# Patient Record
Sex: FEMALE | Race: WHITE | NOT HISPANIC OR LATINO | Employment: OTHER | URBAN - METROPOLITAN AREA
[De-identification: names, ages, dates, MRNs, and addresses within clinical notes are randomized per-mention and may not be internally consistent; named-entity substitution may affect disease eponyms.]

---

## 2017-01-19 ENCOUNTER — OFFICE VISIT (OUTPATIENT)
Dept: ENDOCRINOLOGY | Age: 77
End: 2017-01-19

## 2017-01-19 VITALS
DIASTOLIC BLOOD PRESSURE: 62 MMHG | BODY MASS INDEX: 32.09 KG/M2 | HEART RATE: 74 BPM | OXYGEN SATURATION: 97 % | SYSTOLIC BLOOD PRESSURE: 124 MMHG | HEIGHT: 65 IN | WEIGHT: 192.6 LBS

## 2017-01-19 DIAGNOSIS — E78.5 HYPERLIPIDEMIA, UNSPECIFIED HYPERLIPIDEMIA TYPE: Primary | ICD-10-CM

## 2017-01-19 DIAGNOSIS — E04.2 NONTOXIC MULTINODULAR GOITER: ICD-10-CM

## 2017-01-19 DIAGNOSIS — M48.061 SPINAL STENOSIS OF LUMBAR REGION: ICD-10-CM

## 2017-01-19 DIAGNOSIS — I10 ESSENTIAL HYPERTENSION: ICD-10-CM

## 2017-01-19 DIAGNOSIS — E11.42 TYPE 2 DIABETES MELLITUS WITH PERIPHERAL NEUROPATHY (HCC): ICD-10-CM

## 2017-01-19 PROBLEM — F41.0 PANIC ATTACK: Status: ACTIVE | Noted: 2017-01-19

## 2017-01-19 PROCEDURE — 99204 OFFICE O/P NEW MOD 45 MIN: CPT | Performed by: INTERNAL MEDICINE

## 2017-01-19 RX ORDER — DILTIAZEM HYDROCHLORIDE 120 MG/1
120 TABLET, FILM COATED ORAL DAILY
COMMUNITY
End: 2017-01-19

## 2017-01-19 RX ORDER — CHOLECALCIFEROL (VITAMIN D3) 50 MCG
2000 TABLET ORAL
COMMUNITY
End: 2017-01-19

## 2017-01-19 RX ORDER — NITROFURANTOIN 25; 75 MG/1; MG/1
100 CAPSULE ORAL 2 TIMES DAILY
COMMUNITY
Start: 2017-01-18 | End: 2017-05-19

## 2017-01-19 NOTE — PROGRESS NOTES
Subjective   Monica Pagan is a 76 y.o. female.     HPI Comments: NEW PT REF BY DR JAKE POLK JR  FOR DM 2, MNG/ TESTING BS 1 X DAY / LAST DM EYE EXAM FEB 2016/ LAST DM FOOT EXAM TODAY WITH DR GONZÁLES     Diabetes   Hypoglycemia symptoms include nervousness/anxiousness and tremors. Associated symptoms include fatigue. Chest pain:  PVCS.   Goiter   Associated symptoms include abdominal pain, chills, fatigue, a fever, joint swelling and numbness (FEET AND LEGS AND BONES ). Chest pain:  PVCS.      Patient is a 76-year-old female who was referred for thyroid and diabetes consultation by Dr. Polk.  In 2015, an incidental left thyroid nodule was noted on CT of the cervical spine.  She had a thyroid ultrasound done in March 2016 which showed a multinodular goiter with the largest not Jud measuring 2.8 cm on the left.  Thyroid function tests were normal.  She denies any previous history of head/neck radiation therapy.  She denies any neck soreness.  She is not on thyroid medication.    She has known diabetes mellitus since 2008 and was started on insulin at that time.  She has been on Levemir 10 units every morning and metformin 500 mg twice a day.  Her fasting blood sugars have been higher over the past month and ranges from 170-230.  She denies any hypoglycemic episode.  Her last meal was at 7 AM.    Her last eye examination was in February 2016.  She has no history of retinopathy.  She denies blurry vision.  She complains of numbness, tingling and burning in her legs.  She was tried on Lyrica and gabapentin in the past which caused sedation.  She has never used Cymbalta.    She has cervical and lumbar degenerative disc disease and spinal stenosis.  She had a previous lumbar surgery.  She has chronic pain and has been on Norco prescribed by Dr. Schuler.  She follows with Dr. Cortes at [facility].    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 Irving.  She has not had a biopsy in the past.    She has  "hyperlipidemia and is not on medication.  She has no significant weight change in the past 6 months.  Lipid profile done in July 2016 are as follows.  Cholesterol 214.  HDL 50.  .  She has not used any cholesterol medication in the past.      She has hypertension and has been on losartan 50 mg once a day and diltiazem  mg once a day.  She denies any previous history of myocardial infarction or stroke.    She had a sleep study in the past and was advised to sleep on her side.  She occasionally agrees wakes herself up snoring.     She is under treatment for urinary tract infection with Macrobid.  The following portions of the patient's history were reviewed and updated as appropriate: allergies, current medications, past family history, past medical history, past social history, past surgical history and problem list.    Review of Systems   Constitutional: Positive for chills, fatigue and fever.   HENT: Negative.    Eyes: Negative.    Respiratory: Negative.    Cardiovascular: Chest pain:  PVCS.   Gastrointestinal: Positive for abdominal distention, abdominal pain and diarrhea. Constipation: IBS    Endocrine: Negative.    Genitourinary: Positive for difficulty urinating (HAS UTI ON ANTIBIOTICS ), frequency and urgency.   Musculoskeletal: Positive for back pain and joint swelling.   Skin: Negative.    Allergic/Immunologic: Negative.    Neurological: Positive for tremors and numbness (FEET AND LEGS AND BONES ).   Hematological: Negative.    Psychiatric/Behavioral: Positive for sleep disturbance. The patient is nervous/anxious.        Objective      Vitals:    01/19/17 1025   BP: 124/62   BP Location: Right arm   Patient Position: Sitting   Cuff Size: Large Adult   Pulse: 74   SpO2: 97%   Weight: 192 lb 9.6 oz (87.4 kg)   Height: 64.5\" (163.8 cm)     Physical Exam   Constitutional: She is oriented to person, place, and time. She appears well-developed and well-nourished. No distress.   HENT:   Head: " Normocephalic.   Nose: Nose normal.   Mouth/Throat: No oropharyngeal exudate.   Eyes: Conjunctivae and EOM are normal. Right eye exhibits no discharge. Left eye exhibits no discharge. No scleral icterus.   Neck: Neck supple. No JVD present. No tracheal deviation present. Thyromegaly present.   Thyroid barely palpable, soft, nontender   Cardiovascular: Normal rate, regular rhythm, normal heart sounds and intact distal pulses.  Exam reveals no friction rub.    No murmur heard.  Pulmonary/Chest: Effort normal and breath sounds normal. No respiratory distress. She has no wheezes. She has no rales.   Abdominal: Soft. Bowel sounds are normal. She exhibits no distension and no mass. There is no tenderness.   Musculoskeletal: Normal range of motion. She exhibits no edema, tenderness or deformity.   Lymphadenopathy:     She has no cervical adenopathy.   Neurological: She is alert and oriented to person, place, and time. She displays normal reflexes.   Intact light touch in the distal lower extremities   Skin: Skin is warm. She is not diaphoretic. No erythema. No pallor.   Calluses on the right first and fifth metatarsal area and left first metatarsal.  No plantar ulcers   Psychiatric: She has a normal mood and affect. Her behavior is normal.     Office Visit on 11/22/2016   Component Date Value Ref Range Status   • TSH 11/22/2016 0.743  0.270 - 4.200 mIU/mL Final   • Free T4 11/22/2016 1.28  0.93 - 1.70 ng/dL Final   • T3, Free 11/22/2016 2.7  2.0 - 4.4 pg/mL Final   • Sed Rate 11/22/2016 9  0 - 30 mm/hr Final   • WBC 11/22/2016 8.63  4.50 - 10.70 10*3/mm3 Final   • RBC 11/22/2016 4.46  3.90 - 5.20 10*6/mm3 Final   • Hemoglobin 11/22/2016 14.1  11.9 - 15.5 g/dL Final   • Hematocrit 11/22/2016 42.0  35.6 - 45.5 % Final   • MCV 11/22/2016 94.2  80.5 - 98.2 fL Final   • MCH 11/22/2016 31.6  26.9 - 32.0 pg Final   • MCHC 11/22/2016 33.6  32.4 - 36.3 g/dL Final   • RDW 11/22/2016 13.2* 11.7 - 13.0 % Final   • Platelets 11/22/2016  249  140 - 500 10*3/mm3 Final   • Neutrophil Rel % 11/22/2016 64.1  42.7 - 76.0 % Final   • Lymphocyte Rel % 11/22/2016 28.7  19.6 - 45.3 % Final   • Monocyte Rel % 11/22/2016 3.7* 5.0 - 12.0 % Final   • Eosinophil Rel % 11/22/2016 2.7  0.3 - 6.2 % Final   • Basophil Rel % 11/22/2016 0.5  0.0 - 1.5 % Final   • Neutrophils Absolute 11/22/2016 5.53  1.90 - 8.10 10*3/mm3 Final   • Lymphocytes Absolute 11/22/2016 2.48  0.90 - 4.80 10*3/mm3 Final   • Monocytes Absolute 11/22/2016 0.32  0.20 - 1.20 10*3/mm3 Final   • Eosinophils Absolute 11/22/2016 0.23  0.00 - 0.70 10*3/mm3 Final   • Basophils Absolute 11/22/2016 0.04  0.00 - 0.20 10*3/mm3 Final   • Immature Granulocyte Rel % 11/22/2016 0.3  0.0 - 0.5 % Final   • Immature Grans Absolute 11/22/2016 0.03  0.00 - 0.03 10*3/mm3 Final   • Glucose 11/22/2016 165* 65 - 99 mg/dL Final   • BUN 11/22/2016 18  8 - 23 mg/dL Final   • Creatinine 11/22/2016 1.14* 0.57 - 1.00 mg/dL Final   • eGFR Non African Am 11/22/2016 47* >60 mL/min/1.73 Final    Comment: The MDRD GFR formula is only valid for adults with stable  renal function between ages 18 and 70.     • eGFR  Am 11/22/2016 56* >60 mL/min/1.73 Final   • BUN/Creatinine Ratio 11/22/2016 15.8  7.0 - 25.0 Final   • Sodium 11/22/2016 143  136 - 145 mmol/L Final   • Potassium 11/22/2016 4.7  3.5 - 5.2 mmol/L Final   • Chloride 11/22/2016 101  98 - 107 mmol/L Final   • Total CO2 11/22/2016 27.6  22.0 - 29.0 mmol/L Final   • Calcium 11/22/2016 10.3  8.6 - 10.5 mg/dL Final   • Total Protein 11/22/2016 7.0  6.0 - 8.5 g/dL Final   • Albumin 11/22/2016 4.50  3.50 - 5.20 g/dL Final   • Globulin 11/22/2016 2.5  gm/dL Final   • A/G Ratio 11/22/2016 1.8  g/dL Final   • Total Bilirubin 11/22/2016 0.4  0.1 - 1.2 mg/dL Final   • Alkaline Phosphatase 11/22/2016 94  39 - 117 U/L Final   • AST (SGOT) 11/22/2016 15  1 - 32 U/L Final   • ALT (SGPT) 11/22/2016 16  1 - 33 U/L Final   • Folate, Hemolysate 11/22/2016 >620.0  Not Estab. ng/mL Final    • RBC Folate 11/22/2016 >1476  >498 ng/mL Final   • Hemoglobin A1C 11/22/2016 7.00* 4.80 - 5.60 % Final    Comment: Hemoglobin A1C Ranges:  Increased Risk for Diabetes  5.7% to 6.4%  Diabetes                     >= 6.5%  Diabetic Goal                < 7.0%       Assessment/Plan   Monica was seen today for diabetes and goiter.    Diagnoses and all orders for this visit:    Hyperlipidemia, unspecified hyperlipidemia type  -     Lipid Panel  -     T4, Free  -     TSH  -     Thyroid Peroxidase Antibody    Type 2 diabetes mellitus with peripheral neuropathy  -     Comprehensive Metabolic Panel  -     Hemoglobin A1c  -     Microalbumin / Creatinine Urine Ratio  -     T4, Free  -     TSH  -     Thyroid Peroxidase Antibody  -     C-Peptide  -     Glutamic Acid Decarboxylase  -     insulin detemir (LEVEMIR FLEXTOUCH) 100 UNIT/ML injection; Inject 14 Units under the skin Daily.    Nontoxic multinodular goiter  -     T4, Free  -     TSH  -     Thyroid Peroxidase Antibody  -     US Thyroid    Spinal stenosis of lumbar region    Essential hypertension      Patient has nontoxic multinodular goiter.  She is clinically euthyroid.  Schedule follow-up thyroid ultrasound at [facility].    She has type 2 diabetes mellitus and has peripheral neuropathy which may be due to a combination of lumbar degenerative disc disease and diabetic peripheral neuropathy.    Increase Levemir to 14 units every morning.  Continue metformin 500 mg twice a day.  Check urine microalbumin.  Consider adding Cymbalta.  Will defer pain management to Dr. Schuler.  Continue losartan and diltiazem per Dr. Gentile.  Check lipid profile.  Consider starting on Lipitor.  Patient has possible sleep apnea.  Suggests outpatient sleep evaluation.    Send copy of my notes and labs to Dr. Gentile and Dr. Schuler.    RTC 4 mos

## 2017-01-19 NOTE — LETTER
January 19, 2017     Froylan Polk Jr., MD  [address]  [address]  [facility] [address]    Patient: Monica Pagan   YOB: 1940   Date of Visit: 1/19/2017       Dear Dr. Seferino MD:    Thank you for referring Monica Pagan to me for evaluation. Below are the relevant portions of my assessment and plan of care.    If you have questions, please do not hesitate to call me. I look forward to following Monica along with you.         Sincerely,        Francesco Gonzáles MD        CC: MD Francesco Mojica MD  1/19/2017 12:16 PM  Signed  Subjective   Monica Pagan is a 76 y.o. female.     HPI Comments: NEW PT REF BY DR JAKE POLK JR  FOR DM 2, MNG/ TESTING BS 1 X DAY / LAST DM EYE EXAM FEB 2016/ LAST DM FOOT EXAM TODAY WITH DR GONZÁLES     Diabetes   Hypoglycemia symptoms include nervousness/anxiousness and tremors. Associated symptoms include fatigue. Chest pain:  PVCS.   Goiter   Associated symptoms include abdominal pain, chills, fatigue, a fever, joint swelling and numbness (FEET AND LEGS AND BONES ). Chest pain:  PVCS.      Patient is a 76-year-old female who was referred for thyroid and diabetes consultation by Dr. Polk.  In 2015, an incidental left thyroid nodule was noted on CT of the cervical spine.  She had a thyroid ultrasound done in March 2016 which showed a multinodular goiter with the largest not Jud measuring 2.8 cm on the left.  Thyroid function tests were normal.  She denies any previous history of head/neck radiation therapy.  She denies any neck soreness.  She is not on thyroid medication.    She has known diabetes mellitus since 2008 and was started on insulin at that time.  She has been on Levemir 10 units every morning and metformin 500 mg twice a day.  Her fasting blood sugars have been higher over the past month and ranges from 170-230.  She denies any hypoglycemic episode.  Her last meal was at 7 AM.    Her last eye examination was in February 2016.  She has no history of  retinopathy.  She denies blurry vision.  She complains of numbness, tingling and burning in her legs.  She was tried on Lyrica and gabapentin in the past which caused sedation.  She has never used Cymbalta.    She has cervical and lumbar degenerative disc disease and spinal stenosis.  She had a previous lumbar surgery.  She has chronic pain and has been on Norco prescribed by Dr. Schuler.  She follows with Dr. Cortes at [place].    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 Irving.  She has not had a biopsy in the past.    She has hyperlipidemia and is not on medication.  She has no significant weight change in the past 6 months.  Lipid profile done in July 2016 are as follows.  Cholesterol 214.  HDL 50.  .  She has not used any cholesterol medication in the past.      She has hypertension and has been on losartan 50 mg once a day and diltiazem  mg once a day.  She denies any previous history of myocardial infarction or stroke.    She had a sleep study in the past and was advised to sleep on her side.  She occasionally agrees wakes herself up snoring.     She is under treatment for urinary tract infection with Macrobid.  The following portions of the patient's history were reviewed and updated as appropriate: allergies, current medications, past family history, past medical history, past social history, past surgical history and problem list.    Review of Systems   Constitutional: Positive for chills, fatigue and fever.   HENT: Negative.    Eyes: Negative.    Respiratory: Negative.    Cardiovascular: Chest pain:  PVCS.   Gastrointestinal: Positive for abdominal distention, abdominal pain and diarrhea. Constipation: IBS    Endocrine: Negative.    Genitourinary: Positive for difficulty urinating (HAS UTI ON ANTIBIOTICS ), frequency and urgency.   Musculoskeletal: Positive for back pain and joint swelling.   Skin: Negative.    Allergic/Immunologic: Negative.    Neurological: Positive  "for tremors and numbness (FEET AND LEGS AND BONES ).   Hematological: Negative.    Psychiatric/Behavioral: Positive for sleep disturbance. The patient is nervous/anxious.        Objective      Vitals:    01/19/17 1025   BP: 124/62   BP Location: Right arm   Patient Position: Sitting   Cuff Size: Large Adult   Pulse: 74   SpO2: 97%   Weight: 192 lb 9.6 oz (87.4 kg)   Height: 64.5\" (163.8 cm)     Physical Exam   Constitutional: She is oriented to person, place, and time. She appears well-developed and well-nourished. No distress.   HENT:   Head: Normocephalic.   Nose: Nose normal.   Mouth/Throat: No oropharyngeal exudate.   Eyes: Conjunctivae and EOM are normal. Right eye exhibits no discharge. Left eye exhibits no discharge. No scleral icterus.   Neck: Neck supple. No JVD present. No tracheal deviation present. Thyromegaly present.   Thyroid barely palpable, soft, nontender   Cardiovascular: Normal rate, regular rhythm, normal heart sounds and intact distal pulses.  Exam reveals no friction rub.    No murmur heard.  Pulmonary/Chest: Effort normal and breath sounds normal. No respiratory distress. She has no wheezes. She has no rales.   Abdominal: Soft. Bowel sounds are normal. She exhibits no distension and no mass. There is no tenderness.   Musculoskeletal: Normal range of motion. She exhibits no edema, tenderness or deformity.   Lymphadenopathy:     She has no cervical adenopathy.   Neurological: She is alert and oriented to person, place, and time. She displays normal reflexes.   Intact light touch in the distal lower extremities   Skin: Skin is warm. She is not diaphoretic. No erythema. No pallor.   Calluses on the right first and fifth metatarsal area and left first metatarsal.  No plantar ulcers   Psychiatric: She has a normal mood and affect. Her behavior is normal.     Office Visit on 11/22/2016   Component Date Value Ref Range Status   • TSH 11/22/2016 0.743  0.270 - 4.200 mIU/mL Final   • Free T4 11/22/2016 " 1.28  0.93 - 1.70 ng/dL Final   • T3, Free 11/22/2016 2.7  2.0 - 4.4 pg/mL Final   • Sed Rate 11/22/2016 9  0 - 30 mm/hr Final   • WBC 11/22/2016 8.63  4.50 - 10.70 10*3/mm3 Final   • RBC 11/22/2016 4.46  3.90 - 5.20 10*6/mm3 Final   • Hemoglobin 11/22/2016 14.1  11.9 - 15.5 g/dL Final   • Hematocrit 11/22/2016 42.0  35.6 - 45.5 % Final   • MCV 11/22/2016 94.2  80.5 - 98.2 fL Final   • MCH 11/22/2016 31.6  26.9 - 32.0 pg Final   • MCHC 11/22/2016 33.6  32.4 - 36.3 g/dL Final   • RDW 11/22/2016 13.2* 11.7 - 13.0 % Final   • Platelets 11/22/2016 249  140 - 500 10*3/mm3 Final   • Neutrophil Rel % 11/22/2016 64.1  42.7 - 76.0 % Final   • Lymphocyte Rel % 11/22/2016 28.7  19.6 - 45.3 % Final   • Monocyte Rel % 11/22/2016 3.7* 5.0 - 12.0 % Final   • Eosinophil Rel % 11/22/2016 2.7  0.3 - 6.2 % Final   • Basophil Rel % 11/22/2016 0.5  0.0 - 1.5 % Final   • Neutrophils Absolute 11/22/2016 5.53  1.90 - 8.10 10*3/mm3 Final   • Lymphocytes Absolute 11/22/2016 2.48  0.90 - 4.80 10*3/mm3 Final   • Monocytes Absolute 11/22/2016 0.32  0.20 - 1.20 10*3/mm3 Final   • Eosinophils Absolute 11/22/2016 0.23  0.00 - 0.70 10*3/mm3 Final   • Basophils Absolute 11/22/2016 0.04  0.00 - 0.20 10*3/mm3 Final   • Immature Granulocyte Rel % 11/22/2016 0.3  0.0 - 0.5 % Final   • Immature Grans Absolute 11/22/2016 0.03  0.00 - 0.03 10*3/mm3 Final   • Glucose 11/22/2016 165* 65 - 99 mg/dL Final   • BUN 11/22/2016 18  8 - 23 mg/dL Final   • Creatinine 11/22/2016 1.14* 0.57 - 1.00 mg/dL Final   • eGFR Non African Am 11/22/2016 47* >60 mL/min/1.73 Final    Comment: The MDRD GFR formula is only valid for adults with stable  renal function between ages 18 and 70.     • eGFR  Am 11/22/2016 56* >60 mL/min/1.73 Final   • BUN/Creatinine Ratio 11/22/2016 15.8  7.0 - 25.0 Final   • Sodium 11/22/2016 143  136 - 145 mmol/L Final   • Potassium 11/22/2016 4.7  3.5 - 5.2 mmol/L Final   • Chloride 11/22/2016 101  98 - 107 mmol/L Final   • Total CO2 11/22/2016  27.6  22.0 - 29.0 mmol/L Final   • Calcium 11/22/2016 10.3  8.6 - 10.5 mg/dL Final   • Total Protein 11/22/2016 7.0  6.0 - 8.5 g/dL Final   • Albumin 11/22/2016 4.50  3.50 - 5.20 g/dL Final   • Globulin 11/22/2016 2.5  gm/dL Final   • A/G Ratio 11/22/2016 1.8  g/dL Final   • Total Bilirubin 11/22/2016 0.4  0.1 - 1.2 mg/dL Final   • Alkaline Phosphatase 11/22/2016 94  39 - 117 U/L Final   • AST (SGOT) 11/22/2016 15  1 - 32 U/L Final   • ALT (SGPT) 11/22/2016 16  1 - 33 U/L Final   • Folate, Hemolysate 11/22/2016 >620.0  Not Estab. ng/mL Final   • RBC Folate 11/22/2016 >1476  >498 ng/mL Final   • Hemoglobin A1C 11/22/2016 7.00* 4.80 - 5.60 % Final    Comment: Hemoglobin A1C Ranges:  Increased Risk for Diabetes  5.7% to 6.4%  Diabetes                     >= 6.5%  Diabetic Goal                < 7.0%       Assessment/Plan   Monica was seen today for diabetes and goiter.    Diagnoses and all orders for this visit:    Hyperlipidemia, unspecified hyperlipidemia type  -     Lipid Panel  -     T4, Free  -     TSH  -     Thyroid Peroxidase Antibody    Type 2 diabetes mellitus with peripheral neuropathy  -     Comprehensive Metabolic Panel  -     Hemoglobin A1c  -     Microalbumin / Creatinine Urine Ratio  -     T4, Free  -     TSH  -     Thyroid Peroxidase Antibody  -     C-Peptide  -     Glutamic Acid Decarboxylase  -     insulin detemir (LEVEMIR FLEXTOUCH) 100 UNIT/ML injection; Inject 14 Units under the skin Daily.    Nontoxic multinodular goiter  -     T4, Free  -     TSH  -     Thyroid Peroxidase Antibody  -     US Thyroid    Spinal stenosis of lumbar region    Essential hypertension      Patient has nontoxic multinodular goiter.  She is clinically euthyroid.  Schedule follow-up thyroid ultrasound at [facility], operated by [facility].    She has type 2 diabetes mellitus and has peripheral neuropathy which may be due to a combination of lumbar degenerative disc disease and diabetic peripheral neuropathy.    Increase Levemir to 14 units every  morning.  Continue metformin 500 mg twice a day.  Check urine microalbumin.  Consider adding Cymbalta.  Will defer pain management to Dr. Schuler.  Continue losartan and diltiazem per Dr. Gentile.  Check lipid profile.  Consider starting on Lipitor.  Patient has possible sleep apnea.  Suggests outpatient sleep evaluation.    Send copy of my notes and labs to Dr. Gentile and Dr. Schuler.    RTC 4 mos

## 2017-01-19 NOTE — MR AVS SNAPSHOT
[facility] ENDOCRINOLOGY  [phone]                    Monica Pagan   1/19/2017 10:00 AM   Office Visit    Dept Phone:  [phone]   Encounter #:  [id number]    Provider:  Francesco Perez MD   Department:  [facility] ENDOCRINOLOGY                Your Full Care Plan              Today's Medication Changes          These changes are accurate as of: 1/19/17 12:25 PM.  If you have any questions, ask your nurse or doctor.               Medication(s)that have changed:     insulin detemir 100 UNIT/ML injection   Commonly known as:  LEVEMIR FLEXTOUCH   Inject 14 Units under the skin Daily.   What changed:  See the new instructions.   Changed by:  Francesco Perez MD         Stop taking medication(s)listed here:     diltiaZEM 120 MG tablet   Commonly known as:  CARDIZEM   Stopped by:  Francesco Perez MD           hydrOXYzine 25 MG tablet   Commonly known as:  ATARAX   Stopped by:  Francesco Perez MD           metroNIDAZOLE 500 MG tablet   Commonly known as:  FLAGYL   Stopped by:  Francesco Perez MD           valACYclovir 1000 MG tablet   Commonly known as:  VALTREX   Stopped by:  Francesco Perez MD           Vitamin D 2000 UNITS tablet   Stopped by:  Francesco Perez MD                Where to Get Your Medications      Information about where to get these medications is not yet available     ! Ask your nurse or doctor about these medications     insulin detemir 100 UNIT/ML injection                  Your Updated Medication List          This list is accurate as of: 1/19/17 12:25 PM.  Always use your most recent med list.                clonazePAM 0.5 MG tablet   Commonly known as:  KlonoPIN   TAKE 1 TABLET BY MOUTH THREE TIMES DAILY AS NEEDED FOR ANXIETY       cyanocobalamin 1000 MCG/ML injection       diltiaZEM  MG 24 hr capsule   Commonly known as:  CARTIA XT   Take 1 capsule by mouth Daily.       estradiol 0.5 MG tablet   Commonly known as:  ESTRACE   TAKE 1  "TABLET EVERY DAY       eszopiclone 3 MG tablet   Commonly known as:  LUNESTA   Take 1 tablet by mouth Every Night. Take immediately before bedtime       HYDROcodone-acetaminophen 7.5-325 MG per tablet   Commonly known as:  NORCO       insulin detemir 100 UNIT/ML injection   Commonly known as:  LEVEMIR FLEXTOUCH   Inject 14 Units under the skin Daily.       Insulin Pen Needle 32G X 4 MM misc   Commonly known as:  BD PEN NEEDLE JENNIFER U/F   1 each by Other route Daily.       Insulin Syringe 29G X 1/2\" 1 ML misc       losartan 50 MG tablet   Commonly known as:  COZAAR   TAKE 1 TABLET EVERY DAY       metFORMIN 500 MG tablet   Commonly known as:  GLUCOPHAGE   Take 1 tablet by mouth 2 (Two) Times a Day With Meals.       Needle (Disp) 30G X 1/2\" misc   1 mL every 30 (thirty) days.       nitrofurantoin (macrocrystal-monohydrate) 100 MG capsule   Commonly known as:  MACROBID       vitamin D 31042 UNITS capsule capsule   Commonly known as:  ERGOCALCIFEROL   TAKE 1 CAPSULE BY MOUTH EVERY 7 DAYS               We Performed the Following     C-Peptide     Comprehensive Metabolic Panel     Glutamic Acid Decarboxylase     Hemoglobin A1c     Lipid Panel     Microalbumin / Creatinine Urine Ratio     T4, Free     Thyroid Peroxidase Antibody     TSH     US Thyroid       You Were Diagnosed With        Codes Comments    Hyperlipidemia, unspecified hyperlipidemia type    -  Primary ICD-10-CM: E78.5  ICD-9-CM: 272.4     Type 2 diabetes mellitus with peripheral neuropathy     ICD-10-CM: E11.42  ICD-9-CM: 250.60, 357.2     Nontoxic multinodular goiter     ICD-10-CM: E04.2  ICD-9-CM: 241.1     Spinal stenosis of lumbar region     ICD-10-CM: M48.06  ICD-9-CM: 724.02     Essential hypertension     ICD-10-CM: I10  ICD-9-CM: 401.9       Medications to be Given to You by a Medical Professional     Due       Frequency    1/17/2017 cyanocobalamin injection 1,000 mcg  Every 28 Days      Instructions     None    Patient Instructions History    "   Upcoming Appointments     Visit Type Date Time Department    NEW PATIENT 1/19/2017 10:00 AM [facility]    [facility] 1/19/2017 11:15 AM [facility] 4003    OFFICE VISIT 3/22/2017 10:45 AM [facility] PC [facility] 4003    OFFICE VISIT 5/19/2017 10:30 AM [facility]      MyChart Signup     Our records indicate that you have an active [facility] AVIS account.    You can view your After Visit Summary by going to [facility] and logging in with your AVIS username and password.  If you don't have a AVIS username and password but a parent or guardian has access to your record, the parent or guardian should login with their own AVIS username and password and access your record to view the After Visit Summary.    If you have questions, you can email [email or web address] or call [phone] to talk to our AVIS staff.  Remember, AVIS is NOT to be used for urgent needs.  For medical emergencies, dial 911.               Other Info from Your Visit           Your Appointments     Mar 22, 2017 10:45 AM EDT   Office Visit with Froylan Gentile Jr., MD   [facility] INTERNAL MEDICINE (--)    [address] [facility]  [facility] [address] [phone]           Arrive 15 minutes prior to appointment.            May 19, 2017 10:30 AM EDT   Office Visit with Francesco Perez MD   [facility] ENDOCRINOLOGY (--)    [address]  [facility] [address] [phone]           Arrive 15 minutes prior to appointment.              Allergies     Erythromycin  Diarrhea    Gabapentin      Levofloxacin  Diarrhea    Nsaids      Pregabalin  Other (See Comments)    fogginess    Sulfa Antibiotics      Theophylline      Compazine  [Prochlorperazine Edisylate]  Anxiety    restless    Prochlorperazine  Anxiety    restless      Reason for Visit     Diabetes     Goiter           Vital Signs     Blood Pressure Pulse Height Weight Oxygen Saturation Body  "Mass Index    124/62 (BP Location: Right arm, Patient Position: Sitting, Cuff Size: Large Adult) 74 64.5\" (163.8 cm) 192 lb 9.6 oz (87.4 kg) 97% 32.55 kg/m2    Smoking Status                   Never Smoker           Problems and Diagnoses Noted     High blood pressure    High cholesterol or triglycerides    Nontoxic multinodular goiter    Panic attack    Narrowing of spinal canal    Type 2 diabetes mellitus with peripheral neuropathy        "

## 2017-01-20 DIAGNOSIS — Z78.0 POST-MENOPAUSAL: ICD-10-CM

## 2017-01-20 RX ORDER — ESTRADIOL 0.5 MG/1
0.5 TABLET ORAL DAILY
Qty: 90 TABLET | Refills: 3 | Status: SHIPPED | OUTPATIENT
Start: 2017-01-20 | End: 2017-01-26 | Stop reason: SDUPTHER

## 2017-01-23 LAB
ALBUMIN SERPL-MCNC: 4.4 G/DL (ref 3.5–5.2)
ALBUMIN/CREAT UR: 11.3 MG/G CREAT (ref 0–30)
ALBUMIN/GLOB SERPL: 1.6 G/DL
ALP SERPL-CCNC: 101 U/L (ref 39–117)
ALT SERPL-CCNC: 20 U/L (ref 1–33)
AST SERPL-CCNC: 16 U/L (ref 1–32)
BILIRUB SERPL-MCNC: 0.3 MG/DL (ref 0.1–1.2)
BUN SERPL-MCNC: 20 MG/DL (ref 8–23)
BUN/CREAT SERPL: 22.5 (ref 7–25)
C PEPTIDE SERPL-MCNC: 6.9 NG/ML (ref 1.1–4.4)
CALCIUM SERPL-MCNC: 10.6 MG/DL (ref 8.6–10.5)
CHLORIDE SERPL-SCNC: 99 MMOL/L (ref 98–107)
CHOLEST SERPL-MCNC: 231 MG/DL (ref 0–200)
CO2 SERPL-SCNC: 24.4 MMOL/L (ref 22–29)
CREAT SERPL-MCNC: 0.89 MG/DL (ref 0.57–1)
CREAT UR-MCNC: 107.7 MG/DL
GAD65 AB SER IA-ACNC: <5 U/ML (ref 0–5)
GLOBULIN SER CALC-MCNC: 2.7 GM/DL
GLUCOSE SERPL-MCNC: 166 MG/DL (ref 65–99)
HBA1C MFR BLD: 7.1 % (ref 4.8–5.6)
HDLC SERPL-MCNC: 57 MG/DL (ref 40–60)
LDLC SERPL CALC-MCNC: 136 MG/DL (ref 0–100)
MICROALBUMIN UR-MCNC: 12.2 UG/ML
POTASSIUM SERPL-SCNC: 4.4 MMOL/L (ref 3.5–5.2)
PROT SERPL-MCNC: 7.1 G/DL (ref 6–8.5)
SODIUM SERPL-SCNC: 138 MMOL/L (ref 136–145)
T4 FREE SERPL-MCNC: 1.25 NG/DL (ref 0.93–1.7)
THYROPEROXIDASE AB SERPL-ACNC: 49 IU/ML (ref 0–34)
TRIGL SERPL-MCNC: 188 MG/DL (ref 0–150)
TSH SERPL DL<=0.005 MIU/L-ACNC: 0.76 MIU/ML (ref 0.27–4.2)
VLDLC SERPL CALC-MCNC: 37.6 MG/DL (ref 5–40)

## 2017-01-25 DIAGNOSIS — F41.9 ANXIETY: ICD-10-CM

## 2017-01-25 RX ORDER — CLONAZEPAM 0.5 MG/1
TABLET ORAL
Qty: 90 TABLET | Refills: 5 | Status: SHIPPED | OUTPATIENT
Start: 2017-01-25 | End: 2017-07-11 | Stop reason: SDUPTHER

## 2017-01-25 RX ORDER — ATORVASTATIN CALCIUM 20 MG/1
20 TABLET, FILM COATED ORAL DAILY
Qty: 30 TABLET | Refills: 1 | Status: SHIPPED | OUTPATIENT
Start: 2017-01-25 | End: 2017-02-21 | Stop reason: SINTOL

## 2017-01-26 ENCOUNTER — HOSPITAL ENCOUNTER (OUTPATIENT)
Dept: ULTRASOUND IMAGING | Facility: HOSPITAL | Age: 77
Discharge: HOME | End: 2017-01-26
Attending: INTERNAL MEDICINE | Admitting: INTERNAL MEDICINE

## 2017-01-26 DIAGNOSIS — Z78.0 POST-MENOPAUSAL: ICD-10-CM

## 2017-01-26 DIAGNOSIS — Z12.31 VISIT FOR SCREENING MAMMOGRAM: Primary | ICD-10-CM

## 2017-01-26 PROCEDURE — 76536 US EXAM OF HEAD AND NECK: CPT

## 2017-01-26 RX ORDER — ESTRADIOL 0.5 MG/1
0.5 TABLET ORAL DAILY
Qty: 90 TABLET | Refills: 3 | Status: SHIPPED | OUTPATIENT
Start: 2017-01-26 | End: 2018-01-19 | Stop reason: SDUPTHER

## 2017-01-30 ENCOUNTER — TELEPHONE (OUTPATIENT)
Dept: INTERNAL MEDICINE | Facility: CLINIC | Age: 77
End: 2017-01-30

## 2017-01-30 NOTE — TELEPHONE ENCOUNTER
The pt called last week wanting an order for a Mammo which I sent to [facility], when she called to schedule she c/o R breast pain-do you need to see her first?

## 2017-02-17 ENCOUNTER — HOSPITAL ENCOUNTER (OUTPATIENT)
Dept: MAMMOGRAPHY | Facility: HOSPITAL | Age: 77
Discharge: HOME | End: 2017-02-17
Admitting: FAMILY MEDICINE

## 2017-02-17 DIAGNOSIS — Z12.31 VISIT FOR SCREENING MAMMOGRAM: ICD-10-CM

## 2017-02-17 PROCEDURE — G0202 SCR MAMMO BI INCL CAD: HCPCS

## 2017-02-21 ENCOUNTER — TELEPHONE (OUTPATIENT)
Dept: ENDOCRINOLOGY | Age: 77
End: 2017-02-21

## 2017-02-21 NOTE — TELEPHONE ENCOUNTER
----- Message from Chana Cruz sent at 2/21/2017 11:08 AM EST -----  Contact: PATIENT  PATIENT SAID SHE TOOK  LIPITOR  APPROX. 4 WEEKS AGO AND AFTER 1 WEEK SHE WOKE UP AND EVERY JOINT ACHED AND FELT LIKE ARMS BACK AND LEGS HAD NEEDLES STICKING IN THEM. HER MUSCLES IN ARMS AND LEGS ACHED HORRIBLY.  SHE COULD HARDLY WALK.   SHE STOPPED TAKING AND IN 1 DAY SHE WAS BETTER.   SHE SAID SHE PREFERS NOT TO TAKE LIPITOR IF SHE DOES NOT HAVE TO. SHE SAID SHE IS DIETING AND WATCHING WHAT SHE EATS AND KEEPING RECORDS.   [phone]  [facility] [phone]

## 2017-02-24 ENCOUNTER — CLINICAL SUPPORT (OUTPATIENT)
Dept: INTERNAL MEDICINE | Facility: CLINIC | Age: 77
End: 2017-02-24

## 2017-02-24 DIAGNOSIS — R53.83 FATIGUE, UNSPECIFIED TYPE: Primary | ICD-10-CM

## 2017-02-24 PROCEDURE — 96372 THER/PROPH/DIAG INJ SC/IM: CPT | Performed by: FAMILY MEDICINE

## 2017-02-24 RX ADMIN — CYANOCOBALAMIN 1000 MCG: 1000 INJECTION, SOLUTION INTRAMUSCULAR; SUBCUTANEOUS at 09:29

## 2017-03-08 RX ORDER — ESZOPICLONE 3 MG/1
TABLET, FILM COATED ORAL
Qty: 30 TABLET | Refills: 5 | Status: SHIPPED | OUTPATIENT
Start: 2017-03-08 | End: 2017-09-05 | Stop reason: SDUPTHER

## 2017-03-28 ENCOUNTER — OFFICE VISIT (OUTPATIENT)
Dept: INTERNAL MEDICINE | Facility: CLINIC | Age: 77
End: 2017-03-28

## 2017-03-28 VITALS
BODY MASS INDEX: 31.43 KG/M2 | SYSTOLIC BLOOD PRESSURE: 134 MMHG | RESPIRATION RATE: 16 BRPM | TEMPERATURE: 96.3 F | WEIGHT: 186 LBS | DIASTOLIC BLOOD PRESSURE: 60 MMHG | HEART RATE: 72 BPM

## 2017-03-28 DIAGNOSIS — I10 ESSENTIAL HYPERTENSION: ICD-10-CM

## 2017-03-28 DIAGNOSIS — Z12.11 SCREEN FOR COLON CANCER: ICD-10-CM

## 2017-03-28 DIAGNOSIS — IMO0001 UNCONTROLLED TYPE 2 DIABETES MELLITUS WITHOUT COMPLICATION, WITHOUT LONG-TERM CURRENT USE OF INSULIN: Primary | ICD-10-CM

## 2017-03-28 DIAGNOSIS — E11.42 TYPE 2 DIABETES MELLITUS WITH PERIPHERAL NEUROPATHY (HCC): ICD-10-CM

## 2017-03-28 DIAGNOSIS — E78.2 MIXED HYPERLIPIDEMIA: ICD-10-CM

## 2017-03-28 PROCEDURE — 99214 OFFICE O/P EST MOD 30 MIN: CPT | Performed by: FAMILY MEDICINE

## 2017-03-28 NOTE — PROGRESS NOTES
Subjective   Monica Pagan is a 76 y.o. female.     Chief Complaint   Patient presents with   • Fatigue   • Hypertension         History of Present Illness   Patient returns for routine recheck.  She's recently gotten over a gastrointestinal virus.  She has dysfunction of the right elbow minimally to straighten fully the right shoulder.  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  We discussed her history of back surgery and redo back surgery based on infection a few years ago and she's never quite recovered energy wise from that.  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    Otherwise she follows up with endocrinology regarding diabetes with neuropathy.    Treatment of hypertension is also reviewed.  She has had bad results and reactions to statins his first treatment of hyperlipidemia.    We discussed colon cancer screening and she'll make an appointment to follow-up with Dr. Urrutia on for colon cancer screening.      The following portions of the patient's history were reviewed and updated as appropriate: allergies, current medications, past social history and problem list.    Review of Systems   Constitutional: Positive for fatigue.   HENT: Negative.    Eyes: Negative.    Respiratory: Negative.    Cardiovascular: Negative.    Gastrointestinal: Negative.    Endocrine: Negative.    Genitourinary: Negative.    Musculoskeletal: Positive for back pain.   Skin: Negative.    Allergic/Immunologic: Negative.    Neurological: Negative.    Hematological: Negative.    Psychiatric/Behavioral: Positive for dysphoric mood.       Objective   Vitals:    03/28/17 1349   BP: 134/60   Pulse: 72   Resp: 16   Temp: 96.3 °F (35.7 °C)     Physical Exam   Constitutional: She is oriented to person, place, and time. She appears well-developed and well-nourished.   HENT:   Head: Normocephalic and atraumatic.   Right Ear: Tympanic  membrane and external ear normal.   Left Ear: Tympanic membrane and external ear normal.   Nose: Nose normal.   Mouth/Throat: Oropharynx is clear and moist.   Eyes: Conjunctivae and EOM are normal. Pupils are equal, round, and reactive to light.   Neck: Normal range of motion. Neck supple. No JVD present. No thyromegaly present.   Cardiovascular: Normal rate, regular rhythm, normal heart sounds and intact distal pulses.    Pulmonary/Chest: Effort normal and breath sounds normal.   Abdominal: Soft. Bowel sounds are normal.   Musculoskeletal: Normal range of motion.   Lymphadenopathy:     She has no cervical adenopathy.   Neurological: She is alert and oriented to person, place, and time. No cranial nerve deficit. Coordination normal.   Skin: Skin is warm and dry. No rash noted.   Psychiatric: She has a normal mood and affect. Her behavior is normal. Judgment and thought content normal.   Vitals reviewed.      Assessment/Plan   Problem List Items Addressed This Visit        Cardiovascular and Mediastinum    Essential hypertension    Hyperlipidemia       Endocrine    Type 2 diabetes mellitus with peripheral neuropathy      Other Visit Diagnoses     Uncontrolled type 2 diabetes mellitus without complication, without long-term current use of insulin    -  Primary    Relevant Orders    Ambulatory referral to Ophthalmology    Screen for colon cancer           plan: No changes in meds right now we'll see her back in 6 months sooner if needed.  Continued follow-up and surveillance for diabetes type 2 insulin requiring.  Also follow-up with chronic pain management Dr. Linton.

## 2017-04-17 DIAGNOSIS — E11.9 CONTROLLED TYPE 2 DIABETES MELLITUS WITHOUT COMPLICATION, UNSPECIFIED LONG TERM INSULIN USE STATUS: ICD-10-CM

## 2017-04-27 ENCOUNTER — CLINICAL SUPPORT (OUTPATIENT)
Dept: INTERNAL MEDICINE | Facility: CLINIC | Age: 77
End: 2017-04-27

## 2017-04-27 DIAGNOSIS — M79.7 FIBROMYALGIA: Primary | ICD-10-CM

## 2017-04-27 DIAGNOSIS — R53.83 FATIGUE, UNSPECIFIED TYPE: ICD-10-CM

## 2017-04-27 PROCEDURE — 96372 THER/PROPH/DIAG INJ SC/IM: CPT | Performed by: FAMILY MEDICINE

## 2017-04-27 RX ADMIN — CYANOCOBALAMIN 1000 MCG: 1000 INJECTION, SOLUTION INTRAMUSCULAR; SUBCUTANEOUS at 10:23

## 2017-05-02 ENCOUNTER — PATIENT MESSAGE (OUTPATIENT)
Dept: ENDOCRINOLOGY | Age: 77
End: 2017-05-02

## 2017-05-02 DIAGNOSIS — E11.42 TYPE 2 DIABETES MELLITUS WITH PERIPHERAL NEUROPATHY (HCC): ICD-10-CM

## 2017-05-15 ENCOUNTER — TELEPHONE (OUTPATIENT)
Dept: INTERNAL MEDICINE | Facility: CLINIC | Age: 77
End: 2017-05-15

## 2017-05-19 ENCOUNTER — OFFICE VISIT (OUTPATIENT)
Dept: ENDOCRINOLOGY | Age: 77
End: 2017-05-19

## 2017-05-19 VITALS
WEIGHT: 185.6 LBS | HEIGHT: 65 IN | DIASTOLIC BLOOD PRESSURE: 70 MMHG | OXYGEN SATURATION: 98 % | SYSTOLIC BLOOD PRESSURE: 128 MMHG | HEART RATE: 77 BPM | BODY MASS INDEX: 30.92 KG/M2

## 2017-05-19 DIAGNOSIS — E78.5 HYPERLIPIDEMIA, UNSPECIFIED HYPERLIPIDEMIA TYPE: ICD-10-CM

## 2017-05-19 DIAGNOSIS — M53.9 MULTILEVEL DEGENERATIVE DISC DISEASE: ICD-10-CM

## 2017-05-19 DIAGNOSIS — E04.2 NONTOXIC MULTINODULAR GOITER: ICD-10-CM

## 2017-05-19 DIAGNOSIS — E11.42 TYPE 2 DIABETES MELLITUS WITH PERIPHERAL NEUROPATHY (HCC): Primary | ICD-10-CM

## 2017-05-19 DIAGNOSIS — I10 ESSENTIAL HYPERTENSION: ICD-10-CM

## 2017-05-19 PROCEDURE — 99214 OFFICE O/P EST MOD 30 MIN: CPT | Performed by: INTERNAL MEDICINE

## 2017-05-19 RX ORDER — MULTIPLE VITAMINS W/ MINERALS TAB 9MG-400MCG
1 TAB ORAL DAILY
COMMUNITY
End: 2017-07-11 | Stop reason: ALTCHOICE

## 2017-05-20 LAB
ALBUMIN SERPL-MCNC: 4.4 G/DL (ref 3.5–5.2)
ALBUMIN/GLOB SERPL: 1.4 G/DL
ALP SERPL-CCNC: 82 U/L (ref 39–117)
ALT SERPL-CCNC: 21 U/L (ref 1–33)
AST SERPL-CCNC: 22 U/L (ref 1–32)
BILIRUB SERPL-MCNC: 0.5 MG/DL (ref 0.1–1.2)
BUN SERPL-MCNC: 13 MG/DL (ref 8–23)
BUN/CREAT SERPL: 13.5 (ref 7–25)
CALCIUM SERPL-MCNC: 10.8 MG/DL (ref 8.6–10.5)
CHLORIDE SERPL-SCNC: 101 MMOL/L (ref 98–107)
CHOLEST SERPL-MCNC: 213 MG/DL (ref 0–200)
CO2 SERPL-SCNC: 26.4 MMOL/L (ref 22–29)
CREAT SERPL-MCNC: 0.96 MG/DL (ref 0.57–1)
GLOBULIN SER CALC-MCNC: 3.1 GM/DL
GLUCOSE SERPL-MCNC: 123 MG/DL (ref 65–99)
HBA1C MFR BLD: 6.4 % (ref 4.8–5.6)
HDLC SERPL-MCNC: 56 MG/DL (ref 40–60)
LDLC SERPL CALC-MCNC: 123 MG/DL (ref 0–100)
POTASSIUM SERPL-SCNC: 4.8 MMOL/L (ref 3.5–5.2)
PROT SERPL-MCNC: 7.5 G/DL (ref 6–8.5)
SODIUM SERPL-SCNC: 141 MMOL/L (ref 136–145)
T4 FREE SERPL-MCNC: 1.28 NG/DL (ref 0.93–1.7)
TRIGL SERPL-MCNC: 170 MG/DL (ref 0–150)
TSH SERPL DL<=0.005 MIU/L-ACNC: 1.02 MIU/ML (ref 0.27–4.2)
VLDLC SERPL CALC-MCNC: 34 MG/DL (ref 5–40)

## 2017-05-25 ENCOUNTER — TELEPHONE (OUTPATIENT)
Dept: INTERNAL MEDICINE | Facility: CLINIC | Age: 77
End: 2017-05-25

## 2017-06-28 ENCOUNTER — CLINICAL SUPPORT (OUTPATIENT)
Dept: INTERNAL MEDICINE | Facility: CLINIC | Age: 77
End: 2017-06-28

## 2017-06-28 DIAGNOSIS — D51.9 ANEMIA DUE TO VITAMIN B12 DEFICIENCY, UNSPECIFIED B12 DEFICIENCY TYPE: Primary | ICD-10-CM

## 2017-06-28 PROCEDURE — 96372 THER/PROPH/DIAG INJ SC/IM: CPT | Performed by: FAMILY MEDICINE

## 2017-06-28 RX ORDER — CYANOCOBALAMIN 1000 UG/ML
1000 INJECTION, SOLUTION INTRAMUSCULAR; SUBCUTANEOUS
Status: DISCONTINUED | OUTPATIENT
Start: 2017-06-28 | End: 2018-03-29

## 2017-06-28 RX ADMIN — CYANOCOBALAMIN 1000 MCG: 1000 INJECTION, SOLUTION INTRAMUSCULAR; SUBCUTANEOUS at 11:43

## 2017-06-30 DIAGNOSIS — I10 ESSENTIAL HYPERTENSION: ICD-10-CM

## 2017-07-03 DIAGNOSIS — E55.9 VITAMIN D DEFICIENCY: ICD-10-CM

## 2017-07-03 RX ORDER — ERGOCALCIFEROL 1.25 MG/1
CAPSULE ORAL
Qty: 12 CAPSULE | Refills: 0 | Status: SHIPPED | OUTPATIENT
Start: 2017-07-03 | End: 2017-09-25 | Stop reason: SDUPTHER

## 2017-07-03 RX ORDER — LOSARTAN POTASSIUM 50 MG/1
TABLET ORAL
Qty: 90 TABLET | Refills: 0 | Status: SHIPPED | OUTPATIENT
Start: 2017-07-03 | End: 2017-10-01 | Stop reason: SDUPTHER

## 2017-07-06 ENCOUNTER — TELEPHONE (OUTPATIENT)
Dept: ENDOCRINOLOGY | Age: 77
End: 2017-07-06

## 2017-07-06 RX ORDER — GLUCOSAMINE HCL/CHONDROITIN SU 500-400 MG
CAPSULE ORAL
Qty: 100 EACH | Refills: 1 | Status: SHIPPED | OUTPATIENT
Start: 2017-07-06 | End: 2018-01-09 | Stop reason: SDUPTHER

## 2017-07-06 NOTE — TELEPHONE ENCOUNTER
----- Message from Sarah Chow sent at 7/6/2017 11:25 AM EDT -----  Contact: patient  [facility] [address] [place] [address] [address] AT [facility] [address] [address](Albino Oswald) [facility] [address] - [phone] PH - [phone] (Phone)  [phone] (Fax)        Has an enhance talking machine  Needs test strips and lancets  Test 1 x daily    If cant get the strips and lancets for the enhance she will need to meter      Pt wants yson to know that she stop taking metformin

## 2017-07-11 ENCOUNTER — OFFICE VISIT (OUTPATIENT)
Dept: CARDIOLOGY | Facility: CLINIC | Age: 77
End: 2017-07-11

## 2017-07-11 VITALS
BODY MASS INDEX: 31.49 KG/M2 | WEIGHT: 189 LBS | HEART RATE: 68 BPM | HEIGHT: 65 IN | SYSTOLIC BLOOD PRESSURE: 128 MMHG | DIASTOLIC BLOOD PRESSURE: 74 MMHG

## 2017-07-11 DIAGNOSIS — F41.9 ANXIETY: ICD-10-CM

## 2017-07-11 DIAGNOSIS — I49.3 PVC (PREMATURE VENTRICULAR CONTRACTION): Primary | ICD-10-CM

## 2017-07-11 DIAGNOSIS — I10 ESSENTIAL HYPERTENSION: ICD-10-CM

## 2017-07-11 PROCEDURE — 99213 OFFICE O/P EST LOW 20 MIN: CPT | Performed by: INTERNAL MEDICINE

## 2017-07-11 PROCEDURE — 93000 ELECTROCARDIOGRAM COMPLETE: CPT | Performed by: INTERNAL MEDICINE

## 2017-07-11 NOTE — PROGRESS NOTES
Subjective:     Encounter Date:07/11/2017      Patient ID: Monica Pagan is a 76 y.o. female.    Chief Complaint:  Hypertension   This is a chronic problem. The current episode started more than 1 year ago. Associated symptoms include palpitations. Pertinent negatives include no anxiety, chest pain or orthopnea.   Palpitations    This is a chronic problem. The current episode started more than 1 year ago. The problem has been waxing and waning. Associated symptoms include anxiety. Pertinent negatives include no chest pain.       Patient resents today for reevaluation.  From a vascular standpoint she's been doing good occasional lightheadedness occasional fatigue.  Palpitations of the most part resolved/are unchanged.    Review of Systems   HENT: Positive for hearing loss.    Cardiovascular: Positive for palpitations. Negative for chest pain and orthopnea.   Psychiatric/Behavioral: The patient is nervous/anxious.    All other systems reviewed and are negative.        ECG 12 Lead  Date/Time: 7/11/2017 4:10 PM  Performed by: JODY PICKETT  Authorized by: JODY PICKETT   Comparison: compared with previous ECG from 6/29/2016  Similar to previous ECG  Rhythm: sinus rhythm  Clinical impression: normal ECG               Objective:     Physical Exam   Constitutional: She is oriented to person, place, and time. She appears well-developed.   HENT:   Head: Normocephalic.   Eyes: Conjunctivae are normal.   Neck: Normal range of motion.   Cardiovascular: Normal rate, regular rhythm and normal heart sounds.    Pulmonary/Chest: Breath sounds normal.   Abdominal: Soft. Bowel sounds are normal.   Musculoskeletal: Normal range of motion. She exhibits no edema.   Neurological: She is alert and oriented to person, place, and time.   Skin: Skin is warm and dry.   Psychiatric: She has a normal mood and affect. Her behavior is normal.   Vitals reviewed.      Lab Review:       Assessment:         No diagnosis found.        Plan:       1. History of premature ventricular contractions clinically stable.   2. Hypertension. Her blood pressure is good.   3. Patient appears to be relatively stable continue the same follow-up in one to 2 years.

## 2017-07-12 RX ORDER — CLONAZEPAM 0.5 MG/1
TABLET ORAL
Qty: 90 TABLET | Refills: 3 | OUTPATIENT
Start: 2017-07-12 | End: 2017-11-16 | Stop reason: SDUPTHER

## 2017-07-19 DIAGNOSIS — E11.42 TYPE 2 DIABETES MELLITUS WITH PERIPHERAL NEUROPATHY (HCC): ICD-10-CM

## 2017-07-28 ENCOUNTER — CLINICAL SUPPORT (OUTPATIENT)
Dept: INTERNAL MEDICINE | Facility: CLINIC | Age: 77
End: 2017-07-28

## 2017-07-28 PROCEDURE — 96372 THER/PROPH/DIAG INJ SC/IM: CPT | Performed by: FAMILY MEDICINE

## 2017-07-28 RX ADMIN — CYANOCOBALAMIN 1000 MCG: 1000 INJECTION, SOLUTION INTRAMUSCULAR; SUBCUTANEOUS at 10:42

## 2017-07-31 ENCOUNTER — OFFICE VISIT (OUTPATIENT)
Dept: INTERNAL MEDICINE | Facility: CLINIC | Age: 77
End: 2017-07-31

## 2017-07-31 VITALS
DIASTOLIC BLOOD PRESSURE: 70 MMHG | SYSTOLIC BLOOD PRESSURE: 140 MMHG | BODY MASS INDEX: 31.65 KG/M2 | RESPIRATION RATE: 16 BRPM | TEMPERATURE: 96.6 F | HEIGHT: 65 IN | HEART RATE: 74 BPM | OXYGEN SATURATION: 97 % | WEIGHT: 190 LBS

## 2017-07-31 DIAGNOSIS — R30.0 DYSURIA: ICD-10-CM

## 2017-07-31 DIAGNOSIS — R39.9 UTI SYMPTOMS: Primary | ICD-10-CM

## 2017-07-31 DIAGNOSIS — R82.90 ABNORMAL URINE ODOR: ICD-10-CM

## 2017-07-31 DIAGNOSIS — R35.0 URINARY FREQUENCY: ICD-10-CM

## 2017-07-31 PROCEDURE — 99213 OFFICE O/P EST LOW 20 MIN: CPT | Performed by: NURSE PRACTITIONER

## 2017-07-31 NOTE — PROGRESS NOTES
Vitals:    07/31/17 1001   BP: 140/70   Pulse: 74   Resp: 16   Temp: 96.6 °F (35.9 °C)   SpO2: 97%     Last 2 weights    07/31/17  1001   Weight: 190 lb (86.2 kg)     Social History   Substance Use Topics   • Smoking status: Never Smoker   • Smokeless tobacco: Not on file      Comment: daily caffeine use   • Alcohol use 0.6 oz/week     1 Glasses of wine per week      Comment: 1 time year        Subjective     HPI  Pt presents to office today with 2-3 weeks of urinary pain, urgency and frequency, strong urine odor, and flank pain. She states she has been going to the bathroom 3 time at night which is causing a lot of pain issues/anxiety getting in and out of bed. She went to an urgent care and they gave her ditropan but she was unable to take it due to making her drowsy. She has not taken anything else. Pt denies fever, dizziness, or blood in urine.      The following portions of the patient's history were reviewed and updated as appropriate: allergies, current medications, past medical history, past social history and problem list.    Review of Systems   Constitutional: Negative.    Respiratory: Negative.    Cardiovascular: Negative.    Genitourinary: Positive for dysuria, flank pain, frequency and urgency.       Objective     Physical Exam   Constitutional: She is oriented to person, place, and time. Vital signs are normal. She appears well-developed and well-nourished.   HENT:   Head: Normocephalic and atraumatic.   Neck: Normal range of motion.   Cardiovascular: Normal rate, regular rhythm and normal heart sounds.    Pulmonary/Chest: Effort normal and breath sounds normal.   Musculoskeletal: Normal range of motion.   Neurological: She is oriented to person, place, and time.   Nursing note and vitals reviewed.      Assessment/Plan   Monica was seen today for urinary tract infection.    Diagnoses and all orders for this visit:    UTI symptoms  -     Urinalysis With / Culture If Indicated    Dysuria  -      Urinalysis With / Culture If Indicated    Urinary frequency  -     Urinalysis With / Culture If Indicated    Abnormal urine odor  -     Urinalysis With / Culture If Indicated         -start with UA with culture  -pt does see urology and reviewed recent visit with them  -Pt advise to try AZO OTC for dyuria at this time  -cont home meds  -FU prn or if symptoms persist/worsen

## 2017-08-01 LAB
APPEARANCE UR: CLEAR
BACTERIA #/AREA URNS HPF: NORMAL /HPF
BILIRUB UR QL STRIP: NEGATIVE
COLOR UR: YELLOW
EPI CELLS #/AREA URNS HPF: NORMAL /HPF
GLUCOSE UR QL: NEGATIVE
HGB UR QL STRIP: NEGATIVE
KETONES UR QL STRIP: NEGATIVE
LEUKOCYTE ESTERASE UR QL STRIP: NEGATIVE
MICRO URNS: NORMAL
MICRO URNS: NORMAL
NITRITE UR QL STRIP: NEGATIVE
PH UR STRIP: 6 [PH] (ref 5–7.5)
PROT UR QL STRIP: NEGATIVE
RBC #/AREA URNS HPF: NORMAL /HPF
SP GR UR: 1.01 (ref 1–1.03)
URINALYSIS REFLEX: NORMAL
UROBILINOGEN UR STRIP-MCNC: 0.2 MG/DL (ref 0.2–1)
WBC #/AREA URNS HPF: NORMAL /HPF

## 2017-08-09 DIAGNOSIS — E11.42 TYPE 2 DIABETES MELLITUS WITH PERIPHERAL NEUROPATHY (HCC): ICD-10-CM

## 2017-08-25 ENCOUNTER — CLINICAL SUPPORT (OUTPATIENT)
Dept: INTERNAL MEDICINE | Facility: CLINIC | Age: 77
End: 2017-08-25

## 2017-08-25 DIAGNOSIS — E53.8 VITAMIN B12 DEFICIENCY: Primary | ICD-10-CM

## 2017-08-25 PROCEDURE — 96372 THER/PROPH/DIAG INJ SC/IM: CPT | Performed by: FAMILY MEDICINE

## 2017-08-25 RX ADMIN — CYANOCOBALAMIN 1000 MCG: 1000 INJECTION, SOLUTION INTRAMUSCULAR; SUBCUTANEOUS at 10:31

## 2017-09-05 RX ORDER — ESZOPICLONE 3 MG/1
TABLET, FILM COATED ORAL
Qty: 30 TABLET | Refills: 2 | OUTPATIENT
Start: 2017-09-05 | End: 2017-11-21 | Stop reason: ALTCHOICE

## 2017-09-25 DIAGNOSIS — E55.9 VITAMIN D DEFICIENCY: ICD-10-CM

## 2017-09-25 RX ORDER — ERGOCALCIFEROL 1.25 MG/1
CAPSULE ORAL
Qty: 12 CAPSULE | Refills: 0 | Status: SHIPPED | OUTPATIENT
Start: 2017-09-25 | End: 2017-12-17 | Stop reason: SDUPTHER

## 2017-09-26 ENCOUNTER — CLINICAL SUPPORT (OUTPATIENT)
Dept: INTERNAL MEDICINE | Facility: CLINIC | Age: 77
End: 2017-09-26

## 2017-09-26 DIAGNOSIS — E53.8 VITAMIN B12 DEFICIENCY: Primary | ICD-10-CM

## 2017-09-26 PROCEDURE — 96372 THER/PROPH/DIAG INJ SC/IM: CPT | Performed by: FAMILY MEDICINE

## 2017-09-26 RX ADMIN — CYANOCOBALAMIN 1000 MCG: 1000 INJECTION, SOLUTION INTRAMUSCULAR; SUBCUTANEOUS at 10:05

## 2017-10-01 DIAGNOSIS — I10 ESSENTIAL HYPERTENSION: ICD-10-CM

## 2017-10-02 ENCOUNTER — OFFICE VISIT (OUTPATIENT)
Dept: ENDOCRINOLOGY | Age: 77
End: 2017-10-02

## 2017-10-02 VITALS
HEART RATE: 76 BPM | BODY MASS INDEX: 31.59 KG/M2 | WEIGHT: 189.6 LBS | SYSTOLIC BLOOD PRESSURE: 148 MMHG | HEIGHT: 65 IN | DIASTOLIC BLOOD PRESSURE: 66 MMHG | OXYGEN SATURATION: 98 %

## 2017-10-02 DIAGNOSIS — I10 ESSENTIAL HYPERTENSION: ICD-10-CM

## 2017-10-02 DIAGNOSIS — E11.42 TYPE 2 DIABETES MELLITUS WITH PERIPHERAL NEUROPATHY (HCC): Primary | ICD-10-CM

## 2017-10-02 DIAGNOSIS — M53.9 MULTILEVEL DEGENERATIVE DISC DISEASE: ICD-10-CM

## 2017-10-02 DIAGNOSIS — E78.5 HYPERLIPIDEMIA, UNSPECIFIED HYPERLIPIDEMIA TYPE: ICD-10-CM

## 2017-10-02 DIAGNOSIS — E04.2 NONTOXIC MULTINODULAR GOITER: ICD-10-CM

## 2017-10-02 DIAGNOSIS — E83.52 HYPERCALCEMIA: ICD-10-CM

## 2017-10-02 PROCEDURE — 99214 OFFICE O/P EST MOD 30 MIN: CPT | Performed by: INTERNAL MEDICINE

## 2017-10-02 RX ORDER — GLIMEPIRIDE 1 MG/1
1 TABLET ORAL EVERY MORNING
Qty: 30 TABLET | Refills: 11 | Status: SHIPPED | OUTPATIENT
Start: 2017-10-02 | End: 2017-10-09 | Stop reason: SINTOL

## 2017-10-02 RX ORDER — LOSARTAN POTASSIUM 50 MG/1
TABLET ORAL
Qty: 90 TABLET | Refills: 1 | Status: SHIPPED | OUTPATIENT
Start: 2017-10-02 | End: 2017-11-17 | Stop reason: SDUPTHER

## 2017-10-02 NOTE — PROGRESS NOTES
Subjective   Monica Pagan is a 77 y.o. female.     HPI Comments: F/u for dm ,hyperlipidemia, cervical and lumbar DDD/ testing bs 1 x day / last dm eye exam 7/25/17 with dr Wolf/ last dm foot exam 1/19/17 with dr choi     Diabetes   Hypoglycemia symptoms include nervousness/anxiousness.   Hyperlipidemia        Patient is a 76-year-old female who was referred for thyroid and diabetes consultation by Dr. Gentile. In 2015, an incidental left thyroid nodule was noted on CT of the cervical spine. She had a thyroid ultrasound done in March 2016 which showed a multinodular goiter with the largest nodule measuring 2.8 cm on the left. Thyroid function tests were normal.  She had a follow-up thyroid ultrasound done in January 2017 which showed a stable multinodular goiter.  She denies any previous history of head/neck radiation therapy. She denies any neck soreness. She is not on thyroid medication.      She has known diabetes mellitus since 2008 and was started on insulin at that time. She has been on Levemir 30 units every morning.  She feels shaky inside after she takes metformin not related to hypoglycemia.  She stopped metformin in and shakiness resolved.  She restarted metformin and symptoms recurred.  She has been off metformin since August 2017.. Her fasting blood sugars ranges from 152-228. She denies any hypoglycemic episode.  She has gained 4 pounds since May 2017..  Her last meal was at 8 AM.      Her last eye examination was in 8/17. She has no retinopathy. She denies blurry vision. She complains of numbness, tingling and burning in her legs.  She has a left foot drop.  She was tried on Lyrica and gabapentin in the past which caused sedation. She has never used Cymbalta.  She has no microalbuminuria and urine sample taken in January 2017.      She has cervical and lumbar degenerative disc disease and spinal stenosis. She had a previous lumbar surgery.  She has chronic pain and has been on Norco prescribed by  Dr. Schuler. She follows with Dr. Cortes at [facility].  She is sleeping poorly due to pain which wakes her up from sleep.  She had a sleep study in the past and was told to have borderline sleep apnea.  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 Irving. She has not had a biopsy in the past.      She has hyperlipidemia.  She tried Lipitor 20 mg for 2 weeks and developed myalgia and arthralgia.  The pain resolved 1 week after she stopped Lipitor.  She has tried pravastatin which she discontinued after 2 weeks because of joint pain.  She has not used Livalo, Zetia or PCS K9 inhibitors in the past      She has hypertension and has been on losartan 50 mg once a day and diltiazem  mg once a day. She denies any previous history of myocardial infarction or stroke.  She denies chest pain or SOB    She has history of irritable bowel syndrome and was workup by Dr. Russell in the past.  She has intermittent constipation and is on stool softeners and MiraLAX.  She had a normal colonoscopy in 2012 done by Dr. Russell    She has vitamin D deficiency and takes vitamin D 50,000 units once a month.  She has vitamin B12 deficiency and is receiving intramuscular vitamin B12 through Dr. Gentile.    She is taking care of her daughter who has MS and bipolar disorder.    The following portions of the patient's history were reviewed and updated as appropriate: allergies, current medications, past family history, past medical history, past social history, past surgical history and problem list.    Review of Systems   Constitutional: Negative.    HENT: Negative.    Eyes: Negative.    Respiratory: Negative.    Cardiovascular: Negative.    Gastrointestinal: Positive for abdominal distention, abdominal pain, constipation ( IBS ) and diarrhea.   Endocrine: Positive for cold intolerance.   Genitourinary: Positive for urgency.   Musculoskeletal: Positive for back pain, joint swelling (  "osteoarthritis ) and neck pain.   Allergic/Immunologic: Negative.    Neurological: Positive for numbness (legs ).   Hematological: Negative.    Psychiatric/Behavioral: Positive for sleep disturbance. The patient is nervous/anxious.        Objective      Vitals:    10/02/17 1136   BP: 148/66   BP Location: Right arm   Patient Position: Sitting   Cuff Size: Large Adult   Pulse: 76   SpO2: 98%   Weight: 189 lb 9.6 oz (86 kg)   Height: 64.5\" (163.8 cm)     Physical Exam   Constitutional: She is oriented to person, place, and time. She appears well-developed and well-nourished. No distress.   HENT:   Head: Normocephalic.   Nose: Nose normal.   Mouth/Throat: No oropharyngeal exudate.   Eyes: Conjunctivae and EOM are normal. Right eye exhibits no discharge. Left eye exhibits no discharge. No scleral icterus.   Neck: Neck supple. No JVD present. No tracheal deviation present. No thyromegaly present.   Cardiovascular: Normal rate, regular rhythm, normal heart sounds and intact distal pulses.  Exam reveals no gallop and no friction rub.    No murmur heard.  Pulmonary/Chest: Effort normal and breath sounds normal. No respiratory distress. She has no wheezes. She has no rales. She exhibits no tenderness.   Abdominal: Soft. Bowel sounds are normal. She exhibits no distension and no mass. There is no tenderness.   Musculoskeletal: Normal range of motion. She exhibits no edema, tenderness or deformity.   Lymphadenopathy:     She has no cervical adenopathy.   Neurological: She is alert and oriented to person, place, and time. She has normal reflexes.   Decreased light touch in distal lower ext   Skin: Skin is warm and dry.   Psychiatric: She has a normal mood and affect. Her behavior is normal.     Office Visit on 07/31/2017   Component Date Value Ref Range Status   • Specific Gravity,  07/31/2017 1.014  1.005 - 1.030 Final   • pH,  07/31/2017 6.0  5.0 - 7.5 Final   • Color,  07/31/2017 Yellow  Yellow Final   • Appearance,  " 07/31/2017 Clear  Clear Final   • Leukocytes, UA 07/31/2017 Negative  Negative Final   • Protein 07/31/2017 Negative  Negative/Trace Final   • Glucose, UA 07/31/2017 Negative  Negative Final   • Ketones 07/31/2017 Negative  Negative Final   • Blood, UA 07/31/2017 Negative  Negative Final   • Bilirubin, UA 07/31/2017 Negative  Negative Final   • Urobilinogen, UA 07/31/2017 0.2  0.2 - 1.0 mg/dL Final   • Nitrite, UA 07/31/2017 Negative  Negative Final   • Microscopic Examination 07/31/2017 Comment   Final    Microscopic follows if indicated.   • MICROSCOPIC EXAMINATION 07/31/2017 See below:   Final    Microscopic was indicated and was performed.   • Urinalysis Reflex 07/31/2017 Comment   Final    This specimen will not reflex to a Urine Culture.   • WBC, UA 07/31/2017 0-5  0 - 5 /hpf Final   • RBC, UA 07/31/2017 0-2  0 - 2 /hpf Final   • Epithelial Cells (non renal) 07/31/2017 0-10  0 - 10 /hpf Final   • Bacteria, UA 07/31/2017 Few  None seen/Few /hpf Final     Assessment/Plan   Monica was seen today for diabetes and hyperlipidemia.    Diagnoses and all orders for this visit:    Type 2 diabetes mellitus with peripheral neuropathy  -     Comprehensive Metabolic Panel  -     Lipid Panel  -     Hemoglobin A1c  -     glimepiride (AMARYL) 1 MG tablet; Take 1 tablet by mouth Every Morning.    Nontoxic multinodular goiter  -     TSH    Hyperlipidemia, unspecified hyperlipidemia type  -     Lipid Panel    Essential hypertension    Multilevel degenerative disc disease    Hypercalcemia  -     Vitamin D 25 Hydroxy  -     PTH, Intact      Continue Levemir.  Start glimepiride 1 mg every morning.  Check hemoglobin A1c and thyroid function tests.  Check lipid profile.  Consider Livalo.    Continue losartan and diltiazem XR per Dr. Gentile.  Advised patient to discuss with Dr. Gentile or Dr. Schuler about using Cymbalta for chronic pain and neuropathy.  Suggests tetanus pertussis vaccine    Send copy of my notes and labs to Dr. Gentile and   Reasor.    RTC 4 mos

## 2017-10-03 LAB
25(OH)D3+25(OH)D2 SERPL-MCNC: 38.4 NG/ML (ref 30–100)
ALBUMIN SERPL-MCNC: 4.5 G/DL (ref 3.5–5.2)
ALBUMIN/GLOB SERPL: 1.6 G/DL
ALP SERPL-CCNC: 100 U/L (ref 39–117)
ALT SERPL-CCNC: 22 U/L (ref 1–33)
AST SERPL-CCNC: 15 U/L (ref 1–32)
BILIRUB SERPL-MCNC: 0.3 MG/DL (ref 0.1–1.2)
BUN SERPL-MCNC: 14 MG/DL (ref 8–23)
BUN/CREAT SERPL: 15.4 (ref 7–25)
CALCIUM SERPL-MCNC: 11 MG/DL (ref 8.6–10.5)
CHLORIDE SERPL-SCNC: 99 MMOL/L (ref 98–107)
CHOLEST SERPL-MCNC: 236 MG/DL (ref 0–200)
CO2 SERPL-SCNC: 27.4 MMOL/L (ref 22–29)
CREAT SERPL-MCNC: 0.91 MG/DL (ref 0.57–1)
GLOBULIN SER CALC-MCNC: 2.9 GM/DL
GLUCOSE SERPL-MCNC: 169 MG/DL (ref 65–99)
HBA1C MFR BLD: 8.1 % (ref 4.8–5.6)
HDLC SERPL-MCNC: 54 MG/DL (ref 40–60)
LDLC SERPL CALC-MCNC: 136 MG/DL (ref 0–100)
POTASSIUM SERPL-SCNC: 4.7 MMOL/L (ref 3.5–5.2)
PROT SERPL-MCNC: 7.4 G/DL (ref 6–8.5)
PTH-INTACT SERPL-MCNC: 50 PG/ML (ref 15–65)
SODIUM SERPL-SCNC: 140 MMOL/L (ref 136–145)
TRIGL SERPL-MCNC: 229 MG/DL (ref 0–150)
TSH SERPL DL<=0.005 MIU/L-ACNC: 1.37 MIU/ML (ref 0.27–4.2)
VLDLC SERPL CALC-MCNC: 45.8 MG/DL (ref 5–40)

## 2017-10-04 DIAGNOSIS — E83.52 SERUM CALCIUM ELEVATED: ICD-10-CM

## 2017-10-04 DIAGNOSIS — E78.49 OTHER HYPERLIPIDEMIA: Primary | ICD-10-CM

## 2017-10-04 DIAGNOSIS — E34.9 ELEVATED PARATHYROID HORMONE: ICD-10-CM

## 2017-10-04 DIAGNOSIS — I10 ESSENTIAL HYPERTENSION: ICD-10-CM

## 2017-10-05 ENCOUNTER — RESULTS ENCOUNTER (OUTPATIENT)
Dept: ENDOCRINOLOGY | Age: 77
End: 2017-10-05

## 2017-10-05 DIAGNOSIS — E83.52 SERUM CALCIUM ELEVATED: ICD-10-CM

## 2017-10-05 DIAGNOSIS — E34.9 ELEVATED PARATHYROID HORMONE: ICD-10-CM

## 2017-10-09 ENCOUNTER — OFFICE VISIT (OUTPATIENT)
Dept: INTERNAL MEDICINE | Facility: CLINIC | Age: 77
End: 2017-10-09

## 2017-10-09 VITALS
DIASTOLIC BLOOD PRESSURE: 68 MMHG | SYSTOLIC BLOOD PRESSURE: 134 MMHG | OXYGEN SATURATION: 98 % | HEART RATE: 94 BPM | BODY MASS INDEX: 32.45 KG/M2 | TEMPERATURE: 96.8 F | WEIGHT: 192 LBS

## 2017-10-09 DIAGNOSIS — F32.9 REACTIVE DEPRESSION: ICD-10-CM

## 2017-10-09 DIAGNOSIS — I10 ESSENTIAL HYPERTENSION: ICD-10-CM

## 2017-10-09 DIAGNOSIS — Z00.00 MEDICARE ANNUAL WELLNESS VISIT, SUBSEQUENT: ICD-10-CM

## 2017-10-09 DIAGNOSIS — E83.52 SERUM CALCIUM ELEVATED: ICD-10-CM

## 2017-10-09 DIAGNOSIS — F41.9 ANXIETY: ICD-10-CM

## 2017-10-09 DIAGNOSIS — E04.2 NONTOXIC MULTINODULAR GOITER: ICD-10-CM

## 2017-10-09 DIAGNOSIS — E34.9 ELEVATED PARATHYROID HORMONE: ICD-10-CM

## 2017-10-09 DIAGNOSIS — E11.42 TYPE 2 DIABETES MELLITUS WITH PERIPHERAL NEUROPATHY (HCC): ICD-10-CM

## 2017-10-09 DIAGNOSIS — E78.49 OTHER HYPERLIPIDEMIA: Primary | ICD-10-CM

## 2017-10-09 DIAGNOSIS — R25.1 TREMULOUSNESS: ICD-10-CM

## 2017-10-09 PROCEDURE — 99214 OFFICE O/P EST MOD 30 MIN: CPT | Performed by: FAMILY MEDICINE

## 2017-10-09 PROCEDURE — G0439 PPPS, SUBSEQ VISIT: HCPCS | Performed by: FAMILY MEDICINE

## 2017-10-09 RX ORDER — DULOXETIN HYDROCHLORIDE 20 MG/1
CAPSULE, DELAYED RELEASE ORAL
Qty: 90 CAPSULE | Refills: 1 | Status: SHIPPED | OUTPATIENT
Start: 2017-10-09 | End: 2018-04-08 | Stop reason: SDUPTHER

## 2017-10-09 RX ORDER — DULOXETIN HYDROCHLORIDE 20 MG/1
20 CAPSULE, DELAYED RELEASE ORAL DAILY
Qty: 30 CAPSULE | Refills: 1 | Status: SHIPPED | OUTPATIENT
Start: 2017-10-09 | End: 2017-10-09 | Stop reason: SDUPTHER

## 2017-10-09 NOTE — PATIENT INSTRUCTIONS
Medicare Wellness  Personal Prevention Plan of Service     Date of Office Visit:  10/09/2017  Encounter Provider:  Froylan Gentile Jr., MD  Place of Service:  [facility]  Patient Name: Monica Pagan  :  1940    As part of the Medicare Wellness portion of your visit today, we are providing you with this personalized preventive plan of services (PPPS). This plan is based upon recommendations of the [place] Preventive Services Task Force (USPSTF) and the Advisory Committee on Immunization Practices (ACIP).    This lists the preventive care services that should be considered, and provides dates of when you are due. Items listed as completed are up-to-date and do not require any further intervention.    Health Maintenance   Topic Date Due   • MEDICARE ANNUAL WELLNESS  2016   • COLONOSCOPY  2016   • URINE MICROALBUMIN  2018   • PNEUMOCOCCAL VACCINES (65+ LOW/MEDIUM RISK) (2 of 2 - PPSV23) 2018   • HEMOGLOBIN A1C  2018   • DIABETIC FOOT EXAM  2018   • DIABETIC EYE EXAM  2018   • LIPID PANEL  10/02/2018   • MAMMOGRAM  2019   • TDAP/TD VACCINES (2 - Td) 2027   • INFLUENZA VACCINE  Completed   • ZOSTER VACCINE  Completed       No orders of the defined types were placed in this encounter.      Return in about 3 months (around 2018) for Recheck.

## 2017-10-09 NOTE — PROGRESS NOTES
QUICK REFERENCE INFORMATION:  The ABCs of the Annual Wellness Visit    Subsequent Medicare Wellness Visit    HEALTH RISK ASSESSMENT    1940    Recent Hospitalizations:  No hospitalization(s) within the last year..        Current Medical Providers:  Patient Care Team:  Froylan Gentile Jr., MD as PCP - General (Family Medicine)  Froylan Gentile Jr., MD as PCP - Claims Attributed  Agustin Schuler MD as Consulting Physician (Pain Medicine)  Humza Jean Baptiste MD as Consulting Physician (Dermatology)  Ana Maria Wolf MD as Consulting Physician (Ophthalmology)  Candido Randhawa MD as Consulting Physician (Cardiology)  Kassandra Purvis DPM (Podiatry)        Smoking Status:  History   Smoking Status   • Never Smoker   Smokeless Tobacco   • Not on file     Comment: daily caffeine use       Alcohol Consumption:  History   Alcohol Use   • 0.6 oz/week   • 1 Glasses of wine per week     Comment: 1 time year        Depression Screen:   PHQ-2/PHQ-9 Depression Screening 10/9/2017   Little interest or pleasure in doing things 1   Feeling down, depressed, or hopeless 1   Trouble falling or staying asleep, or sleeping too much 3   Feeling tired or having little energy 2   Poor appetite or overeating 0   Feeling bad about yourself - or that you are a failure or have let yourself or your family down 1   Trouble concentrating on things, such as reading the newspaper or watching television 1   Moving or speaking so slowly that other people could have noticed. Or the opposite - being so fidgety or restless that you have been moving around a lot more than usual 0   Thoughts that you would be better off dead, or of hurting yourself in some way 0   Total Score 9   If you checked off any problems, how difficult have these problems made it for you to do your work, take care of things at home, or get along with other people? Somewhat difficult       Health Habits and Functional and Cognitive Screening:  Functional & Cognitive Status  10/9/2017   Do you have difficulty preparing food and eating? No   Do you have difficulty bathing yourself? No   Do you have difficulty getting dressed? No   Do you have difficulty using the toilet? No   Do you have difficulty moving around from place to place? No   In the past year have you fallen or experienced a near fall? Yes   Do you need help using the phone?  No   Are you deaf or do you have serious difficulty hearing?  No   Do you need help with transportation? No   Do you need help shopping? No   Do you need help preparing meals?  No   Do you need help with housework?  No   Do you need help with laundry? No   Do you need help taking your medications? No   Do you need help managing money? No   Do you have difficulty concentrating, remembering or making decisions? No       Health Habits  Current Diet: Diabetic Diet  Dental Exam: Up to date  Eye Exam: Up to date  Exercise (times per week): 7 times per week  Current Exercise Activities Include: Housecleaning      Does the patient have evidence of cognitive impairment? No    Aspirin use counseling: Does not need ASA (and currently is not on it)      Recent Lab Results:  CMP:  Lab Results   Component Value Date     (H) 10/02/2017    BUN 14 10/02/2017    CREATININE 0.91 10/02/2017    EGFRIFNONA 60 (L) 10/02/2017    EGFRIFAFRI 73 10/02/2017    BCR 15.4 10/02/2017     10/02/2017    K 4.7 10/02/2017    CO2 27.4 10/02/2017    CALCIUM 11.0 (H) 10/02/2017    PROTENTOTREF 7.4 10/02/2017    ALBUMIN 4.50 10/02/2017    LABGLOBREF 2.9 10/02/2017    LABIL2 1.6 10/02/2017    BILITOT 0.3 10/02/2017    ALKPHOS 100 10/02/2017    AST 15 10/02/2017    ALT 22 10/02/2017     Lipid Panel:  Lab Results   Component Value Date    TRIG 229 (H) 10/02/2017    HDL 54 10/02/2017    VLDL 45.8 (H) 10/02/2017     HbA1c:  Lab Results   Component Value Date    HGBA1C 8.10 (H) 10/02/2017       Visual Acuity:  No exam data present    Age-appropriate Screening Schedule:  Refer to the  list below for future screening recommendations based on patient's age, sex and/or medical conditions. Orders for these recommended tests are listed in the plan section. The patient has been provided with a written plan.    Health Maintenance   Topic Date Due   • COLONOSCOPY  01/26/2016   • URINE MICROALBUMIN  01/19/2018   • PNEUMOCOCCAL VACCINES (65+ LOW/MEDIUM RISK) (2 of 2 - PPSV23) 03/28/2018   • HEMOGLOBIN A1C  04/02/2018   • DIABETIC FOOT EXAM  05/19/2018   • DIABETIC EYE EXAM  07/25/2018   • LIPID PANEL  10/02/2018   • MAMMOGRAM  02/17/2019   • TDAP/TD VACCINES (2 - Td) 08/13/2027   • INFLUENZA VACCINE  Completed   • ZOSTER VACCINE  Completed        Subjective   History of Present Illness    Monica Pagan is a 77 y.o. female who presents for an Subsequent Wellness Visit.    The following portions of the patient's history were reviewed and updated as appropriate: allergies, current medications, past family history, past medical history, past social history, past surgical history and problem list.    Outpatient Medications Prior to Visit   Medication Sig Dispense Refill   • clonazePAM (KlonoPIN) 0.5 MG tablet TAKE 1 TABLET BY MOUTH THREE TIMES DAILY AS NEEDED FOR ANXIETY 90 tablet 3   • diltiazem CD (CARTIA XT) 120 MG 24 hr capsule Take 1 capsule by mouth Daily. 90 capsule 3   • estradiol (ESTRACE) 0.5 MG tablet Take 1 tablet by mouth Daily. 90 tablet 3   • eszopiclone (LUNESTA) 3 MG tablet TAKE 1 TABLET BY MOUTH EVERY NIGHT AT BEDTIME (Patient taking differently: taking 5 mg   TAKE 1 TABLET BY MOUTH EVERY NIGHT AT BEDTIME) 30 tablet 2   • Glucose Blood (BLOOD GLUCOSE TEST) strip Enhance meter testing bs 1 x day dx code E11.42 100 each 1   • HYDROcodone-acetaminophen (NORCO) 7.5-325 MG per tablet 1 tablet 2 (Two) Times a Day.  0   • insulin detemir (LEVEMIR FLEXTOUCH) 100 UNIT/ML injection Inject 30 Units under the skin Daily. (Patient taking differently: Inject 30 Units under the skin Daily. Morning) 10 pen  2   • Insulin Pen Needle (BD PEN NEEDLE JENNIFER U/F) 32G X 4 MM misc 1 each by Other route Daily. 100 each 3   • Lancets 30G misc Enhance talking meter /testing bs 1 x day  Dx code  E11.42 100 each 1   • losartan (COZAAR) 50 MG tablet TAKE 1 TABLET BY MOUTH EVERY DAY 90 tablet 1   • pitavastatin calcium (LIVALO) 1 MG tablet tablet Take 1 tablet by mouth Every Night. 30 tablet 5   • vitamin D (ERGOCALCIFEROL) 64059 units capsule capsule TAKE 1 CAPSULE BY MOUTH EVERY 7 DAYS 12 capsule 0   • glimepiride (AMARYL) 1 MG tablet Take 1 tablet by mouth Every Morning. 30 tablet 11   • Cyanocobalamin 1000 MCG/ML kit Inject 1,000 mcg as directed Every 30 (Thirty) Days. 1 kit 5     Facility-Administered Medications Prior to Visit   Medication Dose Route Frequency Provider Last Rate Last Dose   • cyanocobalamin injection 1,000 mcg  1,000 mcg Intramuscular Q28 Days Froylan Gentile Jr., MD   1,000 mcg at 09/26/17 1005       Patient Active Problem List   Diagnosis   • Anemia   • Anxiety   • Pain in the coccyx   • Degeneration of intervertebral disc of lumbar region   • Type 2 diabetes mellitus with peripheral neuropathy   • Fibromyalgia   • Hyperlipidemia   • Essential hypertension   • Insomnia   • Spinal stenosis of lumbar region   • Tremor   • Vitamin D deficiency   • Nontoxic multinodular goiter   • Panic attack   • Multilevel degenerative disc disease   • Vitamin B12 deficiency       Advance Care Planning:  has an advance directive - a copy has been provided and is in file    Identification of Risk Factors:  Risk factors include: cardiovascular risk, inadequate social support, caretaker stress and depression.    Review of Systems    Compared to one year ago, the patient feels her physical health is worse.  Compared to one year ago, the patient feels her mental health is worse.    Objective     Physical Exam    Vitals:    10/09/17 1628   BP: 134/68   BP Location: Left arm   Patient Position: Sitting   Cuff Size: Large Adult   Pulse: 94    Temp: 96.8 °F (36 °C)   TempSrc: Tympanic   SpO2: 98%   Weight: 192 lb (87.1 kg)   PainSc:   7       Body mass index is 32.45 kg/(m^2).  Discussed the patient's BMI with her. The BMI is above average; BMI management plan is completed.    Assessment/Plan   Patient Self-Management and Personalized Health Advice  The patient has been provided with information about: prevention of cardiac or vascular disease and mental health concerns and preventive services including:   · Counseling for cardiovascular disease risk reduction.    Visit Diagnoses:    ICD-10-CM ICD-9-CM   1. Other hyperlipidemia E78.4 272.4   2. Serum calcium elevated E83.52 275.42   3. Elevated parathyroid hormone E34.9 259.9   4. Essential hypertension I10 401.9   5. Type 2 diabetes mellitus with peripheral neuropathy E11.42 250.60     357.2   6. Nontoxic multinodular goiter E04.2 241.1   7. Anxiety F41.9 300.00   8. Tremulousness R25.1 781.0       Orders Placed This Encounter   Procedures   • Pneumococcal Conjugate Vaccine 13-Valent All       Outpatient Encounter Prescriptions as of 10/9/2017   Medication Sig Dispense Refill   • clonazePAM (KlonoPIN) 0.5 MG tablet TAKE 1 TABLET BY MOUTH THREE TIMES DAILY AS NEEDED FOR ANXIETY 90 tablet 3   • diltiazem CD (CARTIA XT) 120 MG 24 hr capsule Take 1 capsule by mouth Daily. 90 capsule 3   • estradiol (ESTRACE) 0.5 MG tablet Take 1 tablet by mouth Daily. 90 tablet 3   • eszopiclone (LUNESTA) 3 MG tablet TAKE 1 TABLET BY MOUTH EVERY NIGHT AT BEDTIME (Patient taking differently: taking 5 mg   TAKE 1 TABLET BY MOUTH EVERY NIGHT AT BEDTIME) 30 tablet 2   • Glucose Blood (BLOOD GLUCOSE TEST) strip Enhance meter testing bs 1 x day dx code E11.42 100 each 1   • HYDROcodone-acetaminophen (NORCO) 7.5-325 MG per tablet 1 tablet 2 (Two) Times a Day.  0   • insulin detemir (LEVEMIR FLEXTOUCH) 100 UNIT/ML injection Inject 30 Units under the skin Daily. (Patient taking differently: Inject 30 Units under the skin Daily.  Morning) 10 pen 2   • Insulin Pen Needle (BD PEN NEEDLE JENNIFER U/F) 32G X 4 MM misc 1 each by Other route Daily. 100 each 3   • Lancets 30G misc Enhance talking meter /testing bs 1 x day  Dx code  E11.42 100 each 1   • losartan (COZAAR) 50 MG tablet TAKE 1 TABLET BY MOUTH EVERY DAY 90 tablet 1   • pitavastatin calcium (LIVALO) 1 MG tablet tablet Take 1 tablet by mouth Every Night. 30 tablet 5   • vitamin D (ERGOCALCIFEROL) 57159 units capsule capsule TAKE 1 CAPSULE BY MOUTH EVERY 7 DAYS 12 capsule 0   • DULoxetine (CYMBALTA) 20 MG capsule Take 1 capsule by mouth Daily. 30 capsule 1   • glimepiride (AMARYL) 1 MG tablet Take 1 tablet by mouth Every Morning. 30 tablet 11   • [DISCONTINUED] Cyanocobalamin 1000 MCG/ML kit Inject 1,000 mcg as directed Every 30 (Thirty) Days. 1 kit 5     Facility-Administered Encounter Medications as of 10/9/2017   Medication Dose Route Frequency Provider Last Rate Last Dose   • cyanocobalamin injection 1,000 mcg  1,000 mcg Intramuscular Q28 Days Froylan Gentile Jr., MD   1,000 mcg at 09/26/17 1005       Reviewed use of high risk medication in the elderly: yes  Reviewed for potential of harmful drug interactions in the elderly: yes    Follow Up:  No Follow-up on file.     An After Visit Summary and PPPS with all of these plans were given to the patient.

## 2017-10-09 NOTE — PROGRESS NOTES
Subjective   Monica Pagan is a 77 y.o. female.     Chief Complaint   Patient presents with   • Anxiety   • Depression   • Hypertension   • Hyperlipidemia   • Diabetes         History of Present Illness   Patient comes in with numerous issues.  She has caregiver stress over care of her daughter who is in decline from multiple sclerosis.  Otherwise she has a lot of back trouble and history of treatment of sepsis with vancomycin.    She is seeing Dr. Perez endocrinology but could not tolerate metformin or glimepiride because of internal tremulousness.  She seems very stressed out as well.  She is experiencing severe caregiver stress.    Medicare wellness visit is performed this visit.  We discussed starting low-dose Cymbalta.  She is to return for Prevnar injection as we were out of vaccine.      The following portions of the patient's history were reviewed and updated as appropriate: allergies, current medications, past social history and problem list.    Review of Systems   Constitutional: Negative.    HENT: Negative.    Eyes: Negative.    Respiratory: Negative.    Cardiovascular: Negative.    Gastrointestinal: Negative.    Endocrine: Negative.    Genitourinary: Negative.    Musculoskeletal: Negative.    Skin: Negative.    Allergic/Immunologic: Negative.    Neurological: Negative.    Hematological: Negative.    Psychiatric/Behavioral: Negative.        Objective   Vitals:    10/09/17 1628   BP: 134/68   Pulse: 94   Temp: 96.8 °F (36 °C)   SpO2: 98%     Physical Exam   Constitutional: She is oriented to person, place, and time. She appears well-developed and well-nourished.   HENT:   Head: Normocephalic and atraumatic.   Right Ear: Tympanic membrane and external ear normal.   Left Ear: Tympanic membrane and external ear normal.   Nose: Nose normal.   Mouth/Throat: Oropharynx is clear and moist.   Eyes: Conjunctivae and EOM are normal. Pupils are equal, round, and reactive to light.   Neck: Normal range of motion.  Neck supple. No JVD present. No thyromegaly present.   Cardiovascular: Normal rate, regular rhythm, normal heart sounds and intact distal pulses.    Pulmonary/Chest: Effort normal and breath sounds normal.   Abdominal: Soft. Bowel sounds are normal.   Musculoskeletal:        Lumbar back: She exhibits decreased range of motion.        Lymphadenopathy:     She has no cervical adenopathy.   Neurological: She is alert and oriented to person, place, and time. No cranial nerve deficit. Gait abnormal. Coordination normal.   Skin: Skin is warm and dry. No rash noted.   Psychiatric: She has a normal mood and affect. Her behavior is normal. Judgment and thought content normal.   Vitals reviewed.      Assessment/Plan   Problem List Items Addressed This Visit        Cardiovascular and Mediastinum    Essential hypertension    Hyperlipidemia - Primary       Endocrine    Type 2 diabetes mellitus with peripheral neuropathy    Nontoxic multinodular goiter       Other    Anxiety      Other Visit Diagnoses     Serum calcium elevated        Elevated parathyroid hormone        Tremulousness        Reactive depression        Relevant Medications    DULoxetine (CYMBALTA) 20 MG capsule    Medicare annual wellness visit, subsequent          Plan: Medicare wellness visit performed start Cymbalta 20 mg daily.  Recheck in about 3 months.  Follow-up with endocrinology.  Follow-up with Dr. Linton chronic pain management.

## 2017-10-10 LAB
CALCIUM 24H UR-MCNC: 21.6 MG/DL
CALCIUM 24H UR-MRATE: 270 MG/24 HR (ref 100–300)
CREAT 24H UR-MRATE: 0.8 G/24 HR (ref 0.7–1.6)
CREAT UR-MCNC: 64 MG/DL
PHOSPHATE 24H UR-MRATE: 647.5 MG/24 HR (ref 400–1300)
PHOSPHATE UR-MCNC: 51.8 MG/DL
PROT 24H UR-MRATE: 75 MG/24HOURS (ref 0–150)
PROT UR-MCNC: 6 MG/DL

## 2017-10-11 RX ORDER — DILTIAZEM HYDROCHLORIDE 120 MG/1
CAPSULE, EXTENDED RELEASE ORAL
Qty: 90 CAPSULE | Refills: 1 | Status: SHIPPED | OUTPATIENT
Start: 2017-10-11 | End: 2018-04-19 | Stop reason: SDUPTHER

## 2017-10-16 DIAGNOSIS — E11.42 TYPE 2 DIABETES MELLITUS WITH PERIPHERAL NEUROPATHY (HCC): ICD-10-CM

## 2017-10-16 RX ORDER — GLIMEPIRIDE 2 MG/1
2 TABLET ORAL
Qty: 30 TABLET | Refills: 11
Start: 2017-10-16 | End: 2017-11-21 | Stop reason: SINTOL

## 2017-10-31 ENCOUNTER — CLINICAL SUPPORT (OUTPATIENT)
Dept: INTERNAL MEDICINE | Facility: CLINIC | Age: 77
End: 2017-10-31

## 2017-10-31 DIAGNOSIS — Z23 NEED FOR PNEUMOCOCCAL VACCINE: Primary | ICD-10-CM

## 2017-10-31 PROCEDURE — 96372 THER/PROPH/DIAG INJ SC/IM: CPT | Performed by: FAMILY MEDICINE

## 2017-10-31 PROCEDURE — 90670 PCV13 VACCINE IM: CPT | Performed by: FAMILY MEDICINE

## 2017-10-31 PROCEDURE — 90471 IMMUNIZATION ADMIN: CPT | Performed by: FAMILY MEDICINE

## 2017-10-31 RX ADMIN — CYANOCOBALAMIN 1000 MCG: 1000 INJECTION, SOLUTION INTRAMUSCULAR; SUBCUTANEOUS at 09:16

## 2017-11-16 DIAGNOSIS — F41.9 ANXIETY: ICD-10-CM

## 2017-11-17 DIAGNOSIS — I10 ESSENTIAL HYPERTENSION: ICD-10-CM

## 2017-11-17 RX ORDER — CLONAZEPAM 0.5 MG/1
TABLET ORAL
Qty: 90 TABLET | Refills: 5 | Status: SHIPPED | OUTPATIENT
Start: 2017-11-17 | End: 2017-12-17 | Stop reason: SDUPTHER

## 2017-11-17 RX ORDER — LOSARTAN POTASSIUM 50 MG/1
50 TABLET ORAL DAILY
Qty: 90 TABLET | Refills: 1 | Status: SHIPPED | OUTPATIENT
Start: 2017-11-17 | End: 2017-11-22 | Stop reason: SDUPTHER

## 2017-11-21 ENCOUNTER — RESULTS ENCOUNTER (OUTPATIENT)
Dept: ENDOCRINOLOGY | Age: 77
End: 2017-11-21

## 2017-11-21 DIAGNOSIS — I10 ESSENTIAL HYPERTENSION: ICD-10-CM

## 2017-11-21 DIAGNOSIS — E78.49 OTHER HYPERLIPIDEMIA: ICD-10-CM

## 2017-11-21 LAB
ALBUMIN SERPL-MCNC: 4.3 G/DL (ref 3.5–5.2)
ALBUMIN/GLOB SERPL: 1.5 G/DL
ALP SERPL-CCNC: 92 U/L (ref 39–117)
ALT SERPL-CCNC: 21 U/L (ref 1–33)
AST SERPL-CCNC: 20 U/L (ref 1–32)
BILIRUB SERPL-MCNC: 0.4 MG/DL (ref 0.1–1.2)
BUN SERPL-MCNC: 15 MG/DL (ref 8–23)
BUN/CREAT SERPL: 15.6 (ref 7–25)
CALCIUM SERPL-MCNC: 10.1 MG/DL (ref 8.6–10.5)
CHLORIDE SERPL-SCNC: 104 MMOL/L (ref 98–107)
CHOLEST SERPL-MCNC: 182 MG/DL (ref 0–200)
CO2 SERPL-SCNC: 27.6 MMOL/L (ref 22–29)
CREAT SERPL-MCNC: 0.96 MG/DL (ref 0.57–1)
GFR SERPLBLD CREATININE-BSD FMLA CKD-EPI: 57 ML/MIN/1.73
GFR SERPLBLD CREATININE-BSD FMLA CKD-EPI: 69 ML/MIN/1.73
GLOBULIN SER CALC-MCNC: 2.8 GM/DL
GLUCOSE SERPL-MCNC: 164 MG/DL (ref 65–99)
HDLC SERPL-MCNC: 57 MG/DL (ref 40–60)
LDLC SERPL CALC-MCNC: 103 MG/DL (ref 0–100)
POTASSIUM SERPL-SCNC: 4.7 MMOL/L (ref 3.5–5.2)
PROT SERPL-MCNC: 7.1 G/DL (ref 6–8.5)
SODIUM SERPL-SCNC: 140 MMOL/L (ref 136–145)
TRIGL SERPL-MCNC: 108 MG/DL (ref 0–150)
VLDLC SERPL CALC-MCNC: 21.6 MG/DL (ref 5–40)

## 2017-11-21 RX ORDER — ESZOPICLONE 3 MG/1
3 TABLET, FILM COATED ORAL NIGHTLY
Qty: 30 TABLET | Refills: 2
Start: 2017-11-21 | End: 2017-12-01 | Stop reason: SDUPTHER

## 2017-11-22 DIAGNOSIS — I10 ESSENTIAL HYPERTENSION: ICD-10-CM

## 2017-11-22 RX ORDER — LOSARTAN POTASSIUM 50 MG/1
50 TABLET ORAL DAILY
Qty: 90 TABLET | Refills: 1 | Status: SHIPPED | OUTPATIENT
Start: 2017-11-22 | End: 2018-07-13 | Stop reason: SDUPTHER

## 2017-12-01 ENCOUNTER — CLINICAL SUPPORT (OUTPATIENT)
Dept: INTERNAL MEDICINE | Facility: CLINIC | Age: 77
End: 2017-12-01

## 2017-12-01 DIAGNOSIS — E53.8 VITAMIN B12 DEFICIENCY: ICD-10-CM

## 2017-12-01 PROCEDURE — 96372 THER/PROPH/DIAG INJ SC/IM: CPT | Performed by: FAMILY MEDICINE

## 2017-12-01 RX ADMIN — CYANOCOBALAMIN 1000 MCG: 1000 INJECTION, SOLUTION INTRAMUSCULAR; SUBCUTANEOUS at 10:34

## 2017-12-04 RX ORDER — ESZOPICLONE 3 MG/1
TABLET, FILM COATED ORAL
Qty: 30 TABLET | Refills: 5 | Status: SHIPPED | OUTPATIENT
Start: 2017-12-04 | End: 2018-06-05 | Stop reason: SDUPTHER

## 2017-12-17 DIAGNOSIS — E55.9 VITAMIN D DEFICIENCY: ICD-10-CM

## 2017-12-17 DIAGNOSIS — F41.9 ANXIETY: ICD-10-CM

## 2017-12-18 RX ORDER — ERGOCALCIFEROL 1.25 MG/1
CAPSULE ORAL
Qty: 12 CAPSULE | Refills: 0 | Status: SHIPPED | OUTPATIENT
Start: 2017-12-18 | End: 2018-03-10 | Stop reason: SDUPTHER

## 2017-12-18 RX ORDER — CLONAZEPAM 0.5 MG/1
TABLET ORAL
Qty: 90 TABLET | Refills: 0 | Status: SHIPPED | OUTPATIENT
Start: 2017-12-18 | End: 2018-06-20 | Stop reason: SDUPTHER

## 2018-01-06 DIAGNOSIS — E11.9 CONTROLLED TYPE 2 DIABETES MELLITUS WITHOUT COMPLICATION, UNSPECIFIED LONG TERM INSULIN USE STATUS: ICD-10-CM

## 2018-01-08 RX ORDER — PEN NEEDLE, DIABETIC 32GX 5/32"
NEEDLE, DISPOSABLE MISCELLANEOUS
Qty: 200 EACH | Refills: 12 | Status: SHIPPED | OUTPATIENT
Start: 2018-01-08 | End: 2019-01-31 | Stop reason: SDUPTHER

## 2018-01-09 ENCOUNTER — OFFICE VISIT (OUTPATIENT)
Dept: INTERNAL MEDICINE | Facility: CLINIC | Age: 78
End: 2018-01-09

## 2018-01-09 VITALS
WEIGHT: 191 LBS | TEMPERATURE: 97.6 F | OXYGEN SATURATION: 98 % | BODY MASS INDEX: 32.28 KG/M2 | SYSTOLIC BLOOD PRESSURE: 138 MMHG | DIASTOLIC BLOOD PRESSURE: 74 MMHG | HEART RATE: 88 BPM

## 2018-01-09 DIAGNOSIS — E53.8 VITAMIN B12 DEFICIENCY: ICD-10-CM

## 2018-01-09 DIAGNOSIS — N39.0 URINARY TRACT INFECTION WITHOUT HEMATURIA, SITE UNSPECIFIED: ICD-10-CM

## 2018-01-09 DIAGNOSIS — E78.2 MIXED HYPERLIPIDEMIA: Primary | ICD-10-CM

## 2018-01-09 DIAGNOSIS — H81.03 MENIERE DISEASE, BILATERAL: ICD-10-CM

## 2018-01-09 DIAGNOSIS — H90.3 SENSORINEURAL HEARING LOSS (SNHL) OF BOTH EARS: ICD-10-CM

## 2018-01-09 DIAGNOSIS — E04.2 NONTOXIC MULTINODULAR GOITER: ICD-10-CM

## 2018-01-09 DIAGNOSIS — I10 ESSENTIAL HYPERTENSION: ICD-10-CM

## 2018-01-09 PROCEDURE — 96372 THER/PROPH/DIAG INJ SC/IM: CPT | Performed by: FAMILY MEDICINE

## 2018-01-09 PROCEDURE — 99214 OFFICE O/P EST MOD 30 MIN: CPT | Performed by: FAMILY MEDICINE

## 2018-01-09 RX ORDER — ROSUVASTATIN CALCIUM 5 MG/1
5 TABLET, COATED ORAL DAILY
Qty: 30 TABLET | Refills: 2 | Status: SHIPPED | OUTPATIENT
Start: 2018-01-09 | End: 2018-04-06 | Stop reason: SDUPTHER

## 2018-01-09 RX ORDER — CEFDINIR 300 MG/1
300 CAPSULE ORAL 2 TIMES DAILY
Qty: 20 CAPSULE | Refills: 0 | Status: SHIPPED | OUTPATIENT
Start: 2018-01-09 | End: 2018-02-13

## 2018-01-09 RX ADMIN — CYANOCOBALAMIN 1000 MCG: 1000 INJECTION, SOLUTION INTRAMUSCULAR; SUBCUTANEOUS at 11:20

## 2018-01-09 NOTE — PROGRESS NOTES
Subjective   Monica Pagan is a 77 y.o. female.     Chief Complaint   Patient presents with   • Urinary Tract Infection   • Hearing Loss   • Hyperlipidemia   • Diabetes   • Back Pain         History of Present Illness   Patient has many issues.  She is exhausted from taking care of her daughter has progressive MS.  She feels like she has UTI with extreme fatigue and polyuria.  Treatment of her diabetes is reviewed from Dr. CHARLOTTE raymond.    She's had a history of Ménière's disease as well as hearing loss and tinnitus which seems worse.    She is off her cholesterol medicines because she can't afford it medicine discussed with her about trying generic Crestor.    She will resume metformin.    Otherwise she sees Dr. Martino who notes that she has 2 screws broken in the surgical repair of her lumbar spine.  She has to decide on surgery.      The following portions of the patient's history were reviewed and updated as appropriate: allergies, current medications, past social history and problem list.    Review of Systems   Constitutional: Positive for fatigue.   HENT: Positive for hearing loss and tinnitus.    Eyes: Negative.    Respiratory: Negative.    Cardiovascular: Negative.    Gastrointestinal: Negative.    Endocrine: Negative.    Genitourinary: Negative.    Musculoskeletal: Negative.    Skin: Negative.    Allergic/Immunologic: Negative.    Neurological: Positive for dizziness.   Hematological: Negative.    Psychiatric/Behavioral: Negative.        Objective   Vitals:    01/09/18 1114   BP: 138/74   Pulse: 88   Temp: 97.6 °F (36.4 °C)   SpO2: 98%     Physical Exam   Constitutional: She is oriented to person, place, and time. She appears well-developed and well-nourished.   HENT:   Head: Normocephalic and atraumatic.   Right Ear: Tympanic membrane and external ear normal.   Left Ear: Tympanic membrane and external ear normal.   Nose: Nose normal.   Mouth/Throat: Oropharynx is clear and moist.   Eyes: Conjunctivae and EOM  are normal. Pupils are equal, round, and reactive to light.   Neck: Normal range of motion. Neck supple. No JVD present. Thyromegaly present.   Cardiovascular: Normal rate, regular rhythm and intact distal pulses.    Murmur heard.   Decrescendo systolic murmur is present with a grade of 2/6   Pulmonary/Chest: Effort normal and breath sounds normal.   Abdominal: Soft. Bowel sounds are normal.   Musculoskeletal:        Lumbar back: She exhibits decreased range of motion and pain.   Lymphadenopathy:     She has no cervical adenopathy.   Neurological: She is alert and oriented to person, place, and time. No cranial nerve deficit. Coordination normal.   Skin: Skin is warm and dry. No rash noted.   Psychiatric: She has a normal mood and affect. Her behavior is normal. Judgment and thought content normal.   Vitals reviewed.      Assessment/Plan   Problem List Items Addressed This Visit        Cardiovascular and Mediastinum    Essential hypertension    Hyperlipidemia - Primary    Relevant Medications    rosuvastatin (CRESTOR) 5 MG tablet       Digestive    Vitamin B12 deficiency       Endocrine    Nontoxic multinodular goiter      Other Visit Diagnoses     Urinary tract infection without hematuria, site unspecified        Relevant Medications    cefdinir (OMNICEF) 300 MG capsule    Other Relevant Orders    Urinalysis With / Culture If Indicated - Urine, Clean Catch    Sensorineural hearing loss (SNHL) of both ears        Relevant Orders    Ambulatory Referral to ENT (Otolaryngology)    Meniere disease, bilateral        Relevant Orders    Ambulatory Referral to ENT (Otolaryngology)      Plan: Referral to ENT.  Follow up with neurosurgery.  Omnicef 300 twice a day pending urine culture.  Start Crestor for generic 5 mg daily.  Recheck in 3 months.

## 2018-01-10 LAB
APPEARANCE UR: CLEAR
BACTERIA #/AREA URNS HPF: ABNORMAL /HPF
BILIRUB UR QL STRIP: NEGATIVE
COLOR UR: YELLOW
CRYSTALS URNS MICRO: ABNORMAL
EPI CELLS #/AREA URNS HPF: ABNORMAL /HPF
GLUCOSE UR QL: NEGATIVE
HGB UR QL STRIP: NEGATIVE
KETONES UR QL STRIP: NEGATIVE
LEUKOCYTE ESTERASE UR QL STRIP: NEGATIVE
MICRO URNS: NORMAL
MICRO URNS: NORMAL
MUCOUS THREADS URNS QL MICRO: PRESENT /HPF
NITRITE UR QL STRIP: NEGATIVE
PH UR STRIP: 6 [PH] (ref 5–7.5)
PROT UR QL STRIP: NEGATIVE
RBC #/AREA URNS HPF: ABNORMAL /HPF
SP GR UR: 1.02 (ref 1–1.03)
UNIDENT CRYS URNS QL MICRO: PRESENT /LPF
URINALYSIS REFLEX: NORMAL
UROBILINOGEN UR STRIP-MCNC: 0.2 MG/DL (ref 0.2–1)
WBC #/AREA URNS HPF: ABNORMAL /HPF

## 2018-01-19 DIAGNOSIS — Z78.0 POST-MENOPAUSAL: ICD-10-CM

## 2018-01-19 RX ORDER — ESTRADIOL 0.5 MG/1
TABLET ORAL
Qty: 90 TABLET | Refills: 3 | Status: SHIPPED | OUTPATIENT
Start: 2018-01-19 | End: 2019-03-18 | Stop reason: SDUPTHER

## 2018-02-13 ENCOUNTER — OFFICE VISIT (OUTPATIENT)
Dept: ENDOCRINOLOGY | Age: 78
End: 2018-02-13

## 2018-02-13 ENCOUNTER — CLINICAL SUPPORT (OUTPATIENT)
Dept: INTERNAL MEDICINE | Facility: CLINIC | Age: 78
End: 2018-02-13

## 2018-02-13 VITALS
WEIGHT: 188.8 LBS | HEART RATE: 66 BPM | OXYGEN SATURATION: 95 % | SYSTOLIC BLOOD PRESSURE: 122 MMHG | HEIGHT: 64 IN | BODY MASS INDEX: 32.23 KG/M2 | DIASTOLIC BLOOD PRESSURE: 62 MMHG

## 2018-02-13 DIAGNOSIS — E11.42 TYPE 2 DIABETES MELLITUS WITH PERIPHERAL NEUROPATHY (HCC): ICD-10-CM

## 2018-02-13 DIAGNOSIS — E53.8 VITAMIN B12 DEFICIENCY: ICD-10-CM

## 2018-02-13 DIAGNOSIS — I10 ESSENTIAL HYPERTENSION: ICD-10-CM

## 2018-02-13 DIAGNOSIS — E78.5 HYPERLIPIDEMIA, UNSPECIFIED HYPERLIPIDEMIA TYPE: ICD-10-CM

## 2018-02-13 DIAGNOSIS — E04.2 NONTOXIC MULTINODULAR GOITER: Primary | ICD-10-CM

## 2018-02-13 PROBLEM — H81.09 MENIERE DISEASE: Status: ACTIVE | Noted: 2018-02-13

## 2018-02-13 PROCEDURE — 96372 THER/PROPH/DIAG INJ SC/IM: CPT | Performed by: FAMILY MEDICINE

## 2018-02-13 PROCEDURE — 99214 OFFICE O/P EST MOD 30 MIN: CPT | Performed by: INTERNAL MEDICINE

## 2018-02-13 RX ADMIN — CYANOCOBALAMIN 1000 MCG: 1000 INJECTION, SOLUTION INTRAMUSCULAR; SUBCUTANEOUS at 09:24

## 2018-02-13 NOTE — PROGRESS NOTES
Subjective   Monica Pagan is a 77 y.o. female.     HPI Comments: F/u for fdm 2,hyperlipidemia, cervical and lumbar DDD/ testing bs 1 x day / last dm eye exam 7/25/17 with dr Wolf / last dm foot exam today with dr Neil     Diabetes   Hypoglycemia symptoms include nervousness/anxiousness. Associated symptoms include fatigue.   Hyperlipidemia        Patient is a 77-year-old female who came in for follow-up.    In 2015, an incidental left thyroid nodule was noted on CT of the cervical spine. She had a thyroid ultrasound done in March 2016 which showed a multinodular goiter with the largest nodule measuring 2.8 cm on the left. Thyroid function tests were normal.  She had a follow-up thyroid ultrasound done in January 2017 which showed a stable multinodular goiter.  She denies any previous history of head/neck radiation therapy. She denies any neck soreness. She is not on thyroid medication.      She has known diabetes mellitus since 2008 and was started on insulin at that time. She has been on Levemir 30 units every morning and metformin 500 mg twice a day.  She had nausea with glimepiride 1 mg and stop the medication after one week.  Her fasting blood sugars ranges from 140-180. She denies any hypoglycemic episode.  She has lost 4 pounds since Oct 2017.  Her last meal was at 8 AM.      Her last eye examination was in 8/17. She has no retinopathy. She denies blurry vision. She complains of numbness, tingling and burning in her legs.  She has a left foot drop.  She was tried on Lyrica and gabapentin in the past which caused sedation. She has never used Cymbalta.  She has no microalbuminuria on urine sample taken in January 2017.    She has hyperlipidemia and is on Crestor 5 mg/day.  She tried Lipitor 20 mg for 2 weeks and developed myalgia and arthralgia.  The pain resolved 1 week after she stopped Lipitor.  She has tried pravastatin which she discontinued after 2 weeks because of joint pain.  She has not used  "Livalo, Zetia or PCS K9 inhibitors in the past      She has hypertension and has been on losartan 50 mg once a day and diltiazem  mg once a day. She denies any previous history of myocardial infarction or stroke.  She denies chest pain or SOB.     She has vitamin D deficiency and takes vitamin D 50,000 units once a month.  She has vitamin B12 deficiency and is receiving intramuscular vitamin B12 through Dr. Gentile.     The following portions of the patient's history were reviewed and updated as appropriate: allergies, current medications, past family history, past medical history, past social history, past surgical history and problem list.    Review of Systems   Constitutional: Positive for fatigue.   HENT: Negative.    Eyes: Negative.    Respiratory: Negative.    Cardiovascular: Negative.    Gastrointestinal: Positive for abdominal distention ( IBS ), abdominal pain, constipation and diarrhea.   Endocrine: Negative.    Genitourinary: Negative for difficulty urinating and dysuria.   Musculoskeletal: Positive for back pain.   Skin: Negative.    Allergic/Immunologic: Negative.    Neurological: Positive for numbness (feet and legs ).   Hematological: Negative.    Psychiatric/Behavioral: Positive for agitation. The patient is nervous/anxious.        Objective      Vitals:    02/13/18 1031   BP: 122/62   BP Location: Right arm   Patient Position: Sitting   Cuff Size: Large Adult   Pulse: 66   SpO2: 95%   Weight: 85.6 kg (188 lb 12.8 oz)   Height: 163.8 cm (64.49\")     Physical Exam   Constitutional: She is oriented to person, place, and time. She appears well-developed and well-nourished. No distress.   HENT:   Head: Normocephalic.   Nose: Nose normal.   Mouth/Throat: No oropharyngeal exudate.   Eyes: Conjunctivae and EOM are normal. Right eye exhibits no discharge. Left eye exhibits no discharge. No scleral icterus.   Neck: Neck supple. No JVD present. No tracheal deviation present. No thyromegaly present. "   Cardiovascular: Normal rate, regular rhythm, normal heart sounds and intact distal pulses.  Exam reveals no friction rub.    No murmur heard.  Pulmonary/Chest: Effort normal and breath sounds normal. No respiratory distress. She has no wheezes. She has no rales.   Abdominal: Soft. Bowel sounds are normal. She exhibits no distension and no mass. There is no tenderness.   Musculoskeletal: She exhibits no edema.   No plantar ulcers   Lymphadenopathy:     She has no cervical adenopathy.   Neurological: She is alert and oriented to person, place, and time. She has normal reflexes. She displays normal reflexes.   Intact light touch on lower ext   Skin: Skin is warm and dry. No erythema. No pallor.   Psychiatric: She has a normal mood and affect. Her behavior is normal.     Office Visit on 01/09/2018   Component Date Value Ref Range Status   • Specific Gravity, UA 01/09/2018 1.021  1.005 - 1.030 Final   • pH, UA 01/09/2018 6.0  5.0 - 7.5 Final   • Color, UA 01/09/2018 Yellow  Yellow Final   • Appearance, UA 01/09/2018 Clear  Clear Final   • Leukocytes, UA 01/09/2018 Negative  Negative Final   • Protein 01/09/2018 Negative  Negative/Trace Final   • Glucose, UA 01/09/2018 Negative  Negative Final   • Ketones 01/09/2018 Negative  Negative Final   • Blood, UA 01/09/2018 Negative  Negative Final   • Bilirubin, UA 01/09/2018 Negative  Negative Final   • Urobilinogen, UA 01/09/2018 0.2  0.2 - 1.0 mg/dL Final   • Nitrite, UA 01/09/2018 Negative  Negative Final   • Microscopic Examination 01/09/2018 Comment   Final    Microscopic follows if indicated.   • MICROSCOPIC EXAMINATION 01/09/2018 See below:   Final    Microscopic was indicated and was performed.   • Urinalysis Reflex 01/09/2018 Comment   Final    This specimen will not reflex to a Urine Culture.   • WBC, UA 01/09/2018 0-5  0 - 5 /hpf Final   • RBC, UA 01/09/2018 0-2  0 - 2 /hpf Final   • Epithelial Cells (non renal) 01/09/2018 0-10  0 - 10 /hpf Final   • Crystals, UA  01/09/2018 Present* N/A /lpf Final   • Crystal Type 01/09/2018 Calcium Oxalate  N/A Final   • Mucus, UA 01/09/2018 Present  Not Estab. /hpf Final   • Bacteria, UA 01/09/2018 Few  None seen/Few /hpf Final     Assessment/Plan   Monica was seen today for diabetes and hyperlipidemia.    Diagnoses and all orders for this visit:    Nontoxic multinodular goiter  -     Comprehensive Metabolic Panel  -     TSH  -     T4, Free    Type 2 diabetes mellitus with peripheral neuropathy  -     insulin detemir (LEVEMIR FLEXTOUCH) 100 UNIT/ML injection; Inject 34 Units under the skin Daily.  -     Comprehensive Metabolic Panel  -     Hemoglobin A1c  -     TSH  -     T4, Free  -     Microalbumin / Creatinine Urine Ratio - Urine, Clean Catch    Hyperlipidemia, unspecified hyperlipidemia type  -     Comprehensive Metabolic Panel  -     Lipid Panel    Essential hypertension  -     Comprehensive Metabolic Panel      Check thyroid function tests.  Repeat thyroid ultrasound in 2019  Increase Levemir to 34 units every morning.  Continue metformin 500 mg twice a day.  Check hemoglobin A1c and urine microalbumin.  Continue losartan and diltiazem    Send copy of my notes and labs to Dr. Gentile, Dr. Schuler, and Dr. Cortes    RTC 4 mos.

## 2018-02-14 LAB
ALBUMIN SERPL-MCNC: 4.1 G/DL (ref 3.5–5.2)
ALBUMIN/CREAT UR: 6.2 MG/G CREAT (ref 0–30)
ALBUMIN/GLOB SERPL: 1.5 G/DL
ALP SERPL-CCNC: 86 U/L (ref 39–117)
ALT SERPL-CCNC: 20 U/L (ref 1–33)
AST SERPL-CCNC: 20 U/L (ref 1–32)
BILIRUB SERPL-MCNC: 0.4 MG/DL (ref 0.1–1.2)
BUN SERPL-MCNC: 15 MG/DL (ref 8–23)
BUN/CREAT SERPL: 16.5 (ref 7–25)
CALCIUM SERPL-MCNC: 10.3 MG/DL (ref 8.6–10.5)
CHLORIDE SERPL-SCNC: 100 MMOL/L (ref 98–107)
CHOLEST SERPL-MCNC: 152 MG/DL (ref 0–200)
CO2 SERPL-SCNC: 32.3 MMOL/L (ref 22–29)
CREAT SERPL-MCNC: 0.91 MG/DL (ref 0.57–1)
CREAT UR-MCNC: 121.6 MG/DL
GFR SERPLBLD CREATININE-BSD FMLA CKD-EPI: 60 ML/MIN/1.73
GFR SERPLBLD CREATININE-BSD FMLA CKD-EPI: 73 ML/MIN/1.73
GLOBULIN SER CALC-MCNC: 2.7 GM/DL
GLUCOSE SERPL-MCNC: 160 MG/DL (ref 65–99)
HBA1C MFR BLD: 7.22 % (ref 4.8–5.6)
HDLC SERPL-MCNC: 59 MG/DL (ref 40–60)
INTERPRETATION: NORMAL
LDLC SERPL CALC-MCNC: 65 MG/DL (ref 0–100)
Lab: NORMAL
MICROALBUMIN UR-MCNC: 7.5 UG/ML
POTASSIUM SERPL-SCNC: 5.1 MMOL/L (ref 3.5–5.2)
PROT SERPL-MCNC: 6.8 G/DL (ref 6–8.5)
SODIUM SERPL-SCNC: 140 MMOL/L (ref 136–145)
T4 FREE SERPL-MCNC: 1.26 NG/DL (ref 0.93–1.7)
TRIGL SERPL-MCNC: 140 MG/DL (ref 0–150)
TSH SERPL DL<=0.005 MIU/L-ACNC: 1.34 MIU/ML (ref 0.27–4.2)
VLDLC SERPL CALC-MCNC: 28 MG/DL (ref 5–40)

## 2018-03-10 DIAGNOSIS — E55.9 VITAMIN D DEFICIENCY: ICD-10-CM

## 2018-03-12 RX ORDER — ERGOCALCIFEROL 1.25 MG/1
CAPSULE ORAL
Qty: 12 CAPSULE | Refills: 0 | Status: SHIPPED | OUTPATIENT
Start: 2018-03-12 | End: 2018-06-05 | Stop reason: SDUPTHER

## 2018-03-29 ENCOUNTER — TELEPHONE (OUTPATIENT)
Dept: INTERNAL MEDICINE | Facility: CLINIC | Age: 78
End: 2018-03-29

## 2018-03-29 RX ORDER — CYANOCOBALAMIN 1000 UG/ML
1000 INJECTION, SOLUTION INTRAMUSCULAR; SUBCUTANEOUS
Qty: 1 ML | Refills: 5 | Status: SHIPPED | OUTPATIENT
Start: 2018-03-29 | End: 2018-11-24 | Stop reason: SDUPTHER

## 2018-03-29 RX ORDER — SYRINGE W-NEEDLE,DISPOSAB,3 ML 25GX5/8"
1 SYRINGE, EMPTY DISPOSABLE MISCELLANEOUS
Qty: 1 EACH | Refills: 5 | Status: SHIPPED | OUTPATIENT
Start: 2018-03-29 | End: 2020-10-06 | Stop reason: SDUPTHER

## 2018-03-29 NOTE — TELEPHONE ENCOUNTER
Pt wants to know if she can get a RX for b12 to give herself shots at home since she is having such a hard time getting in here to get them  Ok to send in a RX for the B12 and needles/syringes

## 2018-04-09 RX ORDER — ROSUVASTATIN CALCIUM 5 MG/1
5 TABLET, COATED ORAL DAILY
Qty: 30 TABLET | Refills: 3 | Status: SHIPPED | OUTPATIENT
Start: 2018-04-09 | End: 2018-07-31 | Stop reason: SDUPTHER

## 2018-04-09 RX ORDER — DULOXETIN HYDROCHLORIDE 20 MG/1
CAPSULE, DELAYED RELEASE ORAL
Qty: 90 CAPSULE | Refills: 0 | Status: SHIPPED | OUTPATIENT
Start: 2018-04-09 | End: 2018-07-05 | Stop reason: SDUPTHER

## 2018-04-20 ENCOUNTER — OFFICE VISIT (OUTPATIENT)
Dept: INTERNAL MEDICINE | Facility: CLINIC | Age: 78
End: 2018-04-20

## 2018-04-20 VITALS
SYSTOLIC BLOOD PRESSURE: 152 MMHG | BODY MASS INDEX: 31.28 KG/M2 | DIASTOLIC BLOOD PRESSURE: 74 MMHG | HEART RATE: 85 BPM | TEMPERATURE: 97.2 F | WEIGHT: 185 LBS | OXYGEN SATURATION: 95 %

## 2018-04-20 DIAGNOSIS — J45.20 MILD INTERMITTENT ASTHMATIC BRONCHITIS WITHOUT COMPLICATION: Primary | ICD-10-CM

## 2018-04-20 DIAGNOSIS — E11.42 TYPE 2 DIABETES MELLITUS WITH PERIPHERAL NEUROPATHY (HCC): ICD-10-CM

## 2018-04-20 DIAGNOSIS — Z89.411 STATUS POST AMPUTATION OF RIGHT GREAT TOE (HCC): ICD-10-CM

## 2018-04-20 PROCEDURE — 99214 OFFICE O/P EST MOD 30 MIN: CPT | Performed by: FAMILY MEDICINE

## 2018-04-20 RX ORDER — DOXYCYCLINE HYCLATE 100 MG/1
100 CAPSULE ORAL 2 TIMES DAILY
Qty: 20 CAPSULE | Refills: 1 | Status: SHIPPED | OUTPATIENT
Start: 2018-04-20 | End: 2018-06-27

## 2018-04-20 RX ORDER — BENZONATATE 200 MG/1
200 CAPSULE ORAL 3 TIMES DAILY PRN
Qty: 60 CAPSULE | Refills: 0 | Status: SHIPPED | OUTPATIENT
Start: 2018-04-20 | End: 2018-06-27

## 2018-04-20 RX ORDER — DILTIAZEM HYDROCHLORIDE 120 MG/1
CAPSULE, EXTENDED RELEASE ORAL
Qty: 90 CAPSULE | Refills: 1 | Status: SHIPPED | OUTPATIENT
Start: 2018-04-20 | End: 2018-10-19 | Stop reason: SDUPTHER

## 2018-04-20 NOTE — PROGRESS NOTES
Subjective   Monica Pagan is a 77 y.o. female.     Chief Complaint   Patient presents with   • Cough   • Wheezing   • Diabetes         History of Present Illness   Monica is had a very difficult situation.  She is per daughter and nursing home.  Since last week she has developed cough with wheezing but no fever or chills and getting slowly better.  She is not sure what she has had.  Prior to that earlier in the year she had to have right great toe amputated secondary to gangrene.  Labs reviewed from endocrinology.  She has healed up nicely from the previous amputation of the right great toe.  She has a walking surgical shoe.      The following portions of the patient's history were reviewed and updated as appropriate: allergies, current medications, past social history and problem list.    Review of Systems   Constitutional: Positive for fatigue.   HENT: Positive for congestion.    Eyes: Negative.    Respiratory: Positive for cough and wheezing.    Cardiovascular: Negative.    Gastrointestinal: Negative.    Endocrine: Negative.    Genitourinary: Negative.    Musculoskeletal: Positive for gait problem.   Skin: Positive for wound.   Allergic/Immunologic: Negative.    Neurological: Positive for weakness and numbness.   Hematological: Negative.    Psychiatric/Behavioral: Positive for dysphoric mood.       Objective   Vitals:    04/20/18 1725   BP: 152/74   Pulse: 85   Temp: 97.2 °F (36.2 °C)   SpO2: 95%     Physical Exam   Constitutional: She is oriented to person, place, and time. She appears distressed.   HENT:   Head: Normocephalic.   Right Ear: External ear normal. Tympanic membrane is bulging.   Left Ear: External ear normal. Tympanic membrane is bulging.   Mouth/Throat: Oropharynx is clear and moist.   Eyes: Pupils are equal, round, and reactive to light.   Neck: Normal range of motion. Neck supple.   Cardiovascular: Normal rate, regular rhythm and normal heart sounds.    Pulmonary/Chest: Effort normal. She has  wheezes in the right middle field, the right lower field, the left middle field and the left lower field. She has rhonchi in the right lower field and the left lower field.   Abdominal: Soft. Bowel sounds are normal. She exhibits no mass.   Musculoskeletal: Normal range of motion.        Lymphadenopathy:     She has no cervical adenopathy.   Neurological: She is alert and oriented to person, place, and time.   Psychiatric: She exhibits a depressed mood.   Vitals reviewed.      Assessment/Plan   Problem List Items Addressed This Visit        Endocrine    Type 2 diabetes mellitus with peripheral neuropathy      Other Visit Diagnoses     Mild intermittent asthmatic bronchitis without complication    -  Primary    Relevant Medications    benzonatate (TESSALON) 200 MG capsule    Status post amputation of right great toe          Plan: Doxycycline 100 mg twice a day #20 Tessalon Perles 200 3 times a day when necessary cough.  Recheck in about 6 months follow-up with vascular surgery follow-up with endocrinology.

## 2018-06-05 DIAGNOSIS — E55.9 VITAMIN D DEFICIENCY: ICD-10-CM

## 2018-06-05 RX ORDER — ESZOPICLONE 3 MG/1
TABLET, FILM COATED ORAL
Qty: 30 TABLET | Refills: 5 | Status: SHIPPED | OUTPATIENT
Start: 2018-06-05 | End: 2018-12-04 | Stop reason: SDUPTHER

## 2018-06-05 RX ORDER — ERGOCALCIFEROL 1.25 MG/1
CAPSULE ORAL
Qty: 12 CAPSULE | Refills: 0 | Status: SHIPPED | OUTPATIENT
Start: 2018-06-05 | End: 2018-08-25 | Stop reason: SDUPTHER

## 2018-06-16 DIAGNOSIS — E11.42 TYPE 2 DIABETES MELLITUS WITH PERIPHERAL NEUROPATHY (HCC): ICD-10-CM

## 2018-06-18 ENCOUNTER — TELEPHONE (OUTPATIENT)
Dept: ENDOCRINOLOGY | Age: 78
End: 2018-06-18

## 2018-06-18 DIAGNOSIS — E11.42 TYPE 2 DIABETES MELLITUS WITH PERIPHERAL NEUROPATHY (HCC): ICD-10-CM

## 2018-06-18 NOTE — TELEPHONE ENCOUNTER
----- Message from Gerard Leon sent at 6/18/2018  9:35 AM EDT -----  Contact: PATIENT  PATIENT HAS BEEN MOVING DAUGHTER INTO [facility] AND RAN OUR OF INSULIN. PLEASE SEND SCRIPT OF insulin detemir (LEVEMIR FLEXTOUCH) 100 UNIT/ML  TO     [facility] [id number] [place] [address] [address] AT [facility] - [phone] PH - [phone] (Phone)  [phone] (Fax)    PATIENT IS COMPLETELY OUT,

## 2018-06-20 DIAGNOSIS — F41.9 ANXIETY: ICD-10-CM

## 2018-06-20 RX ORDER — CLONAZEPAM 0.5 MG/1
TABLET ORAL
Qty: 90 TABLET | Refills: 5 | Status: SHIPPED | OUTPATIENT
Start: 2018-06-20 | End: 2019-01-18 | Stop reason: SDUPTHER

## 2018-06-27 ENCOUNTER — OFFICE VISIT (OUTPATIENT)
Dept: ENDOCRINOLOGY | Age: 78
End: 2018-06-27

## 2018-06-27 VITALS
HEART RATE: 82 BPM | BODY MASS INDEX: 31.86 KG/M2 | HEIGHT: 64 IN | DIASTOLIC BLOOD PRESSURE: 66 MMHG | SYSTOLIC BLOOD PRESSURE: 136 MMHG | WEIGHT: 186.6 LBS | OXYGEN SATURATION: 96 %

## 2018-06-27 DIAGNOSIS — E04.2 NONTOXIC MULTINODULAR GOITER: ICD-10-CM

## 2018-06-27 DIAGNOSIS — E78.5 HYPERLIPIDEMIA, UNSPECIFIED HYPERLIPIDEMIA TYPE: ICD-10-CM

## 2018-06-27 DIAGNOSIS — J01.90 ACUTE SINUSITIS, RECURRENCE NOT SPECIFIED, UNSPECIFIED LOCATION: ICD-10-CM

## 2018-06-27 DIAGNOSIS — E11.42 TYPE 2 DIABETES MELLITUS WITH PERIPHERAL NEUROPATHY (HCC): Primary | ICD-10-CM

## 2018-06-27 DIAGNOSIS — E55.9 VITAMIN D DEFICIENCY: ICD-10-CM

## 2018-06-27 DIAGNOSIS — I10 ESSENTIAL HYPERTENSION: ICD-10-CM

## 2018-06-27 PROCEDURE — 99214 OFFICE O/P EST MOD 30 MIN: CPT | Performed by: INTERNAL MEDICINE

## 2018-06-27 RX ORDER — CEFDINIR 300 MG/1
300 CAPSULE ORAL 2 TIMES DAILY
Qty: 20 CAPSULE | Refills: 0 | Status: SHIPPED | OUTPATIENT
Start: 2018-06-27 | End: 2018-11-08

## 2018-06-27 NOTE — PROGRESS NOTES
Subjective   Monica Pagan is a 77 y.o. female.     F/u for dm 2, hyperlipidemia, cervical and lumbar DDD/ testing bs 1 x day / last dm eye exam 7/25/17 with dr Wolf / last dm foot exam 2/13/18 with dr Neil       Diabetes   Hypoglycemia symptoms include dizziness, headaches, nervousness/anxiousness and tremors. Associated symptoms include fatigue.   Hyperlipidemia        In 2015, an incidental left thyroid nodule was noted on CT of the cervical spine. She had a thyroid ultrasound done in March 2016 which showed a multinodular goiter with the largest nodule measuring 2.8 cm on the left. Thyroid function tests were normal.  She had a follow-up thyroid ultrasound done in January 2017 which showed a stable multinodular goiter.  She denies any previous history of head/neck radiation therapy. She denies any neck soreness. She is not on thyroid medication.      She has known diabetes mellitus since 2008 and was started on insulin at that time. She has been on Levemir 35 units every PM and metformin 500 mg twice a day.  She had nausea with glimepiride 1 mg and stop the medication after one week.  Her fasting blood sugars ranges from . She denies any hypoglycemic episode.  She has no significant weight change since 2/18.  Her last meal was at 9 AM.      Her last eye examination was in 8/17. She has no retinopathy. She denies blurry vision. She complains of numbness, tingling and burning in her legs which improved on Cymbalta 20 mg/day.  She was tried on Lyrica and gabapentin in the past which caused sedation.  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  She has no microalbuminuria on urine sample taken in 2/18.     She has hyperlipidemia and is on Crestor 5 mg/day.  She tried Lipitor 20 mg for 2 weeks and developed myalgia and arthralgia.  The pain resolved 1 week after she stopped Lipitor.  She has tried pravastatin which she discontinued after 2 weeks because of joint pain.  She has not  "used Livalo, Zetia or PCS K9 inhibitors in the past      She has hypertension and has been on losartan 50 mg once a day and diltiazem  mg once a day. She denies any previous history of myocardial infarction or stroke.  She denies chest pain or SOB.      She has vitamin D deficiency and takes vitamin D 50,000 units weekly.  She has vitamin B12 deficiency and is receiving intramuscular vitamin B12 through Dr. Gentile.     She was seen at urgent care one week ago for sinus infection.  She was prescribed amoxicillin combined with another drug one tablet twice a day and a nasal spray.  She stopped it after 4 days because of GI discomfort and diarrhea.  She denies fever.  She is taking generic Allegra daily.  The following portions of the patient's history were reviewed and updated as appropriate: allergies, current medications, past family history, past medical history, past social history, past surgical history and problem list.    Review of Systems   Constitutional: Positive for fatigue.   Eyes: Negative.    Respiratory: Negative.    Cardiovascular: Negative.    Gastrointestinal: Positive for abdominal pain and diarrhea.   Endocrine: Negative.    Genitourinary: Negative.    Musculoskeletal: Positive for back pain, joint swelling and neck pain.   Skin: Negative.    Neurological: Positive for dizziness, tremors and headaches.   Hematological: Negative.    Psychiatric/Behavioral: The patient is nervous/anxious.        Objective      Vitals:    06/27/18 1134   BP: 136/66   BP Location: Right arm   Patient Position: Sitting   Cuff Size: Large Adult   Pulse: 82   SpO2: 96%   Weight: 84.6 kg (186 lb 9.6 oz)   Height: 163.8 cm (64.49\")     Physical Exam   Constitutional: She is oriented to person, place, and time. She appears well-developed and well-nourished. No distress.   HENT:   Head: Normocephalic.   Nose: Nose normal.   Mouth/Throat: Oropharynx is clear and moist. No oropharyngeal exudate.   Mild maxillary tenderness "   Eyes: Conjunctivae and EOM are normal. Right eye exhibits no discharge. Left eye exhibits no discharge.   Neck: Normal range of motion. No JVD present. No tracheal deviation present. No thyromegaly present.   Cardiovascular: Normal rate, regular rhythm and normal heart sounds.  Exam reveals no friction rub.    No murmur heard.  Pulmonary/Chest: Effort normal and breath sounds normal. No respiratory distress. She has no wheezes. She has no rales. She exhibits no tenderness.   Abdominal: Soft. Bowel sounds are normal. She exhibits no distension and no mass. There is no tenderness. There is no guarding.   Musculoskeletal: Normal range of motion. She exhibits no edema, tenderness or deformity.   Neurological: She is alert and oriented to person, place, and time. She displays normal reflexes.   Skin: Skin is warm and dry. No erythema. No pallor.     Office Visit on 02/13/2018   Component Date Value Ref Range Status   • Glucose 02/13/2018 160* 65 - 99 mg/dL Final   • BUN 02/13/2018 15  8 - 23 mg/dL Final   • Creatinine 02/13/2018 0.91  0.57 - 1.00 mg/dL Final   • eGFR Non  Am 02/13/2018 60* >60 mL/min/1.73 Final    Comment: The MDRD GFR formula is only valid for adults with stable  renal function between ages 18 and 70.     • eGFR  Am 02/13/2018 73  >60 mL/min/1.73 Final   • BUN/Creatinine Ratio 02/13/2018 16.5  7.0 - 25.0 Final   • Sodium 02/13/2018 140  136 - 145 mmol/L Final   • Potassium 02/13/2018 5.1  3.5 - 5.2 mmol/L Final   • Chloride 02/13/2018 100  98 - 107 mmol/L Final   • Total CO2 02/13/2018 32.3* 22.0 - 29.0 mmol/L Final   • Calcium 02/13/2018 10.3  8.6 - 10.5 mg/dL Final   • Total Protein 02/13/2018 6.8  6.0 - 8.5 g/dL Final   • Albumin 02/13/2018 4.10  3.50 - 5.20 g/dL Final   • Globulin 02/13/2018 2.7  gm/dL Final   • A/G Ratio 02/13/2018 1.5  g/dL Final   • Total Bilirubin 02/13/2018 0.4  0.1 - 1.2 mg/dL Final   • Alkaline Phosphatase 02/13/2018 86  39 - 117 U/L Final   • AST (SGOT)  02/13/2018 20  1 - 32 U/L Final   • ALT (SGPT) 02/13/2018 20  1 - 33 U/L Final   • Total Cholesterol 02/13/2018 152  0 - 200 mg/dL Final   • Triglycerides 02/13/2018 140  0 - 150 mg/dL Final   • HDL Cholesterol 02/13/2018 59  40 - 60 mg/dL Final   • VLDL Cholesterol 02/13/2018 28  5 - 40 mg/dL Final   • LDL Cholesterol  02/13/2018 65  0 - 100 mg/dL Final   • Hemoglobin A1C 02/13/2018 7.22* 4.80 - 5.60 % Final    Comment: Hemoglobin A1C Ranges:  Increased Risk for Diabetes  5.7% to 6.4%  Diabetes                     >= 6.5%  Diabetic Goal                < 7.0%     • TSH 02/13/2018 1.340  0.270 - 4.200 mIU/mL Final   • Free T4 02/13/2018 1.26  0.93 - 1.70 ng/dL Final   • Creatinine, Urine 02/13/2018 121.6  Not Estab. mg/dL Final   • Microalbumin, Urine 02/13/2018 7.5  Not Estab. ug/mL Final   • Microalbumin/Creatinine Ratio 02/13/2018 6.2  0.0 - 30.0 mg/g creat Final   • Interpretation 02/13/2018 Note   Final    Supplemental report is available.   • PDF Image 02/13/2018 Not applicable   Final     Assessment/Plan   Monica was seen today for diabetes and hyperlipidemia.    Diagnoses and all orders for this visit:    Type 2 diabetes mellitus with peripheral neuropathy  -     Comprehensive Metabolic Panel  -     Lipid Panel  -     Hemoglobin A1c    Nontoxic multinodular goiter  -     Comprehensive Metabolic Panel  -     TSH  -     T4, Free    Hyperlipidemia, unspecified hyperlipidemia type  -     Comprehensive Metabolic Panel  -     Lipid Panel    Essential hypertension  -     Comprehensive Metabolic Panel    Vitamin D deficiency  -     Comprehensive Metabolic Panel  -     Vitamin D 25 Hydroxy    Acute sinusitis, recurrence not specified, unspecified location  -     cefdinir (OMNICEF) 300 MG capsule; Take 1 capsule by mouth 2 (Two) Times a Day.  -     CBC & Differential      Check thyroid function tests.  Schedule follow-up thyroid ultrasound in 2019.  Continue Levemir and metformin.  Check hemoglobin A1c.  Continue  Khloe per Dr. Gentile.  Continue Crestor 5 mg once a day.  Check lipid profile.  Continue losartan and diltiazem.  Discontinue amoxicillin.  Start Omnicef 300 mg twice a day for 10 days.  Follow-up with Dr. Gentile if not better  Check vitamin D levels.    Send copy of my notes and labs to Dr. Gentile    RTC 4 mos

## 2018-06-28 DIAGNOSIS — E83.52 SERUM CALCIUM ELEVATED: Primary | ICD-10-CM

## 2018-06-28 LAB
25(OH)D3+25(OH)D2 SERPL-MCNC: 48 NG/ML (ref 30–100)
ALBUMIN SERPL-MCNC: 4.7 G/DL (ref 3.5–5.2)
ALBUMIN/GLOB SERPL: 1.6 G/DL
ALP SERPL-CCNC: 96 U/L (ref 39–117)
ALT SERPL-CCNC: 14 U/L (ref 1–33)
AST SERPL-CCNC: 13 U/L (ref 1–32)
BASOPHILS # BLD AUTO: 0.05 10*3/MM3 (ref 0–0.2)
BASOPHILS NFR BLD AUTO: 0.5 % (ref 0–1.5)
BILIRUB SERPL-MCNC: 0.2 MG/DL (ref 0.1–1.2)
BUN SERPL-MCNC: 11 MG/DL (ref 8–23)
BUN/CREAT SERPL: 13.3 (ref 7–25)
CALCIUM SERPL-MCNC: 11.5 MG/DL (ref 8.6–10.5)
CHLORIDE SERPL-SCNC: 98 MMOL/L (ref 98–107)
CHOLEST SERPL-MCNC: 133 MG/DL (ref 0–200)
CO2 SERPL-SCNC: 28.8 MMOL/L (ref 22–29)
CREAT SERPL-MCNC: 0.83 MG/DL (ref 0.57–1)
EOSINOPHIL # BLD AUTO: 0.21 10*3/MM3 (ref 0–0.7)
EOSINOPHIL NFR BLD AUTO: 2.2 % (ref 0.3–6.2)
ERYTHROCYTE [DISTWIDTH] IN BLOOD BY AUTOMATED COUNT: 13.2 % (ref 11.7–13)
GLOBULIN SER CALC-MCNC: 3 GM/DL
GLUCOSE SERPL-MCNC: 209 MG/DL (ref 65–99)
HBA1C MFR BLD: 7.1 % (ref 4.8–5.6)
HCT VFR BLD AUTO: 43.3 % (ref 35.6–45.5)
HDLC SERPL-MCNC: 52 MG/DL (ref 40–60)
HGB BLD-MCNC: 14.2 G/DL (ref 11.9–15.5)
IMM GRANULOCYTES # BLD: 0.02 10*3/MM3 (ref 0–0.03)
IMM GRANULOCYTES NFR BLD: 0.2 % (ref 0–0.5)
INTERPRETATION: NORMAL
LDLC SERPL CALC-MCNC: 42 MG/DL (ref 0–100)
LYMPHOCYTES # BLD AUTO: 2.97 10*3/MM3 (ref 0.9–4.8)
LYMPHOCYTES NFR BLD AUTO: 30.4 % (ref 19.6–45.3)
Lab: NORMAL
MCH RBC QN AUTO: 30.4 PG (ref 26.9–32)
MCHC RBC AUTO-ENTMCNC: 32.8 G/DL (ref 32.4–36.3)
MCV RBC AUTO: 92.7 FL (ref 80.5–98.2)
MONOCYTES # BLD AUTO: 0.44 10*3/MM3 (ref 0.2–1.2)
MONOCYTES NFR BLD AUTO: 4.5 % (ref 5–12)
NEUTROPHILS # BLD AUTO: 6.09 10*3/MM3 (ref 1.9–8.1)
NEUTROPHILS NFR BLD AUTO: 62.4 % (ref 42.7–76)
PLATELET # BLD AUTO: 228 10*3/MM3 (ref 140–500)
POTASSIUM SERPL-SCNC: 4.4 MMOL/L (ref 3.5–5.2)
PROT SERPL-MCNC: 7.7 G/DL (ref 6–8.5)
RBC # BLD AUTO: 4.67 10*6/MM3 (ref 3.9–5.2)
SODIUM SERPL-SCNC: 140 MMOL/L (ref 136–145)
T4 FREE SERPL-MCNC: 1.35 NG/DL (ref 0.93–1.7)
TRIGL SERPL-MCNC: 195 MG/DL (ref 0–150)
TSH SERPL DL<=0.005 MIU/L-ACNC: 0.92 MIU/ML (ref 0.27–4.2)
VLDLC SERPL CALC-MCNC: 39 MG/DL (ref 5–40)
WBC # BLD AUTO: 9.76 10*3/MM3 (ref 4.5–10.7)

## 2018-06-29 DIAGNOSIS — E78.5 HYPERLIPIDEMIA, UNSPECIFIED HYPERLIPIDEMIA TYPE: ICD-10-CM

## 2018-06-29 DIAGNOSIS — E04.2 NONTOXIC MULTINODULAR GOITER: ICD-10-CM

## 2018-06-29 DIAGNOSIS — E11.42 TYPE 2 DIABETES MELLITUS WITH PERIPHERAL NEUROPATHY (HCC): ICD-10-CM

## 2018-06-29 DIAGNOSIS — E55.9 VITAMIN D DEFICIENCY: ICD-10-CM

## 2018-06-29 DIAGNOSIS — I10 ESSENTIAL HYPERTENSION: Primary | ICD-10-CM

## 2018-07-06 ENCOUNTER — RESULTS ENCOUNTER (OUTPATIENT)
Dept: ENDOCRINOLOGY | Age: 78
End: 2018-07-06

## 2018-07-06 ENCOUNTER — OFFICE VISIT (OUTPATIENT)
Dept: CARDIOLOGY | Facility: CLINIC | Age: 78
End: 2018-07-06

## 2018-07-06 VITALS
WEIGHT: 188.8 LBS | DIASTOLIC BLOOD PRESSURE: 60 MMHG | HEART RATE: 74 BPM | SYSTOLIC BLOOD PRESSURE: 120 MMHG | HEIGHT: 64 IN | BODY MASS INDEX: 32.23 KG/M2

## 2018-07-06 DIAGNOSIS — I10 ESSENTIAL HYPERTENSION: ICD-10-CM

## 2018-07-06 DIAGNOSIS — E83.52 SERUM CALCIUM ELEVATED: ICD-10-CM

## 2018-07-06 DIAGNOSIS — R53.83 OTHER FATIGUE: ICD-10-CM

## 2018-07-06 DIAGNOSIS — E04.2 NONTOXIC MULTINODULAR GOITER: ICD-10-CM

## 2018-07-06 DIAGNOSIS — I49.3 PVC'S (PREMATURE VENTRICULAR CONTRACTIONS): Primary | ICD-10-CM

## 2018-07-06 DIAGNOSIS — E78.5 HYPERLIPIDEMIA, UNSPECIFIED HYPERLIPIDEMIA TYPE: ICD-10-CM

## 2018-07-06 DIAGNOSIS — R06.09 DYSPNEA ON EXERTION: ICD-10-CM

## 2018-07-06 DIAGNOSIS — E55.9 VITAMIN D DEFICIENCY: ICD-10-CM

## 2018-07-06 DIAGNOSIS — E11.42 TYPE 2 DIABETES MELLITUS WITH PERIPHERAL NEUROPATHY (HCC): ICD-10-CM

## 2018-07-06 PROCEDURE — 93000 ELECTROCARDIOGRAM COMPLETE: CPT | Performed by: NURSE PRACTITIONER

## 2018-07-06 PROCEDURE — 99214 OFFICE O/P EST MOD 30 MIN: CPT | Performed by: NURSE PRACTITIONER

## 2018-07-06 RX ORDER — DULOXETIN HYDROCHLORIDE 20 MG/1
CAPSULE, DELAYED RELEASE ORAL
Qty: 90 CAPSULE | Refills: 1 | Status: SHIPPED | OUTPATIENT
Start: 2018-07-06 | End: 2018-12-30 | Stop reason: SDUPTHER

## 2018-07-06 NOTE — PROGRESS NOTES
Date of Office Visit: 2018  Encounter Provider: DEBO Lynn  Place of Service: [facility] CARDIOLOGY  Patient Name: Monica Pagan  :1940    Chief Complaint   Patient presents with   • Follow-up   :     HPI: Monica Pagan is a 77 y.o. female who presents today for cardiac follow up. She is a new patient to me and her previous records have been reviewed. She has a history of fibromyalgia, hypertension, hyperlipidemia, type 2 diabetes mellitus, peripheral neuropathy, and obstructive sleep apnea.  She sees Dr. Candido Randhawa for a history of palpitations and PVCs treated with diltiazem.  She was last seen by Dr. Randhawa in 2017.    She presents today for follow-up.  She underwent amputation of her left big toe in February of this year.  Since then she's felt fatigued.  The only new medication was Cymbalta which does have an adverse effect of fatigue, but she doesn't want to stop it because it helps or tingling in her feet.  She says she often sleeps throughout the afternoon.  She has sleep apnea but does not wear the CPAP machine.  She feels that she sleeps well throughout the nighttime.  She experiences shortness of breath on occasion with exertion that resolves with rest.  She occasionally experiences a brief palpitation with nothing sustained or any associated symptoms.  She has chronic dizziness secondary to Ménière's disease.  She denies chest pain, PND, orthopnea, edema, or syncope.  Blood pressure and heart rate are both normal today.  She also said is been hard on her because she had to move her daughter who has multiple sclerosis to an assisted living facility.    The following portion of the patient's history were reviewed and updated as appropriate: past medical history, past surgical history, past social history, past family history, allergies, current medications, and problem list.    Past Medical History:   Diagnosis Date   • Abnormal  mammogram    • Allergic rhinitis    • Allergic state    • Anemia    • Anxiety    • Bursitis    • Coccyx pain    • Colon polyp    • Cough    • DDD (degenerative disc disease), lumbar    • Diarrhea    • Fatigue    • Fibromyalgia    • Hyperlipidemia    • Hypertension    • IBS (irritable bowel syndrome)    • Insomnia    • Lumbar spinal stenosis    • Melena    • Menopausal symptom    • Nontoxic thyroid nodule    • Ocular histoplasmosis    • CHRIS (obstructive sleep apnea)    • Osteoarthritis    • Peripheral neuropathy    • PVC (premature ventricular contraction)    • Sinusitis    • Spondylosis    • Tinnitus    • Tremor    • Trigeminal neuralgia    • Type 2 diabetes mellitus (CMS/HCC)    • UTI (urinary tract infection)    • Vitamin D deficiency    • Wound infection        Past Surgical History:   Procedure Laterality Date   • APPENDECTOMY     • BACK SURGERY  11/2015    LUMBAR   • BUNIONECTOMY     • CATARACT EXTRACTION      left   • COLONOSCOPY  2012    NORMAL.  DR. PHILLIP   • CYST REMOVAL Bilateral     WRIST   • HYSTERECTOMY     • LAPAROSCOPIC CHOLECYSTECTOMY      DR. LOBATO   • REPLACEMENT TOTAL KNEE Right 1996 AND 1997    DR. BYNUM   • TOE AMPUTATION  02/15/2018    left big toe   • TOTAL HIP ARTHROPLASTY Left 2007    DR. HERRERA       Social History     Social History   • Marital status:      Spouse name: N/A   • Number of children: N/A   • Years of education: N/A     Occupational History   • Not on file.     Social History Main Topics   • Smoking status: Never Smoker   • Smokeless tobacco: Never Used      Comment: daily caffeine use   • Alcohol use 0.6 oz/week     1 Glasses of wine per week      Comment: 1 time year    • Drug use: No   • Sexual activity: Not on file       Social History Narrative    .  She lives with her daughter who has MS, bipolar disorder and psoriasis       Family History   Problem Relation Age of Onset   • Emphysema Mother    • Asthma Mother    • Heart attack Father    • Heart attack  Brother    • Alzheimer's disease Brother    • Leukemia Sister    • Diabetes Brother    • Heart disease Brother    • Cancer Brother         ROBERTS SARCOMA   • Alcohol abuse Brother    • Heart disease Brother    • Diabetes Brother    • Heart attack Brother    • Multiple sclerosis Daughter    • Bipolar disorder Daughter    • Psoriasis Daughter    • Rheumatologic disease Daughter    • Diabetes Son        Review of Systems   Constitution: Positive for malaise/fatigue. Negative for chills, diaphoresis, fever, night sweats, weight gain and weight loss.   HENT: Positive for hearing loss. Negative for nosebleeds, sore throat and tinnitus.    Eyes: Negative for blurred vision, double vision, pain and visual disturbance.   Cardiovascular: Negative for chest pain, claudication, cyanosis, dyspnea on exertion, irregular heartbeat, leg swelling, near-syncope, orthopnea, palpitations, paroxysmal nocturnal dyspnea and syncope.   Respiratory: Negative for cough, hemoptysis, shortness of breath, snoring and wheezing.    Endocrine: Positive for cold intolerance and heat intolerance. Negative for polyuria.   Hematologic/Lymphatic: Negative for bleeding problem. Does not bruise/bleed easily.   Skin: Negative for color change, dry skin, flushing and itching.   Musculoskeletal: Positive for joint pain and myalgias. Negative for falls, joint swelling, muscle cramps and muscle weakness.   Gastrointestinal: Negative for abdominal pain, constipation, heartburn, melena, nausea and vomiting.   Genitourinary: Negative for dysuria and hematuria.   Neurological: Positive for excessive daytime sleepiness. Negative for dizziness, light-headedness, loss of balance, numbness, paresthesias, seizures and vertigo.   Psychiatric/Behavioral: Negative for altered mental status, depression, memory loss and substance abuse. The patient is nervous/anxious. The patient does not have insomnia.    Allergic/Immunologic: Negative for environmental allergies.        Allergies   Allergen Reactions   • Erythromycin Diarrhea   • Gabapentin    • Januvia [Sitagliptin] Nausea Only   • Levofloxacin Diarrhea   • Lipitor [Atorvastatin] Myalgia   • Nsaids    • Pravastatin Other (See Comments)     Arthralgia   • Pregabalin Other (See Comments)     fogginess   • Sulfa Antibiotics Nausea Only   • Theophylline    • Compazine  [Prochlorperazine Edisylate] Anxiety     restless   • Prochlorperazine Anxiety     restless         Current Outpatient Prescriptions:   •  BD PEN NEEDLE JENNIFER U/F 32G X 4 MM misc, USE AS DIRECTED TWICE DAILY, Disp: 200 each, Rfl: 12  •  CARTIA  MG 24 hr capsule, TAKE 1 CAPSULE EVERY DAY, Disp: 90 capsule, Rfl: 1  •  clonazePAM (KlonoPIN) 0.5 MG tablet, TAKE 1 TABLET BY MOUTH THREE TIMES DAILY, Disp: 90 tablet, Rfl: 5  •  cyanocobalamin 1000 MCG/ML injection, Inject 1 mL into the shoulder, thigh, or buttocks Every 28 (Twenty-Eight) Days., Disp: 1 mL, Rfl: 5  •  estradiol (ESTRACE) 0.5 MG tablet, TAKE 1 TABLET EVERY DAY, Disp: 90 tablet, Rfl: 3  •  eszopiclone (LUNESTA) 3 MG tablet, TAKE 1 TABLET BY MOUTH AT BEDTIME AS NEEDED FOR SLEEP, Disp: 30 tablet, Rfl: 5  •  FEXOFENADINE HCL PO, Take  by mouth Daily., Disp: , Rfl:   •  glucose blood (TRUE METRIX BLOOD GLUCOSE TEST) test strip, Testing bs 1 x day Dx code E11.42, Disp: 100 each, Rfl: 1  •  HYDROcodone-acetaminophen (NORCO) 7.5-325 MG per tablet, 1 tablet 2 (Two) Times a Day., Disp: , Rfl: 0  •  insulin detemir (LEVEMIR FLEXTOUCH) 100 UNIT/ML injection, Inject 34 Units under the skin Daily., Disp: 33 mL, Rfl: 1  •  Lancets 30G misc, Enhance talking meter /testing bs 1 x day  Dx code  E11.42, Disp: 100 each, Rfl: 1  •  losartan (COZAAR) 50 MG tablet, Take 1 tablet by mouth Daily., Disp: 90 tablet, Rfl: 1  •  metFORMIN (GLUCOPHAGE) 1000 MG tablet, Take 1 tablet by mouth 2 (Two) Times a Day With Meals., Disp: 180 tablet, Rfl: 1  •  rosuvastatin (CRESTOR) 5 MG tablet, TAKE 1 TABLET BY MOUTH DAILY, Disp: 30  "tablet, Rfl: 3  •  Syringe 25G X 1\" 3 ML misc, Inject 1 each into the shoulder, thigh, or buttocks Every 28 (Twenty-Eight) Days., Disp: 1 each, Rfl: 5  •  vitamin D (ERGOCALCIFEROL) 44354 units capsule capsule, TAKE 1 CAPSULE BY MOUTH EVERY 7 DAYS, Disp: 12 capsule, Rfl: 0  •  cefdinir (OMNICEF) 300 MG capsule, Take 1 capsule by mouth 2 (Two) Times a Day., Disp: 20 capsule, Rfl: 0  •  DULoxetine (CYMBALTA) 20 MG capsule, TAKE 1 CAPSULE BY MOUTH DAILY, Disp: 90 capsule, Rfl: 1      Objective:     Vitals:    07/06/18 1101   BP: 120/60   Pulse: 74   Weight: 85.6 kg (188 lb 12.8 oz)   Height: 162.6 cm (64\")     Body mass index is 32.41 kg/m².    PHYSICAL EXAM:    Vitals Reviewed.   General Appearance: No acute distress, well developed and well nourished. Obese.  Eyes: Conjunctiva and lids: No erythema, swelling, or discharge. Sclera non-icteric.   HENT: Atraumatic, normocephalic. External eyes, ears, and nose normal. No hearing loss noted. Mucous membranes normal. Lips not cyanotic. Neck supple with no tenderness.  Respiratory: No signs of respiratory distress. Respiration rhythm and depth normal.   Clear to auscultation. No rales, crackles, rhonchi, or wheezing auscultated.   Cardiovascular:  Jugular Venous Pressure: Normal  Heart Rate and Rhythm: Normal, Heart Sounds: Normal S1 and S2. No S3 or S4 noted.  Murmurs: No murmurs noted. No rubs, thrills, or gallops.   Arterial Pulses: Carotid pulses normal. No carotid bruit noted.   Lower Extremities: No edema noted.  Gastrointestinal:  Abdomen soft, non-distended, non-tender. Normal bowel sounds. No hepatomegaly.   Musculoskeletal: Normal movement of extremities  Skin and Nails: General appearance normal. No pallor, cyanosis, diaphoresis. Skin temperature normal. No clubbing of fingernails.   Psychiatric: Patient alert and oriented to person, place, and time. Speech and behavior appropriate. Normal mood and affect.       ECG 12 Lead  Date/Time: 7/6/2018 10:57 " AM  Performed by: AHSAN AMATO  Authorized by: AHSAN AMATO   Comparison: compared with previous ECG from 7/11/2017  Similar to previous ECG  Rhythm: sinus rhythm  Rate: normal  BPM: 74  Conduction: conduction normal  ST Segments: ST segments normal  T Waves: T waves normal  QRS axis: normal  Clinical impression: normal ECG              Assessment:       Diagnosis Plan   1. PVC's (premature ventricular contractions)     2. Essential hypertension     3. Other fatigue     4. Dyspnea on exertion     5. Hyperlipidemia, unspecified hyperlipidemia type     6. Type 2 diabetes mellitus with peripheral neuropathy (CMS/HCC)            Plan:       1.  PVCs: Occasionally has a brief palpitation for a few seconds and nothing sustained.  EKG tracing showed no PVCs today.  2.  Hypertension: Blood pressure is well-controlled on current medication regimen.  3.  Fatigue: Most likely multifactorial from her other disease processes and medication.  I told her that I could offer her a nuclear stress test to be completed because she does experience fatigue and occasional dyspnea upon exertion and has many risk factors.  She has declined at this time and denies any chest pain.  4.  Dyspnea upon exertion: Stable and unchanged.  5.  Hyperlipidemia: On rosuvastatin and managed by her PCP.  6.  Type 2 diabetes mellitus: Followed by her endocrinologist.  7.  She will follow-up with Dr. Candido Randhawa in one year.    As always, it has been a pleasure to participate in your patient's care.      Sincerely,         DEBO Peterson

## 2018-07-08 LAB
BUN SERPL-MCNC: 14 MG/DL (ref 8–23)
BUN/CREAT SERPL: 13.7 (ref 7–25)
CALCIUM SERPL-MCNC: 9.4 MG/DL (ref 8.6–10.5)
CHLORIDE SERPL-SCNC: 101 MMOL/L (ref 98–107)
CO2 SERPL-SCNC: 22.3 MMOL/L (ref 22–29)
CREAT SERPL-MCNC: 1.02 MG/DL (ref 0.57–1)
GLUCOSE SERPL-MCNC: 192 MG/DL (ref 65–99)
PHOSPHATE SERPL-MCNC: 3.2 MG/DL (ref 2.5–4.5)
POTASSIUM SERPL-SCNC: 4.5 MMOL/L (ref 3.5–5.2)
PTH-INTACT SERPL-MCNC: 63 PG/ML (ref 15–65)
SODIUM SERPL-SCNC: 137 MMOL/L (ref 136–145)

## 2018-07-13 DIAGNOSIS — I10 ESSENTIAL HYPERTENSION: ICD-10-CM

## 2018-07-13 RX ORDER — LOSARTAN POTASSIUM 50 MG/1
TABLET ORAL
Qty: 90 TABLET | Refills: 1 | Status: SHIPPED | OUTPATIENT
Start: 2018-07-13 | End: 2019-01-11 | Stop reason: SDUPTHER

## 2018-07-31 RX ORDER — ROSUVASTATIN CALCIUM 5 MG/1
5 TABLET, COATED ORAL DAILY
Qty: 30 TABLET | Refills: 0 | Status: SHIPPED | OUTPATIENT
Start: 2018-07-31 | End: 2018-08-28 | Stop reason: SDUPTHER

## 2018-08-25 DIAGNOSIS — E55.9 VITAMIN D DEFICIENCY: ICD-10-CM

## 2018-08-27 RX ORDER — ERGOCALCIFEROL 1.25 MG/1
CAPSULE ORAL
Qty: 12 CAPSULE | Refills: 0 | Status: SHIPPED | OUTPATIENT
Start: 2018-08-27 | End: 2018-11-12 | Stop reason: SDUPTHER

## 2018-08-28 RX ORDER — ROSUVASTATIN CALCIUM 5 MG/1
5 TABLET, COATED ORAL DAILY
Qty: 30 TABLET | Refills: 2 | Status: SHIPPED | OUTPATIENT
Start: 2018-08-28 | End: 2018-11-23 | Stop reason: SDUPTHER

## 2018-10-17 DIAGNOSIS — E78.49 OTHER HYPERLIPIDEMIA: ICD-10-CM

## 2018-10-17 DIAGNOSIS — D50.8 OTHER IRON DEFICIENCY ANEMIA: ICD-10-CM

## 2018-10-17 DIAGNOSIS — I10 ESSENTIAL HYPERTENSION: Primary | ICD-10-CM

## 2018-10-17 DIAGNOSIS — E11.42 TYPE 2 DIABETES MELLITUS WITH PERIPHERAL NEUROPATHY (HCC): ICD-10-CM

## 2018-10-17 DIAGNOSIS — E04.2 NONTOXIC MULTINODULAR GOITER: ICD-10-CM

## 2018-10-19 RX ORDER — DILTIAZEM HYDROCHLORIDE 120 MG/1
CAPSULE, EXTENDED RELEASE ORAL
Qty: 90 CAPSULE | Refills: 1 | Status: SHIPPED | OUTPATIENT
Start: 2018-10-19 | End: 2019-03-18 | Stop reason: SDUPTHER

## 2018-11-01 ENCOUNTER — RESULTS ENCOUNTER (OUTPATIENT)
Dept: ENDOCRINOLOGY | Age: 78
End: 2018-11-01

## 2018-11-01 DIAGNOSIS — I10 ESSENTIAL HYPERTENSION: ICD-10-CM

## 2018-11-01 DIAGNOSIS — E11.42 TYPE 2 DIABETES MELLITUS WITH PERIPHERAL NEUROPATHY (HCC): ICD-10-CM

## 2018-11-01 DIAGNOSIS — D50.8 OTHER IRON DEFICIENCY ANEMIA: ICD-10-CM

## 2018-11-01 DIAGNOSIS — E78.49 OTHER HYPERLIPIDEMIA: ICD-10-CM

## 2018-11-01 DIAGNOSIS — E04.2 NONTOXIC MULTINODULAR GOITER: ICD-10-CM

## 2018-11-01 LAB
ALBUMIN SERPL-MCNC: 4.3 G/DL (ref 3.5–5.2)
ALBUMIN/GLOB SERPL: 1.6 G/DL
ALP SERPL-CCNC: 71 U/L (ref 39–117)
ALT SERPL-CCNC: 19 U/L (ref 1–33)
AST SERPL-CCNC: 18 U/L (ref 1–32)
BASOPHILS # BLD AUTO: 0.04 10*3/MM3 (ref 0–0.2)
BASOPHILS NFR BLD AUTO: 0.4 % (ref 0–1.5)
BILIRUB SERPL-MCNC: 0.5 MG/DL (ref 0.1–1.2)
BUN SERPL-MCNC: 18 MG/DL (ref 8–23)
BUN/CREAT SERPL: 18 (ref 7–25)
CALCIUM SERPL-MCNC: 10.7 MG/DL (ref 8.6–10.5)
CHLORIDE SERPL-SCNC: 101 MMOL/L (ref 98–107)
CHOLEST SERPL-MCNC: 140 MG/DL (ref 0–200)
CO2 SERPL-SCNC: 28 MMOL/L (ref 22–29)
CREAT SERPL-MCNC: 1 MG/DL (ref 0.57–1)
EOSINOPHIL # BLD AUTO: 0.14 10*3/MM3 (ref 0–0.7)
EOSINOPHIL NFR BLD AUTO: 1.4 % (ref 0.3–6.2)
ERYTHROCYTE [DISTWIDTH] IN BLOOD BY AUTOMATED COUNT: 13.6 % (ref 11.7–13)
GLOBULIN SER CALC-MCNC: 2.7 GM/DL
GLUCOSE SERPL-MCNC: 156 MG/DL (ref 65–99)
HBA1C MFR BLD: 6.6 % (ref 4.8–5.6)
HCT VFR BLD AUTO: 44.6 % (ref 35.6–45.5)
HDLC SERPL-MCNC: 62 MG/DL (ref 40–60)
HGB BLD-MCNC: 13.7 G/DL (ref 11.9–15.5)
IMM GRANULOCYTES # BLD: 0.04 10*3/MM3 (ref 0–0.03)
IMM GRANULOCYTES NFR BLD: 0.4 % (ref 0–0.5)
INTERPRETATION: NORMAL
LDLC SERPL CALC-MCNC: 49 MG/DL (ref 0–100)
LYMPHOCYTES # BLD AUTO: 3.04 10*3/MM3 (ref 0.9–4.8)
LYMPHOCYTES NFR BLD AUTO: 29.5 % (ref 19.6–45.3)
Lab: NORMAL
MCH RBC QN AUTO: 29.8 PG (ref 26.9–32)
MCHC RBC AUTO-ENTMCNC: 30.7 G/DL (ref 32.4–36.3)
MCV RBC AUTO: 97.2 FL (ref 80.5–98.2)
MONOCYTES # BLD AUTO: 0.54 10*3/MM3 (ref 0.2–1.2)
MONOCYTES NFR BLD AUTO: 5.2 % (ref 5–12)
NEUTROPHILS # BLD AUTO: 6.52 10*3/MM3 (ref 1.9–8.1)
NEUTROPHILS NFR BLD AUTO: 63.1 % (ref 42.7–76)
PLATELET # BLD AUTO: 230 10*3/MM3 (ref 140–500)
POTASSIUM SERPL-SCNC: 4.8 MMOL/L (ref 3.5–5.2)
PROT SERPL-MCNC: 7 G/DL (ref 6–8.5)
RBC # BLD AUTO: 4.59 10*6/MM3 (ref 3.9–5.2)
SODIUM SERPL-SCNC: 141 MMOL/L (ref 136–145)
T4 FREE SERPL-MCNC: 1.33 NG/DL (ref 0.93–1.7)
TRIGL SERPL-MCNC: 145 MG/DL (ref 0–150)
TSH SERPL DL<=0.005 MIU/L-ACNC: 1.42 MIU/ML (ref 0.27–4.2)
VLDLC SERPL CALC-MCNC: 29 MG/DL (ref 5–40)
WBC # BLD AUTO: 10.32 10*3/MM3 (ref 4.5–10.7)

## 2018-11-08 ENCOUNTER — OFFICE VISIT (OUTPATIENT)
Dept: ENDOCRINOLOGY | Age: 78
End: 2018-11-08

## 2018-11-08 VITALS
SYSTOLIC BLOOD PRESSURE: 112 MMHG | WEIGHT: 196.6 LBS | OXYGEN SATURATION: 97 % | BODY MASS INDEX: 33.57 KG/M2 | HEIGHT: 64 IN | DIASTOLIC BLOOD PRESSURE: 62 MMHG | HEART RATE: 76 BPM

## 2018-11-08 DIAGNOSIS — E78.5 HYPERLIPIDEMIA, UNSPECIFIED HYPERLIPIDEMIA TYPE: ICD-10-CM

## 2018-11-08 DIAGNOSIS — I10 ESSENTIAL HYPERTENSION: ICD-10-CM

## 2018-11-08 DIAGNOSIS — E11.42 TYPE 2 DIABETES MELLITUS WITH PERIPHERAL NEUROPATHY (HCC): Primary | ICD-10-CM

## 2018-11-08 DIAGNOSIS — E53.8 VITAMIN B12 DEFICIENCY: ICD-10-CM

## 2018-11-08 DIAGNOSIS — E55.9 VITAMIN D DEFICIENCY: ICD-10-CM

## 2018-11-08 PROCEDURE — 99214 OFFICE O/P EST MOD 30 MIN: CPT | Performed by: INTERNAL MEDICINE

## 2018-11-08 NOTE — PROGRESS NOTES
Subjective   Monica Pagan is a 78 y.o. female.     F/u for dm 2, hyperlipidemia, cervical and lumbar DDD/ testing bs 1 x day / last dm eye exam 7/2018 with dr Wolf / last dm foot exam 2/13/18 with dr Neil / flu  Vaccine @ [facility]       Diabetes   She presents for her follow-up diabetic visit. She has type 2 diabetes mellitus. Hypoglycemia symptoms include dizziness and nervousness/anxiousness. Associated symptoms include fatigue.   Hyperlipidemia        In 2015, an incidental left thyroid nodule was noted on CT of the cervical spine. She had a thyroid ultrasound done in March 2016 which showed a multinodular goiter with the largest nodule measuring 2.8 cm on the left. Thyroid function tests were normal.  She had a follow-up thyroid ultrasound done in January 2017 which showed a stable multinodular goiter.  She denies any previous history of head/neck radiation therapy. She denies any neck soreness.  She is not on thyroid medication.  Thyroid function test done in November 2018 showed a normal TSH of 1.42 and normal free T4 at 1.33 ng per DL      She has known diabetes mellitus since 2008 and was started on insulin at that time. She has been on Levemir 35 units every PM and metformin 500 mg twice a day.  She had nausea with glimepiride 1 mg and stop the medication after one week.  Her fasting blood sugars ranges from . She denies any hypoglycemic episode.  She has gained 10 lbs since 6/18.        Her last eye examination was in 8/17. She has no retinopathy. She denies blurry vision. She complains of numbness, tingling and burning in her legs which improved on Cymbalta 20 mg/day.  She was tried on Lyrica and gabapentin in the past which caused sedation.  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  She has no microalbuminuria on urine sample taken in 2/18.     She has hyperlipidemia and is on Crestor 5 mg/day.  She tried Lipitor 20 mg for 2 weeks and developed myalgia and  "arthralgia.  The pain resolved 1 week after she stopped Lipitor.  She has tried pravastatin which she discontinued after 2 weeks because of joint pain.  She has not used Livalo, Zetia or PCS K9 inhibitors in the past      She has hypertension and has been on losartan 50 mg once a day and diltiazem  mg once a day. She denies any previous history of myocardial infarction or stroke.  She denies chest pain or SOB.      She has vitamin D deficiency and takes vitamin D 50,000 units weekly.  She has vitamin B12 deficiency and is receiving intramuscular vitamin B12 through Dr. Gentile.     She has irritable bowel syndrome and follows with Dr. Russell.  She had sleep study 5 years ago and was told to have borderline sleep apnea.    Her daughter is disabled to multiple sclerosis.    The following portions of the patient's history were reviewed and updated as appropriate: allergies, current medications, past family history, past medical history, past social history, past surgical history and problem list.    Review of Systems   Constitutional: Positive for fatigue.   HENT: Negative.    Eyes: Negative.    Respiratory: Negative.    Cardiovascular: Negative.    Gastrointestinal: Positive for abdominal pain, constipation and diarrhea.   Endocrine: Negative.    Genitourinary: Positive for frequency and urgency.   Musculoskeletal: Negative.    Skin: Negative.    Allergic/Immunologic: Negative.    Neurological: Positive for dizziness and numbness (legs and feet ).   Hematological: Bruises/bleeds easily.   Psychiatric/Behavioral: The patient is nervous/anxious.        Objective      Vitals:    11/08/18 1409   BP: 112/62   BP Location: Right arm   Patient Position: Sitting   Cuff Size: Large Adult   Pulse: 76   SpO2: 97%   Weight: 89.2 kg (196 lb 9.6 oz)   Height: 162.6 cm (64.02\")     Physical Exam   Constitutional: She is oriented to person, place, and time. She appears well-developed and well-nourished. No distress.   HENT: "   Head: Normocephalic.   Right Ear: External ear normal.   Left Ear: External ear normal.   Nose: Nose normal.   Mouth/Throat: Oropharynx is clear and moist.   Eyes: EOM are normal. Right eye exhibits no discharge. Left eye exhibits no discharge. No scleral icterus.   Neck: Normal range of motion. No JVD present. No tracheal deviation present. No thyromegaly present.   Cardiovascular: Normal rate, regular rhythm, normal heart sounds and intact distal pulses.  Exam reveals no gallop and no friction rub.    No murmur heard.  Pulmonary/Chest: Effort normal and breath sounds normal. No respiratory distress. She has no wheezes. She has no rales. She exhibits no tenderness.   Abdominal: Soft. Bowel sounds are normal. She exhibits no distension and no mass. There is no tenderness. There is no guarding.   Musculoskeletal: Normal range of motion. She exhibits no edema, tenderness or deformity.   Lymphadenopathy:     She has no cervical adenopathy.   Neurological: She is alert and oriented to person, place, and time. She displays normal reflexes.   Skin: Skin is warm and dry. No erythema. No pallor.   Psychiatric: She has a normal mood and affect. Her behavior is normal.     Results Encounter on 11/01/2018   Component Date Value Ref Range Status   • WBC 11/01/2018 10.32  4.50 - 10.70 10*3/mm3 Final   • RBC 11/01/2018 4.59  3.90 - 5.20 10*6/mm3 Final   • Hemoglobin 11/01/2018 13.7  11.9 - 15.5 g/dL Final   • Hematocrit 11/01/2018 44.6  35.6 - 45.5 % Final   • MCV 11/01/2018 97.2  80.5 - 98.2 fL Final   • MCH 11/01/2018 29.8  26.9 - 32.0 pg Final   • MCHC 11/01/2018 30.7* 32.4 - 36.3 g/dL Final   • RDW 11/01/2018 13.6* 11.7 - 13.0 % Final   • Platelets 11/01/2018 230  140 - 500 10*3/mm3 Final   • Neutrophil Rel % 11/01/2018 63.1  42.7 - 76.0 % Final   • Lymphocyte Rel % 11/01/2018 29.5  19.6 - 45.3 % Final   • Monocyte Rel % 11/01/2018 5.2  5.0 - 12.0 % Final   • Eosinophil Rel % 11/01/2018 1.4  0.3 - 6.2 % Final   • Basophil  Rel % 11/01/2018 0.4  0.0 - 1.5 % Final   • Neutrophils Absolute 11/01/2018 6.52  1.90 - 8.10 10*3/mm3 Final   • Lymphocytes Absolute 11/01/2018 3.04  0.90 - 4.80 10*3/mm3 Final   • Monocytes Absolute 11/01/2018 0.54  0.20 - 1.20 10*3/mm3 Final   • Eosinophils Absolute 11/01/2018 0.14  0.00 - 0.70 10*3/mm3 Final   • Basophils Absolute 11/01/2018 0.04  0.00 - 0.20 10*3/mm3 Final   • Immature Granulocyte Rel % 11/01/2018 0.4  0.0 - 0.5 % Final   • Immature Grans Absolute 11/01/2018 0.04* 0.00 - 0.03 10*3/mm3 Final   • Glucose 11/01/2018 156* 65 - 99 mg/dL Final   • BUN 11/01/2018 18  8 - 23 mg/dL Final   • Creatinine 11/01/2018 1.00  0.57 - 1.00 mg/dL Final   • eGFR Non African Am 11/01/2018 54* >60 mL/min/1.73 Final    Comment: The MDRD GFR formula is only valid for adults with stable  renal function between ages 18 and 70.     • eGFR  Am 11/01/2018 65  >60 mL/min/1.73 Final   • BUN/Creatinine Ratio 11/01/2018 18.0  7.0 - 25.0 Final   • Sodium 11/01/2018 141  136 - 145 mmol/L Final   • Potassium 11/01/2018 4.8  3.5 - 5.2 mmol/L Final   • Chloride 11/01/2018 101  98 - 107 mmol/L Final   • Total CO2 11/01/2018 28.0  22.0 - 29.0 mmol/L Final   • Calcium 11/01/2018 10.7* 8.6 - 10.5 mg/dL Final   • Total Protein 11/01/2018 7.0  6.0 - 8.5 g/dL Final   • Albumin 11/01/2018 4.30  3.50 - 5.20 g/dL Final   • Globulin 11/01/2018 2.7  gm/dL Final   • A/G Ratio 11/01/2018 1.6  g/dL Final   • Total Bilirubin 11/01/2018 0.5  0.1 - 1.2 mg/dL Final   • Alkaline Phosphatase 11/01/2018 71  39 - 117 U/L Final   • AST (SGOT) 11/01/2018 18  1 - 32 U/L Final   • ALT (SGPT) 11/01/2018 19  1 - 33 U/L Final   • Total Cholesterol 11/01/2018 140  0 - 200 mg/dL Final   • Triglycerides 11/01/2018 145  0 - 150 mg/dL Final   • HDL Cholesterol 11/01/2018 62* 40 - 60 mg/dL Final   • VLDL Cholesterol 11/01/2018 29  5 - 40 mg/dL Final   • LDL Cholesterol  11/01/2018 49  0 - 100 mg/dL Final   • Hemoglobin A1C 11/01/2018 6.60* 4.80 - 5.60 % Final     Comment: Hemoglobin A1C Ranges:  Increased Risk for Diabetes  5.7% to 6.4%  Diabetes                     >= 6.5%  Diabetic Goal                < 7.0%     • Free T4 11/01/2018 1.33  0.93 - 1.70 ng/dL Final   • TSH 11/01/2018 1.420  0.270 - 4.200 mIU/mL Final   • Interpretation 11/01/2018 Note   Final    Comment: Medical Director's Note: Patient First Name has been  corrected on 11/1/2018, was MONICA SANDHU and now is MONICA.  Please review this report in its entirety, since changes to  patient demographics may affect result interpretation(s)  and/or treatment/follow-up suggestions.  Supplemental report is available.     • PDF Image 11/01/2018 Not applicable   Final     Assessment/Plan   Monica was seen today for diabetes and hyperlipidemia.    Diagnoses and all orders for this visit:    Type 2 diabetes mellitus with peripheral neuropathy (CMS/HCC)    Vitamin D deficiency    Vitamin B12 deficiency    Hyperlipidemia, unspecified hyperlipidemia type    Essential hypertension      Continue Levemir and metformin  Continue Cymbalta.  Continue Crestor 5 mg per day.  Continue losartan and diltiazem XR  Continue Vit D and vit B12    Send copy of my note to Dr. Gentile    RTC 4 mos.

## 2018-11-12 DIAGNOSIS — E55.9 VITAMIN D DEFICIENCY: ICD-10-CM

## 2018-11-12 RX ORDER — ERGOCALCIFEROL 1.25 MG/1
CAPSULE ORAL
Qty: 12 CAPSULE | Refills: 0 | Status: SHIPPED | OUTPATIENT
Start: 2018-11-12 | End: 2019-02-08 | Stop reason: SDUPTHER

## 2018-11-26 DIAGNOSIS — E83.52 SERUM CALCIUM ELEVATED: Primary | ICD-10-CM

## 2018-11-26 RX ORDER — CYANOCOBALAMIN 1000 UG/ML
INJECTION, SOLUTION INTRAMUSCULAR; SUBCUTANEOUS
Qty: 1 ML | Refills: 2 | Status: SHIPPED | OUTPATIENT
Start: 2018-11-26 | End: 2019-01-19 | Stop reason: SDUPTHER

## 2018-11-26 RX ORDER — ROSUVASTATIN CALCIUM 5 MG/1
5 TABLET, COATED ORAL DAILY
Qty: 30 TABLET | Refills: 2 | Status: SHIPPED | OUTPATIENT
Start: 2018-11-26 | End: 2018-11-26 | Stop reason: SDUPTHER

## 2018-11-26 RX ORDER — ROSUVASTATIN CALCIUM 5 MG/1
5 TABLET, COATED ORAL DAILY
Qty: 30 TABLET | Refills: 2 | Status: SHIPPED | OUTPATIENT
Start: 2018-11-26 | End: 2019-01-31 | Stop reason: SDUPTHER

## 2018-12-03 ENCOUNTER — RESULTS ENCOUNTER (OUTPATIENT)
Dept: ENDOCRINOLOGY | Age: 78
End: 2018-12-03

## 2018-12-03 DIAGNOSIS — E83.52 SERUM CALCIUM ELEVATED: ICD-10-CM

## 2018-12-04 LAB
25(OH)D3+25(OH)D2 SERPL-MCNC: 37.8 NG/ML (ref 30–100)
BUN SERPL-MCNC: 14 MG/DL (ref 8–23)
BUN/CREAT SERPL: 16.5 (ref 7–25)
CALCIUM SERPL-MCNC: 10.7 MG/DL (ref 8.6–10.5)
CHLORIDE SERPL-SCNC: 100 MMOL/L (ref 98–107)
CO2 SERPL-SCNC: 26.8 MMOL/L (ref 22–29)
CREAT SERPL-MCNC: 0.85 MG/DL (ref 0.57–1)
GLUCOSE SERPL-MCNC: 142 MG/DL (ref 65–99)
POTASSIUM SERPL-SCNC: 4.6 MMOL/L (ref 3.5–5.2)
PTH-INTACT SERPL-MCNC: 50 PG/ML (ref 15–65)
SODIUM SERPL-SCNC: 138 MMOL/L (ref 136–145)

## 2018-12-05 RX ORDER — ESZOPICLONE 3 MG/1
TABLET, FILM COATED ORAL
Qty: 30 TABLET | Refills: 5 | Status: SHIPPED | OUTPATIENT
Start: 2018-12-05 | End: 2019-06-03 | Stop reason: SDUPTHER

## 2018-12-31 RX ORDER — DULOXETIN HYDROCHLORIDE 20 MG/1
CAPSULE, DELAYED RELEASE ORAL
Qty: 90 CAPSULE | Refills: 0 | Status: SHIPPED | OUTPATIENT
Start: 2018-12-31 | End: 2019-04-04 | Stop reason: SDUPTHER

## 2019-01-08 ENCOUNTER — OFFICE VISIT (OUTPATIENT)
Dept: INTERNAL MEDICINE | Facility: CLINIC | Age: 79
End: 2019-01-08

## 2019-01-08 VITALS
RESPIRATION RATE: 16 BRPM | OXYGEN SATURATION: 96 % | HEIGHT: 64 IN | SYSTOLIC BLOOD PRESSURE: 140 MMHG | BODY MASS INDEX: 33.53 KG/M2 | DIASTOLIC BLOOD PRESSURE: 79 MMHG | HEART RATE: 75 BPM | TEMPERATURE: 97.1 F | WEIGHT: 196.4 LBS

## 2019-01-08 DIAGNOSIS — R53.83 FATIGUE, UNSPECIFIED TYPE: Primary | ICD-10-CM

## 2019-01-08 DIAGNOSIS — G47.30 SLEEP APNEA, UNSPECIFIED TYPE: ICD-10-CM

## 2019-01-08 PROCEDURE — 99213 OFFICE O/P EST LOW 20 MIN: CPT | Performed by: NURSE PRACTITIONER

## 2019-01-08 NOTE — PROGRESS NOTES
"Subjective   Monica Pagan is a 78 y.o. female.   CC: Fatigue, persistent    Patient presents for evaluation of fatigue.  This is a 78 year old female patient of Dr. mcduffie with a history of hypertension, hyperlipidemia, vitamin D deficiency, lumbar stenosis for which she had back surgery about a year ago, fibromyalgia, and Ménière's disease, anemia.  She has had extensive laboratory workup recently for the issue of fatigue.  She states that she feels like she has very low energy despite how much sleep she gets.  She has much stress in her life, with a very ill daughter.  She states that \"no one can come to a conclusion as to what is causing much fatigue.\"  She denies shortness of breath, chest pain, fever, chills.  She does deal with chronic back pain, which she states causes depression at times.  She does state that she has been told in the past that she should be evaluated for sleep apnea, but has never gotten this evaluation.  She states that she often wakes up in the middle of the night with a dry mouth, feeling as if she has stopped breathing periodically.  She states that the worst point and her fatigue is in the morning when she wakes up.  She denies development of any other new issues today.         The following portions of the patient's history were reviewed and updated as appropriate: allergies, current medications, past family history, past medical history, past social history, past surgical history and problem list.    Review of Systems   Constitutional: Positive for fatigue. Negative for activity change, chills, fever, unexpected weight gain and unexpected weight loss.   HENT: Negative for congestion, hearing loss, postnasal drip, sinus pressure, sneezing, sore throat and tinnitus.    Eyes: Negative for photophobia, pain and visual disturbance.   Respiratory: Negative for cough, chest tightness, shortness of breath and wheezing.    Cardiovascular: Negative for chest pain, palpitations and leg swelling. " "  Gastrointestinal: Negative for abdominal distention, abdominal pain, constipation, diarrhea, nausea and vomiting.   Endocrine: Negative for polydipsia, polyphagia and polyuria.   Genitourinary: Negative for dysuria, frequency, hematuria and urgency.   Musculoskeletal: Positive for back pain (  Chronic).   Neurological: Negative for dizziness, weakness, numbness and headache.   Psychiatric/Behavioral: Positive for depressed mood and stress. Negative for suicidal ideas. The patient is nervous/anxious.        Objective    /79 (BP Location: Left arm, Patient Position: Sitting, Cuff Size: Small Adult)   Pulse 75   Temp 97.1 °F (36.2 °C) (Oral)   Resp 16   Ht 162.6 cm (64\")   Wt 89.1 kg (196 lb 6.4 oz)   SpO2 96%   BMI 33.71 kg/m²     Physical Exam   Constitutional: She is oriented to person, place, and time. She appears well-developed and well-nourished. No distress.   HENT:   Head: Normocephalic and atraumatic.   Right Ear: External ear normal.   Left Ear: External ear normal.   Nose: Nose normal.   Mouth/Throat: Oropharynx is clear and moist. No oropharyngeal exudate.   Eyes: Conjunctivae and EOM are normal. Pupils are equal, round, and reactive to light. Right eye exhibits no discharge. Left eye exhibits no discharge.   Neck: Normal range of motion. Neck supple. No JVD present. No tracheal deviation present. No thyromegaly present.   Cardiovascular: Normal rate, regular rhythm, normal heart sounds and intact distal pulses. Exam reveals no gallop and no friction rub.   No murmur heard.  Pulmonary/Chest: Effort normal and breath sounds normal. No stridor. No respiratory distress. She has no wheezes. She has no rales. She exhibits no tenderness.   Lungs are CTA bilaterally   Abdominal: Soft. Bowel sounds are normal. She exhibits no distension. There is no tenderness.   Musculoskeletal: Normal range of motion.   Lymphadenopathy:     She has no cervical adenopathy.   Neurological: She is alert and oriented " to person, place, and time.   Skin: Skin is warm and dry. Capillary refill takes less than 2 seconds. She is not diaphoretic.   Psychiatric: She has a normal mood and affect. Her behavior is normal. Judgment and thought content normal.   Nursing note and vitals reviewed.        Assessment/Plan   Monica was seen today for fatigue.    Diagnoses and all orders for this visit:    Fatigue, unspecified type  -     Ambulatory Referral to Sleep Medicine    Sleep apnea, unspecified type  -     Ambulatory Referral to Sleep Medicine      - Fatigue: She has had extensive lab workup.  I believe if she may lie in underlying sleep apnea.  We will refer to sleep medicine for evaluation of possible sleep apnea.    - Follow-up if symptoms persist or worsen.  Follow up routinely with Dr. mcduffie, PCP.

## 2019-01-11 DIAGNOSIS — I10 ESSENTIAL HYPERTENSION: ICD-10-CM

## 2019-01-11 RX ORDER — LOSARTAN POTASSIUM 50 MG/1
TABLET ORAL
Qty: 90 TABLET | Refills: 1 | Status: SHIPPED | OUTPATIENT
Start: 2019-01-11 | End: 2019-05-21 | Stop reason: SDUPTHER

## 2019-01-18 DIAGNOSIS — F41.9 ANXIETY: ICD-10-CM

## 2019-01-21 ENCOUNTER — TELEPHONE (OUTPATIENT)
Dept: INTERNAL MEDICINE | Facility: CLINIC | Age: 79
End: 2019-01-21

## 2019-01-21 RX ORDER — CLONAZEPAM 0.5 MG/1
TABLET ORAL
Qty: 90 TABLET | Refills: 3 | Status: SHIPPED | OUTPATIENT
Start: 2019-01-21 | End: 2019-07-01 | Stop reason: SDUPTHER

## 2019-01-21 RX ORDER — CYANOCOBALAMIN 1000 UG/ML
INJECTION, SOLUTION INTRAMUSCULAR; SUBCUTANEOUS
Qty: 1 ML | Refills: 5 | Status: SHIPPED | OUTPATIENT
Start: 2019-01-21 | End: 2019-03-26 | Stop reason: SDUPTHER

## 2019-01-31 DIAGNOSIS — E11.9 CONTROLLED TYPE 2 DIABETES MELLITUS WITHOUT COMPLICATION (HCC): ICD-10-CM

## 2019-02-01 RX ORDER — PEN NEEDLE, DIABETIC 32GX 5/32"
NEEDLE, DISPOSABLE MISCELLANEOUS
Qty: 200 EACH | Refills: 0 | Status: SHIPPED | OUTPATIENT
Start: 2019-02-01 | End: 2019-08-07 | Stop reason: SDUPTHER

## 2019-02-01 RX ORDER — ROSUVASTATIN CALCIUM 5 MG/1
5 TABLET, COATED ORAL DAILY
Qty: 90 TABLET | Refills: 0 | Status: SHIPPED | OUTPATIENT
Start: 2019-02-01 | End: 2019-05-24 | Stop reason: SDUPTHER

## 2019-02-08 DIAGNOSIS — E55.9 VITAMIN D DEFICIENCY: ICD-10-CM

## 2019-02-08 RX ORDER — ERGOCALCIFEROL 1.25 MG/1
CAPSULE ORAL
Qty: 12 CAPSULE | Refills: 2 | Status: SHIPPED | OUTPATIENT
Start: 2019-02-08 | End: 2019-08-30 | Stop reason: SDUPTHER

## 2019-02-17 DIAGNOSIS — E11.42 TYPE 2 DIABETES MELLITUS WITH PERIPHERAL NEUROPATHY (HCC): ICD-10-CM

## 2019-02-20 ENCOUNTER — TELEPHONE (OUTPATIENT)
Dept: INTERNAL MEDICINE | Facility: CLINIC | Age: 79
End: 2019-02-20

## 2019-02-20 NOTE — TELEPHONE ENCOUNTER
The pt has to cancel her sleep study when her Daughter was in the hospital and needs to r/s please call her ( I couldn't find info in referral-sorry)

## 2019-02-21 ENCOUNTER — APPOINTMENT (OUTPATIENT)
Dept: SLEEP MEDICINE | Facility: HOSPITAL | Age: 79
End: 2019-02-21
Attending: INTERNAL MEDICINE

## 2019-03-08 DIAGNOSIS — I10 ESSENTIAL HYPERTENSION: Primary | ICD-10-CM

## 2019-03-08 DIAGNOSIS — E04.2 NONTOXIC MULTINODULAR GOITER: ICD-10-CM

## 2019-03-08 DIAGNOSIS — R68.89 ABNORMAL ENDOCRINE LABORATORY TEST FINDING: ICD-10-CM

## 2019-03-08 DIAGNOSIS — E11.42 TYPE 2 DIABETES MELLITUS WITH PERIPHERAL NEUROPATHY (HCC): ICD-10-CM

## 2019-03-08 DIAGNOSIS — E78.49 OTHER HYPERLIPIDEMIA: ICD-10-CM

## 2019-03-11 ENCOUNTER — RESULTS ENCOUNTER (OUTPATIENT)
Dept: ENDOCRINOLOGY | Age: 79
End: 2019-03-11

## 2019-03-11 DIAGNOSIS — I10 ESSENTIAL HYPERTENSION: ICD-10-CM

## 2019-03-11 DIAGNOSIS — R68.89 ABNORMAL ENDOCRINE LABORATORY TEST FINDING: ICD-10-CM

## 2019-03-11 DIAGNOSIS — E78.49 OTHER HYPERLIPIDEMIA: ICD-10-CM

## 2019-03-11 DIAGNOSIS — E11.42 TYPE 2 DIABETES MELLITUS WITH PERIPHERAL NEUROPATHY (HCC): ICD-10-CM

## 2019-03-11 DIAGNOSIS — E04.2 NONTOXIC MULTINODULAR GOITER: ICD-10-CM

## 2019-03-12 LAB
ALBUMIN SERPL-MCNC: 4.1 G/DL (ref 3.5–5.2)
ALBUMIN/CREAT UR: 9.3 MG/G CREAT (ref 0–30)
ALBUMIN/GLOB SERPL: 1.7 G/DL
ALP SERPL-CCNC: 65 U/L (ref 39–117)
ALT SERPL-CCNC: 19 U/L (ref 1–33)
AST SERPL-CCNC: 20 U/L (ref 1–32)
BILIRUB SERPL-MCNC: 0.3 MG/DL (ref 0.1–1.2)
BUN SERPL-MCNC: 10 MG/DL (ref 8–23)
BUN/CREAT SERPL: 11.5 (ref 7–25)
CALCIUM SERPL-MCNC: 10.7 MG/DL (ref 8.6–10.5)
CHLORIDE SERPL-SCNC: 100 MMOL/L (ref 98–107)
CHOLEST SERPL-MCNC: 114 MG/DL (ref 0–200)
CO2 SERPL-SCNC: 25 MMOL/L (ref 22–29)
CREAT SERPL-MCNC: 0.87 MG/DL (ref 0.57–1)
CREAT UR-MCNC: 92.4 MG/DL
GLOBULIN SER CALC-MCNC: 2.4 GM/DL
GLUCOSE SERPL-MCNC: 146 MG/DL (ref 65–99)
HBA1C MFR BLD: 6.9 % (ref 4.8–5.6)
HDLC SERPL-MCNC: 49 MG/DL (ref 40–60)
INTERPRETATION: NORMAL
LDLC SERPL CALC-MCNC: 35 MG/DL (ref 0–100)
Lab: NORMAL
MICROALBUMIN UR-MCNC: 8.6 UG/ML
POTASSIUM SERPL-SCNC: 5.1 MMOL/L (ref 3.5–5.2)
PROT SERPL-MCNC: 6.5 G/DL (ref 6–8.5)
PTH-INTACT SERPL-MCNC: 36 PG/ML (ref 15–65)
SODIUM SERPL-SCNC: 140 MMOL/L (ref 136–145)
T4 FREE SERPL-MCNC: 1.23 NG/DL (ref 0.93–1.7)
TRIGL SERPL-MCNC: 152 MG/DL (ref 0–150)
VLDLC SERPL CALC-MCNC: 30.4 MG/DL (ref 5–40)

## 2019-03-15 ENCOUNTER — OFFICE VISIT (OUTPATIENT)
Dept: SLEEP MEDICINE | Facility: HOSPITAL | Age: 79
End: 2019-03-15

## 2019-03-15 VITALS
SYSTOLIC BLOOD PRESSURE: 155 MMHG | BODY MASS INDEX: 31.82 KG/M2 | HEIGHT: 65 IN | OXYGEN SATURATION: 95 % | HEART RATE: 85 BPM | DIASTOLIC BLOOD PRESSURE: 83 MMHG | WEIGHT: 191 LBS

## 2019-03-15 DIAGNOSIS — F51.04 PSYCHOPHYSIOLOGICAL INSOMNIA: ICD-10-CM

## 2019-03-15 DIAGNOSIS — F11.90 CHRONIC, CONTINUOUS USE OF OPIOIDS: ICD-10-CM

## 2019-03-15 DIAGNOSIS — R06.83 SNORING: ICD-10-CM

## 2019-03-15 DIAGNOSIS — R06.89 GASPING FOR BREATH: ICD-10-CM

## 2019-03-15 DIAGNOSIS — E66.9 OBESITY (BMI 30-39.9): ICD-10-CM

## 2019-03-15 DIAGNOSIS — G47.10 HYPERSOMNIA: Primary | ICD-10-CM

## 2019-03-15 PROCEDURE — G0463 HOSPITAL OUTPT CLINIC VISIT: HCPCS

## 2019-03-15 NOTE — PROGRESS NOTES
[facility]  Telephone: [phone] / Fax: [phone] [place]  Telephone: [phone] / Fax: [phone] Lakia Unger    Referring Physician: Froylan Gentile Jr., MD  PCP: Froylan Gentlie Jr., MD    Reason for consult:  sleep apnea    Monica Pagan is a 78 y.o.female  was seen in the [facility] today for evaluation of sleep apnea.  She reports excessive fatigue. She snores loudly at night and is gasping for breath during deep sleep. She wakes up jerking from sleep feels that she startles.  She takes a nap during the day to feel more refreshed. She has chronic pain and is on Norco and is taking Clonazepam for anxiety in the morning and afternoon.  She reports recurrent arousals from sleep. She takes Lunesta for insomnia.  Her bedtime schedule is 10:30 PM-7:30 AM.  She falls asleep within 1-2 hours and usually wakes up in the morning feeling tired.    Social history, drinks 2-3 caffeinated beverages a day, no alcohol, no tobacco.    Review of systems, positive for hematuria, myalgias, diarrhea, excessive thirst, cold intolerance.  The rest of the review of systems is negative.    Monica Pagan  has a past medical history of Abnormal mammogram, Allergic rhinitis, Allergic state, Anemia, Anxiety, Bursitis, Coccyx pain, Colon polyp, Cough, DDD (degenerative disc disease), lumbar, Diarrhea, Fatigue, Fibromyalgia, Hyperlipidemia, Hypertension, IBS (irritable bowel syndrome), Insomnia, Lumbar spinal stenosis, Melena, Menopausal symptom, Nontoxic thyroid nodule, Ocular histoplasmosis, CHRIS (obstructive sleep apnea), Osteoarthritis, Peripheral neuropathy, PVC (premature ventricular contraction), Sinusitis, Spondylosis, Tinnitus, Tremor, Trigeminal neuralgia, Type 2 diabetes mellitus (CMS/HCC), UTI (urinary tract infection), Vitamin D deficiency, and Wound infection.    Current Medications:    Current Outpatient Medications:   •  BD PEN NEEDLE JENNIFER U/F 32G X 4 MM misc, USE AS DIRECTED  "TWICE DAILY, Disp: 200 each, Rfl: 0  •  CARTIA  MG 24 hr capsule, TAKE 1 CAPSULE EVERY DAY, Disp: 90 capsule, Rfl: 1  •  clonazePAM (KlonoPIN) 0.5 MG tablet, TAKE 1 TABLET BY MOUTH THREE TIMES DAILY, Disp: 90 tablet, Rfl: 3  •  cyanocobalamin 1000 MCG/ML injection, INJECT 1 ML INTO THE SHOULDER, THIGH, OR BUTTOCKS EVERY 28 DAYS, Disp: 1 mL, Rfl: 5  •  DULoxetine (CYMBALTA) 20 MG capsule, TAKE 1 CAPSULE BY MOUTH DAILY, Disp: 90 capsule, Rfl: 0  •  estradiol (ESTRACE) 0.5 MG tablet, TAKE 1 TABLET EVERY DAY, Disp: 90 tablet, Rfl: 3  •  eszopiclone (LUNESTA) 3 MG tablet, TAKE 1 TABLET BY MOUTH AT BEDTIME AS NEEDED FOR SLEEP, Disp: 30 tablet, Rfl: 5  •  FEXOFENADINE HCL PO, Take  by mouth Daily., Disp: , Rfl:   •  glucose blood (TRUE METRIX BLOOD GLUCOSE TEST) test strip, Dx code E11.42 testing bs 1 x day, Disp: 100 each, Rfl: 1  •  HYDROcodone-acetaminophen (NORCO) 7.5-325 MG per tablet, 1 tablet 2 (Two) Times a Day., Disp: , Rfl: 0  •  insulin detemir (LEVEMIR FLEXTOUCH) 100 UNIT/ML injection, Inject 35 Units under the skin into the appropriate area as directed Every Night., Disp: 34 mL, Rfl: 1  •  Lancets 30G misc, Enhance talking meter /testing bs 1 x day  Dx code  E11.42, Disp: 100 each, Rfl: 1  •  losartan (COZAAR) 50 MG tablet, TAKE 1 TABLET EVERY DAY, Disp: 90 tablet, Rfl: 1  •  metFORMIN (GLUCOPHAGE) 1000 MG tablet, TAKE 1 TABLET TWO TIMES A DAY WITH MEALS., Disp: 180 tablet, Rfl: 1  •  rosuvastatin (CRESTOR) 5 MG tablet, TAKE 1 TABLET BY MOUTH DAILY, Disp: 90 tablet, Rfl: 0  •  Syringe 25G X 1\" 3 ML misc, Inject 1 each into the shoulder, thigh, or buttocks Every 28 (Twenty-Eight) Days., Disp: 1 each, Rfl: 5  •  vitamin D (ERGOCALCIFEROL) 20681 units capsule capsule, TAKE 1 CAPSULE BY MOUTH EVERY 7 DAYS, Disp: 12 capsule, Rfl: 2    I have reviewed Past Medical History, Past Surgical History, Medication List, Social History and Family History as entered in Sleep Questionnaire and EPIC.    ESS  4   Vital " "Signs /83   Pulse 85   Ht 163.8 cm (64.5\")   Wt 86.6 kg (191 lb)   SpO2 95%   BMI 32.28 kg/m²  Body mass index is 32.28 kg/m².    General Alert and oriented. No acute distress noted   Pharynx/Throat Class IV Mallampati airway, large tongue, no evidence of redundant lateral pharyngeal tissue. No oral lesions. No thrush. Moist mucous membranes.   Head Normocephalic. Symmetrical. Atraumatic.    Nose No septal deviation. No drainage   Chest Wall Normal shape. Symmetric expansion with respiration. No tenderness.   Neck Trachea midline, no thyromegaly or adenopathy    Lungs Clear to auscultation bilaterally. No wheezes. No rhonchi. No rales. Respirations regular, even and unlabored.   Heart Regular rhythm and normal rate. Normal S1 and S2. No murmur   Abdomen Soft, non-tender and non-distended. Normal bowel sounds. No masses.   Extremities Moves all extremities well. No edema   Psychiatric Normal mood and affect.      .     Impression:  1. Hypersomnia    2. Gasping for breath    3. Snoring    4. Psychophysiological insomnia    5. Chronic, continuous use of opioids    6. Obesity (BMI 30-39.9)          Plan:  I discussed the pathophysiology of obstructive sleep apnea with the patient.  We discussed the adverse outcomes associated with untreated sleep-disordered breathing.  I scheduled in lab study as she is on chronic treatment with narcotics for pain.  In lab study will help evaluate other sleep disorders such as central sleep apnea due to drug.  We discussed treatment modalities of obstructive sleep apnea including CPAP device as well as oral mandibular advancement device. Sleep study will be scheduled to establish definitive diagnosis of sleep disorder breathing.  Weight loss will be strongly beneficial in order to reduce the severity of sleep-disordered breathing.  Patient has narrow oropharyngeal structure.  Caution during activities that require prolonged concentration is strongly advised.  Patient will be " notified of sleep study results after sleep study is completed.  If sleep apnea is only mild,  oral mandibular advancement device may be one of the treatment options.  However if sleep apnea is moderately severe, CPAP treatment will be strongly encouraged.  The patient is not opposed to treatment with CPAP device if we confirm significant obstructive sleep apnea on polysomnography.  I asked to bring Lunesta for the sleep study.    Thank you for allowing me to participate in your patient's care.    The patient will follow up with Dr. Varela after completion of polysomnography.    DEBO Corcoran  [facility]  Phone: [phone]      Part of this note may be an electronic transcription/translation of spoken language to printed text using the Dragon Dictation System. Some errors may exist even though the document was edited.

## 2019-03-18 DIAGNOSIS — Z78.0 POST-MENOPAUSAL: ICD-10-CM

## 2019-03-18 RX ORDER — ESTRADIOL 0.5 MG/1
TABLET ORAL
Qty: 90 TABLET | Refills: 1 | Status: SHIPPED | OUTPATIENT
Start: 2019-03-18 | End: 2019-08-07 | Stop reason: SDUPTHER

## 2019-03-19 RX ORDER — DILTIAZEM HYDROCHLORIDE 120 MG/1
CAPSULE, EXTENDED RELEASE ORAL
Qty: 90 CAPSULE | Refills: 1 | Status: SHIPPED | OUTPATIENT
Start: 2019-03-19 | End: 2019-08-07 | Stop reason: SDUPTHER

## 2019-03-21 ENCOUNTER — APPOINTMENT (OUTPATIENT)
Dept: SLEEP MEDICINE | Facility: HOSPITAL | Age: 79
End: 2019-03-21

## 2019-03-26 RX ORDER — CYANOCOBALAMIN 1000 UG/ML
INJECTION, SOLUTION INTRAMUSCULAR; SUBCUTANEOUS
Qty: 1 ML | Refills: 2 | Status: SHIPPED | OUTPATIENT
Start: 2019-03-26 | End: 2019-06-16 | Stop reason: SDUPTHER

## 2019-04-04 RX ORDER — DULOXETIN HYDROCHLORIDE 20 MG/1
CAPSULE, DELAYED RELEASE ORAL
Qty: 90 CAPSULE | Refills: 1 | Status: SHIPPED | OUTPATIENT
Start: 2019-04-04 | End: 2019-07-02 | Stop reason: DRUGHIGH

## 2019-04-08 ENCOUNTER — TELEPHONE (OUTPATIENT)
Dept: INTERNAL MEDICINE | Facility: CLINIC | Age: 79
End: 2019-04-08

## 2019-04-08 NOTE — TELEPHONE ENCOUNTER
Pt was unable to set up her sleep study, she's got a lot going on with her Daughter.  She will call us back when she's able to reschedule.

## 2019-05-21 DIAGNOSIS — I10 ESSENTIAL HYPERTENSION: ICD-10-CM

## 2019-05-21 RX ORDER — LOSARTAN POTASSIUM 50 MG/1
TABLET ORAL
Qty: 90 TABLET | Refills: 0 | Status: SHIPPED | OUTPATIENT
Start: 2019-05-21 | End: 2019-08-07 | Stop reason: SDUPTHER

## 2019-05-24 ENCOUNTER — OFFICE VISIT (OUTPATIENT)
Dept: INTERNAL MEDICINE | Facility: CLINIC | Age: 79
End: 2019-05-24

## 2019-05-24 VITALS
RESPIRATION RATE: 16 BRPM | TEMPERATURE: 97.8 F | BODY MASS INDEX: 31.65 KG/M2 | OXYGEN SATURATION: 97 % | HEART RATE: 79 BPM | SYSTOLIC BLOOD PRESSURE: 127 MMHG | DIASTOLIC BLOOD PRESSURE: 81 MMHG | WEIGHT: 190 LBS | HEIGHT: 65 IN

## 2019-05-24 DIAGNOSIS — E55.9 VITAMIN D DEFICIENCY: ICD-10-CM

## 2019-05-24 DIAGNOSIS — E53.8 VITAMIN B12 DEFICIENCY: Primary | ICD-10-CM

## 2019-05-24 DIAGNOSIS — R53.83 FATIGUE, UNSPECIFIED TYPE: ICD-10-CM

## 2019-05-24 PROCEDURE — 99213 OFFICE O/P EST LOW 20 MIN: CPT | Performed by: NURSE PRACTITIONER

## 2019-05-24 PROCEDURE — 96372 THER/PROPH/DIAG INJ SC/IM: CPT | Performed by: NURSE PRACTITIONER

## 2019-05-24 RX ORDER — ROSUVASTATIN CALCIUM 5 MG/1
5 TABLET, COATED ORAL DAILY
Qty: 30 TABLET | Refills: 0 | Status: SHIPPED | OUTPATIENT
Start: 2019-05-24 | End: 2019-06-27 | Stop reason: SDUPTHER

## 2019-05-24 RX ORDER — CYANOCOBALAMIN 1000 UG/ML
1000 INJECTION, SOLUTION INTRAMUSCULAR; SUBCUTANEOUS ONCE
Status: COMPLETED | OUTPATIENT
Start: 2019-05-24 | End: 2019-05-24

## 2019-05-24 RX ADMIN — CYANOCOBALAMIN 1000 MCG: 1000 INJECTION, SOLUTION INTRAMUSCULAR; SUBCUTANEOUS at 15:41

## 2019-05-24 NOTE — PROGRESS NOTES
Subjective   Monica Pagan is a 78 y.o. female.   Chief Complaint   Patient presents with   • Needs B12 inj   • Fatigue     Generalized   • Stress   • Goiter     [id number]       Patient presents for evaluation of fatigue and muscle aches.  This is 72-year-old female patient of Dr. mcduffie.  She has a history of fibromyalgia, lumbar degenerative disc disease, B12 deficiency, vitamin D deficiency, goiter, hypertension, hyperlipidemia.  She follows with Dr. Perez, endocrinology and will see him on 6/13/2019.  She endorses several weeks of body aches throughout and fatigue.  She identifies no medication changes that may have caused this.  She does state that she has been under a lot of stress lately.  She does have some urinary frequency but has a history of overactive bladder and takes Myrbetriq.  She denies having any blood in the urine or cloudy urine and denies any painful urination.  She denies fever, chills, shortness of breath, chest discomfort.  She is having widespread muscle aches.  She denies development of any other new issues today.         The following portions of the patient's history were reviewed and updated as appropriate: allergies, current medications, past family history, past medical history, past social history, past surgical history and problem list.    Review of Systems   Constitutional: Positive for fatigue. Negative for activity change, chills, fever, unexpected weight gain and unexpected weight loss.   HENT: Negative for congestion, hearing loss, postnasal drip, sinus pressure, sneezing, sore throat and tinnitus.    Eyes: Negative for photophobia, pain and visual disturbance.   Respiratory: Negative for cough, chest tightness, shortness of breath and wheezing.    Cardiovascular: Negative for chest pain, palpitations and leg swelling.   Gastrointestinal: Negative for abdominal distention, abdominal pain, constipation, diarrhea, nausea and vomiting.   Endocrine: Negative for polydipsia,  "polyphagia and polyuria.   Genitourinary: Negative for dysuria, frequency, hematuria and urgency.   Musculoskeletal: Positive for arthralgias and back pain.   Neurological: Negative for dizziness, weakness, numbness and headache.   Psychiatric/Behavioral: Positive for stress. Negative for suicidal ideas.   All other systems reviewed and are negative.      Objective    /81 (BP Location: Left arm, Patient Position: Sitting, Cuff Size: Adult)   Pulse 79   Temp 97.8 °F (36.6 °C) (Oral)   Resp 16   Ht 163.8 cm (64.5\")   Wt 86.2 kg (190 lb)   SpO2 97%   BMI 32.11 kg/m²     Physical Exam   Constitutional: She is oriented to person, place, and time. She appears well-developed and well-nourished. No distress.   HENT:   Head: Normocephalic and atraumatic.   Eyes: EOM are normal. Pupils are equal, round, and reactive to light.   Neck: Normal range of motion. Neck supple. No JVD present. No tracheal deviation present. No thyromegaly present.   Cardiovascular: Normal rate, regular rhythm, normal heart sounds and intact distal pulses. Exam reveals no gallop and no friction rub.   No murmur heard.  Pulmonary/Chest: Effort normal and breath sounds normal. No stridor. No respiratory distress. She has no wheezes. She has no rales. She exhibits no tenderness.   Lungs are CTA bilaterally   Abdominal: Soft. Bowel sounds are normal. She exhibits no distension. There is no tenderness.   Musculoskeletal: Normal range of motion.   Lymphadenopathy:     She has no cervical adenopathy.   Neurological: She is alert and oriented to person, place, and time.   Skin: Skin is warm and dry. Capillary refill takes less than 2 seconds. She is not diaphoretic.   Psychiatric: She has a normal mood and affect. Her behavior is normal. Judgment and thought content normal.   Nursing note and vitals reviewed.    Current outpatient and discharge medications have been reconciled for the patient.  Reviewed by: DEBO Gallagher      Assessment/Plan "   Monica was seen today for needs b12 inj, fatigue, stress and goiter.    Diagnoses and all orders for this visit:    Vitamin B12 deficiency  -     cyanocobalamin injection 1,000 mcg  -     Vitamin B12    Vitamin D deficiency  -     Vitamin D 25 Hydroxy    Fatigue, unspecified type  -     Vitamin B12  -     Vitamin D 25 Hydroxy  -     CBC & Differential  -     TSH  -     T4, Free  -     Folate  -     Urinalysis With Culture If Indicated - Urine, Clean Catch  -     Comprehensive metabolic panel      -Fatigue, muscle aches: We will check labs including CBC, CMP, B12, vitamin D, TSH, free T4, folate.  We will check the urine as well for urinalysis with culture if indicated.  She is having urinary frequency but not really any above baseline.  We will rule out UTI as a contributor.    -She takes monthly B12 injections at home per Dr. Lopez prescription but accidentally wasted her injection today.  We will give her one in the office.    -We will contact patient with the results of her labs and any further recommendations.  Follow-up PRN and routinely with PCP, Dr. mcduffie.

## 2019-05-26 LAB
25(OH)D3+25(OH)D2 SERPL-MCNC: 43.1 NG/ML (ref 30–100)
ALBUMIN SERPL-MCNC: 4.6 G/DL (ref 3.5–5.2)
ALBUMIN/GLOB SERPL: 1.7 G/DL
ALP SERPL-CCNC: 81 U/L (ref 39–117)
ALT SERPL-CCNC: 26 U/L (ref 1–33)
APPEARANCE UR: CLEAR
AST SERPL-CCNC: 27 U/L (ref 1–32)
BACTERIA #/AREA URNS HPF: ABNORMAL /HPF
BACTERIA UR CULT: NORMAL
BACTERIA UR CULT: NORMAL
BASOPHILS # BLD AUTO: 0.08 10*3/MM3 (ref 0–0.2)
BASOPHILS NFR BLD AUTO: 0.9 % (ref 0–1.5)
BILIRUB SERPL-MCNC: 0.3 MG/DL (ref 0.2–1.2)
BILIRUB UR QL STRIP: NEGATIVE
BUN SERPL-MCNC: 13 MG/DL (ref 8–23)
BUN/CREAT SERPL: 15.7 (ref 7–25)
CALCIUM SERPL-MCNC: 11.5 MG/DL (ref 8.6–10.5)
CHLORIDE SERPL-SCNC: 99 MMOL/L (ref 98–107)
CO2 SERPL-SCNC: 28.2 MMOL/L (ref 22–29)
COLOR UR: YELLOW
CREAT SERPL-MCNC: 0.83 MG/DL (ref 0.57–1)
EOSINOPHIL # BLD AUTO: 0.26 10*3/MM3 (ref 0–0.4)
EOSINOPHIL NFR BLD AUTO: 2.8 % (ref 0.3–6.2)
EPI CELLS #/AREA URNS HPF: ABNORMAL /HPF
ERYTHROCYTE [DISTWIDTH] IN BLOOD BY AUTOMATED COUNT: 12.6 % (ref 12.3–15.4)
FOLATE SERPL-MCNC: 19.1 NG/ML (ref 4.78–24.2)
GLOBULIN SER CALC-MCNC: 2.7 GM/DL
GLUCOSE SERPL-MCNC: 171 MG/DL (ref 65–99)
GLUCOSE UR QL: NEGATIVE
HCT VFR BLD AUTO: 44.2 % (ref 34–46.6)
HGB BLD-MCNC: 14 G/DL (ref 12–15.9)
HGB UR QL STRIP: NEGATIVE
IMM GRANULOCYTES # BLD AUTO: 0.03 10*3/MM3 (ref 0–0.05)
IMM GRANULOCYTES NFR BLD AUTO: 0.3 % (ref 0–0.5)
KETONES UR QL STRIP: NEGATIVE
LEUKOCYTE ESTERASE UR QL STRIP: ABNORMAL
LYMPHOCYTES # BLD AUTO: 3.28 10*3/MM3 (ref 0.7–3.1)
LYMPHOCYTES NFR BLD AUTO: 35.1 % (ref 19.6–45.3)
MCH RBC QN AUTO: 29.1 PG (ref 26.6–33)
MCHC RBC AUTO-ENTMCNC: 31.7 G/DL (ref 31.5–35.7)
MCV RBC AUTO: 91.9 FL (ref 79–97)
MICRO URNS: ABNORMAL
MONOCYTES # BLD AUTO: 0.48 10*3/MM3 (ref 0.1–0.9)
MONOCYTES NFR BLD AUTO: 5.1 % (ref 5–12)
MUCOUS THREADS URNS QL MICRO: PRESENT /HPF
NEUTROPHILS # BLD AUTO: 5.21 10*3/MM3 (ref 1.7–7)
NEUTROPHILS NFR BLD AUTO: 55.8 % (ref 42.7–76)
NITRITE UR QL STRIP: NEGATIVE
NRBC BLD AUTO-RTO: 0 /100 WBC (ref 0–0.2)
PH UR STRIP: 6.5 [PH] (ref 5–7.5)
PLATELET # BLD AUTO: 196 10*3/MM3 (ref 140–450)
POTASSIUM SERPL-SCNC: 4.3 MMOL/L (ref 3.5–5.2)
PROT SERPL-MCNC: 7.3 G/DL (ref 6–8.5)
PROT UR QL STRIP: NEGATIVE
RBC # BLD AUTO: 4.81 10*6/MM3 (ref 3.77–5.28)
RBC #/AREA URNS HPF: ABNORMAL /HPF
SODIUM SERPL-SCNC: 140 MMOL/L (ref 136–145)
SP GR UR: 1.02 (ref 1–1.03)
T4 FREE SERPL-MCNC: 1.33 NG/DL (ref 0.93–1.7)
TSH SERPL DL<=0.005 MIU/L-ACNC: 1.5 MIU/ML (ref 0.27–4.2)
URINALYSIS REFLEX: ABNORMAL
UROBILINOGEN UR STRIP-MCNC: 0.2 MG/DL (ref 0.2–1)
VIT B12 SERPL-MCNC: >2000 PG/ML (ref 211–946)
WBC # BLD AUTO: 9.34 10*3/MM3 (ref 3.4–10.8)
WBC #/AREA URNS HPF: >30 /HPF

## 2019-05-28 NOTE — PROGRESS NOTES
Please notify patient that her labs look stable. No abnormalities in Vitamin D, CBC, urine, thyroid or folate. Please let me know if symptoms persist or worsen. She also needs a follow-up scheduled with Dr. Gentile for health maintenance, it doesn't look like she has one right now.

## 2019-06-03 RX ORDER — ESZOPICLONE 3 MG/1
TABLET, FILM COATED ORAL
Qty: 30 TABLET | Refills: 2 | Status: SHIPPED | OUTPATIENT
Start: 2019-06-03 | End: 2019-07-01 | Stop reason: SDUPTHER

## 2019-06-07 DIAGNOSIS — R68.89 ABNORMAL ENDOCRINE LABORATORY TEST FINDING: ICD-10-CM

## 2019-06-07 DIAGNOSIS — I10 ESSENTIAL HYPERTENSION: Primary | ICD-10-CM

## 2019-06-07 DIAGNOSIS — E11.42 TYPE 2 DIABETES MELLITUS WITH PERIPHERAL NEUROPATHY (HCC): ICD-10-CM

## 2019-06-07 DIAGNOSIS — E78.49 OTHER HYPERLIPIDEMIA: ICD-10-CM

## 2019-06-10 ENCOUNTER — RESULTS ENCOUNTER (OUTPATIENT)
Dept: ENDOCRINOLOGY | Age: 79
End: 2019-06-10

## 2019-06-10 DIAGNOSIS — I10 ESSENTIAL HYPERTENSION: ICD-10-CM

## 2019-06-10 DIAGNOSIS — E11.42 TYPE 2 DIABETES MELLITUS WITH PERIPHERAL NEUROPATHY (HCC): ICD-10-CM

## 2019-06-10 DIAGNOSIS — R68.89 ABNORMAL ENDOCRINE LABORATORY TEST FINDING: ICD-10-CM

## 2019-06-10 DIAGNOSIS — E78.49 OTHER HYPERLIPIDEMIA: ICD-10-CM

## 2019-06-11 LAB
ALBUMIN SERPL-MCNC: 4.5 G/DL (ref 3.5–5.2)
ALBUMIN/GLOB SERPL: 2 G/DL
ALP SERPL-CCNC: 75 U/L (ref 39–117)
ALT SERPL-CCNC: 20 U/L (ref 1–33)
AST SERPL-CCNC: 22 U/L (ref 1–32)
BILIRUB SERPL-MCNC: 0.5 MG/DL (ref 0.2–1.2)
BUN SERPL-MCNC: 14 MG/DL (ref 8–23)
BUN/CREAT SERPL: 15.9 (ref 7–25)
CALCIUM SERPL-MCNC: 10.8 MG/DL (ref 8.6–10.5)
CHLORIDE SERPL-SCNC: 103 MMOL/L (ref 98–107)
CHOLEST SERPL-MCNC: 129 MG/DL (ref 0–200)
CO2 SERPL-SCNC: 25.3 MMOL/L (ref 22–29)
CREAT SERPL-MCNC: 0.88 MG/DL (ref 0.57–1)
GLOBULIN SER CALC-MCNC: 2.3 GM/DL
GLUCOSE SERPL-MCNC: 168 MG/DL (ref 65–99)
HBA1C MFR BLD: 7 % (ref 4.8–5.6)
HDLC SERPL-MCNC: 51 MG/DL (ref 40–60)
INTERPRETATION: NORMAL
LDLC SERPL CALC-MCNC: 51 MG/DL (ref 0–100)
Lab: NORMAL
POTASSIUM SERPL-SCNC: 5.1 MMOL/L (ref 3.5–5.2)
PROT SERPL-MCNC: 6.8 G/DL (ref 6–8.5)
PTH-INTACT SERPL-MCNC: 58 PG/ML (ref 15–65)
SODIUM SERPL-SCNC: 140 MMOL/L (ref 136–145)
TRIGL SERPL-MCNC: 136 MG/DL (ref 0–150)
VLDLC SERPL CALC-MCNC: 27.2 MG/DL

## 2019-06-12 NOTE — PROGRESS NOTES
Subjective   Monica Pagan is a 78 y.o. female.     F/u for dm 2, hyperlipidemia, cervical and lumbar DDD/ testing bs 1 x day/ last dm eye exam 7/20/18 with dr Wolf / last dm foot exam today with dr Perez        In 2015, an incidental left thyroid nodule was noted on CT of the cervical spine. She had a thyroid ultrasound done in March 2016 which showed a multinodular goiter with the largest nodule measuring 2.8 cm on the left. Thyroid function tests were normal.  She had a follow-up thyroid ultrasound done in January 2017 which showed a stable multinodular goiter.  She denies any previous history of head/neck radiation therapy. She denies any neck soreness.  She is not on thyroid medication.    Thyroid function test done in May 2019 showed a normal TSH at 1.5 and normal free T4 at 1.33 ng per DL.      She has known diabetes mellitus since 2008 and was started on insulin at that time. She has been on Levemir 35 units every PM and metformin 1000 mg twice a day.  She had nausea with glimepiride 1 mg and stopped the medication after one week.  Her fasting blood sugars ranges from 111-193. She denies any hypoglycemic episode.  She has lost 6 pounds since November 2018.  Hemoglobin A1c done in June 2019 was 7.0%.      Her last eye examination was in 8/17 and she has appointment on July 27, 2019.. She has no retinopathy. She has occasional blurry vision. She complains of numbness, tingling and burning in her legs which improved on Cymbalta 20 mg/day.  She was tried on Lyrica and gabapentin in the past which caused sedation.  She is on CBD cream prescribed by Dr. Lira.  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  She has no microalbuminuria on urine sample taken in 3/19.     She has hyperlipidemia and is on Crestor 5 mg/day.  She tried Lipitor 20 mg for 2 weeks and developed myalgia and arthralgia.  The pain resolved 1 week after she stopped Lipitor.  She has tried pravastatin which she  "discontinued after 2 weeks because of joint pain.  She has not used Livalo, Zetia or PCS K9 inhibitors in the past.        She has hypertension and has been on losartan 50 mg once a day and diltiazem  mg once a day. She denies any previous history of myocardial infarction or stroke.  She denies chest pain or SOB.      She has vitamin D deficiency and takes vitamin D 50,000 units weekly.  She has vitamin B12 deficiency and is receiving intramuscular vitamin B12 through Dr. Gentile.      She has irritable bowel syndrome and follows with Dr. Russell.  She had sleep study 5 years ago and was told to have borderline sleep apnea.     Her daughter is disabled to multiple sclerosis.     The following portions of the patient's history were reviewed and updated as appropriate: allergies, current medications, past family history, past medical history, past social history, past surgical history and problem list.    Review of Systems   Constitutional: Positive for chills and fatigue.   HENT: Negative.    Eyes: Negative.    Respiratory: Negative.    Cardiovascular: Positive for leg swelling (achy ).   Gastrointestinal: Negative.    Endocrine: Negative.    Genitourinary: Positive for frequency and urgency.   Musculoskeletal: Negative.    Skin: Negative.    Allergic/Immunologic: Negative.    Neurological: Positive for numbness (  hand and feet ).   Hematological: Bruises/bleeds easily.   Psychiatric/Behavioral: Negative.  Negative for sleep disturbance.       Objective      Vitals:    06/13/19 1205   BP: 132/66   BP Location: Right arm   Patient Position: Sitting   Cuff Size: Large Adult   Pulse: 74   SpO2: 97%   Weight: 86.5 kg (190 lb 9.6 oz)   Height: 163.8 cm (64.49\")     Physical Exam   Constitutional: She is oriented to person, place, and time. She appears well-developed and well-nourished. No distress.   HENT:   Head: Normocephalic.   Right Ear: External ear normal.   Left Ear: External ear normal.   Nose: Nose normal. "   Eyes: Conjunctivae and EOM are normal. Right eye exhibits no discharge. Left eye exhibits no discharge. No scleral icterus.   Neck: Neck supple. No JVD present. No tracheal deviation present. No thyromegaly present.   Cardiovascular: Normal rate, regular rhythm, normal heart sounds and intact distal pulses. Exam reveals no gallop and no friction rub.   No murmur heard.  Pulmonary/Chest: Effort normal and breath sounds normal. No stridor. No respiratory distress. She has no wheezes. She has no rales.   Abdominal: Soft. Bowel sounds are normal. She exhibits no distension and no mass. There is no tenderness. There is no guarding.   Musculoskeletal: Normal range of motion. She exhibits no edema, tenderness or deformity.   Absent left big toe   Lymphadenopathy:     She has no cervical adenopathy.   Neurological: She is alert and oriented to person, place, and time. She displays normal reflexes.   Intact light touch on both lower extremities   Skin: Skin is warm and dry. No erythema. No pallor.   Psychiatric: She has a normal mood and affect. Her behavior is normal.     Results Encounter on 06/10/2019   Component Date Value Ref Range Status   • Glucose 06/10/2019 168* 65 - 99 mg/dL Final   • BUN 06/10/2019 14  8 - 23 mg/dL Final   • Creatinine 06/10/2019 0.88  0.57 - 1.00 mg/dL Final   • eGFR Non African Am 06/10/2019 62  >60 mL/min/1.73 Final    Comment: The MDRD GFR formula is only valid for adults with stable  renal function between ages 18 and 70.     • eGFR  Am 06/10/2019 76  >60 mL/min/1.73 Final   • BUN/Creatinine Ratio 06/10/2019 15.9  7.0 - 25.0 Final   • Sodium 06/10/2019 140  136 - 145 mmol/L Final   • Potassium 06/10/2019 5.1  3.5 - 5.2 mmol/L Final   • Chloride 06/10/2019 103  98 - 107 mmol/L Final   • Total CO2 06/10/2019 25.3  22.0 - 29.0 mmol/L Final   • Calcium 06/10/2019 10.8* 8.6 - 10.5 mg/dL Final   • Total Protein 06/10/2019 6.8  6.0 - 8.5 g/dL Final   • Albumin 06/10/2019 4.50  3.50 - 5.20  g/dL Final   • Globulin 06/10/2019 2.3  gm/dL Final   • A/G Ratio 06/10/2019 2.0  g/dL Final   • Total Bilirubin 06/10/2019 0.5  0.2 - 1.2 mg/dL Final   • Alkaline Phosphatase 06/10/2019 75  39 - 117 U/L Final   • AST (SGOT) 06/10/2019 22  1 - 32 U/L Final   • ALT (SGPT) 06/10/2019 20  1 - 33 U/L Final   • Total Cholesterol 06/10/2019 129  0 - 200 mg/dL Final   • Triglycerides 06/10/2019 136  0 - 150 mg/dL Final   • HDL Cholesterol 06/10/2019 51  40 - 60 mg/dL Final   • VLDL Cholesterol 06/10/2019 27.2  mg/dL Final   • LDL Cholesterol  06/10/2019 51  0 - 100 mg/dL Final   • PTH, Intact 06/10/2019 58  15 - 65 pg/mL Final   • Hemoglobin A1C 06/10/2019 7.00* 4.80 - 5.60 % Final    Comment: Hemoglobin A1C Ranges:  Increased Risk for Diabetes  5.7% to 6.4%  Diabetes                     >= 6.5%  Diabetic Goal                < 7.0%     • Interpretation 06/10/2019 Note   Final    Supplemental report is available.   • PDF Image 06/10/2019 Not applicable   Final     Assessment/Plan   Monica was seen today for diabetes and hyperlipidemia.    Diagnoses and all orders for this visit:    Type 2 diabetes mellitus with peripheral neuropathy (CMS/HCC)    Vitamin D deficiency    Vitamin B12 deficiency    Hyperlipidemia, unspecified hyperlipidemia type    Essential hypertension    Nontoxic multinodular goiter      Continue Levemir and metformin  Continue Cymbalta per podiatrist.  Continue Crestor 5 mg/day.  Continue losartan and diltiazem.  Continue vitamin B12 and vitamin D supplements per Dr. Gentile    Copy of my note sent to Dr. Gentile, Dr. Schuler, Dr. Wolf, Dr. Randhawa, and Dr. Lira    RTC 4 mos.

## 2019-06-13 ENCOUNTER — OFFICE VISIT (OUTPATIENT)
Dept: ENDOCRINOLOGY | Age: 79
End: 2019-06-13

## 2019-06-13 VITALS
WEIGHT: 190.6 LBS | DIASTOLIC BLOOD PRESSURE: 66 MMHG | SYSTOLIC BLOOD PRESSURE: 132 MMHG | OXYGEN SATURATION: 97 % | HEIGHT: 64 IN | BODY MASS INDEX: 32.54 KG/M2 | HEART RATE: 74 BPM

## 2019-06-13 DIAGNOSIS — E78.5 HYPERLIPIDEMIA, UNSPECIFIED HYPERLIPIDEMIA TYPE: ICD-10-CM

## 2019-06-13 DIAGNOSIS — E04.2 NONTOXIC MULTINODULAR GOITER: ICD-10-CM

## 2019-06-13 DIAGNOSIS — E55.9 VITAMIN D DEFICIENCY: ICD-10-CM

## 2019-06-13 DIAGNOSIS — I10 ESSENTIAL HYPERTENSION: ICD-10-CM

## 2019-06-13 DIAGNOSIS — E53.8 VITAMIN B12 DEFICIENCY: ICD-10-CM

## 2019-06-13 DIAGNOSIS — E11.42 TYPE 2 DIABETES MELLITUS WITH PERIPHERAL NEUROPATHY (HCC): Primary | ICD-10-CM

## 2019-06-13 PROCEDURE — 99214 OFFICE O/P EST MOD 30 MIN: CPT | Performed by: INTERNAL MEDICINE

## 2019-06-17 RX ORDER — CYANOCOBALAMIN 1000 UG/ML
INJECTION, SOLUTION INTRAMUSCULAR; SUBCUTANEOUS
Qty: 1 ML | Refills: 2 | Status: SHIPPED | OUTPATIENT
Start: 2019-06-17 | End: 2019-09-23 | Stop reason: SDUPTHER

## 2019-06-28 RX ORDER — ROSUVASTATIN CALCIUM 5 MG/1
TABLET, COATED ORAL
Qty: 30 TABLET | Refills: 0 | Status: SHIPPED | OUTPATIENT
Start: 2019-06-28 | End: 2019-07-26 | Stop reason: SDUPTHER

## 2019-07-01 DIAGNOSIS — F41.9 ANXIETY: ICD-10-CM

## 2019-07-01 RX ORDER — CLONAZEPAM 0.5 MG/1
TABLET ORAL
Qty: 90 TABLET | Refills: 0 | Status: SHIPPED | OUTPATIENT
Start: 2019-07-01 | End: 2019-07-28 | Stop reason: SDUPTHER

## 2019-07-01 RX ORDER — ESZOPICLONE 3 MG/1
TABLET, FILM COATED ORAL
Qty: 90 TABLET | Refills: 2 | Status: SHIPPED | OUTPATIENT
Start: 2019-07-01 | End: 2019-12-27 | Stop reason: SDUPTHER

## 2019-07-02 ENCOUNTER — OFFICE VISIT (OUTPATIENT)
Dept: INTERNAL MEDICINE | Facility: CLINIC | Age: 79
End: 2019-07-02

## 2019-07-02 VITALS
BODY MASS INDEX: 32.1 KG/M2 | TEMPERATURE: 97.5 F | DIASTOLIC BLOOD PRESSURE: 86 MMHG | OXYGEN SATURATION: 97 % | HEART RATE: 72 BPM | HEIGHT: 64 IN | SYSTOLIC BLOOD PRESSURE: 147 MMHG | WEIGHT: 188 LBS

## 2019-07-02 DIAGNOSIS — E11.42 TYPE 2 DIABETES MELLITUS WITH PERIPHERAL NEUROPATHY (HCC): ICD-10-CM

## 2019-07-02 DIAGNOSIS — Z91.81 AT MODERATE RISK FOR FALL: ICD-10-CM

## 2019-07-02 DIAGNOSIS — M51.36 DEGENERATION OF INTERVERTEBRAL DISC OF LUMBAR REGION: ICD-10-CM

## 2019-07-02 DIAGNOSIS — E04.2 NONTOXIC MULTINODULAR GOITER: ICD-10-CM

## 2019-07-02 DIAGNOSIS — E78.2 MIXED HYPERLIPIDEMIA: Primary | ICD-10-CM

## 2019-07-02 DIAGNOSIS — I10 ESSENTIAL HYPERTENSION: ICD-10-CM

## 2019-07-02 DIAGNOSIS — E53.8 VITAMIN B12 DEFICIENCY: ICD-10-CM

## 2019-07-02 DIAGNOSIS — Z00.00 MEDICARE ANNUAL WELLNESS VISIT, SUBSEQUENT: ICD-10-CM

## 2019-07-02 PROCEDURE — 99214 OFFICE O/P EST MOD 30 MIN: CPT | Performed by: FAMILY MEDICINE

## 2019-07-02 PROCEDURE — G0439 PPPS, SUBSEQ VISIT: HCPCS | Performed by: FAMILY MEDICINE

## 2019-07-02 RX ORDER — DULOXETIN HYDROCHLORIDE 30 MG/1
30 CAPSULE, DELAYED RELEASE ORAL DAILY
Qty: 90 CAPSULE | Refills: 1 | Status: SHIPPED | OUTPATIENT
Start: 2019-07-02 | End: 2019-10-04 | Stop reason: SDUPTHER

## 2019-07-02 NOTE — PROGRESS NOTES
QUICK REFERENCE INFORMATION:  The ABCs of the Annual Wellness Visit    Subsequent Medicare Wellness Visit     HEALTH RISK ASSESSMENT    : 1940    Recent Hospitalizations:  No hospitalization(s) within the last year..  ccc      Current Medical Providers:  Patient Care Team:  Froylan Gentile Jr., MD as PCP - General (Family Medicine)  Agustin Schuler MD as PCP - Claims Attributed  Agustin Schuler MD as Consulting Physician (Pain Medicine)  Humza Jean Baptiste MD as Consulting Physician (Dermatology)  Ana Maria Wolf MD as Consulting Physician (Ophthalmology)  Candido Randhawa MD as Consulting Physician (Cardiology)  Francesco Perez MD as Consulting Physician (Endocrinology)  Americo Lira DPM (Podiatry)        Smoking Status:  Social History     Tobacco Use   Smoking Status Never Smoker   Smokeless Tobacco Never Used   Tobacco Comment    daily caffeine use       Alcohol Consumption:  Social History     Substance and Sexual Activity   Alcohol Use Yes   • Alcohol/week: 0.6 oz   • Types: 1 Glasses of wine per week    Comment: 1 time year        Depression Screen:   PHQ-2/PHQ-9 Depression Screening 2019   Little interest or pleasure in doing things 1   Feeling down, depressed, or hopeless 1   Trouble falling or staying asleep, or sleeping too much 0   Feeling tired or having little energy 0   Poor appetite or overeating 0   Feeling bad about yourself - or that you are a failure or have let yourself or your family down 0   Trouble concentrating on things, such as reading the newspaper or watching television 0   Moving or speaking so slowly that other people could have noticed. Or the opposite - being so fidgety or restless that you have been moving around a lot more than usual 0   Thoughts that you would be better off dead, or of hurting yourself in some way 0   Total Score 2   If you checked off any problems, how difficult have these problems made it for you to do your work, take care of  things at home, or get along with other people? Not difficult at all       Health Habits and Functional and Cognitive Screening:  Functional & Cognitive Status 7/2/2019   Do you have difficulty preparing food and eating? Yes   Do you have difficulty bathing yourself, getting dressed or grooming yourself? No   Do you have difficulty using the toilet? No   Do you have difficulty moving around from place to place? No   Do you have trouble with steps or getting out of a bed or a chair? No   In the past year have you fallen or experienced a near fall? Yes   Current Diet Unhealthy Diet   Dental Exam Not up to date   Eye Exam Not up to date   Exercise (times per week) 0 times per week   Current Exercise Activities Include None   Do you need help using the phone?  No   Are you deaf or do you have serious difficulty hearing?  Yes   Do you need help with transportation? No   Do you need help shopping? No   Do you need help preparing meals?  No   Do you need help with housework?  Yes   Do you need help with laundry? No   Do you need help taking your medications? No   Do you need help managing money? No   Do you ever drive or ride in a car without wearing a seat belt? No   Have you felt unusual stress, anger or loneliness in the last month? Yes   Who do you live with? Alone   If you need help, do you have trouble finding someone available to you? No   Have you been bothered in the last four weeks by sexual problems? No   Do you have difficulty concentrating, remembering or making decisions? Yes           Does the patient have evidence of cognitive impairment? No    Asiprin use counseling: Does not need ASA (and currently is not on it)      Recent Lab Results:    Lab Results   Component Value Date     (H) 06/10/2019     Lab Results   Component Value Date    HGBA1C 7.00 (H) 06/10/2019     Lab Results   Component Value Date    TRIG 136 06/10/2019    HDL 51 06/10/2019    VLDL 27.2 06/10/2019           Age-appropriate  Screening Schedule:  Refer to the list below for future screening recommendations based on patient's age, sex and/or medical conditions. Orders for these recommended tests are listed in the plan section. The patient has been provided with a written plan.    Health Maintenance   Topic Date Due   • ZOSTER VACCINE (2 of 3) 12/18/2016   • MAMMOGRAM  02/17/2019   • INFLUENZA VACCINE  08/01/2019   • HEMOGLOBIN A1C  12/10/2019   • URINE MICROALBUMIN  03/11/2020   • LIPID PANEL  06/10/2020   • COLONOSCOPY  04/10/2024   • TDAP/TD VACCINES (2 - Td) 08/13/2027   • PNEUMOCOCCAL VACCINES (65+ LOW/MEDIUM RISK)  Addressed        Subjective   History of Present Illness    Monica Pagan is a 78 y.o. female who presents for an Annual Wellness Visit.    The following portions of the patient's history were reviewed and updated as appropriate: allergies, current medications, past family history, past medical history, past social history, past surgical history and problem list.    Outpatient Medications Prior to Visit   Medication Sig Dispense Refill   • albuterol sulfate  (90 Base) MCG/ACT inhaler Inhale 2 puffs.     • BD PEN NEEDLE JENNIFER U/F 32G X 4 MM misc USE AS DIRECTED TWICE DAILY 200 each 0   • benzonatate (TESSALON PERLES) 100 MG capsule Take 100 mg by mouth.     • Blood Glucose Monitoring Suppl (TRUE METRIX METER) w/Device kit USE UTD  0   • CARTIA  MG 24 hr capsule TAKE 1 CAPSULE EVERY DAY 90 capsule 1   • clonazePAM (KlonoPIN) 0.5 MG tablet TAKE 1 TABLET BY MOUTH THREE TIMES DAILY 90 tablet 0   • cyanocobalamin 1000 MCG/ML injection INJECT 1,000 MCG AS DIRECTED EVERY 30 DAYS 1 mL 2   • DULoxetine (CYMBALTA) 20 MG capsule TAKE 1 CAPSULE BY MOUTH DAILY 90 capsule 1   • estradiol (ESTRACE) 0.5 MG tablet TAKE 1 TABLET EVERY DAY 90 tablet 1   • eszopiclone (LUNESTA) 3 MG tablet TAKE 1 TABLET BY MOUTH EVERY DAY AT BEDTIME AS NEEDED FOR SLEEP 90 tablet 2   • FEXOFENADINE HCL PO Take  by mouth Daily.     • glucose blood  "(TRUE METRIX BLOOD GLUCOSE TEST) test strip Dx code E11.42 testing bs 1 x day 100 each 1   • HYDROcodone-acetaminophen (NORCO) 7.5-325 MG per tablet 1 tablet 2 (Two) Times a Day.  0   • insulin detemir (LEVEMIR FLEXTOUCH) 100 UNIT/ML injection Inject 35 Units under the skin into the appropriate area as directed Every Night. 34 mL 1   • Lancets 30G misc Enhance talking meter /testing bs 1 x day  Dx code  E11.42 100 each 1   • losartan (COZAAR) 50 MG tablet TAKE 1 TABLET EVERY DAY 90 tablet 0   • metFORMIN (GLUCOPHAGE) 1000 MG tablet TAKE 1 TABLET TWO TIMES A DAY WITH MEALS. 180 tablet 1   • MYRBETRIQ 25 MG tablet sustained-release 24 hour 24 hr tablet Take  by mouth Daily.  1   • rosuvastatin (CRESTOR) 5 MG tablet TAKE 1 TABLET BY MOUTH DAILY 30 tablet 0   • Syringe 25G X 1\" 3 ML misc Inject 1 each into the shoulder, thigh, or buttocks Every 28 (Twenty-Eight) Days. 1 each 5   • vitamin D (ERGOCALCIFEROL) 42855 units capsule capsule TAKE 1 CAPSULE BY MOUTH EVERY 7 DAYS 12 capsule 2     No facility-administered medications prior to visit.        Patient Active Problem List   Diagnosis   • Anemia   • Anxiety   • Pain in the coccyx   • Degeneration of intervertebral disc of lumbar region   • Type 2 diabetes mellitus with peripheral neuropathy (CMS/HCC)   • Fibromyalgia   • Hyperlipidemia   • Essential hypertension   • Insomnia   • Spinal stenosis of lumbar region   • Tremor   • Vitamin D deficiency   • Nontoxic multinodular goiter   • Panic attack   • Multilevel degenerative disc disease   • Vitamin B12 deficiency   • Meniere disease   • PVC's (premature ventricular contractions)       Advance Care Planning:  Patient has an advance directive - a copy has been provided and is visible in patient header    Identification of Risk Factors:  Risk factors include: cardiovascular risk and increased fall risk.    Review of Systems    Compared to one year ago, the patient feels her physical health is worse.  Compared to one year " "ago, the patient feels her mental health is the same.    Objective     Physical Exam    Vitals:    07/02/19 1346   BP: 147/86   BP Location: Left arm   Patient Position: Sitting   Cuff Size: Small Adult   Pulse: 72   Temp: 97.5 °F (36.4 °C)   TempSrc: Oral   SpO2: 97%   Weight: 85.3 kg (188 lb)   Height: 163.8 cm (64.49\")   PainSc:   4   PainLoc: Back       Patient's Body mass index is 31.78 kg/m². BMI is above normal parameters. Recommendations include: nutrition counseling.      Assessment/Plan   Patient Self-Management and Personalized Health Advice  The patient has been provided with information about: prevention of cardiac or vascular disease and fall prevention and preventive services including:   · Counseling for cardiovascular disease risk reduction.    Visit Diagnoses:    ICD-10-CM ICD-9-CM   1. Mixed hyperlipidemia E78.2 272.2   2. Essential hypertension I10 401.9   3. Vitamin B12 deficiency E53.8 266.2   4. Type 2 diabetes mellitus with peripheral neuropathy (CMS/[facility]) E11.42 250.60     357.2   5. Nontoxic multinodular goiter E04.2 241.1   6. Degeneration of intervertebral disc of lumbar region M51.36 722.52   7. Medicare annual wellness visit, subsequent Z00.00 V70.0       No orders of the defined types were placed in this encounter.      Outpatient Encounter Medications as of 7/2/2019   Medication Sig Dispense Refill   • albuterol sulfate  (90 Base) MCG/ACT inhaler Inhale 2 puffs.     • BD PEN NEEDLE JENNIFER U/F 32G X 4 MM misc USE AS DIRECTED TWICE DAILY 200 each 0   • benzonatate (TESSALON PERLES) 100 MG capsule Take 100 mg by mouth.     • Blood Glucose Monitoring Suppl (TRUE METRIX METER) w/Device kit USE UTD  0   • CARTIA  MG 24 hr capsule TAKE 1 CAPSULE EVERY DAY 90 capsule 1   • clonazePAM (KlonoPIN) 0.5 MG tablet TAKE 1 TABLET BY MOUTH THREE TIMES DAILY 90 tablet 0   • cyanocobalamin 1000 MCG/ML injection INJECT 1,000 MCG AS DIRECTED EVERY 30 DAYS 1 mL 2   • DULoxetine (CYMBALTA) 20 MG " "capsule TAKE 1 CAPSULE BY MOUTH DAILY 90 capsule 1   • estradiol (ESTRACE) 0.5 MG tablet TAKE 1 TABLET EVERY DAY 90 tablet 1   • eszopiclone (LUNESTA) 3 MG tablet TAKE 1 TABLET BY MOUTH EVERY DAY AT BEDTIME AS NEEDED FOR SLEEP 90 tablet 2   • FEXOFENADINE HCL PO Take  by mouth Daily.     • glucose blood (TRUE METRIX BLOOD GLUCOSE TEST) test strip Dx code E11.42 testing bs 1 x day 100 each 1   • HYDROcodone-acetaminophen (NORCO) 7.5-325 MG per tablet 1 tablet 2 (Two) Times a Day.  0   • insulin detemir (LEVEMIR FLEXTOUCH) 100 UNIT/ML injection Inject 35 Units under the skin into the appropriate area as directed Every Night. 34 mL 1   • Lancets 30G misc Enhance talking meter /testing bs 1 x day  Dx code  E11.42 100 each 1   • losartan (COZAAR) 50 MG tablet TAKE 1 TABLET EVERY DAY 90 tablet 0   • metFORMIN (GLUCOPHAGE) 1000 MG tablet TAKE 1 TABLET TWO TIMES A DAY WITH MEALS. 180 tablet 1   • MYRBETRIQ 25 MG tablet sustained-release 24 hour 24 hr tablet Take  by mouth Daily.  1   • rosuvastatin (CRESTOR) 5 MG tablet TAKE 1 TABLET BY MOUTH DAILY 30 tablet 0   • Syringe 25G X 1\" 3 ML misc Inject 1 each into the shoulder, thigh, or buttocks Every 28 (Twenty-Eight) Days. 1 each 5   • vitamin D (ERGOCALCIFEROL) 32846 units capsule capsule TAKE 1 CAPSULE BY MOUTH EVERY 7 DAYS 12 capsule 2     No facility-administered encounter medications on file as of 7/2/2019.        Reviewed use of high risk medication in the elderly: yes  Reviewed for potential of harmful drug interactions in the elderly: yes    Follow Up:  No Follow-up on file.     An After Visit Summary and PPPS with all of these plans were given to the patient.               "

## 2019-07-02 NOTE — PATIENT INSTRUCTIONS
Medicare Wellness  Personal Prevention Plan of Service     Date of Office Visit:  2019  Encounter Provider:  Froylan Gentile Jr., MD  Place of Service:  [facility] INTERNAL MEDICINE  Patient Name: Monica Pagan  :  1940    As part of the Medicare Wellness portion of your visit today, we are providing you with this personalized preventive plan of services (PPPS). This plan is based upon recommendations of the [place] Preventive Services Task Force (USPSTF) and the Advisory Committee on Immunization Practices (ACIP).    This lists the preventive care services that should be considered, and provides dates of when you are due. Items listed as completed are up-to-date and do not require any further intervention.    Health Maintenance   Topic Date Due   • ANNUAL GYN EXAM  1940   • ZOSTER VACCINE (2 of 3) 2016   • MEDICARE ANNUAL WELLNESS  10/09/2018   • MAMMOGRAM  2019   • INFLUENZA VACCINE  2019   • HEMOGLOBIN A1C  12/10/2019   • URINE MICROALBUMIN  2020   • LIPID PANEL  06/10/2020   • COLONOSCOPY  04/10/2024   • TDAP/TD VACCINES (2 - Td) 2027   • PNEUMOCOCCAL VACCINES (65+ LOW/MEDIUM RISK)  Addressed       No orders of the defined types were placed in this encounter.      Return if symptoms worsen or fail to improve, for Recheck.

## 2019-07-02 NOTE — PROGRESS NOTES
Subjective   Monica Pagan is a 78 y.o. female.     Chief Complaint   Patient presents with   • Annual Exam   • Hypertension   • Hyperlipidemia         History of Present Illness   Patient is a delightful lady who is moving to [place] to take care of her daughter who has multiple problems.  Treatment of diabetes hypertension hyperlipidemia reviewed.  She has had moderate increase in fall risk we discussed fall prevention.  She has chronic back pain as well.      The following portions of the patient's history were reviewed and updated as appropriate: allergies, current medications, past social history and problem list.    Review of Systems   Constitutional: Positive for fatigue.   HENT: Negative.    Eyes: Negative.    Respiratory: Negative.    Cardiovascular: Negative.    Gastrointestinal: Negative.    Endocrine: Negative.    Genitourinary: Negative.    Musculoskeletal: Positive for arthralgias, back pain, gait problem and myalgias.   Skin: Negative.    Allergic/Immunologic: Negative.    Hematological: Negative.    Psychiatric/Behavioral: Positive for dysphoric mood.       Objective   Vitals:    07/02/19 1346   BP: 147/86   Pulse: 72   Temp: 97.5 °F (36.4 °C)   SpO2: 97%     Physical Exam   Constitutional: She is oriented to person, place, and time. She appears well-developed and well-nourished.   HENT:   Head: Normocephalic and atraumatic.   Right Ear: Tympanic membrane and external ear normal.   Left Ear: Tympanic membrane and external ear normal.   Nose: Nose normal.   Mouth/Throat: Oropharynx is clear and moist.   Eyes: Conjunctivae and EOM are normal. Pupils are equal, round, and reactive to light.   Neck: Normal range of motion. Neck supple. No JVD present. No thyromegaly present.   Cardiovascular: Normal rate, regular rhythm, normal heart sounds and intact distal pulses.   Pulmonary/Chest: Effort normal and breath sounds normal.   Abdominal: Soft. Bowel sounds are normal.   Musculoskeletal:        Right  knee: She exhibits decreased range of motion.        Left knee: She exhibits decreased range of motion.        Lumbar back: She exhibits decreased range of motion, tenderness and bony tenderness.   Lymphadenopathy:     She has no cervical adenopathy.   Neurological: She is alert and oriented to person, place, and time. No cranial nerve deficit. Coordination normal.   Skin: Skin is warm and dry. No rash noted.   Psychiatric: She has a normal mood and affect. Her behavior is normal. Judgment and thought content normal.   Vitals reviewed.      Assessment/Plan   Problem List Items Addressed This Visit        Cardiovascular and Mediastinum    Essential hypertension    Hyperlipidemia - Primary       Digestive    Vitamin B12 deficiency       Endocrine    Type 2 diabetes mellitus with peripheral neuropathy (CMS/HCC)    Nontoxic multinodular goiter       Musculoskeletal and Integument    Degeneration of intervertebral disc of lumbar region      Other Visit Diagnoses     Medicare annual wellness visit, subsequent          Increase Cymbalta from 20 to 30 mg daily for enhancement of treatment of back pain as well as neuropathy.  She will follow-up with physician in [facility].

## 2019-07-22 ENCOUNTER — TELEPHONE (OUTPATIENT)
Dept: CARDIOLOGY | Facility: CLINIC | Age: 79
End: 2019-07-22

## 2019-07-22 NOTE — TELEPHONE ENCOUNTER
Pt is moving to [place], [place] and asked if you could refer/suggest a Cardiologist there?    Thanks,  Soheila

## 2019-07-22 NOTE — TELEPHONE ENCOUNTER
I spoke with the pt.  If she can't find someone then she will just come bk here for appts.  She doesn't want to stop seeing you  but her daughter has MS and that is why she is moving to [place]-so she can be with her.

## 2019-07-26 RX ORDER — ROSUVASTATIN CALCIUM 5 MG/1
TABLET, COATED ORAL
Qty: 30 TABLET | Refills: 0 | Status: SHIPPED | OUTPATIENT
Start: 2019-07-26 | End: 2019-08-30 | Stop reason: SDUPTHER

## 2019-07-28 DIAGNOSIS — F41.9 ANXIETY: ICD-10-CM

## 2019-08-01 RX ORDER — CLONAZEPAM 0.5 MG/1
TABLET ORAL
Qty: 90 TABLET | Refills: 2 | Status: SHIPPED | OUTPATIENT
Start: 2019-08-01 | End: 2019-10-17 | Stop reason: SDUPTHER

## 2019-08-07 DIAGNOSIS — E11.9 CONTROLLED TYPE 2 DIABETES MELLITUS WITHOUT COMPLICATION (HCC): ICD-10-CM

## 2019-08-07 DIAGNOSIS — Z78.0 POST-MENOPAUSAL: ICD-10-CM

## 2019-08-07 DIAGNOSIS — E11.42 TYPE 2 DIABETES MELLITUS WITH PERIPHERAL NEUROPATHY (HCC): ICD-10-CM

## 2019-08-07 DIAGNOSIS — I10 ESSENTIAL HYPERTENSION: ICD-10-CM

## 2019-08-07 RX ORDER — ESTRADIOL 0.5 MG/1
TABLET ORAL
Qty: 90 TABLET | Refills: 0 | Status: SHIPPED | OUTPATIENT
Start: 2019-08-07 | End: 2019-10-31 | Stop reason: SDUPTHER

## 2019-08-07 RX ORDER — DILTIAZEM HYDROCHLORIDE 120 MG/1
CAPSULE, COATED, EXTENDED RELEASE ORAL
Qty: 90 CAPSULE | Refills: 1 | Status: SHIPPED | OUTPATIENT
Start: 2019-08-07 | End: 2020-01-20 | Stop reason: SDUPTHER

## 2019-08-07 RX ORDER — LOSARTAN POTASSIUM 50 MG/1
TABLET ORAL
Qty: 90 TABLET | Refills: 0 | Status: SHIPPED | OUTPATIENT
Start: 2019-08-07 | End: 2019-09-06 | Stop reason: SDUPTHER

## 2019-08-07 RX ORDER — PEN NEEDLE, DIABETIC 32GX 5/32"
NEEDLE, DISPOSABLE MISCELLANEOUS
Qty: 100 EACH | Refills: 0 | Status: SHIPPED | OUTPATIENT
Start: 2019-08-07 | End: 2022-02-23

## 2019-08-07 RX ORDER — INSULIN DETEMIR 100 [IU]/ML
INJECTION, SOLUTION SUBCUTANEOUS
Qty: 34 ML | Refills: 1 | Status: SHIPPED | OUTPATIENT
Start: 2019-08-07 | End: 2019-09-06 | Stop reason: SDUPTHER

## 2019-08-30 DIAGNOSIS — E55.9 VITAMIN D DEFICIENCY: ICD-10-CM

## 2019-08-30 RX ORDER — ERGOCALCIFEROL 1.25 MG/1
50000 CAPSULE ORAL
Qty: 12 CAPSULE | Refills: 1 | Status: SHIPPED | OUTPATIENT
Start: 2019-08-30 | End: 2020-04-27

## 2019-08-30 RX ORDER — ROSUVASTATIN CALCIUM 5 MG/1
5 TABLET, COATED ORAL DAILY
Qty: 90 TABLET | Refills: 1 | Status: SHIPPED | OUTPATIENT
Start: 2019-08-30 | End: 2019-09-06 | Stop reason: SDUPTHER

## 2019-09-06 ENCOUNTER — OFFICE VISIT (OUTPATIENT)
Dept: FAMILY MEDICINE CLINIC | Facility: CLINIC | Age: 79
End: 2019-09-06

## 2019-09-06 VITALS
TEMPERATURE: 97.9 F | DIASTOLIC BLOOD PRESSURE: 79 MMHG | WEIGHT: 187 LBS | OXYGEN SATURATION: 94 % | RESPIRATION RATE: 16 BRPM | HEART RATE: 73 BPM | BODY MASS INDEX: 31.92 KG/M2 | HEIGHT: 64 IN | SYSTOLIC BLOOD PRESSURE: 135 MMHG

## 2019-09-06 DIAGNOSIS — G62.9 NEUROPATHY: ICD-10-CM

## 2019-09-06 DIAGNOSIS — C44.310 BASAL CELL CARCINOMA (BCC) OF FACE: ICD-10-CM

## 2019-09-06 DIAGNOSIS — E78.2 MIXED HYPERLIPIDEMIA: ICD-10-CM

## 2019-09-06 DIAGNOSIS — D64.9 ANEMIA, UNSPECIFIED TYPE: ICD-10-CM

## 2019-09-06 DIAGNOSIS — E11.65 TYPE 2 DIABETES MELLITUS WITH HYPERGLYCEMIA, WITH LONG-TERM CURRENT USE OF INSULIN (HCC): Primary | ICD-10-CM

## 2019-09-06 DIAGNOSIS — L98.9 SCALP LESION: ICD-10-CM

## 2019-09-06 DIAGNOSIS — Z79.4 TYPE 2 DIABETES MELLITUS WITH HYPERGLYCEMIA, WITH LONG-TERM CURRENT USE OF INSULIN (HCC): Primary | ICD-10-CM

## 2019-09-06 DIAGNOSIS — E53.8 VITAMIN B12 DEFICIENCY: ICD-10-CM

## 2019-09-06 DIAGNOSIS — I10 ESSENTIAL HYPERTENSION: ICD-10-CM

## 2019-09-06 DIAGNOSIS — E11.42 TYPE 2 DIABETES MELLITUS WITH PERIPHERAL NEUROPATHY (HCC): ICD-10-CM

## 2019-09-06 DIAGNOSIS — E55.9 VITAMIN D DEFICIENCY: ICD-10-CM

## 2019-09-06 PROBLEM — M48.02 CERVICAL SPINAL STENOSIS: Status: ACTIVE | Noted: 2018-11-30

## 2019-09-06 PROBLEM — Z86.0100 HISTORY OF COLON POLYPS: Status: ACTIVE | Noted: 2019-04-17

## 2019-09-06 PROBLEM — Z86.010 HISTORY OF COLON POLYPS: Status: ACTIVE | Noted: 2019-04-17

## 2019-09-06 PROBLEM — E11.40 DIABETIC NEUROPATHY: Status: ACTIVE | Noted: 2018-02-15

## 2019-09-06 PROCEDURE — 99203 OFFICE O/P NEW LOW 30 MIN: CPT | Performed by: FAMILY MEDICINE

## 2019-09-06 RX ORDER — ROSUVASTATIN CALCIUM 5 MG/1
5 TABLET, COATED ORAL DAILY
Qty: 90 TABLET | Refills: 0 | Status: SHIPPED | OUTPATIENT
Start: 2019-09-06 | End: 2020-06-11 | Stop reason: SDUPTHER

## 2019-09-06 RX ORDER — CETIRIZINE HYDROCHLORIDE 10 MG/1
10 TABLET ORAL DAILY
COMMUNITY
End: 2020-08-12

## 2019-09-06 RX ORDER — LOSARTAN POTASSIUM 50 MG/1
50 TABLET ORAL DAILY
Qty: 90 TABLET | Refills: 0
Start: 2019-09-06 | End: 2019-10-04 | Stop reason: SDUPTHER

## 2019-09-06 RX ORDER — ROSUVASTATIN CALCIUM 5 MG/1
5 TABLET, COATED ORAL DAILY
Qty: 30 TABLET | Refills: 0 | Status: SHIPPED | OUTPATIENT
Start: 2019-09-06 | End: 2019-09-06 | Stop reason: SDUPTHER

## 2019-09-23 RX ORDER — CYANOCOBALAMIN 1000 UG/ML
INJECTION, SOLUTION INTRAMUSCULAR; SUBCUTANEOUS
Qty: 1 ML | Refills: 2 | OUTPATIENT
Start: 2019-09-23

## 2019-09-23 RX ORDER — CYANOCOBALAMIN 1000 UG/ML
INJECTION, SOLUTION INTRAMUSCULAR; SUBCUTANEOUS
Refills: 0 | OUTPATIENT
Start: 2019-09-23

## 2019-09-23 RX ORDER — MIRABEGRON 25 MG/1
25 TABLET, FILM COATED, EXTENDED RELEASE ORAL DAILY
Qty: 90 TABLET | Refills: 0 | Status: SHIPPED | OUTPATIENT
Start: 2019-09-23 | End: 2019-10-16

## 2019-09-23 RX ORDER — CYANOCOBALAMIN 1000 UG/ML
1000 INJECTION, SOLUTION INTRAMUSCULAR; SUBCUTANEOUS
Qty: 3 ML | Refills: 0 | Status: SHIPPED | OUTPATIENT
Start: 2019-09-23 | End: 2019-11-26 | Stop reason: SDUPTHER

## 2019-10-04 DIAGNOSIS — I10 ESSENTIAL HYPERTENSION: ICD-10-CM

## 2019-10-04 RX ORDER — LOSARTAN POTASSIUM 50 MG/1
50 TABLET ORAL DAILY
Qty: 90 TABLET | Refills: 0
Start: 2019-10-04 | End: 2019-10-16 | Stop reason: SDUPTHER

## 2019-10-04 RX ORDER — DULOXETIN HYDROCHLORIDE 30 MG/1
30 CAPSULE, DELAYED RELEASE ORAL DAILY
Qty: 90 CAPSULE | Refills: 1 | Status: SHIPPED | OUTPATIENT
Start: 2019-10-04 | End: 2020-03-09 | Stop reason: SDUPTHER

## 2019-10-14 ENCOUNTER — TELEPHONE (OUTPATIENT)
Dept: FAMILY MEDICINE CLINIC | Facility: CLINIC | Age: 79
End: 2019-10-14

## 2019-10-14 NOTE — TELEPHONE ENCOUNTER
Last visit:  9/6/19  Next visit: 12/13/19  Last labs: 9/6/19    Rx requested: Duloxetine   Pharmacy: [facility]

## 2019-10-16 ENCOUNTER — OFFICE VISIT (OUTPATIENT)
Dept: PODIATRY | Facility: CLINIC | Age: 79
End: 2019-10-16

## 2019-10-16 VITALS
SYSTOLIC BLOOD PRESSURE: 127 MMHG | BODY MASS INDEX: 32.95 KG/M2 | HEART RATE: 70 BPM | HEIGHT: 64 IN | WEIGHT: 193 LBS | DIASTOLIC BLOOD PRESSURE: 80 MMHG

## 2019-10-16 DIAGNOSIS — M19.072 ARTHRITIS OF LEFT FOOT: ICD-10-CM

## 2019-10-16 DIAGNOSIS — F41.9 ANXIETY: ICD-10-CM

## 2019-10-16 DIAGNOSIS — B35.1 ONYCHOMYCOSIS: ICD-10-CM

## 2019-10-16 DIAGNOSIS — I10 ESSENTIAL HYPERTENSION: ICD-10-CM

## 2019-10-16 DIAGNOSIS — E11.42 DM TYPE 2 WITH DIABETIC PERIPHERAL NEUROPATHY (HCC): Primary | ICD-10-CM

## 2019-10-16 PROCEDURE — 11721 DEBRIDE NAIL 6 OR MORE: CPT | Performed by: PODIATRIST

## 2019-10-16 PROCEDURE — 99203 OFFICE O/P NEW LOW 30 MIN: CPT | Performed by: PODIATRIST

## 2019-10-16 NOTE — PROGRESS NOTES
10/16/2019  Foot and Ankle Surgery - New Patient   Provider: Dr. Sean Ring DPM  Location: [facility]    Subjective:  Monica Pagan is a 79 y.o. female.     Chief Complaint   Patient presents with   • Annual Exam     Dm check       HPI: Patient is a 79-year-old diabetic female that presents for routine diabetic foot check and discomfort involving her left foot.  Patient has seen an outside podiatrist in the past and has required left great toe amputation.  Surgery was performed approximately 2 years ago.  She feels that her blood sugars are well controlled at this time.  She has not had any recent open wounds or infections to her feet.  She does complain of discomfort with weightbearing activities, particularly without shoes to the left foot.  She also has instability due to the great toe amputation which requires her to ambulate with walker assistance.  She has had previous falls in the past.  She also states that she has an AFO which she intermittently wears.  No other issues today    Allergies   Allergen Reactions   • Cortisone Other (See Comments)   • Erythromycin Diarrhea   • Gabapentin    • Januvia [Sitagliptin] Nausea Only   • Levofloxacin Diarrhea   • Lipitor [Atorvastatin] Myalgia   • Nsaids GI Intolerance   • Pravastatin Other (See Comments)     Arthralgia   • Pregabalin Other (See Comments)     fogginess   • Sulfa Antibiotics Nausea Only   • Theophylline    • Compazine  [Prochlorperazine Edisylate] Anxiety     restless   • Prochlorperazine Anxiety     restless       Past Medical History:   Diagnosis Date   • Anemia    • Anxiety    • Coccyx pain    • Colon polyp     TA   • DDD (degenerative disc disease), lumbar    • Fibromyalgia    • Hyperlipidemia    • Hypertension    • IBS (irritable bowel syndrome)    • Insomnia    • Lumbar spinal stenosis    • MAMMO    • Meniere's disease    • Nontoxic thyroid nodule    • Ocular histoplasmosis    • Osteoarthritis    • Peripheral neuropathy    • PVC  (premature ventricular contraction)    • Spondylosis    • Tinnitus    • Tremor    • Trigeminal neuralgia    • Type 2 diabetes mellitus (CMS/HCC)    • Vitamin D deficiency        Past Surgical History:   Procedure Laterality Date   • AMPUTATION DIGIT Left     Digit #1   • APPENDECTOMY  1962   • BACK SURGERY  11/2015    LUMBAR Laminectomy/ Fusion   • BUNIONECTOMY Bilateral    • CATARACT EXTRACTION      left   • CATARACT EXTRACTION      x2   • COLONOSCOPY  2012    NEG = 2012, rech 2017     • CYST REMOVAL Bilateral     WRIST   • HYSTERECTOMY  1980   • LAMINECTOMY     • LAPAROSCOPIC CHOLECYSTECTOMY      DR. LOBATO   • PARTIAL HYSTERECTOMY     • REPLACEMENT TOTAL KNEE Right 1996 AND 1997    DR. BYNUM   • SPINE SURGERY     • TOTAL HIP ARTHROPLASTY Left 2007    DR. HERRERA       Family History   Problem Relation Age of Onset   • Asthma Mother    • COPD Mother    • Heart disease Father    • Alcohol abuse Father    • Other Father         WWII Gangrene/ Malaria   • Arthritis Sister    • Heart attack Brother    • Alzheimer's disease Brother    • Heart disease Brother    • Diabetes Brother    • Hyperlipidemia Brother    • Leukemia Sister    • Heart disease Brother    • Diabetes Brother    • Alcohol abuse Brother    • Heart attack Brother 42   • Cancer Brother         Ewings Sarcoma       Social History     Socioeconomic History   • Marital status:      Spouse name: Not on file   • Number of children: Not on file   • Years of education: Not on file   • Highest education level: Not on file   Tobacco Use   • Smoking status: Never Smoker   • Smokeless tobacco: Never Used   • Tobacco comment: daily caffeine use   Substance and Sexual Activity   • Alcohol use: Yes     Alcohol/week: 0.6 oz     Types: 1 Glasses of wine per week     Comment: Rare   • Drug use: No   • Sexual activity: Defer   Social History Narrative    .  She lives with her daughter who has MS, bipolar disorder and psoriasis        Current Outpatient  "Medications on File Prior to Visit   Medication Sig Dispense Refill   • albuterol sulfate  (90 Base) MCG/ACT inhaler Inhale 2 puffs.     • BD PEN NEEDLE JENNIFER U/F 32G X 4 MM misc USE AS DIRECTED TWICE DAILY 100 each 0   • benzonatate (TESSALON PERLES) 100 MG capsule Take 100 mg by mouth.     • Blood Glucose Monitoring Suppl (TRUE METRIX METER) w/Device kit USE UTD  0   • cetirizine (zyrTEC) 10 MG tablet Take 10 mg by mouth Daily.     • clonazePAM (KlonoPIN) 0.5 MG tablet TAKE 1 TABLET BY MOUTH THREE TIMES DAILY (Patient taking differently: TAKE 1 TABLET BY MOUTH TWO TIMES DAILY) 90 tablet 2   • cyanocobalamin 1000 MCG/ML injection Inject 1 mL under the skin into the appropriate area as directed Every 30 (Thirty) Days. 3 mL 0   • diltiaZEM CD (CARDIZEM CD) 120 MG 24 hr capsule TAKE 1 CAPSULE EVERY DAY 90 capsule 1   • DULoxetine (CYMBALTA) 30 MG capsule Take 1 capsule by mouth Daily. 90 capsule 1   • estradiol (ESTRACE) 0.5 MG tablet TAKE 1 TABLET EVERY DAY 90 tablet 0   • eszopiclone (LUNESTA) 3 MG tablet TAKE 1 TABLET BY MOUTH EVERY DAY AT BEDTIME AS NEEDED FOR SLEEP 90 tablet 2   • glucose blood (TRUE METRIX BLOOD GLUCOSE TEST) test strip Dx code E11.42 testing bs 1 x day 100 each 0   • HYDROcodone-acetaminophen (NORCO) 7.5-325 MG per tablet 1 tablet 2 (Two) Times a Day.  0   • insulin detemir (LEVEMIR FLEXTOUCH) 100 UNIT/ML injection Inject 35 Units under the skin into the appropriate area as directed Every Night. 34 mL 1   • Lancets 30G misc Enhance talking meter /testing bs 1 x day  Dx code  E11.42 100 each 1   • losartan (COZAAR) 50 MG tablet Take 1 tablet by mouth Daily. 90 tablet 0   • metFORMIN (GLUCOPHAGE) 1000 MG tablet Take 1 tablet by mouth 2 (Two) Times a Day With Meals. 180 tablet 1   • rosuvastatin (CRESTOR) 5 MG tablet Take 1 tablet by mouth Daily. 90 tablet 0   • Syringe 25G X 1\" 3 ML misc Inject 1 each into the shoulder, thigh, or buttocks Every 28 (Twenty-Eight) Days. 1 each 5   • vitamin D " "(ERGOCALCIFEROL) 46911 units capsule capsule Take 1 capsule by mouth Every 7 (Seven) Days. 12 capsule 1   • [DISCONTINUED] MYRBETRIQ 25 MG tablet sustained-release 24 hour 24 hr tablet Take 1 tablet by mouth Daily. 90 tablet 0     No current facility-administered medications on file prior to visit.        Review of Systems:  General: Denies fever, chills, fatigue, and weakness.  Eyes: Denies vision loss, blurry vision, and excessive redness.  ENT: Denies hearing issues and difficulty swallowing.  Cardiovascular: Denies palpitations, chest pain, or syncopal episodes.  Respiratory: Denies shortness of breath, wheezing, and coughing.  GI: Denies abdominal pain, nausea, and vomiting.   : Denies frequency, hematuria, and urgency.  Musculoskeletal: + Left foot pain  Derm: Denies rash, open wounds, or suspicious lesions.  Neuro: Denies headaches, numbness, loss of coordination, and tremors.  Psych: Denies anxiety and depression.  Endocrine: Denies temperature intolerance and changes in appetite.  Heme: Denies bleeding disorders or abnormal bruising.     Objective   /80   Pulse 70   Ht 162.6 cm (64\")   Wt 87.5 kg (193 lb)   BMI 33.13 kg/m²     Foot/Ankle Exam:       General:   Diabetic Foot Exam Performed    Appearance: appears stated age and healthy    Orientation: AAOx3    Affect: appropriate       Right Foot/Ankle:      Vascular   Normal vascular exam    Dorsalis pedis:  2+  Posterior tibial:  2+  Skin Temperature: warm    Edema Grading:  None  CFT:  < 3 seconds  Pedal Hair Growth:  Present  Varicosities: mild varicosities       Neurologic   Light touch sensation:  Normal  Hot/Cold sensation: normal    Protective Sensation using [place]-Caterina Monofilament:  10  Achilles reflex:  2+     Musculoskeletal   Ecchymosis:  None  Tenderness: none    Arch:  Normal  Hammertoe:  Second toe, third toe, fourth toe and fifth toe (Reducible)     Muscle Strength   Normal strength    Foot dorsiflexion:  5  Foot plantar " flexion:  5  Foot inversion:  5  Foot eversion:  5     Range of Motion   Foot and ankle ROM within normal limits       Dermatologic   Skin: skin intact    Nails: onychomycosis, abnormally thick and dystrophic nails        Additional Comments Moderate soft tissue rigidity.  No pain with palpation.     Left Foot/Ankle:      Vascular   Normal vascular exam    Dorsalis pedis:  2+  Posterior tibial:  2+  Skin Temperature: warm    Edema Grading:  None  CFT:  < 3 seconds  Pedal Hair Growth:  Present  Varicosities: mild varicosities       Neurologic   Light touch sensation:  Normal  Hot/cold sensation: normal    Protective Sensation using [place]-Caterina Monofilament:  10  Achilles reflex:  2+     Musculoskeletal    Amputation   Toes amputated: first toe  Ecchymosis:  None  Tenderness: arch and dorsal foot    Arch:  Normal  Hammertoe:  Second toe, third toe, fourth toe and fifth toe (Reducible)     Muscle Strength   Normal strength    Foot dorsiflexion:  5  Foot plantar flexion:  5  Foot inversion:  5  Foot eversion:  5     Dermatologic   Skin: skin intact    Nails: onychomycosis, abnormally thick and dystrophic nails        Additional Comments: Bony prominence noted to the dorsal midfoot.  Discomfort and limitation with range of motion present.  Moderate soft tissue rigidity      Assessment/Plan   Monica was seen today for annual exam.    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  On exam, she does have bony prominence and joint limitation noted to the left midfoot.  She has been told that she has fairly significant arthritis which is apparent on exam.  Her symptoms are mostly noted when she is not wearing shoes.  I have asked that she avoid barefoot or unsupported weightbearing. I have referred her to [facility] for diabetic shoes  and inserts.  We did discuss long term treatment options for arthritis if symptoms progress including steroid injections and surgery.  She states that she understands.  She also complains of previous falls and instability which likely stem from her left hallux amputation.  She does have an AFO.  I have asked that she wear the AFO and ambulate with walker assistance to prevent further falls.  We did discuss formal physical therapy which she does not want to consider.  We did review proper stretching and strengthening exercises.  I do feel that she is at mild to moderate risk for pedal complications.Explained importance of diabetic foot care, daily foot checks, and glycemic control. Patient should check both feet on a daily basis, monitor and control blood sugars, make sure that both feet and in between toes are towel dried after baths or showers. Avoid barefoot walking at all times. Check shoes before putting them on.   Patient was given information on proper foot care. Call the office at the first signs of a wound or with signs of infection.    Nail debridement: Both feet x9    Nails were debrided with a nail nipper without complication.  No anesthesia was required.  Indications for procedure were thickened, dystrophic nails which are difficult to trim.  Proper self-care and technique was discussed with patient.  Patient was stable after procedure.    No orders of the defined types were placed in this encounter.       Note is dictated utilizing voice recognition software. Unfortunately this leads to occasional typographical errors. I apologize in advance if the situation occurs. If questions occur please do not hesitate to call our office.

## 2019-10-16 NOTE — PATIENT INSTRUCTIONS
Diabetes Mellitus and Foot Care  Foot care is an important part of your health, especially when you have diabetes. Diabetes may cause you to have problems because of poor blood flow (circulation) to your feet and legs, which can cause your skin to:  · Become thinner and drier.  · Break more easily.  · Heal more slowly.  · Peel and crack.  You may also have nerve damage (neuropathy) in your legs and feet, causing decreased feeling in them. This means that you may not notice minor injuries to your feet that could lead to more serious problems. Noticing and addressing any potential problems early is the best way to prevent future foot problems.  How to care for your feet  Foot hygiene  · Wash your feet daily with warm water and mild soap. Do not use hot water. Then, pat your feet and the areas between your toes until they are completely dry. Do not soak your feet as this can dry your skin.  · Trim your toenails straight across. Do not dig under them or around the cuticle. File the edges of your nails with an emery board or nail file.  · Apply a moisturizing lotion or petroleum jelly to the skin on your feet and to dry, brittle toenails. Use lotion that does not contain alcohol and is unscented. Do not apply lotion between your toes.  Shoes and socks  · Wear clean socks or stockings every day. Make sure they are not too tight. Do not wear knee-high stockings since they may decrease blood flow to your legs.  · Wear shoes that fit properly and have enough cushioning. Always look in your shoes before you put them on to be sure there are no objects inside.  · To break in new shoes, wear them for just a few hours a day. This prevents injuries on your feet.  Wounds, scrapes, corns, and calluses  · Check your feet daily for blisters, cuts, bruises, sores, and redness. If you cannot see the bottom of your feet, use a mirror or ask someone for help.  · Do not cut corns or calluses or try to remove them with medicine.  · If you  find a minor scrape, cut, or break in the skin on your feet, keep it and the skin around it clean and dry. You may clean these areas with mild soap and water. Do not clean the area with peroxide, alcohol, or iodine.  · If you have a wound, scrape, corn, or callus on your foot, look at it several times a day to make sure it is healing and not infected. Check for:  ? Redness, swelling, or pain.  ? Fluid or blood.  ? Warmth.  ? Pus or a bad smell.  General instructions  · Do not cross your legs. This may decrease blood flow to your feet.  · Do not use heating pads or hot water bottles on your feet. They may burn your skin. If you have lost feeling in your feet or legs, you may not know this is happening until it is too late.  · Protect your feet from hot and cold by wearing shoes, such as at the beach or on hot pavement.  · Schedule a complete foot exam at least once a year (annually) or more often if you have foot problems. If you have foot problems, report any cuts, sores, or bruises to your health care provider immediately.  Contact a health care provider if:  · You have a medical condition that increases your risk of infection and you have any cuts, sores, or bruises on your feet.  · You have an injury that is not healing.  · You have redness on your legs or feet.  · You feel burning or tingling in your legs or feet.  · You have pain or cramps in your legs and feet.  · Your legs or feet are numb.  · Your feet always feel cold.  · You have pain around a toenail.  Get help right away if:  · You have a wound, scrape, corn, or callus on your foot and:  ? You have pain, swelling, or redness that gets worse.  ? You have fluid or blood coming from the wound, scrape, corn, or callus.  ? Your wound, scrape, corn, or callus feels warm to the touch.  ? You have pus or a bad smell coming from the wound, scrape, corn, or callus.  ? You have a fever.  ? You have a red line going up your leg.  Summary  · Check your feet every day  for cuts, sores, red spots, swelling, and blisters.  · Moisturize feet and legs daily.  · Wear shoes that fit properly and have enough cushioning.  · If you have foot problems, report any cuts, sores, or bruises to your health care provider immediately.  · Schedule a complete foot exam at least once a year (annually) or more often if you have foot problems.  This information is not intended to replace advice given to you by your health care provider. Make sure you discuss any questions you have with your health care provider.  Document Released: 12/15/2001 Document Revised: 01/30/2019 Document Reviewed: 01/19/2018  [employer] Patient Education © 2019 [employer]

## 2019-10-17 DIAGNOSIS — F41.9 ANXIETY: ICD-10-CM

## 2019-10-17 RX ORDER — LOSARTAN POTASSIUM 50 MG/1
50 TABLET ORAL DAILY
Qty: 90 TABLET | Refills: 0
Start: 2019-10-17 | End: 2019-10-31 | Stop reason: SDUPTHER

## 2019-10-17 RX ORDER — CLONAZEPAM 0.5 MG/1
0.5 TABLET ORAL 3 TIMES DAILY
Qty: 90 TABLET | Refills: 2 | Status: CANCELLED | OUTPATIENT
Start: 2019-10-17

## 2019-10-17 RX ORDER — CLONAZEPAM 0.5 MG/1
0.5 TABLET ORAL 3 TIMES DAILY
Qty: 90 TABLET | Refills: 0 | Status: SHIPPED | OUTPATIENT
Start: 2019-10-17 | End: 2019-11-26 | Stop reason: SDUPTHER

## 2019-10-22 DIAGNOSIS — E11.42 TYPE 2 DIABETES MELLITUS WITH PERIPHERAL NEUROPATHY (HCC): ICD-10-CM

## 2019-10-22 NOTE — TELEPHONE ENCOUNTER
Metformin 1000  levemir    [facility]    Seen 9/6/19  sched 12/2019  Labs were ordered 9/2019 - not done yet  Labs 6/2019

## 2019-10-25 ENCOUNTER — TELEPHONE (OUTPATIENT)
Dept: FAMILY MEDICINE CLINIC | Facility: CLINIC | Age: 79
End: 2019-10-25

## 2019-10-25 DIAGNOSIS — E11.42 TYPE 2 DIABETES MELLITUS WITH PERIPHERAL NEUROPATHY (HCC): ICD-10-CM

## 2019-10-25 NOTE — TELEPHONE ENCOUNTER
Last visit:  9/6/19  Next visit:12/13/19  Last labs:6/10/19    Rx requested:   Metformin 1,000  Levemir Flextouch    Pharmacy: [facility]     (This came over as a paper request from [facility] pharmacy)

## 2019-10-29 ENCOUNTER — TELEPHONE (OUTPATIENT)
Dept: FAMILY MEDICINE CLINIC | Facility: CLINIC | Age: 79
End: 2019-10-29

## 2019-10-29 NOTE — TELEPHONE ENCOUNTER
Patient called and left a VM stating that she won't be able to get her metformin in time and is wanting to know if you can send in 2 weeks worth to a local pharmacy--[facility] in [place].

## 2019-10-31 DIAGNOSIS — Z78.0 POST-MENOPAUSAL: ICD-10-CM

## 2019-10-31 DIAGNOSIS — I10 ESSENTIAL HYPERTENSION: ICD-10-CM

## 2019-10-31 RX ORDER — ESTRADIOL 0.5 MG/1
0.5 TABLET ORAL DAILY
Qty: 90 TABLET | Refills: 0 | Status: SHIPPED | OUTPATIENT
Start: 2019-10-31 | End: 2020-01-20 | Stop reason: SDUPTHER

## 2019-10-31 NOTE — TELEPHONE ENCOUNTER
Estradiol 0.5    [facility]    Seen 9/6/19  sched 12/13  Labs6/2019 - some ordered on 9/6/19, not done yet.

## 2019-11-01 RX ORDER — LOSARTAN POTASSIUM 50 MG/1
TABLET ORAL
Qty: 90 TABLET | Refills: 0 | Status: SHIPPED | OUTPATIENT
Start: 2019-11-01 | End: 2020-01-20 | Stop reason: SDUPTHER

## 2019-11-26 DIAGNOSIS — F41.9 ANXIETY: ICD-10-CM

## 2019-11-26 RX ORDER — CYANOCOBALAMIN 1000 UG/ML
INJECTION, SOLUTION INTRAMUSCULAR; SUBCUTANEOUS
Qty: 3 ML | Refills: 0 | Status: SHIPPED | OUTPATIENT
Start: 2019-11-26 | End: 2020-02-25

## 2019-11-26 RX ORDER — CLONAZEPAM 0.5 MG/1
TABLET ORAL
Qty: 90 TABLET | Refills: 0 | Status: SHIPPED | OUTPATIENT
Start: 2019-11-26 | End: 2020-01-07 | Stop reason: SDUPTHER

## 2019-11-26 NOTE — TELEPHONE ENCOUNTER
Clonazepam/B12 - [facility]      Seen 9/6/19  sched 12/13  Labs 6/2019 - some ordered on 9/6/19, not done yet.

## 2019-12-05 ENCOUNTER — CLINICAL SUPPORT (OUTPATIENT)
Dept: FAMILY MEDICINE CLINIC | Facility: CLINIC | Age: 79
End: 2019-12-05

## 2019-12-05 DIAGNOSIS — E53.8 VITAMIN B12 DEFICIENCY: ICD-10-CM

## 2019-12-05 DIAGNOSIS — E55.9 VITAMIN D DEFICIENCY: ICD-10-CM

## 2019-12-05 DIAGNOSIS — D64.9 ANEMIA, UNSPECIFIED TYPE: ICD-10-CM

## 2019-12-05 DIAGNOSIS — E11.65 TYPE 2 DIABETES MELLITUS WITH HYPERGLYCEMIA, WITH LONG-TERM CURRENT USE OF INSULIN (HCC): ICD-10-CM

## 2019-12-05 DIAGNOSIS — E78.2 MIXED HYPERLIPIDEMIA: ICD-10-CM

## 2019-12-05 DIAGNOSIS — R30.0 DYSURIA: Primary | ICD-10-CM

## 2019-12-05 DIAGNOSIS — Z79.4 TYPE 2 DIABETES MELLITUS WITH HYPERGLYCEMIA, WITH LONG-TERM CURRENT USE OF INSULIN (HCC): ICD-10-CM

## 2019-12-05 LAB — HBA1C MFR BLD: 6.9 % (ref 3.5–5.6)

## 2019-12-05 PROCEDURE — 82306 VITAMIN D 25 HYDROXY: CPT | Performed by: FAMILY MEDICINE

## 2019-12-05 PROCEDURE — 80053 COMPREHEN METABOLIC PANEL: CPT | Performed by: FAMILY MEDICINE

## 2019-12-05 PROCEDURE — 82043 UR ALBUMIN QUANTITATIVE: CPT | Performed by: FAMILY MEDICINE

## 2019-12-05 PROCEDURE — 81003 URINALYSIS AUTO W/O SCOPE: CPT | Performed by: FAMILY MEDICINE

## 2019-12-05 PROCEDURE — 36415 COLL VENOUS BLD VENIPUNCTURE: CPT | Performed by: FAMILY MEDICINE

## 2019-12-05 PROCEDURE — 82607 VITAMIN B-12: CPT | Performed by: FAMILY MEDICINE

## 2019-12-05 PROCEDURE — 85025 COMPLETE CBC W/AUTO DIFF WBC: CPT | Performed by: FAMILY MEDICINE

## 2019-12-05 PROCEDURE — 80061 LIPID PANEL: CPT | Performed by: FAMILY MEDICINE

## 2019-12-05 PROCEDURE — 83036 HEMOGLOBIN GLYCOSYLATED A1C: CPT | Performed by: FAMILY MEDICINE

## 2019-12-06 LAB
25(OH)D3 SERPL-MCNC: 53.9 NG/ML (ref 30–100)
ALBUMIN SERPL-MCNC: 4.4 G/DL (ref 3.5–5.2)
ALBUMIN UR-MCNC: 1.5 MG/DL
ALBUMIN/GLOB SERPL: 1.5 G/DL
ALP SERPL-CCNC: 72 U/L (ref 39–117)
ALT SERPL W P-5'-P-CCNC: 16 U/L (ref 1–33)
ANION GAP SERPL CALCULATED.3IONS-SCNC: 14.6 MMOL/L (ref 5–15)
AST SERPL-CCNC: 18 U/L (ref 1–32)
BASOPHILS # BLD AUTO: 0.08 10*3/MM3 (ref 0–0.2)
BASOPHILS NFR BLD AUTO: 0.8 % (ref 0–1.5)
BILIRUB SERPL-MCNC: 0.4 MG/DL (ref 0.2–1.2)
BILIRUB UR QL STRIP: NEGATIVE
BUN BLD-MCNC: 14 MG/DL (ref 8–23)
BUN/CREAT SERPL: 16.5 (ref 7–25)
CALCIUM SPEC-SCNC: 10.8 MG/DL (ref 8.6–10.5)
CHLORIDE SERPL-SCNC: 101 MMOL/L (ref 98–107)
CHOLEST SERPL-MCNC: 121 MG/DL (ref 0–200)
CLARITY UR: CLEAR
CO2 SERPL-SCNC: 26.4 MMOL/L (ref 22–29)
COLOR UR: YELLOW
CREAT BLD-MCNC: 0.85 MG/DL (ref 0.57–1)
DEPRECATED RDW RBC AUTO: 40.7 FL (ref 37–54)
EOSINOPHIL # BLD AUTO: 0.16 10*3/MM3 (ref 0–0.4)
EOSINOPHIL NFR BLD AUTO: 1.6 % (ref 0.3–6.2)
ERYTHROCYTE [DISTWIDTH] IN BLOOD BY AUTOMATED COUNT: 12.6 % (ref 12.3–15.4)
GFR SERPL CREATININE-BSD FRML MDRD: 65 ML/MIN/1.73
GLOBULIN UR ELPH-MCNC: 3 GM/DL
GLUCOSE BLD-MCNC: 162 MG/DL (ref 65–99)
GLUCOSE UR STRIP-MCNC: NEGATIVE MG/DL
HCT VFR BLD AUTO: 41.7 % (ref 34–46.6)
HDLC SERPL-MCNC: 50 MG/DL (ref 40–60)
HGB BLD-MCNC: 13.9 G/DL (ref 12–15.9)
HGB UR QL STRIP.AUTO: NEGATIVE
IMM GRANULOCYTES # BLD AUTO: 0.03 10*3/MM3 (ref 0–0.05)
IMM GRANULOCYTES NFR BLD AUTO: 0.3 % (ref 0–0.5)
KETONES UR QL STRIP: NEGATIVE
LDLC SERPL CALC-MCNC: 45 MG/DL (ref 0–100)
LDLC/HDLC SERPL: 0.9 {RATIO}
LEUKOCYTE ESTERASE UR QL STRIP.AUTO: NEGATIVE
LYMPHOCYTES # BLD AUTO: 2.73 10*3/MM3 (ref 0.7–3.1)
LYMPHOCYTES NFR BLD AUTO: 27.8 % (ref 19.6–45.3)
MCH RBC QN AUTO: 29.8 PG (ref 26.6–33)
MCHC RBC AUTO-ENTMCNC: 33.3 G/DL (ref 31.5–35.7)
MCV RBC AUTO: 89.3 FL (ref 79–97)
MONOCYTES # BLD AUTO: 0.45 10*3/MM3 (ref 0.1–0.9)
MONOCYTES NFR BLD AUTO: 4.6 % (ref 5–12)
NEUTROPHILS # BLD AUTO: 6.36 10*3/MM3 (ref 1.7–7)
NEUTROPHILS NFR BLD AUTO: 64.9 % (ref 42.7–76)
NITRITE UR QL STRIP: NEGATIVE
NRBC BLD AUTO-RTO: 0 /100 WBC (ref 0–0.2)
PH UR STRIP.AUTO: 7.5 [PH] (ref 5–8)
PLATELET # BLD AUTO: 206 10*3/MM3 (ref 140–450)
PMV BLD AUTO: 11.1 FL (ref 6–12)
POTASSIUM BLD-SCNC: 4.4 MMOL/L (ref 3.5–5.2)
PROT SERPL-MCNC: 7.4 G/DL (ref 6–8.5)
PROT UR QL STRIP: NEGATIVE
RBC # BLD AUTO: 4.67 10*6/MM3 (ref 3.77–5.28)
SODIUM BLD-SCNC: 142 MMOL/L (ref 136–145)
SP GR UR STRIP: 1.02 (ref 1–1.03)
TRIGL SERPL-MCNC: 131 MG/DL (ref 0–150)
UROBILINOGEN UR QL STRIP: NORMAL
VIT B12 BLD-MCNC: 590 PG/ML (ref 211–946)
VLDLC SERPL-MCNC: 26.2 MG/DL (ref 5–40)
WBC NRBC COR # BLD: 9.81 10*3/MM3 (ref 3.4–10.8)

## 2019-12-27 DIAGNOSIS — F51.04 PSYCHOPHYSIOLOGICAL INSOMNIA: Primary | ICD-10-CM

## 2019-12-27 RX ORDER — ESZOPICLONE 3 MG/1
3 TABLET, FILM COATED ORAL NIGHTLY PRN
Qty: 90 TABLET | Refills: 0 | Status: SHIPPED | OUTPATIENT
Start: 2019-12-27 | End: 2020-03-20 | Stop reason: SDUPTHER

## 2019-12-27 NOTE — TELEPHONE ENCOUNTER
Pt called asking if dr carias can send in [facility].  Dr. Carias has not ordered this med for her yet - used to be Dr. Howard.  Will be out on 1/3/19    Last seen 9/6/19 (cancelled on 12/16 and has not re/sched)    Labs 12/5/19

## 2020-01-07 ENCOUNTER — OFFICE VISIT (OUTPATIENT)
Dept: FAMILY MEDICINE CLINIC | Facility: CLINIC | Age: 80
End: 2020-01-07

## 2020-01-07 VITALS
OXYGEN SATURATION: 97 % | RESPIRATION RATE: 18 BRPM | DIASTOLIC BLOOD PRESSURE: 82 MMHG | HEART RATE: 75 BPM | TEMPERATURE: 97.7 F | HEIGHT: 64 IN | BODY MASS INDEX: 31.92 KG/M2 | SYSTOLIC BLOOD PRESSURE: 157 MMHG | WEIGHT: 187 LBS

## 2020-01-07 DIAGNOSIS — I10 ELEVATED BLOOD PRESSURE READING WITH DIAGNOSIS OF HYPERTENSION: ICD-10-CM

## 2020-01-07 DIAGNOSIS — Z79.4 TYPE 2 DIABETES MELLITUS WITH HYPERGLYCEMIA, WITH LONG-TERM CURRENT USE OF INSULIN (HCC): Primary | ICD-10-CM

## 2020-01-07 DIAGNOSIS — F41.9 ANXIETY: ICD-10-CM

## 2020-01-07 DIAGNOSIS — Z12.31 SCREENING MAMMOGRAM, ENCOUNTER FOR: ICD-10-CM

## 2020-01-07 DIAGNOSIS — R22.0 POSTAURICULAR SWELLING: ICD-10-CM

## 2020-01-07 DIAGNOSIS — R42 DIZZINESS: ICD-10-CM

## 2020-01-07 DIAGNOSIS — E11.65 TYPE 2 DIABETES MELLITUS WITH HYPERGLYCEMIA, WITH LONG-TERM CURRENT USE OF INSULIN (HCC): Primary | ICD-10-CM

## 2020-01-07 PROBLEM — M50.30 DDD (DEGENERATIVE DISC DISEASE), CERVICAL: Status: ACTIVE | Noted: 2019-09-19

## 2020-01-07 PROBLEM — M47.816 LUMBAR SPONDYLOSIS: Status: ACTIVE | Noted: 2019-09-19

## 2020-01-07 PROBLEM — M51.36 DEGENERATION OF LUMBAR INTERVERTEBRAL DISC: Status: ACTIVE | Noted: 2019-09-19

## 2020-01-07 PROBLEM — M47.812 CERVICAL SPONDYLOSIS: Status: ACTIVE | Noted: 2019-09-19

## 2020-01-07 PROBLEM — M54.2 NECK PAIN: Status: ACTIVE | Noted: 2019-09-19

## 2020-01-07 PROBLEM — M51.369 DEGENERATION OF LUMBAR INTERVERTEBRAL DISC: Status: ACTIVE | Noted: 2019-09-19

## 2020-01-07 PROBLEM — M54.50 LOW BACK PAIN: Status: ACTIVE | Noted: 2019-09-19

## 2020-01-07 LAB — GLUCOSE BLDC GLUCOMTR-MCNC: 136 MG/DL (ref 70–130)

## 2020-01-07 PROCEDURE — 82962 GLUCOSE BLOOD TEST: CPT | Performed by: FAMILY MEDICINE

## 2020-01-07 PROCEDURE — 99214 OFFICE O/P EST MOD 30 MIN: CPT | Performed by: FAMILY MEDICINE

## 2020-01-07 RX ORDER — CYANOCOBALAMIN 1000 UG/ML
INJECTION, SOLUTION INTRAMUSCULAR; SUBCUTANEOUS
COMMUNITY
End: 2020-01-07

## 2020-01-07 RX ORDER — CLONAZEPAM 0.5 MG/1
TABLET ORAL
Qty: 90 TABLET | Refills: 0 | OUTPATIENT
Start: 2020-01-07

## 2020-01-07 RX ORDER — CLONAZEPAM 0.5 MG/1
0.5 TABLET ORAL 2 TIMES DAILY
Qty: 60 TABLET | Refills: 0 | Status: SHIPPED | OUTPATIENT
Start: 2020-01-07 | End: 2020-01-27

## 2020-01-07 RX ORDER — FLUOCINONIDE TOPICAL SOLUTION USP, 0.05% 0.5 MG/ML
SOLUTION TOPICAL
COMMUNITY
End: 2020-08-12

## 2020-01-07 RX ORDER — DULOXETIN HYDROCHLORIDE 20 MG/1
1 CAPSULE, DELAYED RELEASE ORAL DAILY
COMMUNITY
End: 2020-01-07 | Stop reason: DRUGHIGH

## 2020-01-20 ENCOUNTER — TELEPHONE (OUTPATIENT)
Dept: FAMILY MEDICINE CLINIC | Facility: CLINIC | Age: 80
End: 2020-01-20

## 2020-01-20 DIAGNOSIS — I10 ESSENTIAL HYPERTENSION: ICD-10-CM

## 2020-01-20 DIAGNOSIS — Z78.0 POST-MENOPAUSAL: ICD-10-CM

## 2020-01-20 RX ORDER — ESTRADIOL 0.5 MG/1
0.5 TABLET ORAL DAILY
Qty: 90 TABLET | Refills: 0 | Status: SHIPPED | OUTPATIENT
Start: 2020-01-20 | End: 2020-05-26 | Stop reason: SDUPTHER

## 2020-01-20 RX ORDER — LOSARTAN POTASSIUM 50 MG/1
50 TABLET ORAL DAILY
Qty: 90 TABLET | Refills: 0 | Status: SHIPPED | OUTPATIENT
Start: 2020-01-20 | End: 2020-05-26 | Stop reason: SDUPTHER

## 2020-01-20 RX ORDER — DILTIAZEM HYDROCHLORIDE 120 MG/1
120 CAPSULE, COATED, EXTENDED RELEASE ORAL DAILY
Qty: 90 CAPSULE | Refills: 0 | Status: SHIPPED | OUTPATIENT
Start: 2020-01-20 | End: 2020-04-27

## 2020-01-20 NOTE — TELEPHONE ENCOUNTER
Diltiazem 120mg  Estradiol 0.5mg  Losartan 50mg      [facility]     Last Seen 1/7/2020  Next Appt 4/7/2020

## 2020-01-27 ENCOUNTER — TELEPHONE (OUTPATIENT)
Dept: FAMILY MEDICINE CLINIC | Facility: CLINIC | Age: 80
End: 2020-01-27

## 2020-01-27 DIAGNOSIS — F41.9 ANXIETY: ICD-10-CM

## 2020-01-27 RX ORDER — CLONAZEPAM 0.5 MG/1
0.5 TABLET ORAL 2 TIMES DAILY
Qty: 60 TABLET | Refills: 0 | Status: SHIPPED | OUTPATIENT
Start: 2020-01-27 | End: 2020-03-09

## 2020-01-27 NOTE — TELEPHONE ENCOUNTER
Last visit:  1/7/20  Next visit:4/7/20  Last labs: 12/5/19    Rx requested: Clonazepam 0.5mg   Pharmacy: [facility] order

## 2020-01-28 ENCOUNTER — TELEPHONE (OUTPATIENT)
Dept: FAMILY MEDICINE CLINIC | Facility: CLINIC | Age: 80
End: 2020-01-28

## 2020-01-28 DIAGNOSIS — E11.42 TYPE 2 DIABETES MELLITUS WITH PERIPHERAL NEUROPATHY (HCC): ICD-10-CM

## 2020-01-28 RX ORDER — PEN NEEDLE, DIABETIC 30 GX3/16"
1 NEEDLE, DISPOSABLE MISCELLANEOUS DAILY
Qty: 90 EACH | Refills: 1 | Status: SHIPPED | OUTPATIENT
Start: 2020-01-28 | End: 2020-10-06 | Stop reason: SDUPTHER

## 2020-01-28 NOTE — TELEPHONE ENCOUNTER
Pt called requesting med refills. She is having a hard time with [facility], so she needs everything sent to [facility] now.    Levemir flextouch pens 35 units qhs  Pen needles  Lancets  Metformin 1000 bid    Last visit:  1/7/20  Next visit:4/7/20  Last labs: 12/5/19

## 2020-01-29 ENCOUNTER — OFFICE VISIT (OUTPATIENT)
Dept: PODIATRY | Facility: CLINIC | Age: 80
End: 2020-01-29

## 2020-01-29 VITALS
HEART RATE: 84 BPM | WEIGHT: 188 LBS | BODY MASS INDEX: 31.32 KG/M2 | SYSTOLIC BLOOD PRESSURE: 121 MMHG | DIASTOLIC BLOOD PRESSURE: 76 MMHG | HEIGHT: 65 IN

## 2020-01-29 DIAGNOSIS — M19.072 ARTHRITIS OF LEFT FOOT: ICD-10-CM

## 2020-01-29 DIAGNOSIS — E11.42 DM TYPE 2 WITH DIABETIC PERIPHERAL NEUROPATHY (HCC): Primary | ICD-10-CM

## 2020-01-29 DIAGNOSIS — B35.1 ONYCHOMYCOSIS: ICD-10-CM

## 2020-01-29 PROCEDURE — 11721 DEBRIDE NAIL 6 OR MORE: CPT | Performed by: PODIATRIST

## 2020-01-29 PROCEDURE — 99213 OFFICE O/P EST LOW 20 MIN: CPT | Performed by: PODIATRIST

## 2020-01-29 NOTE — PROGRESS NOTES
01/29/2020  Foot and Ankle Surgery - Established Patient/Follow-up  Provider: Dr. Sean Ring DPM  Location: [facility]    Subjective:  Monica Pagan is a 79 y.o. female.     Chief Complaint   Patient presents with   • Left Foot - Pain   • Right Foot - Pain       HPI: Patient returns for routine diabetic foot check.  No new issues since last exam.  She has noticed significant improvement and lower extremity discomfort with her stretching exercises that she has started since last time.  She is very happy with her overall improvement.  She has not noticed any open wounds or infections.  She feels that her overall health and glycemic control is relatively unchanged.    Allergies   Allergen Reactions   • Cortisone Other (See Comments)   • Erythromycin Diarrhea   • Gabapentin    • Januvia [Sitagliptin] Nausea Only   • Levofloxacin Diarrhea   • Lipitor [Atorvastatin] Myalgia   • Nsaids GI Intolerance   • Pravastatin Other (See Comments)     Arthralgia   • Pregabalin Other (See Comments)     fogginess   • Sulfa Antibiotics Nausea Only   • Theophylline    • Compazine  [Prochlorperazine Edisylate] Anxiety     restless   • Prochlorperazine Anxiety     restless       Current Outpatient Medications on File Prior to Visit   Medication Sig Dispense Refill   • albuterol sulfate  (90 Base) MCG/ACT inhaler Inhale 2 puffs.     • BD PEN NEEDLE JENNIFER U/F 32G X 4 MM misc USE AS DIRECTED TWICE DAILY 100 each 0   • benzonatate (TESSALON PERLES) 100 MG capsule Take 100 mg by mouth.     • Blood Glucose Monitoring Suppl (TRUE METRIX METER) w/Device kit USE UTD  0   • cetirizine (zyrTEC) 10 MG tablet Take 10 mg by mouth Daily.     • clonazePAM (KlonoPIN) 0.5 MG tablet Take 1 tablet by mouth 2 (Two) Times a Day. 60 tablet 0   • cyanocobalamin 1000 MCG/ML injection INJECT 1 MLS UNDER THE SKIN EVERY 30 DAYS 3 mL 0   • dilTIAZem CD (CARDIZEM CD) 120 MG 24 hr capsule Take 1 capsule by mouth Daily. 90 capsule 0   • DULoxetine  "(CYMBALTA) 30 MG capsule Take 1 capsule by mouth Daily. 90 capsule 1   • estradiol (ESTRACE) 0.5 MG tablet Take 1 tablet by mouth Daily. 90 tablet 0   • eszopiclone (LUNESTA) 3 MG tablet Take 1 tablet by mouth At Night As Needed for Sleep. Take immediately before bedtime 90 tablet 0   • fluocinonide (LIDEX) 0.05 % external solution fluocinonide 0.05 % topical solution     • glucose blood (TRUE METRIX BLOOD GLUCOSE TEST) test strip Dx code E11.42 testing bs 1 x day 100 each 0   • HYDROcodone-acetaminophen (NORCO) 7.5-325 MG per tablet 1 tablet 2 (Two) Times a Day.  0   • insulin detemir (LEVEMIR FLEXTOUCH) 100 UNIT/ML injection Inject 35 Units under the skin into the appropriate area as directed Every Night. 4 pen 3   • Insulin Pen Needle (PEN NEEDLES) 32G X 4 MM misc 1 package Daily. 90 each 1   • Lancets 30G misc Enhance talking meter /testing bs 1 x day  Dx code  E11.42 100 each 1   • losartan (COZAAR) 50 MG tablet Take 1 tablet by mouth Daily. 90 tablet 0   • metFORMIN (GLUCOPHAGE) 1000 MG tablet Take 1 tablet by mouth 2 (Two) Times a Day With Meals. 60 tablet 3   • Mirabegron ER (MYRBETRIQ) 25 MG tablet sustained-release 24 hour 24 hr tablet Take 25 mg by mouth Daily.     • rosuvastatin (CRESTOR) 5 MG tablet Take 1 tablet by mouth Daily. 90 tablet 0   • Syringe 25G X 1\" 3 ML misc Inject 1 each into the shoulder, thigh, or buttocks Every 28 (Twenty-Eight) Days. 1 each 5   • vitamin D (ERGOCALCIFEROL) 17002 units capsule capsule Take 1 capsule by mouth Every 7 (Seven) Days. 12 capsule 1     No current facility-administered medications on file prior to visit.        Objective   /76 (BP Location: Left arm, Patient Position: Sitting, Cuff Size: Large Adult)   Pulse 84   Ht 163.8 cm (64.5\")   Wt 85.3 kg (188 lb)   BMI 31.77 kg/m²     General:   Diabetic Foot Exam Performed    Appearance: appears stated age and healthy    Orientation: AAOx3    Affect: appropriate        Right Foot/Ankle:       Vascular "   Normal vascular exam    Dorsalis pedis:  2+  Posterior tibial:  2+  Skin Temperature: warm    Edema Grading:  None  CFT:  < 3 seconds  Pedal Hair Growth:  Present  Varicosities: mild varicosities        Neurologic   Light touch sensation:  Normal  Hot/Cold sensation: normal    Protective Sensation using Newton-Caterina Monofilament:  10  Achilles reflex:  2+      Musculoskeletal   Ecchymosis:  None  Tenderness: none    Arch:  Normal  Hammertoe:  Second toe, third toe, fourth toe and fifth toe (Reducible)      Muscle Strength   Normal strength    Foot dorsiflexion:  5  Foot plantar flexion:  5  Foot inversion:  5  Foot eversion:  5      Range of Motion   Foot and ankle ROM within normal limits        Dermatologic   Skin: skin intact    Nails: onychomycosis, abnormally thick and dystrophic nails        Additional Comments Moderate soft tissue rigidity.  No pain with palpation.      Left Foot/Ankle:       Vascular   Normal vascular exam    Dorsalis pedis:  2+  Posterior tibial:  2+  Skin Temperature: warm    Edema Grading:  None  CFT:  < 3 seconds  Pedal Hair Growth:  Present  Varicosities: mild varicosities        Neurologic   Light touch sensation:  Normal  Hot/cold sensation: normal    Protective Sensation using Newton-Caterina Monofilament:  10  Achilles reflex:  2+      Musculoskeletal    Amputation   Toes amputated: first toe  Ecchymosis:  None  Tenderness: arch and dorsal foot    Arch:  Normal  Hammertoe:  Second toe, third toe, fourth toe and fifth toe (Reducible)      Muscle Strength   Normal strength    Foot dorsiflexion:  5  Foot plantar flexion:  5  Foot inversion:  5  Foot eversion:  5      Dermatologic   Skin: skin intact    Nails: onychomycosis, abnormally thick and dystrophic nails        Additional Comments: Bony prominence noted to the dorsal midfoot.  Discomfort and limitation with range of motion present.  Moderate soft tissue rigidity    Assessment/Plan   Monica was seen today for pain and  pain.    Diagnoses and all orders for this visit:    DM type 2 with diabetic peripheral neuropathy (CMS/[facility])    Onychomycosis    Arthritis of left foot    Patient states that she is doing well at this time.  She has started the stretching and strengthening exercises at home and noticed an improvement to the left lower extremity.  She has not had any open wounds or infections.  Her nails were debrided today without complication.  She states that she is able to perform at home care if necessary.  I do feel that she remains at mild to moderate risk for pedal complications.  She is to monitor her feet closely and call with any issues or concerns.  We did review proper diabetic foot care and the importance of glycemic control.  She is to continue the same and I will see her in 6 months for reevaluation.    Nail debridement: Both feet x10    Nails were debrided with a nail nipper without complication.  No anesthesia was required.  Indications for procedure were thickened, dystrophic nails which are difficult to trim.  Proper self-care and technique was discussed with patient.  Patient was stable after procedure.    No orders of the defined types were placed in this encounter.         Note is dictated utilizing voice recognition software. Unfortunately this leads to occasional typographical errors. I apologize in advance if the situation occurs. If questions occur please do not hesitate to call our office.

## 2020-02-25 RX ORDER — CYANOCOBALAMIN 1000 UG/ML
INJECTION, SOLUTION INTRAMUSCULAR; SUBCUTANEOUS
Qty: 3 ML | Refills: 1 | Status: SHIPPED | OUTPATIENT
Start: 2020-02-25 | End: 2020-08-12

## 2020-02-28 ENCOUNTER — TELEPHONE (OUTPATIENT)
Dept: FAMILY MEDICINE CLINIC | Facility: CLINIC | Age: 80
End: 2020-02-28

## 2020-03-05 DIAGNOSIS — E11.42 TYPE 2 DIABETES MELLITUS WITH PERIPHERAL NEUROPATHY (HCC): ICD-10-CM

## 2020-03-05 NOTE — TELEPHONE ENCOUNTER
Levemir    Last Seen 1/7/2020  Next Appt 4/7/2020    [facility]    **Requesting 90 day supply

## 2020-03-07 DIAGNOSIS — F41.9 ANXIETY: ICD-10-CM

## 2020-03-09 RX ORDER — CLONAZEPAM 0.5 MG/1
TABLET ORAL
Qty: 60 TABLET | Refills: 0 | Status: SHIPPED | OUTPATIENT
Start: 2020-03-09 | End: 2020-04-20

## 2020-03-09 RX ORDER — DULOXETIN HYDROCHLORIDE 30 MG/1
30 CAPSULE, DELAYED RELEASE ORAL DAILY
Qty: 90 CAPSULE | Refills: 0 | Status: SHIPPED | OUTPATIENT
Start: 2020-03-09 | End: 2020-05-19

## 2020-03-09 RX ORDER — DULOXETIN HYDROCHLORIDE 20 MG/1
CAPSULE, DELAYED RELEASE ORAL
Qty: 30 CAPSULE | Refills: 1 | OUTPATIENT
Start: 2020-03-09

## 2020-03-09 NOTE — TELEPHONE ENCOUNTER
Last visit:  1/7/20  Next visit: 4/7/20  Last labs: 1/7/20    Rx requested: clonazepam  Pharmacy: [facility] in [place]

## 2020-03-20 DIAGNOSIS — F51.04 PSYCHOPHYSIOLOGICAL INSOMNIA: ICD-10-CM

## 2020-03-20 DIAGNOSIS — F41.9 ANXIETY: ICD-10-CM

## 2020-03-20 RX ORDER — ESZOPICLONE 3 MG/1
TABLET, FILM COATED ORAL
Qty: 90 TABLET | Refills: 0 | OUTPATIENT
Start: 2020-03-20

## 2020-03-20 RX ORDER — ESZOPICLONE 3 MG/1
3 TABLET, FILM COATED ORAL NIGHTLY PRN
Qty: 90 TABLET | Refills: 0 | Status: SHIPPED | OUTPATIENT
Start: 2020-03-20 | End: 2020-04-27

## 2020-03-20 RX ORDER — CLONAZEPAM 0.5 MG/1
TABLET ORAL
Qty: 60 TABLET | Refills: 0 | OUTPATIENT
Start: 2020-03-20

## 2020-03-20 NOTE — TELEPHONE ENCOUNTER
Last visit:  1/7/20  Next visit:4/7/20  Last labs: 1/7/20    Rx requested: Klonopin,Lunesta  Pharmacy: [facility] in [place]

## 2020-04-20 DIAGNOSIS — F41.9 ANXIETY: ICD-10-CM

## 2020-04-20 RX ORDER — CLONAZEPAM 0.5 MG/1
TABLET ORAL
Qty: 60 TABLET | Refills: 0 | Status: SHIPPED | OUTPATIENT
Start: 2020-04-20 | End: 2020-05-18

## 2020-04-25 DIAGNOSIS — E55.9 VITAMIN D DEFICIENCY: ICD-10-CM

## 2020-04-27 DIAGNOSIS — F51.04 PSYCHOPHYSIOLOGICAL INSOMNIA: ICD-10-CM

## 2020-04-27 RX ORDER — ERGOCALCIFEROL 1.25 MG/1
CAPSULE ORAL
Qty: 12 CAPSULE | Refills: 0 | Status: SHIPPED | OUTPATIENT
Start: 2020-04-27 | End: 2020-08-12

## 2020-04-27 RX ORDER — DILTIAZEM HYDROCHLORIDE 120 MG/1
CAPSULE, COATED, EXTENDED RELEASE ORAL
Qty: 90 CAPSULE | Refills: 0 | Status: SHIPPED | OUTPATIENT
Start: 2020-04-27 | End: 2020-05-26 | Stop reason: SDUPTHER

## 2020-04-27 RX ORDER — ESZOPICLONE 3 MG/1
TABLET, FILM COATED ORAL
Qty: 30 TABLET | Refills: 0 | Status: SHIPPED | OUTPATIENT
Start: 2020-04-27 | End: 2020-05-26 | Stop reason: SDUPTHER

## 2020-04-27 NOTE — TELEPHONE ENCOUNTER
Last visit:  1/7/20  Next visit: 5/19/20  Last labs: 1/7/20    Rx requested: Lunesta,Cardizem   Pharmacy: [facility] mail order

## 2020-05-16 DIAGNOSIS — F41.9 ANXIETY: ICD-10-CM

## 2020-05-18 RX ORDER — CLONAZEPAM 0.5 MG/1
TABLET ORAL
Qty: 60 TABLET | Refills: 0 | Status: SHIPPED | OUTPATIENT
Start: 2020-05-18 | End: 2020-06-19

## 2020-05-18 NOTE — TELEPHONE ENCOUNTER
Last visit: 1/7/20  Next visit: 5/19/20  Last labs: 1/7/20    Rx requested:Clonazepam   Pharmacy: [facility] in [place]

## 2020-05-19 ENCOUNTER — OFFICE VISIT (OUTPATIENT)
Dept: FAMILY MEDICINE CLINIC | Facility: CLINIC | Age: 80
End: 2020-05-19

## 2020-05-19 VITALS — SYSTOLIC BLOOD PRESSURE: 161 MMHG | HEART RATE: 79 BPM | DIASTOLIC BLOOD PRESSURE: 74 MMHG

## 2020-05-19 DIAGNOSIS — Z78.0 POST-MENOPAUSAL: ICD-10-CM

## 2020-05-19 DIAGNOSIS — E04.9 NODULAR GOITER: ICD-10-CM

## 2020-05-19 DIAGNOSIS — E11.65 TYPE 2 DIABETES MELLITUS WITH HYPERGLYCEMIA, WITHOUT LONG-TERM CURRENT USE OF INSULIN (HCC): Primary | ICD-10-CM

## 2020-05-19 DIAGNOSIS — N32.81 OVERACTIVE BLADDER: ICD-10-CM

## 2020-05-19 DIAGNOSIS — F51.04 PSYCHOPHYSIOLOGICAL INSOMNIA: ICD-10-CM

## 2020-05-19 PROCEDURE — 99442 PR PHYS/QHP TELEPHONE EVALUATION 11-20 MIN: CPT | Performed by: FAMILY MEDICINE

## 2020-05-19 RX ORDER — POLYETHYLENE GLYCOL 3350 17 G/17G
17 POWDER, FOR SOLUTION ORAL DAILY
COMMUNITY

## 2020-05-19 RX ORDER — DULOXETINE 40 MG/1
40 CAPSULE, DELAYED RELEASE ORAL DAILY
Qty: 30 CAPSULE | Refills: 1 | Status: SHIPPED | OUTPATIENT
Start: 2020-05-19 | End: 2020-07-31

## 2020-05-19 RX ORDER — TOLTERODINE 2 MG/1
2 CAPSULE, EXTENDED RELEASE ORAL DAILY
Qty: 30 CAPSULE | Refills: 1 | Status: SHIPPED | OUTPATIENT
Start: 2020-05-19 | End: 2020-07-14

## 2020-05-19 NOTE — PROGRESS NOTES
Subjective   Monica Pagan is a 79 y.o. female.     Chief Complaint   Patient presents with   • Joint Pain     Pain in her joint,feet,wrists,back.   • Anxiety     Clonazepam-- to [facility]  and the Estradiol,Lunesta to [facility] mail order pharmacy    • Insomnia   • Hypertension   • Diabetes         Current Outpatient Medications:   •  albuterol sulfate  (90 Base) MCG/ACT inhaler, Inhale 2 puffs., Disp: , Rfl:   •  BD PEN NEEDLE JENNIFER U/F 32G X 4 MM misc, USE AS DIRECTED TWICE DAILY, Disp: 100 each, Rfl: 0  •  benzonatate (TESSALON PERLES) 100 MG capsule, Take 100 mg by mouth., Disp: , Rfl:   •  Blood Glucose Monitoring Suppl (TRUE METRIX METER) w/Device kit, USE UTD, Disp: , Rfl: 0  •  clonazePAM (KlonoPIN) 0.5 MG tablet, TAKE ONE TABLET BY MOUTH TWICE A DAY, Disp: 60 tablet, Rfl: 0  •  cyanocobalamin 1000 MCG/ML injection, INJECT 1ML UNDER THE SKIN EVERY 30 DAYS, Disp: 3 mL, Rfl: 1  •  DULoxetine 40 MG capsule delayed-release particles, Take 1 capsule by mouth Daily., Disp: 30 capsule, Rfl: 1  •  estradiol (ESTRACE) 0.5 MG tablet, Take 1 tablet by mouth Daily., Disp: 90 tablet, Rfl: 0  •  eszopiclone (LUNESTA) 3 MG tablet, Take 1 tablet by mouth At Night As Needed for Sleep. Take immediately before bedtime, Disp: 90 tablet, Rfl: 0  •  fluocinonide (LIDEX) 0.05 % external solution, fluocinonide 0.05 % topical solution, Disp: , Rfl:   •  glucose blood (TRUE METRIX BLOOD GLUCOSE TEST) test strip, Dx code E11.42 testing bs 1 x day, Disp: 100 each, Rfl: 0  •  glucose blood test strip, Use as instructed QAC/ QHS, Disp: 120 each, Rfl: 3  •  HYDROcodone-acetaminophen (NORCO) 7.5-325 MG per tablet, 1 tablet 2 (Two) Times a Day., Disp: , Rfl: 0  •  Insulin Pen Needle (PEN NEEDLES) 32G X 4 MM misc, 1 package Daily., Disp: 90 each, Rfl: 1  •  Lancets 30G misc, Enhance talking meter /testing bs 1 x day  Dx code  E11.42, Disp: 100 each, Rfl: 1  •  metFORMIN (GLUCOPHAGE) 1000 MG tablet, Take 1 tablet by mouth 2 (Two) Times a  "Day With Meals., Disp: 60 tablet, Rfl: 3  •  polyethylene glycol (MIRALAX) packet, Take 17 g by mouth Daily., Disp: , Rfl:   •  rosuvastatin (CRESTOR) 5 MG tablet, Take 1 tablet by mouth Daily., Disp: 90 tablet, Rfl: 0  •  Syringe 25G X 1\" 3 ML misc, Inject 1 each into the shoulder, thigh, or buttocks Every 28 (Twenty-Eight) Days., Disp: 1 each, Rfl: 5  •  vitamin D (ERGOCALCIFEROL) 1.25 MG (03353 UT) capsule capsule, TAKE ONE CAPSULE BY MOUTH EVERY SEVEN DAYS, Disp: 12 capsule, Rfl: 0  •  cetirizine (zyrTEC) 10 MG tablet, Take 10 mg by mouth Daily., Disp: , Rfl:   •  dilTIAZem CD (CARDIZEM CD) 120 MG 24 hr capsule, Take 1 capsule by mouth Daily., Disp: 90 capsule, Rfl: 0  •  insulin detemir (Levemir FlexTouch) 100 UNIT/ML injection, Inject 35 Units under the skin into the appropriate area as directed Every Night., Disp: 12 pen, Rfl: 0  •  losartan (COZAAR) 50 MG tablet, Take 1 tablet by mouth Daily., Disp: 90 tablet, Rfl: 0  •  tolterodine LA (DETROL LA) 2 MG 24 hr capsule, Take 1 capsule by mouth Daily., Disp: 30 capsule, Rfl: 1    Past Medical History:   Diagnosis Date   • Anemia    • Anxiety    • Coccyx pain    • Colon polyp     TA   • DDD (degenerative disc disease), lumbar    • Fibromyalgia    • Hyperlipidemia    • Hypertension    • IBS (irritable bowel syndrome)    • Insomnia    • Lumbar spinal stenosis    • MAMMO    • Meniere's disease    • Nontoxic thyroid nodule    • Ocular histoplasmosis    • Osteoarthritis    • Peripheral neuropathy    • PVC (premature ventricular contraction)    • Spondylosis    • Tinnitus    • Tremor    • Trigeminal neuralgia    • Type 2 diabetes mellitus (CMS/HCC)    • Vitamin D deficiency        Past Surgical History:   Procedure Laterality Date   • AMPUTATION DIGIT Left     Digit #1   • APPENDECTOMY  1962   • BUNIONECTOMY Bilateral    • CATARACT EXTRACTION      left   • CATARACT EXTRACTION      x2   • COLONOSCOPY  2012    NEG = 2012, rech 2017     • CYST REMOVAL Bilateral     WRIST "   • HYSTERECTOMY  1980   • JOINT REPLACEMENT      Rt TKR x 2   • JOINT REPLACEMENT      Left THR   • LAPAROSCOPIC CHOLECYSTECTOMY      DR. LOBATO   • SPINE SURGERY      Lumbar Fusion   • SUBTOTAL HYSTERECTOMY         Family History   Problem Relation Age of Onset   • Asthma Mother    • COPD Mother    • Heart disease Father    • Alcohol abuse Father    • Other Father         WWII Gangrene/ Malaria   • Arthritis Sister    • Heart attack Brother    • Alzheimer's disease Brother    • Heart disease Brother    • Diabetes Brother    • Hyperlipidemia Brother    • Leukemia Sister    • Heart disease Brother    • Diabetes Brother    • Alcohol abuse Brother    • Heart attack Brother 42   • Cancer Brother         Ewings Sarcoma       Social History     Socioeconomic History   • Marital status:      Spouse name: Not on file   • Number of children: Not on file   • Years of education: Not on file   • Highest education level: Not on file   Tobacco Use   • Smoking status: Never Smoker   • Smokeless tobacco: Never Used   • Tobacco comment: daily caffeine use   Substance and Sexual Activity   • Alcohol use: Yes     Alcohol/week: 1.0 standard drinks     Types: 1 Glasses of wine per week     Comment: Rare   • Drug use: No   • Sexual activity: Defer   Social History Narrative    .  She lives with her daughter who has MS, bipolar disorder and psoriasis       80 y/o C female on the phone w/ hx/o DMII/ OAB/ HTN/ Insomnia/ Anx here for f/u and now having joint pain    Pt doing well on sleep aid and needs Refill sent to pharmacy    Pt woken up at 2am due to severe joint pain in her feet and shoulders----she took a pain pill which helped but if she takes a second pill, it makes her fidgety so she doesn't take it bid regularly    Pt needing another bladder pill too ------she is having issues w/ freq/ urgency/ leakage but no dysuria/ hematuria    Pt states BS's have been controlled on current regimen       The following portions of the  patient's history were reviewed and updated as appropriate: allergies, current medications, past family history, past medical history, past social history, past surgical history and problem list.    Review of Systems   Constitutional: Positive for activity change and fatigue. Negative for appetite change, unexpected weight gain and unexpected weight loss.   Respiratory: Negative for cough, chest tightness and shortness of breath.    Cardiovascular: Negative for chest pain, palpitations and leg swelling.   Gastrointestinal: Positive for constipation.   Genitourinary: Positive for frequency, urgency and urinary incontinence. Negative for dysuria.   Musculoskeletal: Positive for arthralgias and back pain. Negative for myalgias.   Neurological: Negative for dizziness, facial asymmetry, speech difficulty, weakness, light-headedness, headache, memory problem and confusion.   Psychiatric/Behavioral: Positive for dysphoric mood and depressed mood.       Vitals:    05/19/20 1203   BP: 161/74   Pulse: 79       Objective   Physical Exam   Constitutional: She is oriented to person, place, and time. No distress.   Pulmonary/Chest: No respiratory distress.   Neurological: She is alert and oriented to person, place, and time.   Psychiatric: She has a normal mood and affect. Her behavior is normal. Judgment and thought content normal.         Assessment/Plan   Monica was seen today for joint pain, anxiety, insomnia, hypertension and diabetes.    Diagnoses and all orders for this visit:    Type 2 diabetes mellitus with hyperglycemia, without long-term current use of insulin (CMS/[facility])  -     Hemoglobin A1c; Future    Nodular goiter  -     T4, Free; Future  -     TSH; Future    Psychophysiological insomnia  -     eszopiclone (LUNESTA) 3 MG tablet; Take 1 tablet by mouth At Night As Needed for Sleep. Take immediately before bedtime    Overactive bladder    Post-menopausal  -     estradiol (ESTRACE) 0.5 MG tablet; Take 1 tablet by mouth  Daily.    Other orders  -     tolterodine LA (DETROL LA) 2 MG 24 hr capsule; Take 1 capsule by mouth Daily.  -     DULoxetine 40 MG capsule delayed-release particles; Take 1 capsule by mouth Daily.    You have chosen to receive care through a telephone visit. Do you consent to use a telephone visit for your medical care today? {YES  This visit has been rescheduled as a phone visit to comply with patient safety concerns in accordance with CDC recommendations. Total time of discussion was 20 minutes.

## 2020-05-21 ENCOUNTER — TELEPHONE (OUTPATIENT)
Dept: FAMILY MEDICINE CLINIC | Facility: CLINIC | Age: 80
End: 2020-05-21

## 2020-05-25 DIAGNOSIS — E11.42 TYPE 2 DIABETES MELLITUS WITH PERIPHERAL NEUROPATHY (HCC): ICD-10-CM

## 2020-05-25 DIAGNOSIS — F51.04 PSYCHOPHYSIOLOGICAL INSOMNIA: ICD-10-CM

## 2020-05-25 DIAGNOSIS — Z78.0 POST-MENOPAUSAL: ICD-10-CM

## 2020-05-25 DIAGNOSIS — I10 ESSENTIAL HYPERTENSION: ICD-10-CM

## 2020-05-26 DIAGNOSIS — I10 ESSENTIAL HYPERTENSION: ICD-10-CM

## 2020-05-26 DIAGNOSIS — E11.42 TYPE 2 DIABETES MELLITUS WITH PERIPHERAL NEUROPATHY (HCC): ICD-10-CM

## 2020-05-26 RX ORDER — DILTIAZEM HYDROCHLORIDE 120 MG/1
120 CAPSULE, COATED, EXTENDED RELEASE ORAL DAILY
Qty: 90 CAPSULE | Refills: 0 | Status: SHIPPED | OUTPATIENT
Start: 2020-05-26 | End: 2020-08-20

## 2020-05-26 RX ORDER — ESTRADIOL 0.5 MG/1
0.5 TABLET ORAL DAILY
Qty: 90 TABLET | Refills: 0 | Status: SHIPPED | OUTPATIENT
Start: 2020-05-26 | End: 2020-07-29

## 2020-05-26 RX ORDER — ESTRADIOL 0.5 MG/1
TABLET ORAL
Qty: 90 TABLET | Refills: 0 | OUTPATIENT
Start: 2020-05-26

## 2020-05-26 RX ORDER — INSULIN DETEMIR 100 [IU]/ML
INJECTION, SOLUTION SUBCUTANEOUS
Qty: 30 ML | Refills: 0 | OUTPATIENT
Start: 2020-05-26

## 2020-05-26 RX ORDER — LOSARTAN POTASSIUM 50 MG/1
TABLET ORAL
Qty: 90 TABLET | Refills: 0 | OUTPATIENT
Start: 2020-05-26

## 2020-05-26 RX ORDER — LOSARTAN POTASSIUM 50 MG/1
50 TABLET ORAL DAILY
Qty: 90 TABLET | Refills: 0 | Status: SHIPPED | OUTPATIENT
Start: 2020-05-26 | End: 2020-07-22 | Stop reason: SDUPTHER

## 2020-05-26 RX ORDER — DILTIAZEM HYDROCHLORIDE 120 MG/1
CAPSULE, COATED, EXTENDED RELEASE ORAL
Qty: 90 CAPSULE | Refills: 0 | OUTPATIENT
Start: 2020-05-26

## 2020-05-26 RX ORDER — DULOXETIN HYDROCHLORIDE 30 MG/1
CAPSULE, DELAYED RELEASE ORAL
Qty: 90 CAPSULE | Refills: 0 | OUTPATIENT
Start: 2020-05-26

## 2020-05-26 RX ORDER — ESZOPICLONE 3 MG/1
TABLET, FILM COATED ORAL
Qty: 30 TABLET | Refills: 0 | OUTPATIENT
Start: 2020-05-26

## 2020-05-26 RX ORDER — ESZOPICLONE 3 MG/1
3 TABLET, FILM COATED ORAL NIGHTLY PRN
Qty: 90 TABLET | Refills: 0 | Status: SHIPPED | OUTPATIENT
Start: 2020-05-26 | End: 2020-09-02 | Stop reason: SDUPTHER

## 2020-05-26 NOTE — TELEPHONE ENCOUNTER
Last visit: 5/19/20  Next visit: none  Last labs:1/7/20    Rx requested:   Duloxetine  Estradiol   Losartan   Levemir   Diltiazem   Lunesta      Pharmacy: [facility]

## 2020-06-01 NOTE — TELEPHONE ENCOUNTER
Last visit:  5/19/20  Next visit: none  Last labs: 1/7/20    Rx requested: Metformin   Pharmacy: [facility] in [place]

## 2020-06-09 ENCOUNTER — CLINICAL SUPPORT (OUTPATIENT)
Dept: FAMILY MEDICINE CLINIC | Facility: CLINIC | Age: 80
End: 2020-06-09

## 2020-06-09 DIAGNOSIS — E11.65 TYPE 2 DIABETES MELLITUS WITH HYPERGLYCEMIA, WITHOUT LONG-TERM CURRENT USE OF INSULIN (HCC): ICD-10-CM

## 2020-06-09 DIAGNOSIS — E04.9 NODULAR GOITER: ICD-10-CM

## 2020-06-09 LAB — HBA1C MFR BLD: 6.8 % (ref 3.5–5.6)

## 2020-06-09 PROCEDURE — 84443 ASSAY THYROID STIM HORMONE: CPT | Performed by: FAMILY MEDICINE

## 2020-06-09 PROCEDURE — 36415 COLL VENOUS BLD VENIPUNCTURE: CPT | Performed by: FAMILY MEDICINE

## 2020-06-09 PROCEDURE — 83036 HEMOGLOBIN GLYCOSYLATED A1C: CPT | Performed by: FAMILY MEDICINE

## 2020-06-09 PROCEDURE — 84439 ASSAY OF FREE THYROXINE: CPT | Performed by: FAMILY MEDICINE

## 2020-06-10 LAB
T4 FREE SERPL-MCNC: 1.12 NG/DL (ref 0.93–1.7)
TSH SERPL DL<=0.05 MIU/L-ACNC: 1.07 UIU/ML (ref 0.27–4.2)

## 2020-06-11 ENCOUNTER — TELEPHONE (OUTPATIENT)
Dept: FAMILY MEDICINE CLINIC | Facility: CLINIC | Age: 80
End: 2020-06-11

## 2020-06-11 RX ORDER — ROSUVASTATIN CALCIUM 5 MG/1
TABLET, COATED ORAL
Qty: 90 TABLET | Refills: 0 | OUTPATIENT
Start: 2020-06-11

## 2020-06-11 RX ORDER — ROSUVASTATIN CALCIUM 5 MG/1
5 TABLET, COATED ORAL DAILY
Qty: 90 TABLET | Refills: 1 | Status: SHIPPED | OUTPATIENT
Start: 2020-06-11 | End: 2020-06-15 | Stop reason: SDUPTHER

## 2020-06-11 NOTE — TELEPHONE ENCOUNTER
Last visit:  5/19/20  Next visit:none  Last labs: 6/9/20    Rx requested: Rouvastatin 5mg #90  Pharmacy: [facility] in [place]

## 2020-06-15 RX ORDER — ROSUVASTATIN CALCIUM 5 MG/1
TABLET, COATED ORAL
Qty: 90 TABLET | Refills: 0 | OUTPATIENT
Start: 2020-06-15

## 2020-06-15 RX ORDER — ROSUVASTATIN CALCIUM 5 MG/1
5 TABLET, COATED ORAL DAILY
Qty: 30 TABLET | Refills: 0 | Status: SHIPPED | OUTPATIENT
Start: 2020-06-15 | End: 2020-06-24 | Stop reason: SDUPTHER

## 2020-06-15 NOTE — TELEPHONE ENCOUNTER
Last visit:  5/19/20  Next visit:none  Last labs: 6/9/20     Rx requested: Rouvastatin 5mg #90  Pharmacy: [facility] in [place]

## 2020-06-19 DIAGNOSIS — F41.9 ANXIETY: ICD-10-CM

## 2020-06-19 RX ORDER — CLONAZEPAM 0.5 MG/1
TABLET ORAL
Qty: 60 TABLET | Refills: 0 | Status: SHIPPED | OUTPATIENT
Start: 2020-06-19 | End: 2020-07-22

## 2020-06-19 NOTE — TELEPHONE ENCOUNTER
Last visit:  5/19/20  Next visit:none  Last labs: 6/9/20    Rx requested:Clonazepam   Pharmacy: [facility] in [place]

## 2020-06-24 RX ORDER — ROSUVASTATIN CALCIUM 5 MG/1
5 TABLET, COATED ORAL DAILY
Qty: 90 TABLET | Refills: 1 | Status: SHIPPED | OUTPATIENT
Start: 2020-06-24 | End: 2021-01-05 | Stop reason: SDUPTHER

## 2020-07-13 NOTE — TELEPHONE ENCOUNTER
Last visit:  5/19/20  Next visit:none  Last labs: 6/9/20    Rx requested:Detrol LA   Pharmacy: [facility] in [place]

## 2020-07-14 RX ORDER — TOLTERODINE 2 MG/1
CAPSULE, EXTENDED RELEASE ORAL
Qty: 90 CAPSULE | Refills: 0 | Status: SHIPPED | OUTPATIENT
Start: 2020-07-14 | End: 2020-08-07

## 2020-07-14 NOTE — TELEPHONE ENCOUNTER
Yes, pt states she will always take it. Please send    She wanted to tell you that she's stopped taking the estradiol and says she is feeling better, no longer having sensitive breasts. Is there any problem with going off of this?    Mammo sched this wk [facility]  Appt sched w Dr Oivedo & kidney US this wk.

## 2020-07-22 DIAGNOSIS — E11.42 TYPE 2 DIABETES MELLITUS WITH PERIPHERAL NEUROPATHY (HCC): ICD-10-CM

## 2020-07-22 DIAGNOSIS — I10 ESSENTIAL HYPERTENSION: ICD-10-CM

## 2020-07-22 DIAGNOSIS — F41.9 ANXIETY: ICD-10-CM

## 2020-07-22 DIAGNOSIS — F51.04 PSYCHOPHYSIOLOGICAL INSOMNIA: ICD-10-CM

## 2020-07-22 RX ORDER — INSULIN DETEMIR 100 [IU]/ML
INJECTION, SOLUTION SUBCUTANEOUS
Qty: 30 ML | Refills: 0 | OUTPATIENT
Start: 2020-07-22

## 2020-07-22 RX ORDER — LOSARTAN POTASSIUM 50 MG/1
TABLET ORAL
Qty: 90 TABLET | Refills: 0 | OUTPATIENT
Start: 2020-07-22

## 2020-07-22 RX ORDER — ESZOPICLONE 3 MG/1
TABLET, FILM COATED ORAL
Qty: 90 TABLET | Refills: 0 | OUTPATIENT
Start: 2020-07-22

## 2020-07-22 RX ORDER — LOSARTAN POTASSIUM 50 MG/1
50 TABLET ORAL DAILY
Qty: 90 TABLET | Refills: 0 | Status: SHIPPED | OUTPATIENT
Start: 2020-07-22 | End: 2020-10-06 | Stop reason: SDUPTHER

## 2020-07-22 RX ORDER — CLONAZEPAM 0.5 MG/1
TABLET ORAL
Qty: 60 TABLET | Refills: 0 | Status: SHIPPED | OUTPATIENT
Start: 2020-07-22 | End: 2020-08-20

## 2020-07-22 NOTE — TELEPHONE ENCOUNTER
Refused by: Henny Long DO     Refusal reason: Request already responded to by other means (e.g. phone or fax)

## 2020-07-22 NOTE — TELEPHONE ENCOUNTER
Losartan/ Levemir/ Lunesta - [facility]    Last visit:  5/19/20  Next visit: none  Last labs: 6/9/20

## 2020-07-29 ENCOUNTER — OFFICE VISIT (OUTPATIENT)
Dept: PODIATRY | Facility: CLINIC | Age: 80
End: 2020-07-29

## 2020-07-29 VITALS
WEIGHT: 186 LBS | BODY MASS INDEX: 31.76 KG/M2 | SYSTOLIC BLOOD PRESSURE: 120 MMHG | HEART RATE: 79 BPM | DIASTOLIC BLOOD PRESSURE: 80 MMHG | HEIGHT: 64 IN

## 2020-07-29 DIAGNOSIS — M19.072 ARTHRITIS OF LEFT FOOT: ICD-10-CM

## 2020-07-29 DIAGNOSIS — E11.42 DM TYPE 2 WITH DIABETIC PERIPHERAL NEUROPATHY (HCC): ICD-10-CM

## 2020-07-29 DIAGNOSIS — M79.672 BILATERAL FOOT PAIN: Primary | ICD-10-CM

## 2020-07-29 DIAGNOSIS — M79.671 BILATERAL FOOT PAIN: Primary | ICD-10-CM

## 2020-07-29 PROCEDURE — 99213 OFFICE O/P EST LOW 20 MIN: CPT | Performed by: PODIATRIST

## 2020-07-29 NOTE — PROGRESS NOTES
07/29/2020  Foot and Ankle Surgery - Established Patient/Follow-up  Provider: Dr. Sean Ring DPM  Location: [facility]    Subjective:  Monica Pagan is a 80 y.o. female.     Chief Complaint   Patient presents with   • Left Foot - Follow-up   • Right Foot - Follow-up       HPI: Patient returns for routine diabetic foot check.  She states that she has been doing well but continues to deal with bilateral foot pain.  Symptoms are noticed with first few steps in the morning and after periods of rest.  She also has discomfort with long periods of standing.  She feels that these issues are not new but do appear to be gradually progressing.  Symptoms are sometimes better in certain shoes.  She has not had any open wounds or infections since last exam.  Also recent A1c was 6.8%.    Allergies   Allergen Reactions   • Amoxicillin Diarrhea     Diarrhea, stomach pain    • Cortisone Other (See Comments)   • Erythromycin Diarrhea   • Gabapentin    • Januvia [Sitagliptin] Nausea Only   • Levofloxacin Diarrhea   • Lipitor [Atorvastatin] Myalgia   • Nsaids GI Intolerance   • Pravastatin Other (See Comments)     Arthralgia   • Pregabalin Other (See Comments)     fogginess   • Sulfa Antibiotics Nausea Only   • Theophylline    • Compazine  [Prochlorperazine Edisylate] Anxiety     restless   • Prochlorperazine Anxiety     restless       Current Outpatient Medications on File Prior to Visit   Medication Sig Dispense Refill   • albuterol sulfate  (90 Base) MCG/ACT inhaler Inhale 2 puffs.     • BD PEN NEEDLE JENNIFER U/F 32G X 4 MM misc USE AS DIRECTED TWICE DAILY 100 each 0   • benzonatate (TESSALON PERLES) 100 MG capsule Take 100 mg by mouth.     • Blood Glucose Monitoring Suppl (TRUE METRIX METER) w/Device kit USE UTD  0   • cetirizine (zyrTEC) 10 MG tablet Take 10 mg by mouth Daily.     • clonazePAM (KlonoPIN) 0.5 MG tablet TAKE ONE TABLET BY MOUTH TWICE A DAY 60 tablet 0   • cyanocobalamin 1000 MCG/ML injection  "INJECT 1ML UNDER THE SKIN EVERY 30 DAYS 3 mL 1   • dilTIAZem CD (CARDIZEM CD) 120 MG 24 hr capsule Take 1 capsule by mouth Daily. 90 capsule 0   • DULoxetine 40 MG capsule delayed-release particles Take 1 capsule by mouth Daily. 30 capsule 1   • eszopiclone (LUNESTA) 3 MG tablet Take 1 tablet by mouth At Night As Needed for Sleep. Take immediately before bedtime 90 tablet 0   • fluocinonide (LIDEX) 0.05 % external solution fluocinonide 0.05 % topical solution     • glucose blood (TRUE METRIX BLOOD GLUCOSE TEST) test strip Dx code E11.42 testing bs 1 x day 100 each 0   • glucose blood test strip Use as instructed QAC/  each 3   • HYDROcodone-acetaminophen (NORCO) 7.5-325 MG per tablet 1 tablet 2 (Two) Times a Day.  0   • insulin detemir (Levemir FlexTouch) 100 UNIT/ML injection Inject 35 Units under the skin into the appropriate area as directed Every Night. 12 pen 0   • Insulin Pen Needle (PEN NEEDLES) 32G X 4 MM misc 1 package Daily. 90 each 1   • Lancets 30G misc Enhance talking meter /testing bs 1 x day  Dx code  E11.42 100 each 1   • losartan (COZAAR) 50 MG tablet Take 1 tablet by mouth Daily. 90 tablet 0   • metFORMIN (GLUCOPHAGE) 1000 MG tablet TAKE ONE TABLET BY MOUTH TWICE A DAY WITH MEALS 60 tablet 0   • polyethylene glycol (MIRALAX) packet Take 17 g by mouth Daily.     • rosuvastatin (CRESTOR) 5 MG tablet Take 1 tablet by mouth Daily. 90 tablet 1   • Syringe 25G X 1\" 3 ML misc Inject 1 each into the shoulder, thigh, or buttocks Every 28 (Twenty-Eight) Days. 1 each 5   • tolterodine LA (DETROL LA) 2 MG 24 hr capsule TAKE ONE CAPSULE BY MOUTH DAILY 90 capsule 0   • vitamin D (ERGOCALCIFEROL) 1.25 MG (82762 UT) capsule capsule TAKE ONE CAPSULE BY MOUTH EVERY SEVEN DAYS 12 capsule 0   • [DISCONTINUED] estradiol (ESTRACE) 0.5 MG tablet Take 1 tablet by mouth Daily. 90 tablet 0     No current facility-administered medications on file prior to visit.        Objective   /80   Pulse 79   Ht 162.6 cm " "(64\")   Wt 84.4 kg (186 lb)   BMI 31.93 kg/m²     General:   Diabetic Foot Exam Performed    Appearance: appears stated age and healthy    Orientation: AAOx3    Affect: appropriate        Right Foot/Ankle:       Vascular   Normal vascular exam    Dorsalis pedis:  2+  Posterior tibial:  2+  Skin Temperature: warm    Edema Grading:  None  CFT:  < 3 seconds  Pedal Hair Growth:  Present  Varicosities: mild varicosities        Neurologic   Light touch sensation:  Normal  Hot/Cold sensation: normal    Protective Sensation using Boron-Caterina Monofilament:  10  Achilles reflex:  2+      Musculoskeletal   Ecchymosis:  None  Tenderness: none    Arch:  Normal  Hammertoe:  Second toe, third toe, fourth toe and fifth toe (Reducible)      Muscle Strength   Normal strength    Foot dorsiflexion:  5  Foot plantar flexion:  5  Foot inversion:  5  Foot eversion:  5      Range of Motion   Foot and ankle ROM within normal limits        Dermatologic   Skin: skin intact    Nails: onychomycosis, abnormally thick and dystrophic nails        Additional Comments Moderate soft tissue rigidity.  No pain with palpation.      Left Foot/Ankle:       Vascular   Normal vascular exam    Dorsalis pedis:  2+  Posterior tibial:  2+  Skin Temperature: warm    Edema Grading:  None  CFT:  < 3 seconds  Pedal Hair Growth:  Present  Varicosities: mild varicosities        Neurologic   Light touch sensation:  Normal  Hot/cold sensation: normal    Protective Sensation using Boron-Caterina Monofilament:  10  Achilles reflex:  2+      Musculoskeletal    Amputation   Toes amputated: first toe  Ecchymosis:  None  Tenderness: arch and dorsal foot    Arch:  Normal  Hammertoe:  Second toe, third toe, fourth toe and fifth toe (Reducible)      Muscle Strength   Normal strength    Foot dorsiflexion:  5  Foot plantar flexion:  5  Foot inversion:  5  Foot eversion:  5      Dermatologic   Skin: skin intact    Nails: onychomycosis, abnormally thick and dystrophic " nails        Additional Comments: Bony prominence noted to the dorsal midfoot.  Discomfort and limitation with range of motion present.  Moderate soft tissue rigidity    Assessment/Plan   Monica was seen today for follow-up and follow-up.    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  She has not had any progressive deformity or instability.  She continues to have discomfort to the midfoot regions.  I explained the majority of her symptoms of foot pain are likely due to soft tissue rigidity and equinus contracture.  The degenerative changes involving the midfoot are also an underlying issue.  I have reviewed the importance of improved support and function.  I would like her to obtain a pair of over-the-counter arch supports.  I have also suggested that she proceed with formal physical therapy for lower extremity strengthening, range of motion, and overall stability.  Would like her to monitor her symptoms and call with any additional issues or concerns.  I continue to feel that she is at a relatively mild risk for diabetic foot related complications.  We did discuss the importance of continued glycemic control and routine diabetic foot checks.  I would like to see her in 6 months for reevaluation.    Orders Placed This Encounter   Procedures   • Ambulatory Referral to Physical Therapy Evaluate and treat     Referral Priority:   Routine     Referral Type:   Therapy     Referral Reason:   Specialty Services Required     Requested Specialty:   Physical Therapy     Number of Visits Requested:   1          Note is dictated utilizing voice recognition software. Unfortunately this leads to occasional typographical errors. I apologize in advance if the situation occurs. If questions occur  please do not hesitate to call our office.

## 2020-07-31 RX ORDER — DULOXETINE 40 MG/1
CAPSULE, DELAYED RELEASE ORAL
Qty: 90 CAPSULE | Refills: 0 | Status: SHIPPED | OUTPATIENT
Start: 2020-07-31 | End: 2020-10-06 | Stop reason: SDUPTHER

## 2020-08-07 ENCOUNTER — TELEPHONE (OUTPATIENT)
Dept: FAMILY MEDICINE CLINIC | Facility: CLINIC | Age: 80
End: 2020-08-07

## 2020-08-07 DIAGNOSIS — E11.42 TYPE 2 DIABETES MELLITUS WITH PERIPHERAL NEUROPATHY (HCC): ICD-10-CM

## 2020-08-07 RX ORDER — TOLTERODINE 2 MG/1
CAPSULE, EXTENDED RELEASE ORAL
Qty: 90 CAPSULE | Refills: 0 | Status: SHIPPED | OUTPATIENT
Start: 2020-08-07 | End: 2020-10-06

## 2020-08-07 NOTE — TELEPHONE ENCOUNTER
Last visit:  5/19/20  Next visit: 8/12/20  Last labs: 6/9/20    Rx requested: Levemir  Pharmacy:     Patient needs a 90 day to [facility] and a 30 day supply to [facility] in [place]. Patient is getting ready to go out of town and she is almost out of the medication.

## 2020-08-12 ENCOUNTER — CLINICAL SUPPORT (OUTPATIENT)
Dept: FAMILY MEDICINE CLINIC | Facility: CLINIC | Age: 80
End: 2020-08-12

## 2020-08-12 ENCOUNTER — OFFICE VISIT (OUTPATIENT)
Dept: FAMILY MEDICINE CLINIC | Facility: CLINIC | Age: 80
End: 2020-08-12

## 2020-08-12 VITALS — DIASTOLIC BLOOD PRESSURE: 74 MMHG | TEMPERATURE: 98.2 F | SYSTOLIC BLOOD PRESSURE: 148 MMHG

## 2020-08-12 DIAGNOSIS — E55.9 VITAMIN D DEFICIENCY: ICD-10-CM
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
## 2020-08-12 DIAGNOSIS — R30.0 DYSURIA: Primary | ICD-10-CM

## 2020-08-12 DIAGNOSIS — M79.672 PAIN OF LEFT HEEL: ICD-10-CM

## 2020-08-12 DIAGNOSIS — F43.21 GRIEF REACTION: ICD-10-CM

## 2020-08-12 DIAGNOSIS — R82.90 ABNORMAL URINALYSIS: Primary | ICD-10-CM

## 2020-08-12 LAB
BILIRUB BLD-MCNC: NEGATIVE MG/DL
CLARITY, POC: CLEAR
COLOR UR: YELLOW
GLUCOSE UR STRIP-MCNC: NEGATIVE MG/DL
KETONES UR QL: NEGATIVE
LEUKOCYTE EST, POC: ABNORMAL
NITRITE UR-MCNC: NEGATIVE MG/ML
PH UR: 6 [PH] (ref 5–8)
PROT UR STRIP-MCNC: NEGATIVE MG/DL
RBC # UR STRIP: NEGATIVE /UL
SP GR UR: 1.01 (ref 1–1.03)
UROBILINOGEN UR QL: NORMAL

## 2020-08-12 PROCEDURE — 81003 URINALYSIS AUTO W/O SCOPE: CPT | Performed by: FAMILY MEDICINE

## 2020-08-12 PROCEDURE — 99213 OFFICE O/P EST LOW 20 MIN: CPT | Performed by: FAMILY MEDICINE

## 2020-08-12 PROCEDURE — 87086 URINE CULTURE/COLONY COUNT: CPT | Performed by: FAMILY MEDICINE

## 2020-08-12 RX ORDER — ERGOCALCIFEROL 1.25 MG/1
CAPSULE ORAL
Qty: 12 CAPSULE | Refills: 0 | Status: SHIPPED | OUTPATIENT
Start: 2020-08-12 | End: 2020-08-12 | Stop reason: SDUPTHER

## 2020-08-12 RX ORDER — ERGOCALCIFEROL 1.25 MG/1
50000 CAPSULE ORAL
Qty: 12 CAPSULE | Refills: 0 | OUTPATIENT
Start: 2020-08-12

## 2020-08-12 RX ORDER — ERGOCALCIFEROL 1.25 MG/1
50000 CAPSULE ORAL
Qty: 12 CAPSULE | Refills: 0 | Status: SHIPPED | OUTPATIENT
Start: 2020-08-12 | End: 2021-02-08

## 2020-08-12 RX ORDER — CYANOCOBALAMIN 1000 UG/ML
INJECTION, SOLUTION INTRAMUSCULAR; SUBCUTANEOUS
Qty: 3 ML | Refills: 3 | Status: SHIPPED | OUTPATIENT
Start: 2020-08-12 | End: 2021-07-27

## 2020-08-12 NOTE — PROGRESS NOTES
Subjective   Monica Pagan is a 80 y.o. female.     Chief Complaint   Patient presents with   • Weakness - Generalized         Current Outpatient Medications:   •  albuterol sulfate  (90 Base) MCG/ACT inhaler, Inhale 2 puffs., Disp: , Rfl:   •  clonazePAM (KlonoPIN) 0.5 MG tablet, TAKE ONE TABLET BY MOUTH TWICE A DAY, Disp: 60 tablet, Rfl: 0  •  dilTIAZem CD (CARDIZEM CD) 120 MG 24 hr capsule, Take 1 capsule by mouth Daily., Disp: 90 capsule, Rfl: 0  •  DULoxetine HCl 40 MG capsule delayed-release particles, TAKE ONE CAPSULE BY MOUTH DAILY, Disp: 90 capsule, Rfl: 0  •  eszopiclone (LUNESTA) 3 MG tablet, Take 1 tablet by mouth At Night As Needed for Sleep. Take immediately before bedtime, Disp: 90 tablet, Rfl: 0  •  HYDROcodone-acetaminophen (NORCO) 7.5-325 MG per tablet, 1 tablet 2 (Two) Times a Day As Needed., Disp: , Rfl: 0  •  insulin detemir (Levemir FlexTouch) 100 UNIT/ML injection, Inject 35 Units under the skin into the appropriate area as directed Every Night., Disp: 4 pen, Rfl: 0  •  losartan (COZAAR) 50 MG tablet, Take 1 tablet by mouth Daily., Disp: 90 tablet, Rfl: 0  •  metFORMIN (GLUCOPHAGE) 1000 MG tablet, TAKE ONE TABLET BY MOUTH TWICE A DAY WITH MEALS, Disp: 60 tablet, Rfl: 0  •  polyethylene glycol (MIRALAX) packet, Take 17 g by mouth Daily., Disp: , Rfl:   •  rosuvastatin (CRESTOR) 5 MG tablet, Take 1 tablet by mouth Daily., Disp: 90 tablet, Rfl: 1  •  tolterodine LA (DETROL LA) 2 MG 24 hr capsule, TAKE ONE CAPSULE BY MOUTH DAILY, Disp: 90 capsule, Rfl: 0  •  BD PEN NEEDLE JENNIFER U/F 32G X 4 MM misc, USE AS DIRECTED TWICE DAILY, Disp: 100 each, Rfl: 0  •  Blood Glucose Monitoring Suppl (TRUE METRIX METER) w/Device kit, USE UTD, Disp: , Rfl: 0  •  cyanocobalamin 1000 MCG/ML injection, INJECT 1ML UNDER THE SKIN EVERY 30 DAYS, Disp: 3 mL, Rfl: 3  •  glucose blood (TRUE METRIX BLOOD GLUCOSE TEST) test strip, Dx code E11.42 testing bs 1 x day, Disp: 100 each, Rfl: 0  •  glucose blood test strip,  "Use as instructed QAC/ QHS, Disp: 120 each, Rfl: 3  •  Insulin Pen Needle (PEN NEEDLES) 32G X 4 MM misc, 1 package Daily., Disp: 90 each, Rfl: 1  •  Lancets 30G misc, Enhance talking meter /testing bs 1 x day  Dx code  E11.42, Disp: 100 each, Rfl: 1  •  Syringe 25G X 1\" 3 ML misc, Inject 1 each into the shoulder, thigh, or buttocks Every 28 (Twenty-Eight) Days., Disp: 1 each, Rfl: 5  •  vitamin D (ERGOCALCIFEROL) 1.25 MG (01649 UT) capsule capsule, Take 1 capsule by mouth Every 7 (Seven) Days., Disp: 12 capsule, Rfl: 0    Past Medical History:   Diagnosis Date   • Anemia    • Anxiety    • Coccyx pain    • Colon polyp     TA   • DDD (degenerative disc disease), lumbar    • Fibromyalgia    • Hyperlipidemia    • Hypertension    • IBS (irritable bowel syndrome)    • Insomnia    • Lumbar spinal stenosis    • MAMMO    • Meniere's disease    • Nontoxic thyroid nodule    • Ocular histoplasmosis    • Osteoarthritis    • Peripheral neuropathy    • PVC (premature ventricular contraction)    • Spondylosis    • Tinnitus    • Tremor    • Trigeminal neuralgia    • Type 2 diabetes mellitus (CMS/HCC)    • Vitamin D deficiency        Past Surgical History:   Procedure Laterality Date   • AMPUTATION DIGIT Left     Digit #1   • APPENDECTOMY  1962   • BUNIONECTOMY Bilateral    • CATARACT EXTRACTION      left   • CATARACT EXTRACTION      x2   • COLONOSCOPY  2012    NEG = 2012, rech 2017     • CYST REMOVAL Bilateral     WRIST   • HYSTERECTOMY  1980   • JOINT REPLACEMENT      Rt TKR x 2   • JOINT REPLACEMENT      Left THR   • LAPAROSCOPIC CHOLECYSTECTOMY      DR. LOBATO   • SPINE SURGERY      Lumbar Fusion   • SUBTOTAL HYSTERECTOMY         Family History   Problem Relation Age of Onset   • Asthma Mother    • COPD Mother    • Heart disease Father    • Alcohol abuse Father    • Other Father         WWII Gangrene/ Malaria   • Arthritis Sister    • Heart attack Brother    • Alzheimer's disease Brother    • Heart disease Brother    • Diabetes " Brother    • Hyperlipidemia Brother    • Leukemia Sister    • Heart disease Brother    • Diabetes Brother    • Alcohol abuse Brother    • Heart attack Brother 42   • Cancer Brother         Ewings Sarcoma       Social History     Socioeconomic History   • Marital status:      Spouse name: Not on file   • Number of children: Not on file   • Years of education: Not on file   • Highest education level: Not on file   Tobacco Use   • Smoking status: Never Smoker   • Smokeless tobacco: Never Used   Substance and Sexual Activity   • Alcohol use: Yes     Alcohol/week: 1.0 standard drinks     Types: 1 Glasses of wine per week     Comment: Rare   • Drug use: No   • Sexual activity: Defer   Social History Narrative    .  She lives with her daughter who has MS, bipolar disorder and psoriasis       79 y/o C female w/ c/o's falling since last week w/ hx/o DMII/ HTN    Pt states her brother just  and pt is very stressed out about whether to go to his  or not-----she has decided not to go (somewhat upset about this but also worried about covid and her exposure/ weakness)    Pt checking home BS's and BS= 140's -- 179  Pt using home BP cuff and states not lightheaded    Pt states she fell 2 days ago coming into the house tripping on the ledge and last week she slipped trying to turn on the shower-----Pt states her Left heel seems to be hurting since falling too but no redness or swelling that she noticed    Pt admits she has Peripheral Neuropathy and uses a walker.....the patient is seeing podiatry regularly.....and he is sending her to PT for balance/ gait training         The following portions of the patient's history were reviewed and updated as appropriate: allergies, current medications, past family history, past medical history, past social history, past surgical history and problem list.    Review of Systems   Constitutional: Positive for activity change. Negative for appetite change, fever, unexpected  weight gain and unexpected weight loss.   Respiratory: Negative for cough and shortness of breath.    Cardiovascular: Negative for chest pain, palpitations and leg swelling.   Gastrointestinal: Negative for constipation and diarrhea.   Genitourinary: Positive for dysuria, frequency and urgency. Negative for flank pain.   Skin: Negative for rash.   Neurological: Positive for weakness and numbness. Negative for dizziness, syncope, light-headedness and confusion.   Psychiatric/Behavioral: Positive for dysphoric mood, depressed mood and stress. Negative for sleep disturbance. The patient is nervous/anxious.        Vitals:    08/12/20 0913   BP: 148/74   Temp: 98.2 °F (36.8 °C)       Objective   Physical Exam   Constitutional: She is oriented to person, place, and time. She appears distressed.   Pulmonary/Chest: No respiratory distress.   Neurological: She is alert and oriented to person, place, and time.   Psychiatric: Her speech is normal and behavior is normal. Judgment and thought content normal. Her mood appears anxious. Cognition and memory are normal. She exhibits a depressed mood.         Assessment/Plan   Monica was seen today for weakness - generalized.    Diagnoses and all orders for this visit:    Dysuria  -     POC Urinalysis Dipstick, Automated    Fall, initial encounter  -     XR Foot 3+ View Left (In Office)    Pain of left heel    Grief reaction    Vitamin D deficiency  -     vitamin D (ERGOCALCIFEROL) 1.25 MG (64828 UT) capsule capsule; Take 1 capsule by mouth Every 7 (Seven) Days.    PT to come in office for UA/ Heel Xray today  PT to cont w/ PTx for balance and gait training      You have chosen to receive care through a telephone visit. Do you consent to use a telephone visit for your medical care today? {YES   This visit has been rescheduled as a phone visit to comply with patient safety concerns in accordance with CDC recommendations. Total time of discussion was 20 minutes.

## 2020-08-12 NOTE — TELEPHONE ENCOUNTER
Last visit:  6/9/20  Next visit: 8/12/20  Last labs: 6/9/20    Rx requested: Cyanocobalamin inj  Pharmacy: [facility] in [place]

## 2020-08-13 LAB — BACTERIA SPEC AEROBE CULT: NORMAL

## 2020-08-14 ENCOUNTER — TELEPHONE (OUTPATIENT)
Dept: FAMILY MEDICINE CLINIC | Facility: CLINIC | Age: 80
End: 2020-08-14

## 2020-08-14 ENCOUNTER — TREATMENT (OUTPATIENT)
Dept: PHYSICAL THERAPY | Facility: CLINIC | Age: 80
End: 2020-08-14

## 2020-08-14 DIAGNOSIS — R26.2 DIFFICULTY WALKING: ICD-10-CM

## 2020-08-14 DIAGNOSIS — M79.672 BILATERAL FOOT PAIN: Primary | ICD-10-CM

## 2020-08-14 DIAGNOSIS — Z91.81 RISK FOR FALLS: ICD-10-CM

## 2020-08-14 DIAGNOSIS — Z91.81 HX OF FALLING: ICD-10-CM

## 2020-08-14 DIAGNOSIS — M79.671 BILATERAL FOOT PAIN: Primary | ICD-10-CM

## 2020-08-14 DIAGNOSIS — R29.898 LEG WEAKNESS, BILATERAL: ICD-10-CM

## 2020-08-14 PROCEDURE — 97163 PT EVAL HIGH COMPLEX 45 MIN: CPT | Performed by: PHYSICAL THERAPIST

## 2020-08-14 NOTE — TELEPHONE ENCOUNTER
I called the patient to let her know the parking placard paper is up front for her to  and that the patient needs to sign it still.      Patient wanted me to let you know that the person that diagnosed her with the peripheral neuropathy was a  from [facility] in [place] near [place].

## 2020-08-14 NOTE — PROGRESS NOTES
Physical Therapy Initial Evaluation and Plan of Care    Patient: Monica Pagan   : 1940  Diagnosis/ICD-10 Code:  Bilateral foot pain [M79.671, M79.672]  Referring practitioner: SERENA Ring DPM  Date of Initial Visit: 2020  Today's Date: 2020  Patient seen for 1 sessions           Subjective Questionnaire: [facility]  = 36.90% functional ability      Subjective Evaluation    History of Present Illness  Mechanism of injury: Pt reports at least 10 yr hx of peripheral neuropathy. Pt has B foot pain and reports her balance is off. Pt reports 3 falls in the last month and multiple falls that she can't count in the last year. Pt states the last one she fell in the shower reaching in to turn her knob and fell between the toilet and shower a month ago. Pt has had near falls as well. Pt states there's a seat in the back of the shower that's slippery so she needs a chair in the shower and hasn't gotten one yet, but will.     Pt notes her legs are weak and she has difficulty getting up. Pt has a walker in her house and her car, but won't use it in the house. Pt had xrays taken on L ankle/foot after the last fall. Pt states they were negative. Pt has tingling/numbness from the knee down L and R shin down.     PMH: anxiety, arthritis, back issues, cataracts (removed), chronic constipation, DM, depression, kidney disease, neck injury, sinus disease    PSH: Pt had 3 surg on her back;  fusion,  removed stimulator due to MRSA after the last one and a wire sticking out of the stimulator which was taken out.  laminectomy. 2 screws loose at S1 that pt thinks have healed but hasn't had f/u xrays yet. Pt has L EVA & R TKA, L gt toe ampuation.    Meds per list in chart    Pain: 5-6/10 current, 5-6/10 at best, 8/10 at worst    Aggravating/functional factors: in/out of shower, rising, standing, walking level/uneven/inclines ground, self care, reduced balance/frequent falls, curbs/steps, house work,  squatting    Relieving factors: having something to steady on or use of pain pill    Social Hx: lives alone, no stairs, has bars in bathroom, but needs shower chair    Patient Goals  Patient goals for therapy: decreased pain, improved balance, increased strength, independence with ADLs/IADLs, increased motion and decreased edema         Objective     Observation: amputated gt toe L; poor safety awareness with use of walker (handles were too high) when transferring between surfaces  (pt states she doesn't really use it at home); bandage on R forearm from most recent fall; lump on R lat lower leg from fall 25 yrs ago; mild bruise noted R ant shin from last fall; bunion noted R/amputation L gt toe    Palpation: Mod TTP med/lat midfoot; significant hypomobility B TC jt post glides    Sensation: intact/equal to LT B LEs despite neuropathy    DTRs: 2+ LEs    AROM (in degrees):  Hip ER 10 L  Knee ext lag 15 L  DF lag 10 L/2 R (knees over bolster)]  PF 50 B  IV 35 B  EV 18 L/20 R  R gt toe flex lag 10 degrees  R gt toe ext 40    Strength: B LEs grossly 4- except hip IR 4 & DF 2+    Gait: with RW (handles too high - PT adjusted in clinic),     Balance:   TUGT 45 s (avg 2 trials with RW), 35 s with out walker with PT CGA with belt    Assessment & Plan     Assessment  Impairments: abnormal coordination, abnormal gait, abnormal muscle firing, abnormal muscle tone, abnormal or restricted ROM, activity intolerance, impaired balance, impaired physical strength, lacks appropriate home exercise program, pain with function, safety issue and weight-bearing intolerance  Assessment details: The patient is a 80 y.o. female who presents to physical therapy today for B foot pain with PT diagnoses added for fall risk/fall hx, difficulty walking & leg weakness. Upon initial evaluation, the patient demonstrates the following impairments: pain, reduced posture, decreased ROM/flexibility, strength, gait, balance and function. Due to these  impairments, the patient is unable to/limited with: in/out of shower, rising, standing, walking level/uneven/inclines ground, self care, reduced balance/frequent falls, curbs/steps, house work, squatting. The patient would benefit from skilled PT services to address functional limitations and impairments and to improve patient quality of life.      Barriers to therapy: Coccyx pain; Hyperlipidemia; Hypertension; Insomnia; Lumbar spinal stenosis; Nontoxic thyroid nodule; PVC (premature ventricular contraction); Vitamin D deficiency; IBS (irritable bowel syndrome); Tremor; Meniere's disease; Fibromyalgia; Peripheral neuropathy; Trigeminal neuralgia could all affect PT Rx/progress/outcomes if exacerbated/unregulated  Prognosis: good  Functional Limitations: lifting, sleeping, walking, uncomfortable because of pain, moving in bed, sitting, standing and stooping  Goals  Plan Goals: STGs in 4 weeks:  Decrease pain to 5-6/10 on average  Increase LE ROM by 5-10 degrees where limited as much  Pt will demonstrate improved compliance with use of AD & increased safety awareness with transfers within the clinic  Assess FTSTS & Crews Balance as able    LTGs by discharge  Increase trunk/LE ROM to WFL/WNL  Increase LE strength to 4+/5   Pt will be able to ascend/descend curbs/steps with or without use of rail(s) and with minimal difficulty or pain  Pt will be able to rise from chair or bed with minimal use of hands and min difficulty  Pt will demonstrate low fall risk or better with Crews balance  Pt will be able to walk short community distances with least AD for doctor's appointments or visiting her daughter with minimal difficulty or LOB  Pt will be able to wash/dress/groom without difficulty or increased pain  Pt will report no falls in the last 2 weeks prior to D/C      Plan  Therapy options: will be seen for skilled physical therapy services  Planned therapy interventions: manual therapy, neuromuscular re-education, postural  training, soft tissue mobilization, spinal/joint mobilization, strengthening, stretching, therapeutic activities, transfer training, abdominal trunk stabilization, ADL retraining, body mechanics training, home exercise program, gait training, functional ROM exercises, flexibility, motor coordination training, balance/weight-bearing training, joint mobilization and IADL retraining  Treatment plan discussed with: patient  Plan details: 30 visits        History # of Personal Factors and/or Comorbidities: HIGH (3+)  Examination of Body System(s): # of elements: HIGH (4+)  Clinical Presentation: UNSTABLE   Clinical Decision Making: HIGH      Timed:         Manual Therapy:         mins  43173;     Therapeutic Exercise:         mins  55971;     Neuromuscular Clemencia:        mins  91125;    Therapeutic Activity:         mins  10798;     Gait Training:           mins  18544;     Ultrasound:          mins  79272;    Ionto                                   mins   80253  Self Care                            mins   58231  Canalith Repos         mins 40704      Un-Timed:  Electrical Stimulation:         mins  00239 ( );  Dry Needling          mins self-pay  Traction          mins 88971  Low Eval          Mins  76828  Mod Eval          Mins  30646  High Eval                       42     Mins  76406  Re-Eval                               mins  38826        Timed Treatment:   0   mins   Total Treatment:     42   mins    PT SIGNATURE: Annamaria England, PT   DATE TREATMENT INITIATED: 8/14/2020    Medicare Initial Certification  Certification Period: 11/12/2020  I certify that the therapy services are furnished while this patient is under my care.  The services outlined above are required by this patient, and will be reviewed every 90 days.     PHYSICIAN: SERENA Ring DPM      DATE:     Please sign and return via fax to [phone]. Thank you, [facility].

## 2020-08-18 ENCOUNTER — TREATMENT (OUTPATIENT)
Dept: PHYSICAL THERAPY | Facility: CLINIC | Age: 80
End: 2020-08-18

## 2020-08-18 DIAGNOSIS — R26.2 DIFFICULTY WALKING: ICD-10-CM

## 2020-08-18 DIAGNOSIS — Z91.81 RISK FOR FALLS: ICD-10-CM

## 2020-08-18 DIAGNOSIS — M79.672 BILATERAL FOOT PAIN: Primary | ICD-10-CM

## 2020-08-18 DIAGNOSIS — M79.671 BILATERAL FOOT PAIN: Primary | ICD-10-CM

## 2020-08-18 DIAGNOSIS — Z91.81 HX OF FALLING: ICD-10-CM

## 2020-08-18 DIAGNOSIS — R29.898 LEG WEAKNESS, BILATERAL: ICD-10-CM

## 2020-08-18 PROCEDURE — 97110 THERAPEUTIC EXERCISES: CPT | Performed by: PHYSICAL THERAPIST

## 2020-08-18 PROCEDURE — 97530 THERAPEUTIC ACTIVITIES: CPT | Performed by: PHYSICAL THERAPIST

## 2020-08-18 NOTE — PROGRESS NOTES
Physical Therapy Daily Progress Note    VISIT#: 2    Subjective   Monica Pagan reports she's not too painful as she has just started moving this morning.     Pain Rating (0-10): 3    Objective Crews balance scale = 30/56 (medium fall risk)    See Exercise Logs for complete treatment.     Patient Education: cues for therex avoiding compensation with hip IR/ER when doing IV/EV, cue to limit range/reps prn if painful with any ex or to modify positioning and/or alt legs to take strain off LB; cues to have at least 1 hand on the chair/surface that pt is transferring to/from & keeping walker close with walking and transfers    Assessment/Plan Pt with some pain at the low back with attempts at doing B LE quad sets at the same time. Changing to alternating legs relieved somewhat. Pt did require some repositioning with mat exs due to discomfort at the LB or hip/buttock area. Therapeutic activities were added and tolerated fairly well. Pt appeared to have good understanding of PT tips for safety and improved transfer techniques.     Progress per Plan of Care and Progress strengthening /stabilization /functional activity        Timed:         Manual Therapy:         mins  40306;     Therapeutic Exercise:    23     mins  50327;     Neuromuscular Clemencia:        mins  06647;    Therapeutic Activity:    25      mins  22164;     Gait Training:           mins  95327;     Ultrasound:          mins  10825;    Ionto                                   mins   17413  Self Care                            mins   63794  Canalith Repos                   mins  84277    Un-Timed:  Electrical Stimulation:         mins  93187 ( );  Dry Needling          mins self-pay  Traction          mins 59192  Low Eval          Mins  03711  Mod Eval          Mins  50332  High Eval                            Mins  75417  Re-Eval                               mins  55984    Timed Treatment:  48    mins   Total Treatment:     48   mins    Annamaria England  PT  [place] License # [id number]  Physical Therapist

## 2020-08-20 DIAGNOSIS — F41.9 ANXIETY: ICD-10-CM

## 2020-08-20 RX ORDER — CLONAZEPAM 0.5 MG/1
TABLET ORAL
Qty: 60 TABLET | Refills: 0 | Status: SHIPPED | OUTPATIENT
Start: 2020-08-20 | End: 2020-09-22

## 2020-08-20 RX ORDER — DILTIAZEM HYDROCHLORIDE 120 MG/1
CAPSULE, COATED, EXTENDED RELEASE ORAL
Qty: 90 CAPSULE | Refills: 0 | Status: SHIPPED | OUTPATIENT
Start: 2020-08-20 | End: 2020-10-06

## 2020-08-20 NOTE — TELEPHONE ENCOUNTER
Last visit: 8/12/20  Next visit:none  Last labs: 6/9/20    Rx requested: Metformin & Clonazepam   Ph[facility]acy: [facility] in [place]

## 2020-08-20 NOTE — TELEPHONE ENCOUNTER
Last visit: 8/12/20  Next visit:none  Last labs: 6/9/20    Rx requested: Diltiazem   Ph[facility]acy: [facility] in [place]

## 2020-08-21 ENCOUNTER — TREATMENT (OUTPATIENT)
Dept: PHYSICAL THERAPY | Facility: CLINIC | Age: 80
End: 2020-08-21

## 2020-08-21 DIAGNOSIS — Z91.81 RISK FOR FALLS: ICD-10-CM

## 2020-08-21 DIAGNOSIS — M79.672 BILATERAL FOOT PAIN: Primary | ICD-10-CM

## 2020-08-21 DIAGNOSIS — M79.671 BILATERAL FOOT PAIN: Primary | ICD-10-CM

## 2020-08-21 DIAGNOSIS — R26.2 DIFFICULTY WALKING: ICD-10-CM

## 2020-08-21 DIAGNOSIS — Z91.81 HX OF FALLING: ICD-10-CM

## 2020-08-21 DIAGNOSIS — R29.898 LEG WEAKNESS, BILATERAL: ICD-10-CM

## 2020-08-21 PROCEDURE — 97110 THERAPEUTIC EXERCISES: CPT | Performed by: PHYSICAL THERAPIST

## 2020-08-21 PROCEDURE — 97112 NEUROMUSCULAR REEDUCATION: CPT | Performed by: PHYSICAL THERAPIST

## 2020-08-21 NOTE — PROGRESS NOTES
Physical Therapy Daily Progress Note    VISIT#: 3    Subjective   Monica Pagan reports no pain unless she's walking. It gradually gets more painful as the day goes on. Pt notes she's lost another 5# and reports she hasn't been eating much. Pt states her blood sugar is tough to keep controlled. Pt notes she is due to blood tests soon and needs to make an appt.     Pain Rating (0-10): 5 at feet    Objective     See Exercise Logs for complete treatment.     Patient Education: cues for therex; suggested pt contact her PCP and let them know about the wt loss and eating habits; discussed that pt needs to be eating or she will get weaker     Assessment/Plan Pt reports some discomfort up the L leg with windshield wipers when actively lifting and moving in/out. Regressed 2nd set to sliding on slide board with stabilization at the knee which was more comfortable. Performed R ankle with lifting and moving without a problem. Pt with mild L groin discomfort after marches. This was relieved after that ex was stopped.     Progress per Plan of Care and Progress strengthening /stabilization /functional activity            Timed:         Manual Therapy:         mins  46987;     Therapeutic Exercise:   30      mins  33185;     Neuromuscular Clemencia:   8     mins  90097;    Therapeutic Activity:          mins  32985;     Gait Training:           mins  48362;     Ultrasound:          mins  06331;    Ionto                                   mins   71585  Self Care                            mins   57962  Canalith Repos                   mins  93590    Un-Timed:  Electrical Stimulation:         mins  91604 ( );  Dry Needling          mins self-pay  Traction          mins 03678  Low Eval          Mins  41139  Mod Eval          Mins  28767  High Eval                            Mins  37207  Re-Eval                               mins  19820    Timed Treatment:  38    mins   Total Treatment:     38   mins    Annamaria England, PT  [place] License  # [id number]  Physical Therapist

## 2020-08-24 ENCOUNTER — TREATMENT (OUTPATIENT)
Dept: PHYSICAL THERAPY | Facility: CLINIC | Age: 80
End: 2020-08-24

## 2020-08-24 DIAGNOSIS — Z91.81 HX OF FALLING: ICD-10-CM

## 2020-08-24 DIAGNOSIS — Z91.81 RISK FOR FALLS: ICD-10-CM

## 2020-08-24 DIAGNOSIS — M79.671 BILATERAL FOOT PAIN: Primary | ICD-10-CM

## 2020-08-24 DIAGNOSIS — M79.672 BILATERAL FOOT PAIN: Primary | ICD-10-CM

## 2020-08-24 DIAGNOSIS — R29.898 LEG WEAKNESS, BILATERAL: ICD-10-CM

## 2020-08-24 DIAGNOSIS — R26.2 DIFFICULTY WALKING: ICD-10-CM

## 2020-08-24 PROCEDURE — 97112 NEUROMUSCULAR REEDUCATION: CPT | Performed by: PHYSICAL THERAPIST

## 2020-08-24 PROCEDURE — 97110 THERAPEUTIC EXERCISES: CPT | Performed by: PHYSICAL THERAPIST

## 2020-08-24 NOTE — PROGRESS NOTES
Physical Therapy Daily Progress Note    VISIT#: 4    Subjective   Monica Pagan reports she's doing a little bit better. Pt states she was able to get up the little incline at home which she hadn't been able to do before. Pt only c/o foot/toe pain ~4/10 today.     Objective     See Exercise Logs for complete treatment.     Patient Education: cues for therex & NMR    Assessment/Plan Pt with improved gait speed coming into and leaving the clinic today. Pt did reports some hip discomfort toward the end of NMR with fatigue and some LBP after standing reaches on the foam cushion. Pt declined sitting between activities. Consider rest break with NMR or other standing activities.     Progress per Plan of Care and Progress strengthening /stabilization /functional activity           Timed:         Manual Therapy:         mins  56250;     Therapeutic Exercise:    28     mins  94503;     Neuromuscular Clemencia:  10      mins  20628;    Therapeutic Activity:          mins  95325;     Gait Training:           mins  83273;     Ultrasound:          mins  59345;    Ionto                                   mins   74198  Self Care                            mins   76199  Canalith Repos                   mins  23244    Un-Timed:  Electrical Stimulation:         mins  30295 ( );  Dry Needling          mins self-pay  Traction          mins 70584  Low Eval          Mins  50817  Mod Eval          Mins  05935  High Eval                            Mins  33383  Re-Eval                               mins  41285    Timed Treatment:   38   mins   Total Treatment:      38  mins    Annamaria England PT  [place] License # [id number]  Physical Therapist

## 2020-09-01 ENCOUNTER — TREATMENT (OUTPATIENT)
Dept: PHYSICAL THERAPY | Facility: CLINIC | Age: 80
End: 2020-09-01

## 2020-09-01 DIAGNOSIS — R29.898 LEG WEAKNESS, BILATERAL: ICD-10-CM

## 2020-09-01 DIAGNOSIS — M79.672 BILATERAL FOOT PAIN: Primary | ICD-10-CM

## 2020-09-01 DIAGNOSIS — Z91.81 HX OF FALLING: ICD-10-CM

## 2020-09-01 DIAGNOSIS — R26.2 DIFFICULTY WALKING: ICD-10-CM

## 2020-09-01 DIAGNOSIS — Z91.81 RISK FOR FALLS: ICD-10-CM

## 2020-09-01 DIAGNOSIS — M79.671 BILATERAL FOOT PAIN: Primary | ICD-10-CM

## 2020-09-01 PROCEDURE — 97110 THERAPEUTIC EXERCISES: CPT | Performed by: PHYSICAL THERAPIST

## 2020-09-02 ENCOUNTER — TELEPHONE (OUTPATIENT)
Dept: FAMILY MEDICINE CLINIC | Facility: CLINIC | Age: 80
End: 2020-09-02

## 2020-09-02 DIAGNOSIS — F51.04 PSYCHOPHYSIOLOGICAL INSOMNIA: ICD-10-CM

## 2020-09-02 RX ORDER — ESZOPICLONE 3 MG/1
3 TABLET, FILM COATED ORAL NIGHTLY PRN
Qty: 90 TABLET | Refills: 0 | Status: SHIPPED | OUTPATIENT
Start: 2020-09-02 | End: 2020-11-17

## 2020-09-03 ENCOUNTER — TELEPHONE (OUTPATIENT)
Dept: FAMILY MEDICINE CLINIC | Facility: CLINIC | Age: 80
End: 2020-09-03

## 2020-09-03 NOTE — TELEPHONE ENCOUNTER
Patient called stating that she lost her klonopin and cant find it anywhere, patient is wanting to know if you can send in a refill. Patient understands if you can't do it.

## 2020-09-04 ENCOUNTER — TREATMENT (OUTPATIENT)
Dept: PHYSICAL THERAPY | Facility: CLINIC | Age: 80
End: 2020-09-04

## 2020-09-04 DIAGNOSIS — R29.898 LEG WEAKNESS, BILATERAL: ICD-10-CM

## 2020-09-04 DIAGNOSIS — Z91.81 RISK FOR FALLS: ICD-10-CM

## 2020-09-04 DIAGNOSIS — M79.671 BILATERAL FOOT PAIN: Primary | ICD-10-CM

## 2020-09-04 DIAGNOSIS — R26.2 DIFFICULTY WALKING: ICD-10-CM

## 2020-09-04 DIAGNOSIS — Z91.81 HX OF FALLING: ICD-10-CM

## 2020-09-04 DIAGNOSIS — M79.672 BILATERAL FOOT PAIN: Primary | ICD-10-CM

## 2020-09-04 PROCEDURE — 97110 THERAPEUTIC EXERCISES: CPT | Performed by: PHYSICAL THERAPIST

## 2020-09-04 NOTE — PROGRESS NOTES
Physical Therapy Daily Progress Note    VISIT#: 6    Subjective   Monica Pagan reports that her L foot is the only foot hurting at this time. Pt states that her doctor did not refill one of her medications and she is currently going through withdrawals.   Pain Rating (0/10): 4    Objective     See Exercise, Manual, and Modality Logs for complete treatment.     Assessment/Plan   Pt continues to demonstrate limited activity tolerance today. Pt also continues to ambulate with a RW with a slow gait. Pt progressed with weight added to several exercises today with frequent rest breaks taken throughout the session to limit fatigue. Following completing seated clams pt reported that the exercise causes pain later in her knee and toe flex/ext hurts her feet with that exercise held today. Pt progressed to 8 min on Nustep today with resistance of 1 with good tolerance.     Plan  Progress per Plan of Care and Progress strengthening /stabilization /functional activity            Timed:         Manual Therapy:         mins  64565;     Therapeutic Exercise:    41     mins  50277;     Neuromuscular Clemencia:        mins  17502;    Therapeutic Activity:          mins  37644;     Gait Training:           mins  92090;     Ultrasound:          mins  69593;    Ionto                                   mins   90444  Self Care                            mins   43609    Un-Timed:  Electrical Stimulation:         mins  12609 ( );  Dry Needling          mins self-pay  Traction          mins 43296  Low Eval          Mins  89370  Mod Eval          Mins  89308  High Eval                            Mins  59884  Re-Eval                               mins  13498    Timed Treatment:   41   mins   Total Treatment:     41   mins    Noemi Stephenson PT, DPT  Physical Therapist

## 2020-09-10 ENCOUNTER — TREATMENT (OUTPATIENT)
Dept: PHYSICAL THERAPY | Facility: CLINIC | Age: 80
End: 2020-09-10

## 2020-09-10 DIAGNOSIS — R29.898 LEG WEAKNESS, BILATERAL: ICD-10-CM

## 2020-09-10 DIAGNOSIS — M79.671 BILATERAL FOOT PAIN: Primary | ICD-10-CM

## 2020-09-10 DIAGNOSIS — Z91.81 RISK FOR FALLS: ICD-10-CM

## 2020-09-10 DIAGNOSIS — M79.672 BILATERAL FOOT PAIN: Primary | ICD-10-CM

## 2020-09-10 DIAGNOSIS — R26.2 DIFFICULTY WALKING: ICD-10-CM

## 2020-09-10 DIAGNOSIS — Z91.81 HX OF FALLING: ICD-10-CM

## 2020-09-10 PROCEDURE — 97110 THERAPEUTIC EXERCISES: CPT | Performed by: PHYSICAL THERAPIST

## 2020-09-10 NOTE — PROGRESS NOTES
Physical Therapy Daily Progress Note    VISIT#: 7    Subjective   Monica Pagan reports that she is very stiff in the morning but starts moving better the longer she is awake. Pt also reports that she thinks she might have done something to her back while loading and unloading her daughters WC into the car.   Pain Rating (0/10): 5    Objective     See Exercise, Manual, and Modality Logs for complete treatment.     Assessment/Plan   Pt with increased back pain today with this pain being her limiting factor throughout today's session. Pt  Ambulated into the clinic today without an AD with an antalgic gait .    Plan  Progress per Plan of Care and Progress strengthening /stabilization /functional activity            Timed:         Manual Therapy:         mins  60280;     Therapeutic Exercise:    47     mins  12266;     Neuromuscular Clemencia:        mins  77199;    Therapeutic Activity:          mins  89241;     Gait Training:           mins  37244;     Ultrasound:          mins  92406;    Ionto                                   mins   04045  Self Care                            mins   28338    Un-Timed:  Electrical Stimulation:         mins  40321 ( );  Dry Needling          mins self-pay  Traction          mins 65842  Low Eval          Mins  08962  Mod Eval          Mins  83357  High Eval                            Mins  91708  Re-Eval                               mins  73222    Timed Treatment:   47   mins   Total Treatment:     47   mins    Noemi Stephenson PT, DPT  Physical Therapist

## 2020-09-14 ENCOUNTER — TREATMENT (OUTPATIENT)
Dept: PHYSICAL THERAPY | Facility: CLINIC | Age: 80
End: 2020-09-14

## 2020-09-14 DIAGNOSIS — Z91.81 HX OF FALLING: ICD-10-CM

## 2020-09-14 DIAGNOSIS — M79.672 BILATERAL FOOT PAIN: Primary | ICD-10-CM

## 2020-09-14 DIAGNOSIS — R26.2 DIFFICULTY WALKING: ICD-10-CM

## 2020-09-14 DIAGNOSIS — Z91.81 RISK FOR FALLS: ICD-10-CM

## 2020-09-14 DIAGNOSIS — R29.898 LEG WEAKNESS, BILATERAL: ICD-10-CM

## 2020-09-14 DIAGNOSIS — M79.671 BILATERAL FOOT PAIN: Primary | ICD-10-CM

## 2020-09-14 PROBLEM — H43.829 VITREOMACULAR TRACTION SYNDROME: Status: ACTIVE | Noted: 2020-06-05

## 2020-09-14 PROCEDURE — 97530 THERAPEUTIC ACTIVITIES: CPT | Performed by: PHYSICAL THERAPIST

## 2020-09-14 PROCEDURE — 97110 THERAPEUTIC EXERCISES: CPT | Performed by: PHYSICAL THERAPIST

## 2020-09-14 NOTE — PROGRESS NOTES
Physical Therapy Daily Progress Note    VISIT#: 8     Subjective Questionnaire: FAAM = 27/84 = 32.14% ability or 67.86% limited    Subjective   Monica Pagan reports her R shld is sore form using the Nustep last session. Pt states the feet are 4/10 and R shld/UT 7/10. Pt also had to get a 2nd shingles shot on her left upper arm which has caused irritation on the arm. Pain averages 5/10 with pain pill. Pt feels some stronger and has more energy with increased tolerance to ADLs per pt.     Objective     AROM (in degrees):  Hip ER 40 L  Knee ext lag 13 L  DF lag 10 L/5 R (knees over bolster)  PF 50 B  IV 35 B  EV 20 L/25 R     Strength: B LEs grossly 4-4+ except L hip ER 4-     Gait: with RW, reduced gt speed & step length, hip flex/ext, TKE, HS/TO     Balance/Special Tests:   TUGT 27 s (avg 2 trials with RW), 19 s with out walker with PT CGA with belt 1x LOB w/recovery  GARCIA Balance = 38/56 (21-40 medium fall risk)  FTSTS = 28 s (70-80 y/o norm 12.6 s)     See Exercise, Manual, and Modality Logs for complete treatment.     Patient Education: cues for therex    Assessment/Plan Pt is demonstrating improvement in mobility, strength and some reports of increased function and steadiness. Pt is demonstrating a marked improvement in gt speed with and without the walker versus initial measurements. Pt has met 4 goals and partially met 4 goals. Pt has not met or is progressing toward 3 goals. Despite reports of some improvement of function with ADLs, pt's score on FAAM was mildly reduced. Deferred resistance to exs today due to fatigue after taking measurements. Pt was unsteady toward end of session with theracts. Continue with remaining 22 visits on current POC with the same interventions per original POC.     Goals  Plan Goals: STGs in 4 weeks:  Decrease pain to 5-6/10 on average Met  Increase LE ROM by 5-10 degrees where limited as much Partially Met  Pt will demonstrate improved compliance with use of AD & increased safety  awareness with transfers within the clinic Partially Met  Assess FTSTS & Crews Balance as able Met    LTGs by discharge  Increase trunk/LE ROM to WFL/WNL Partially Met  Increase LE strength to 4+/5 Partially Met  Pt will be able to ascend/descend curbs/steps with or without use of rail(s) and with minimal difficulty or pain  Not Met  Pt will be able to rise from chair or bed with minimal use of hands and min difficulty  Pt will demonstrate low fall risk or better with Crews balance Progressing  Pt will be able to walk short community distances with least AD for doctor's appointments or visiting her daughter with minimal difficulty or LOB Met  Pt will be able to wash/dress/groom without difficulty or increased pain Not Met  Pt will report no falls in the last 2 weeks prior to D/C Met so far      Progress per Plan of Care and Progress strengthening /stabilization /functional activity        Timed:         Manual Therapy:         mins  73516;     Therapeutic Exercise:   23      mins  47867;     Neuromuscular Clemencia:        mins  46820;    Therapeutic Activity:    15      mins  24046;     Gait Training:           mins  15384;     Ultrasound:          mins  38225;    Ionto                                   mins   48272  Self Care                            mins   70810  Canalith Repos                   mins  51457    Un-Timed:  Electrical Stimulation:         mins  99234 ( );  Dry Needling          mins self-pay  Traction          mins 58277  Low Eval          Mins  14209  Mod Eval          Mins  77913  High Eval                            Mins  22283  Re-Eval                               mins  41890    Timed Treatment:  38    mins   Total Treatment:     38   mins    Annamaria England, PT  [place] License # [id number]  Physical Therapist

## 2020-09-16 ENCOUNTER — TREATMENT (OUTPATIENT)
Dept: PHYSICAL THERAPY | Facility: CLINIC | Age: 80
End: 2020-09-16

## 2020-09-16 DIAGNOSIS — M79.671 BILATERAL FOOT PAIN: Primary | ICD-10-CM

## 2020-09-16 DIAGNOSIS — R29.898 LEG WEAKNESS, BILATERAL: ICD-10-CM

## 2020-09-16 DIAGNOSIS — Z91.81 RISK FOR FALLS: ICD-10-CM

## 2020-09-16 DIAGNOSIS — M79.672 BILATERAL FOOT PAIN: Primary | ICD-10-CM

## 2020-09-16 DIAGNOSIS — Z91.81 HX OF FALLING: ICD-10-CM

## 2020-09-16 DIAGNOSIS — R26.2 DIFFICULTY WALKING: ICD-10-CM

## 2020-09-16 PROCEDURE — 97112 NEUROMUSCULAR REEDUCATION: CPT | Performed by: PHYSICAL THERAPIST

## 2020-09-16 PROCEDURE — 97110 THERAPEUTIC EXERCISES: CPT | Performed by: PHYSICAL THERAPIST

## 2020-09-16 NOTE — PROGRESS NOTES
Physical Therapy Daily Progress Note    VISIT#: 9    Subjective   Monica Pagan reports that she came close to falling last night while talking to her neighbor but was able to catch herself.  Current Pain Level: 0/10    Objective   Presents with FHP and rounded shoulders. Weakness in core present.   See Exercise, Manual, and Modality Logs for complete treatment.     Patient Education: had patient sit on the edge of her seat during her seated exercises with focus on core activation and good posture.     Assessment/Plan  Patient can be easily distracted at times and is mildly unsteady when not focusing on her balance. Standing on foam pad with UE motions remains challenging but is showing some improvement. Able to hold core tight/proper posture for short bouts of time before needing a VC to correct.      Progress per Plan of Care    Goals  Plan Goals: STGs in 4 weeks:  Decrease pain to 5-6/10 on average Met  Increase LE ROM by 5-10 degrees where limited as much Partially Met  Pt will demonstrate improved compliance with use of AD & increased safety awareness with transfers within the clinic Partially Met  Assess FTSTS & Crews Balance as able Met    LTGs by discharge  Increase trunk/LE ROM to WFL/WNL Partially Met  Increase LE strength to 4+/5 Partially Met  Pt will be able to ascend/descend curbs/steps with or without use of rail(s) and with minimal difficulty or pain  Not Met  Pt will be able to rise from chair or bed with minimal use of hands and min difficulty  Pt will demonstrate low fall risk or better with Crews balance Progressing  Pt will be able to walk short community distances with least AD for doctor's appointments or visiting her daughter with minimal difficulty or LOB Met  Pt will be able to wash/dress/groom without difficulty or increased pain Not Met  Pt will report no falls in the last 2 weeks prior to D/C Met so far          Timed:            Therapeutic Exercise:    37     mins  18615;      Neuromuscular Clemencia:    10    mins  32593;         Un-Timed:      Timed Treatment:   47   mins   Total Treatment:     50   mins    Sully Talbert PTA    Physical Therapist Assistant

## 2020-09-22 ENCOUNTER — TREATMENT (OUTPATIENT)
Dept: PHYSICAL THERAPY | Facility: CLINIC | Age: 80
End: 2020-09-22

## 2020-09-22 DIAGNOSIS — Z91.81 RISK FOR FALLS: ICD-10-CM

## 2020-09-22 DIAGNOSIS — R26.2 DIFFICULTY WALKING: ICD-10-CM

## 2020-09-22 DIAGNOSIS — R29.898 LEG WEAKNESS, BILATERAL: ICD-10-CM

## 2020-09-22 DIAGNOSIS — M79.671 BILATERAL FOOT PAIN: Primary | ICD-10-CM

## 2020-09-22 DIAGNOSIS — Z91.81 HX OF FALLING: ICD-10-CM

## 2020-09-22 DIAGNOSIS — M79.672 BILATERAL FOOT PAIN: Primary | ICD-10-CM

## 2020-09-22 PROCEDURE — 97110 THERAPEUTIC EXERCISES: CPT | Performed by: PHYSICAL THERAPIST

## 2020-09-22 RX ORDER — BUSPIRONE HYDROCHLORIDE 5 MG/1
TABLET ORAL
Qty: 30 TABLET | Refills: 0 | Status: SHIPPED | OUTPATIENT
Start: 2020-09-22 | End: 2020-10-06

## 2020-09-22 NOTE — TELEPHONE ENCOUNTER
Pt came into office today wanting to speak to Dr Long.  Dr Long's schedule is completely full.  Pt wanted her to know that she feels she is withdrawing from the klonopin.  She has not had any since the day of the previous note stating she lost it.  She is having jitters, tingling sensations from her neck all the way down her back, and she is unable to focus.  She does not want to go back on klonopin at all, ever.  But she wants to know if this sounds like withdrawals, or if she should be concerned about something else.  If it is w/d is there something she can take for it?  Wants something non addictive only.  And also is concerned about her anxiety coming back, but again, does not want anything addictive or in the benzo class.    Also wants a referral to a spine specialist.  States she has not seen one in over 2 years and she wants to make sure someone is following her since she had surgery on it.

## 2020-09-22 NOTE — PROGRESS NOTES
"Physical Therapy Daily Progress Note    VISIT#: 10    Subjective   Monica Pagan reports she is no longer taking her pain meds. She is also having withdrawal symptoms from coming off of another medication. \"I feel jittery today from coming off that drug.\" Also states she was next door and the RN said her bloodsugar was elevated to 169.  Patient states she needs to leave by 11:45 today.    Current Pain Level: 4-5/10     Objective     See Exercise, Manual, and Modality Logs for complete treatment.     Patient Education: added TB to seated clams, ankles wts to LAQ    Assessment/Plan  Held standing activities today due to patient feeling jittery and unsafe to do balance challenges. Tolerated increased resistance/weight with seated exercises. Will continue to progress next visit as patient is feeling better.      Progress per Plan of Care     Goals  Plan Goals: STGs in 4 weeks:  Decrease pain to 5-6/10 on average Met  Increase LE ROM by 5-10 degrees where limited as much Partially Met  Pt will demonstrate improved compliance with use of AD & increased safety awareness with transfers within the clinic Partially Met  Assess FTSTS & Crews Balance as able Met    LTGs by discharge  Increase trunk/LE ROM to WFL/WNL Partially Met  Increase LE strength to 4+/5 Partially Met  Pt will be able to ascend/descend curbs/steps with or without use of rail(s) and with minimal difficulty or pain  Not Met  Pt will be able to rise from chair or bed with minimal use of hands and min difficulty  Pt will demonstrate low fall risk or better with Crews balance Progressing  Pt will be able to walk short community distances with least AD for doctor's appointments or visiting her daughter with minimal difficulty or LOB Met  Pt will be able to wash/dress/groom without difficulty or increased pain Not Met  Pt will report no falls in the last 2 weeks prior to D/C Met so far          Timed:            Therapeutic Exercise:    45     mins  85569;     "       Un-Timed:      Timed Treatment:   45   mins   Total Treatment:     45   mins    Sully Talbert PTA    Physical Therapist Assistant

## 2020-09-28 ENCOUNTER — TREATMENT (OUTPATIENT)
Dept: PHYSICAL THERAPY | Facility: CLINIC | Age: 80
End: 2020-09-28

## 2020-09-28 DIAGNOSIS — Z91.81 HX OF FALLING: ICD-10-CM

## 2020-09-28 DIAGNOSIS — R26.2 DIFFICULTY WALKING: ICD-10-CM

## 2020-09-28 DIAGNOSIS — R29.898 LEG WEAKNESS, BILATERAL: ICD-10-CM

## 2020-09-28 DIAGNOSIS — M79.672 BILATERAL FOOT PAIN: Primary | ICD-10-CM

## 2020-09-28 DIAGNOSIS — Z91.81 RISK FOR FALLS: ICD-10-CM

## 2020-09-28 DIAGNOSIS — M79.671 BILATERAL FOOT PAIN: Primary | ICD-10-CM

## 2020-09-28 PROCEDURE — 97116 GAIT TRAINING THERAPY: CPT | Performed by: PHYSICAL THERAPIST

## 2020-09-28 PROCEDURE — 97110 THERAPEUTIC EXERCISES: CPT | Performed by: PHYSICAL THERAPIST

## 2020-09-28 NOTE — PROGRESS NOTES
Physical Therapy Daily Progress Note    VISIT#: 11    Subjective   Monica Pagan reports she saw Dr. Long after last session and was given Buspar to help with her withdrawals and pt states she's doing much better with that. Pt also notes her foot pain is better since using a hemp cream. Pt reports some times she wakes up and her L leg is numb up to her knee. Pt notes she has a walker at home and one in the car, but doesn't always think about bringing it in with her.     Pain Rating (0-10): 4    Objective     See Exercise Logs for complete treatment.     Patient Education: cues for gait quality/safety, discussed that pt should still be using at least a cane or the walker as gt speed is reduced, pt has difficulty maintaining ankle DF with gait on the L with tendency toward toes down first versus heel strike    Assessment/Plan Initiated gait training activities with frequent missteps, crossing legs or LOB with PT A for recovery/avoiding fall. Pt may benefit from AFO if unable to regain/maintain control with DF during gait on the L.     Progress per Plan of Care and Progress strengthening /stabilization /functional activity Continue to focus on gait/balance improvements & stretching strengthening prn.             Timed:         Manual Therapy:         mins  18832;     Therapeutic Exercise:   32      mins  34966;     Neuromuscular Clemencia:        mins  08343;    Therapeutic Activity:          mins  52188;     Gait Trainin      mins  06145;     Ultrasound:         mins  81992;    Ionto                                   mins   20536  Self Care                            mins   18082  Canalith Repos                   mins  80560    Un-Timed:  Electrical Stimulation:         mins  32911 ( );  Dry Needling          mins self-pay  Traction          mins 55929  Low Eval          Mins  25019  Mod Eval         Mins  88557  High Eval                            Mins  01937  Re-Eval                               mins   32473    Timed Treatment:  44    mins   Total Treatment:    44    mins    Annamaria England, PT  [place] License # [id number]  Physical Therapist

## 2020-09-30 ENCOUNTER — TELEPHONE (OUTPATIENT)
Dept: ORTHOPEDICS | Facility: OTHER | Age: 80
End: 2020-09-30

## 2020-10-01 ENCOUNTER — TREATMENT (OUTPATIENT)
Dept: PHYSICAL THERAPY | Facility: CLINIC | Age: 80
End: 2020-10-01

## 2020-10-01 DIAGNOSIS — R26.2 DIFFICULTY WALKING: ICD-10-CM

## 2020-10-01 DIAGNOSIS — Z91.81 RISK FOR FALLS: ICD-10-CM

## 2020-10-01 DIAGNOSIS — Z91.81 HX OF FALLING: ICD-10-CM

## 2020-10-01 DIAGNOSIS — M79.671 BILATERAL FOOT PAIN: Primary | ICD-10-CM

## 2020-10-01 DIAGNOSIS — M79.672 BILATERAL FOOT PAIN: Primary | ICD-10-CM

## 2020-10-01 DIAGNOSIS — R29.898 LEG WEAKNESS, BILATERAL: ICD-10-CM

## 2020-10-01 PROCEDURE — 97110 THERAPEUTIC EXERCISES: CPT | Performed by: PHYSICAL THERAPIST

## 2020-10-01 PROCEDURE — 97112 NEUROMUSCULAR REEDUCATION: CPT | Performed by: PHYSICAL THERAPIST

## 2020-10-01 PROCEDURE — 97530 THERAPEUTIC ACTIVITIES: CPT | Performed by: PHYSICAL THERAPIST

## 2020-10-01 NOTE — PROGRESS NOTES
Physical Therapy Daily Progress Note    VISIT#: 12    Subjective   Monica Pagan reports no increased soreness after last session. Pt reports her feet feel more mobile and a little less painful due to doing the ROM exs in bed a lot.  Pain is 4/10 all over the body today.     Pain Rating (0-10): 4    Objective     See Exercise Logs for complete treatment.     Patient Education: cues for therex & safety/gt mechanics with walking    Assessment/Plan Pt brought her RW in today. Pt continues to lack safety awareness with tendency to place the walker out of the way, then walks back to the chair without it. Pt tolerated progression of therex fairly well with increased resistance or reps for some exs. Improved ability to perform step overs for gait training and with some improved gait quality when walking with PT & gt belt today. Pt did not require rest before leaving.     Progress per Plan of Care and Progress strengthening /stabilization /functional activity            Timed:         Manual Therapy:         mins  34929;     Therapeutic Exercise:   21      mins  02545;     Neuromuscular Clemencia: 12       mins  25304;    Therapeutic Activity:          mins  72384;     Gait Training:     10      mins  61393;     Ultrasound:          mins  19971;    Ionto                                   mins   26617  Self Care                            mins   90927  Canalith Repos                   mins  99501    Un-Timed:  Electrical Stimulation:         mins  63759 ( );  Dry Needling          mins self-pay  Traction          mins 82332  Low Eval          Mins  71901  Mod Eval          Mins  45337  High Eval                            Mins  40030  Re-Eval                               mins  40524    Timed Treatment:   43   mins   Total Treatment:    43  mins    Annamaria England, PT  [place] License # [id number]  Physical Therapist

## 2020-10-06 ENCOUNTER — OFFICE VISIT (OUTPATIENT)
Dept: FAMILY MEDICINE CLINIC | Facility: CLINIC | Age: 80
End: 2020-10-06

## 2020-10-06 ENCOUNTER — TREATMENT (OUTPATIENT)
Dept: PHYSICAL THERAPY | Facility: CLINIC | Age: 80
End: 2020-10-06

## 2020-10-06 VITALS
HEART RATE: 87 BPM | WEIGHT: 181 LBS | HEIGHT: 64 IN | SYSTOLIC BLOOD PRESSURE: 149 MMHG | RESPIRATION RATE: 18 BRPM | TEMPERATURE: 96.9 F | DIASTOLIC BLOOD PRESSURE: 81 MMHG | BODY MASS INDEX: 30.9 KG/M2 | OXYGEN SATURATION: 97 %

## 2020-10-06 DIAGNOSIS — M79.672 BILATERAL FOOT PAIN: Primary | ICD-10-CM

## 2020-10-06 DIAGNOSIS — E55.9 VITAMIN D DEFICIENCY: ICD-10-CM

## 2020-10-06 DIAGNOSIS — E78.2 MIXED HYPERLIPIDEMIA: ICD-10-CM

## 2020-10-06 DIAGNOSIS — Z91.81 RISK FOR FALLS: ICD-10-CM

## 2020-10-06 DIAGNOSIS — R26.2 DIFFICULTY WALKING: ICD-10-CM

## 2020-10-06 DIAGNOSIS — M79.7 FIBROMYALGIA: ICD-10-CM

## 2020-10-06 DIAGNOSIS — Z79.4 TYPE 2 DIABETES MELLITUS WITHOUT COMPLICATION, WITH LONG-TERM CURRENT USE OF INSULIN (HCC): Primary | ICD-10-CM

## 2020-10-06 DIAGNOSIS — E11.42 TYPE 2 DIABETES MELLITUS WITH PERIPHERAL NEUROPATHY (HCC): ICD-10-CM

## 2020-10-06 DIAGNOSIS — I10 ESSENTIAL HYPERTENSION: ICD-10-CM

## 2020-10-06 DIAGNOSIS — Z91.81 HX OF FALLING: ICD-10-CM

## 2020-10-06 DIAGNOSIS — R29.898 LEG WEAKNESS, BILATERAL: ICD-10-CM

## 2020-10-06 DIAGNOSIS — M79.671 BILATERAL FOOT PAIN: Primary | ICD-10-CM

## 2020-10-06 DIAGNOSIS — E11.9 TYPE 2 DIABETES MELLITUS WITHOUT COMPLICATION, WITH LONG-TERM CURRENT USE OF INSULIN (HCC): Primary | ICD-10-CM

## 2020-10-06 DIAGNOSIS — M51.36 DEGENERATION OF INTERVERTEBRAL DISC OF LUMBAR REGION: ICD-10-CM

## 2020-10-06 LAB — HBA1C MFR BLD: 6.7 %

## 2020-10-06 PROCEDURE — 99214 OFFICE O/P EST MOD 30 MIN: CPT | Performed by: FAMILY MEDICINE

## 2020-10-06 PROCEDURE — 83036 HEMOGLOBIN GLYCOSYLATED A1C: CPT | Performed by: FAMILY MEDICINE

## 2020-10-06 PROCEDURE — 97112 NEUROMUSCULAR REEDUCATION: CPT | Performed by: PHYSICAL THERAPIST

## 2020-10-06 PROCEDURE — 97110 THERAPEUTIC EXERCISES: CPT | Performed by: PHYSICAL THERAPIST

## 2020-10-06 RX ORDER — DULOXETINE 40 MG/1
1 CAPSULE, DELAYED RELEASE ORAL DAILY
Qty: 90 CAPSULE | Refills: 0 | Status: SHIPPED | OUTPATIENT
Start: 2020-10-06 | End: 2020-12-09 | Stop reason: SDUPTHER

## 2020-10-06 RX ORDER — LOSARTAN POTASSIUM 50 MG/1
50 TABLET ORAL DAILY
Qty: 90 TABLET | Refills: 1 | Status: SHIPPED | OUTPATIENT
Start: 2020-10-06 | End: 2021-03-22

## 2020-10-06 RX ORDER — DULOXETINE 40 MG/1
CAPSULE, DELAYED RELEASE ORAL
Qty: 30 CAPSULE | Refills: 0 | OUTPATIENT
Start: 2020-10-06

## 2020-10-06 RX ORDER — PEN NEEDLE, DIABETIC 30 GX3/16"
1 NEEDLE, DISPOSABLE MISCELLANEOUS DAILY
Qty: 90 EACH | Refills: 1 | Status: SHIPPED | OUTPATIENT
Start: 2020-10-06 | End: 2021-03-22

## 2020-10-06 RX ORDER — LANCETS 33 GAUGE
1 EACH MISCELLANEOUS
Qty: 100 EACH | Refills: 3 | Status: SHIPPED | OUTPATIENT
Start: 2020-10-06 | End: 2021-12-13

## 2020-10-06 RX ORDER — INSULIN DETEMIR 100 [IU]/ML
35 INJECTION, SOLUTION SUBCUTANEOUS NIGHTLY
Qty: 11 PEN | Refills: 1 | Status: SHIPPED | OUTPATIENT
Start: 2020-10-06 | End: 2020-11-13

## 2020-10-06 RX ORDER — CALCIUM CITRATE/VITAMIN D3 200MG-6.25
TABLET ORAL
Qty: 100 EACH | Refills: 1 | Status: SHIPPED | OUTPATIENT
Start: 2020-10-06 | End: 2021-07-13 | Stop reason: SDUPTHER

## 2020-10-06 RX ORDER — SYRINGE W-NEEDLE,DISPOSAB,3 ML 25GX5/8"
1 SYRINGE, EMPTY DISPOSABLE MISCELLANEOUS
Qty: 12 EACH | Refills: 1 | Status: SHIPPED | OUTPATIENT
Start: 2020-10-06 | End: 2022-12-01

## 2020-10-06 RX ORDER — NYSTATIN 100000 U/G
CREAM TOPICAL AS NEEDED
Qty: 90 G | Refills: 0 | Status: SHIPPED | OUTPATIENT
Start: 2020-10-06

## 2020-10-06 RX ORDER — TOLTERODINE 4 MG/1
4 CAPSULE, EXTENDED RELEASE ORAL DAILY
Qty: 30 CAPSULE | Refills: 1 | Status: SHIPPED | OUTPATIENT
Start: 2020-10-06 | End: 2020-11-02

## 2020-10-06 RX ORDER — PEN NEEDLE, DIABETIC 32GX 5/32"
NEEDLE, DISPOSABLE MISCELLANEOUS
Qty: 100 EACH | Refills: 0 | OUTPATIENT
Start: 2020-10-06

## 2020-10-06 NOTE — PROGRESS NOTES
Physical Therapy Daily Progress Note    VISIT#: 13    Subjective   Monica Pagan reports her feet hurt worse since her last appointment. She thinks it was because she did more standing balance exercises.   Current Pain Level: 5/10    Objective     See Exercise, Manual, and Modality Logs for complete treatment.   Requires CGA/close SBA and minimum 1 UE support during standing activities    Assessment/Plan  Patient did not experience as much discomfort in her feet with her balance challenges today. She did have 2x LOB during cone step overs and diagonal weight shifts on foam pad. She was able to catch herself and safely recover. Patient remains unsteady on her feet but reports improvement on getting around her home and yard.    Progress per Plan of Care     Goals  Plan Goals: STGs in 4 weeks:  Decrease pain to 5-6/10 on average Met  Increase LE ROM by 5-10 degrees where limited as much Partially Met  Pt will demonstrate improved compliance with use of AD & increased safety awareness with transfers within the clinic Partially Met  Assess FTSTS & Crews Balance as able Met    LTGs by discharge  Increase trunk/LE ROM to WFL/WNL Partially Met  Increase LE strength to 4+/5 Partially Met  Pt will be able to ascend/descend curbs/steps with or without use of rail(s) and with minimal difficulty or pain  Not Met  Pt will be able to rise from chair or bed with minimal use of hands and min difficulty  Pt will demonstrate low fall risk or better with Crews balance Progressing  Pt will be able to walk short community distances with least AD for doctor's appointments or visiting her daughter with minimal difficulty or LOB Met  Pt will be able to wash/dress/groom without difficulty or increased pain Not Met  Pt will report no falls in the last 2 weeks prior to D/C Met so far          Timed:           Therapeutic Exercise:    15     mins  79191;     Neuromuscular Clemencia:    27    mins  22480;      Un-Timed:      Timed Treatment:   42    mins   Total Treatment:     42   mins    Sully Talbert, LOCO    Physical Therapist Assistant

## 2020-10-08 ENCOUNTER — TREATMENT (OUTPATIENT)
Dept: PHYSICAL THERAPY | Facility: CLINIC | Age: 80
End: 2020-10-08

## 2020-10-08 DIAGNOSIS — M79.671 BILATERAL FOOT PAIN: Primary | ICD-10-CM

## 2020-10-08 DIAGNOSIS — R29.898 LEG WEAKNESS, BILATERAL: ICD-10-CM

## 2020-10-08 DIAGNOSIS — Z91.81 RISK FOR FALLS: ICD-10-CM

## 2020-10-08 DIAGNOSIS — R26.2 DIFFICULTY WALKING: ICD-10-CM

## 2020-10-08 DIAGNOSIS — Z91.81 HX OF FALLING: ICD-10-CM

## 2020-10-08 DIAGNOSIS — M79.672 BILATERAL FOOT PAIN: Primary | ICD-10-CM

## 2020-10-08 PROCEDURE — 97110 THERAPEUTIC EXERCISES: CPT | Performed by: PHYSICAL THERAPIST

## 2020-10-08 RX ORDER — BUSPIRONE HYDROCHLORIDE 5 MG/1
TABLET ORAL
Qty: 30 TABLET | Refills: 0 | OUTPATIENT
Start: 2020-10-08

## 2020-10-08 NOTE — PROGRESS NOTES
Physical Therapy Daily Progress Note    VISIT#: 14    Subjective   Monica Pagan reports she's getting stronger. Pt still has pain at the top of her feet and her toes still hurt, but she notes she doesn't have to work with them as long before they get moving. Pt states she climbed a ladder yesterday to clean her windows and she realized she shouldn't be doing that. Pt with trouble with walking inclines/declines and with fast turns. Pt states she has to be very aware of what she's doing. Pt did get new tennis shoes which she states feel great and really supportive. Pt thinks she won't even need the arch supports that the doctor gave her because they feel that good. Pt also c/o pain B hands/wrists.     Pain Rating (0-10): 3    Objective     See Exercise, Manual, and Modality Logs for complete treatment.     Patient Education: cues for therex; cautiouned pt about not using walker or AD    Assessment/Plan   Pt did not bring her walker in to PT again. Pt tolerated some increased reps with therex today with mild fatigue, but no c/o pain. Added sidesteps at plinth which was tolerated well. NMR was fairly well tolerated, but with mild increase in foot pain requiring seated rest period.     Progress per Plan of Care and Progress strengthening /stabilization /functional activity            Timed:         Manual Therapy:         mins  54517;     Therapeutic Exercise:   40      mins  79874;     Neuromuscular Clemencia:   8     mins  08000;    Therapeutic Activity:         mins  73439;     Gait Training:           mins  29645;     Ultrasound:          mins  94204;    Ionto                                   mins   01761  Self Care                           mins   01140  Canalith Repos                   mins  08253    Un-Timed:  Electrical Stimulation:         mins  19358 ( );  Dry Needling          mins self-pay  Traction          mins 25872  Low Eval          Mins  67302  Mod Eval          Mins  75245  High Eval                             Mins  45319  Re-Eval                               mins  04053    Timed Treatment:   48   mins   Total Treatment:      48  mins    Annamaria England, PT  [place] License # [id number]  Physical Therapist

## 2020-10-21 ENCOUNTER — TREATMENT (OUTPATIENT)
Dept: PHYSICAL THERAPY | Facility: CLINIC | Age: 80
End: 2020-10-21

## 2020-10-21 DIAGNOSIS — M79.671 BILATERAL FOOT PAIN: Primary | ICD-10-CM

## 2020-10-21 DIAGNOSIS — M79.672 BILATERAL FOOT PAIN: Primary | ICD-10-CM

## 2020-10-21 DIAGNOSIS — Z91.81 HX OF FALLING: ICD-10-CM

## 2020-10-21 DIAGNOSIS — R26.2 DIFFICULTY WALKING: ICD-10-CM

## 2020-10-21 DIAGNOSIS — Z91.81 RISK FOR FALLS: ICD-10-CM

## 2020-10-21 DIAGNOSIS — R29.898 LEG WEAKNESS, BILATERAL: ICD-10-CM

## 2020-10-21 PROCEDURE — 97530 THERAPEUTIC ACTIVITIES: CPT | Performed by: PHYSICAL THERAPIST

## 2020-10-21 PROCEDURE — 97110 THERAPEUTIC EXERCISES: CPT | Performed by: PHYSICAL THERAPIST

## 2020-10-21 NOTE — PROGRESS NOTES
Physical Therapy Daily Progress Note    VISIT#: 15    Subjective   Monica Pagan reports she did fall on Monday night. One of her cats ran out the door and without thinking she ran after it. She did not have her walker or her safety button around her neck. She went down a hill and was [place] edge of [place] and found a hole and fell and hit the side of her head. She said she wasn't strong enough to get herself up so she just started screaming. After awhile some neighbors down the street heard her and came to get her up off the ground. She did not injure herself other than a small cut on her forehead.     She would like to make today her last visit. She states she does everything at home and will continue to do so but she is having a lot of other Dr appointments right now and it's getting to be too much.  Current Pain Level: 0/10     Objective     See Exercise, Manual, and Modality Logs for complete treatment.     Patient Education: cues for there ex    Assessment/Plan  Pt with recent fall due to walking through a field in the dark looking for her cat but no injuries. Reviewed all exercises for HEP and increased her band to red for home. Patient still has occasional LOB and was instructed to hold on counter top during her standing exercises.    Plan: Follow up in 2-3 weeks then DC if patient is doing well with HEP on her own and no more falls.     Goals  Plan Goals: STGs in 4 weeks:  Decrease pain to 5-6/10 on average Met  Increase LE ROM by 5-10 degrees where limited as much Partially Met  Pt will demonstrate improved compliance with use of AD & increased safety awareness with transfers within the clinic Partially Met  Assess FTSTS & Crews Balance as able Met    LTGs by discharge  Increase trunk/LE ROM to WFL/WNL Partially Met  Increase LE strength to 4+/5 Partially Met  Pt will be able to ascend/descend curbs/steps with or without use of rail(s) and with minimal difficulty or pain  Not Met  Pt will be able to  rise from chair or bed with minimal use of hands and min difficulty  Pt will demonstrate low fall risk or better with Crews balance Progressing  Pt will be able to walk short community distances with least AD for doctor's appointments or visiting her daughter with minimal difficulty or LOB Met  Pt will be able to wash/dress/groom without difficulty or increased pain Not Met  Pt will report no falls in the last 2 weeks prior to D/C Met so far          Timed:            Therapeutic Exercise:    26     mins  81610;       Therapeutic Activity:     14     mins  43765;         Un-Timed:      Timed Treatment:   40   mins   Total Treatment:     55   mins    Sully Talbert PTA    Physical Therapist Assistant

## 2020-11-02 ENCOUNTER — TELEPHONE (OUTPATIENT)
Dept: FAMILY MEDICINE CLINIC | Facility: CLINIC | Age: 80
End: 2020-11-02

## 2020-11-02 NOTE — TELEPHONE ENCOUNTER
Patient called stating that the Tolterodine caused her BP to go up so she stopped taking it and then she felt better and her BP is okay.

## 2020-11-11 ENCOUNTER — TREATMENT (OUTPATIENT)
Dept: PHYSICAL THERAPY | Facility: CLINIC | Age: 80
End: 2020-11-11

## 2020-11-11 DIAGNOSIS — Z91.81 HX OF FALLING: ICD-10-CM

## 2020-11-11 DIAGNOSIS — M79.672 BILATERAL FOOT PAIN: Primary | ICD-10-CM

## 2020-11-11 DIAGNOSIS — M79.671 BILATERAL FOOT PAIN: Primary | ICD-10-CM

## 2020-11-11 DIAGNOSIS — R29.898 LEG WEAKNESS, BILATERAL: ICD-10-CM

## 2020-11-11 DIAGNOSIS — Z91.81 RISK FOR FALLS: ICD-10-CM

## 2020-11-11 DIAGNOSIS — R26.2 DIFFICULTY WALKING: ICD-10-CM

## 2020-11-11 PROCEDURE — 97110 THERAPEUTIC EXERCISES: CPT | Performed by: PHYSICAL THERAPIST

## 2020-11-11 PROCEDURE — 97112 NEUROMUSCULAR REEDUCATION: CPT | Performed by: PHYSICAL THERAPIST

## 2020-11-11 PROCEDURE — 97530 THERAPEUTIC ACTIVITIES: CPT | Performed by: PHYSICAL THERAPIST

## 2020-11-11 NOTE — PROGRESS NOTES
Physical Therapy Re-Evaluation/Re-Certification    Patient: Monica Pagan   : 1940  Diagnosis/ICD-10 Code:  Bilateral foot pain [M79.671, M79.672]  Referring practitioner: SERENA Ring DPM  Date of Initial Visit: 2020  Today's Date: 2020  Patient seen for 16 sessions    Subjective Questionnaire: Current: FAAM  = 44.04% functional ability/55.96% limited    From Eval: FAAM  = 36.90% functional ability/63.1% limited      Subjective   Monica Pagan reports she fell again 2 days ago while carrying cardboard boxes in her living room. Pt is unsure what made her fall, but she thinks she turned to her side and fell on her L side. Pt wanted to D/C today, but notes she will continue if we think she should, but will continue if we think she should as well. Pt is doing exs at home in sitting. Pt notes she didn't sit down at all yesterday.     Pt still with difficulty climbing ladders and standing in the shower. Pt states her son put rails in the shower and pt has a seat, but she notes she can't use it. Pt prefers baths. Mornings are better, but later in the day/nights she becomes achy or painful. Pt notes she has a walker at home and has one for the car, but she uses mostly for when she goes longer distances. Pt has to go grocery shopping after PT today. Pt does feel the pain in the feet has reduced overall, but is still painful.     Pain Rating (0-10): 0 current/best; 4 on average; 8 at worst    Clinical Progress: some improvement  Home Exercise Compliance: yes, partial mostly with seated exs  Treatment has included: therex, theract, NMR, gait training    Objective     AROM (in degrees):  LEs grossly WFL  Hip ER 25 L  Knee ext lag 10 L  DF 7 L/12 R (knees over PT's leg in sitting)     Strength:   Hip flex 4 L/4+ R  Hip IR 4+  Knee ext 4+ L/5 R  Knee flex 4+ L/4 R    Gait: has RW, but walking without AD today; wide KEVIN, shuffling walk scuffing feet on ground with reduced DF, reduced hip  flex/ext/TKE, gt speed improved since eval, but still somewhat reduced     Balance:   GARCIA 47/56 = 83.92% ability/16.08% limited = low risk of falling  TUGT 18.5 s (avg 2 trials) with out walker with PT close S/SBA  FTSTS = 19 s    See Exercise, Manual, and Modality Logs for complete treatment.     Patient Education: cues for therex; discussed continuing PT & use of RW at all times due to fall hx/fall risk    Assessment/Plan Pt is demonstrating improvements in mobility, strength, balance, gait and function overall. Deferred cuff wts today due to recert and pt going to the store after PT today. Did have pt perform MRE with marches and heel/toe raises for some added resistance. Pt's average pain is reduced and she currently has no pain which was 5-6 at the eval. Pt has met or partially met all except one goal which was regarding falls. Pt has had 2 recent falls. Pt also lacks safety awareness and is not using her RW at all times. This was discussed with pt today.     Goals  Plan Goals: STGs in 4 weeks:  Decrease pain to 5-6/10 on average Met  Increase LE ROM by 5-10 degrees where limited as much Partially Met (did not formally test gt toe flex)  Pt will demonstrate improved compliance with use of AD & increased safety awareness with transfers within the clinic Partially Met  - pt coming into clinic with out walker   Assess FTSTS & Garcia Balance as able Met    LTGs by discharge  Increase trunk/LE ROM to WFL/WNL Partially Met  Increase LE strength to 4+/5 Partially Met  Pt will be able to ascend/descend curbs/steps with or without use of rail(s) and with minimal difficulty or pain Partially Met  Pt will be able to rise from chair or bed with minimal use of hands and min difficulty Met  Pt will demonstrate low fall risk or better with Garcia balance Met  Pt will be able to walk short community distances with least AD for doctor's appointments or visiting her daughter with minimal difficulty or LOB Partially Met  Pt will be  able to wash/dress/groom without difficulty or increased pain Partially Met  Pt will report no falls in the last 2 weeks prior to D/C Not met    New Goals added 11/11/20:   Decrease FTSTS to 13 s or better by D/C  Increase GARCIA to 50 or better by D/C    Plan  Therapy options: will be seen for skilled physical therapy services  Planned therapy interventions: manual therapy, neuromuscular re-education, postural training, soft tissue mobilization, spinal/joint mobilization, strengthening, stretching, therapeutic activities, transfer training, abdominal trunk stabilization, ADL retraining, body mechanics training, home exercise program, gait training, functional ROM exercises, flexibility, motor coordination training, balance/weight-bearing training, joint mobilization and IADL retraining  Treatment plan discussed with: patient    Recommendation: continue with skilled PT for 20 visits  Duration: 3 months  Prognosis to achieve goals: fair/good    Annamaria England, PT  [place] License # [id number]  Physical Therapist    Based upon review of the patient's progress and continued therapy plan, it is my medical opinion that Monica Pagan should continue physical therapy treatment at [facility]  [address]  [place] [address] [phone] Phone  [phone] Fax    Signature:                                                                SERENA Ring DPM        Timed:         Manual Therapy:         mins  50282;     Therapeutic Exercise:    14     mins  89628;     Neuromuscular Clemencia:   8     mins  00286;    Therapeutic Activity:    23      mins  39347;     Gait Training:           mins  47046;     Ultrasound:         mins  69050;    Ionto                                   mins   13903  Self Care                            mins   03390  Canalith Repos                   mins  79646    Un-Timed:  Electrical Stimulation:         mins  21271 ( );  Dry  Needling          mins self-pay  Traction          mins 11600  Low Eval          Mins  38938  Mod Eval          Mins  57459  High Eval                           Mins  15560  Re-Eval                               mins  29519    Timed Treatment:  45    mins   Total Treatment:     45   mins

## 2020-11-13 DIAGNOSIS — E11.42 TYPE 2 DIABETES MELLITUS WITH PERIPHERAL NEUROPATHY (HCC): ICD-10-CM

## 2020-11-13 RX ORDER — INSULIN DETEMIR 100 [IU]/ML
INJECTION, SOLUTION SUBCUTANEOUS
Qty: 4 PEN | Refills: 2 | Status: SHIPPED | OUTPATIENT
Start: 2020-11-13 | End: 2021-02-25

## 2020-11-17 DIAGNOSIS — F51.04 PSYCHOPHYSIOLOGICAL INSOMNIA: ICD-10-CM

## 2020-11-17 RX ORDER — ESZOPICLONE 3 MG/1
TABLET, FILM COATED ORAL
Qty: 90 TABLET | Refills: 0 | Status: SHIPPED | OUTPATIENT
Start: 2020-11-17 | End: 2021-02-08

## 2020-11-18 ENCOUNTER — TREATMENT (OUTPATIENT)
Dept: PHYSICAL THERAPY | Facility: CLINIC | Age: 80
End: 2020-11-18

## 2020-11-18 DIAGNOSIS — Z91.81 HX OF FALLING: ICD-10-CM

## 2020-11-18 DIAGNOSIS — Z91.81 RISK FOR FALLS: ICD-10-CM

## 2020-11-18 DIAGNOSIS — M79.671 BILATERAL FOOT PAIN: Primary | ICD-10-CM

## 2020-11-18 DIAGNOSIS — R29.898 LEG WEAKNESS, BILATERAL: ICD-10-CM

## 2020-11-18 DIAGNOSIS — M79.672 BILATERAL FOOT PAIN: Primary | ICD-10-CM

## 2020-11-18 DIAGNOSIS — R26.2 DIFFICULTY WALKING: ICD-10-CM

## 2020-11-18 PROCEDURE — 97110 THERAPEUTIC EXERCISES: CPT | Performed by: PHYSICAL THERAPIST

## 2020-11-18 PROCEDURE — 97530 THERAPEUTIC ACTIVITIES: CPT | Performed by: PHYSICAL THERAPIST

## 2020-11-18 NOTE — PROGRESS NOTES
Physical Therapy Daily Progress Note    VISIT#: 17    Subjective   Monica Pagan reports the tops of her feet have been painful and will swell when she does a lot of activity. She did a lot of cleaning around her house yesterday and her legs are very sore and tired this morning. States she is probably going to hire help to start cleaning her house because it is getting too difficult for her.  Current Pain Level: did not rate this date    Objective   No visible swelling seen in either foot this date.    See Exercise, Manual, and Modality Logs for complete treatment.     Patient Education: no new exercises added this date. Encouraged to keep doing her HEP at home.    Assessment/Plan  Patient remains unsteady with balance activities and requires Min A to recover. LE's fatigue quickly with standing activities. No falls reported over the last few days.    Progress per Plan of Care    Goals  Plan Goals: STGs in 4 weeks:  Decrease pain to 5-6/10 on average Met  Increase LE ROM by 5-10 degrees where limited as much Partially Met (did not formally test gt toe flex)  Pt will demonstrate improved compliance with use of AD & increased safety awareness with transfers within the clinic Partially Met  - pt coming into clinic with out walker   Assess FTSTS & Crews Balance as able Met    LTGs by discharge  Increase trunk/LE ROM to WFL/WNL Partially Met  Increase LE strength to 4+/5 Partially Met  Pt will be able to ascend/descend curbs/steps with or without use of rail(s) and with minimal difficulty or pain Partially Met  Pt will be able to rise from chair or bed with minimal use of hands and min difficulty Met  Pt will demonstrate low fall risk or better with Crews balance Met  Pt will be able to walk short community distances with least AD for doctor's appointments or visiting her daughter with minimal difficulty or LOB Partially Met  Pt will be able to wash/dress/groom without difficulty or increased pain Partially Met  Pt will  report no falls in the last 2 weeks prior to D/C Not met     New Goals added 11/11/20:   Decrease FTSTS to 13 s or better by D/C  Increase GARCIA to 50 or better by D/C          Timed:            Therapeutic Exercise:    26     mins  20696;       Therapeutic Activity:     13     mins  99049;         Un-Timed:        Timed Treatment:   39   mins   Total Treatment:     39   mins    Sully Talbert, PTA    Physical Therapist Assistant

## 2020-12-01 ENCOUNTER — TELEPHONE (OUTPATIENT)
Dept: FAMILY MEDICINE CLINIC | Facility: CLINIC | Age: 80
End: 2020-12-01

## 2020-12-01 NOTE — TELEPHONE ENCOUNTER
1) Pt's BP was running really high 195/105 for past few days, but she feels it was related to stress and she had lost her sleeping medicine. She now has her sleeping medicine and her BP is now 165/83.  She still feels a little jerky and shaky, but thinks that is what it is.  She was not sleeping at all.  She finally slept last night and is starting to feel better.    2) Her daughter was possibly exposed to COVID at [employer], she had been around her daughter a lot last week and she was worried and stressed about that and wanted to know if she needed tested, however the [facility] mgr told her today that her daughter was not exposed.  She wants to know if you would advise her to get tested anyway?

## 2020-12-09 RX ORDER — DULOXETINE 40 MG/1
CAPSULE, DELAYED RELEASE ORAL
Qty: 90 CAPSULE | Refills: 0 | OUTPATIENT
Start: 2020-12-09

## 2020-12-09 RX ORDER — ROSUVASTATIN CALCIUM 5 MG/1
TABLET, COATED ORAL
Qty: 90 TABLET | Refills: 1 | OUTPATIENT
Start: 2020-12-09

## 2020-12-09 RX ORDER — DULOXETINE 40 MG/1
1 CAPSULE, DELAYED RELEASE ORAL DAILY
Qty: 90 CAPSULE | Refills: 0 | Status: SHIPPED | OUTPATIENT
Start: 2020-12-09 | End: 2021-03-22

## 2020-12-09 NOTE — TELEPHONE ENCOUNTER
Last visit:  10/6/20  Next visit: none  Last labs: 10/6/20    Rx requested: crestor,verapamil,duloxetine   Pharmacy: [facility]

## 2020-12-17 ENCOUNTER — DOCUMENTATION (OUTPATIENT)
Dept: PHYSICAL THERAPY | Facility: CLINIC | Age: 80
End: 2020-12-17

## 2020-12-17 NOTE — PROGRESS NOTES
Discharge Summary  Discharge Summary from Physical Therapy Report      Dates  PT visit: 8/14/20 - 11/18/20  Number of Visits: 17    Discharge Status of Patient: Our office contacted pt and she reported she wanted to be discharged because she was taking care of her daughter who has a lot of medical issues     Goals: per last reassessment, pt had met 4 goals and partially met 7 goals with 1 goal unmet    Discharge Plan: no specific D/C instr were given as pt never returned to PT after 11/18.    Comments: pt was given HEP during sessions    Date of Discharge: 12/17/20      Annamaria England, PT  Physical Therapist  [place] Lic# [id number]

## 2020-12-22 RX ORDER — TOLTERODINE 4 MG/1
CAPSULE, EXTENDED RELEASE ORAL
Qty: 30 CAPSULE | Refills: 0 | OUTPATIENT
Start: 2020-12-22

## 2020-12-22 RX ORDER — DULOXETINE 40 MG/1
CAPSULE, DELAYED RELEASE ORAL
Qty: 90 CAPSULE | Refills: 0 | OUTPATIENT
Start: 2020-12-22

## 2020-12-23 ENCOUNTER — CLINICAL SUPPORT (OUTPATIENT)
Dept: FAMILY MEDICINE CLINIC | Facility: CLINIC | Age: 80
End: 2020-12-23

## 2020-12-23 DIAGNOSIS — E11.42 TYPE 2 DIABETES MELLITUS WITH PERIPHERAL NEUROPATHY (HCC): Primary | ICD-10-CM

## 2020-12-23 DIAGNOSIS — E78.2 MIXED HYPERLIPIDEMIA: ICD-10-CM

## 2020-12-23 DIAGNOSIS — I10 ESSENTIAL HYPERTENSION: ICD-10-CM

## 2020-12-23 DIAGNOSIS — E55.9 VITAMIN D DEFICIENCY: ICD-10-CM

## 2020-12-23 LAB — HBA1C MFR BLD: 6.5 % (ref 3.5–5.6)

## 2020-12-23 PROCEDURE — 80053 COMPREHEN METABOLIC PANEL: CPT | Performed by: FAMILY MEDICINE

## 2020-12-23 PROCEDURE — 83036 HEMOGLOBIN GLYCOSYLATED A1C: CPT | Performed by: FAMILY MEDICINE

## 2020-12-23 PROCEDURE — 82043 UR ALBUMIN QUANTITATIVE: CPT | Performed by: FAMILY MEDICINE

## 2020-12-23 PROCEDURE — 36415 COLL VENOUS BLD VENIPUNCTURE: CPT | Performed by: FAMILY MEDICINE

## 2020-12-23 PROCEDURE — 82306 VITAMIN D 25 HYDROXY: CPT | Performed by: FAMILY MEDICINE

## 2020-12-23 PROCEDURE — 80061 LIPID PANEL: CPT | Performed by: FAMILY MEDICINE

## 2020-12-24 LAB
25(OH)D3 SERPL-MCNC: 56.5 NG/ML (ref 30–100)
ALBUMIN SERPL-MCNC: 3.8 G/DL (ref 3.5–5.2)
ALBUMIN UR-MCNC: <1.2 MG/DL
ALBUMIN/GLOB SERPL: 1.3 G/DL
ALP SERPL-CCNC: 72 U/L (ref 39–117)
ALT SERPL W P-5'-P-CCNC: 16 U/L (ref 1–33)
ANION GAP SERPL CALCULATED.3IONS-SCNC: 8.5 MMOL/L (ref 5–15)
AST SERPL-CCNC: 16 U/L (ref 1–32)
BILIRUB SERPL-MCNC: 0.4 MG/DL (ref 0–1.2)
BUN SERPL-MCNC: 17 MG/DL (ref 8–23)
BUN/CREAT SERPL: 18.3 (ref 7–25)
CALCIUM SPEC-SCNC: 10.1 MG/DL (ref 8.6–10.5)
CHLORIDE SERPL-SCNC: 102 MMOL/L (ref 98–107)
CHOLEST SERPL-MCNC: 122 MG/DL (ref 0–200)
CO2 SERPL-SCNC: 28.5 MMOL/L (ref 22–29)
CREAT SERPL-MCNC: 0.93 MG/DL (ref 0.57–1)
GFR SERPL CREATININE-BSD FRML MDRD: 58 ML/MIN/1.73
GLOBULIN UR ELPH-MCNC: 3 GM/DL
GLUCOSE SERPL-MCNC: 118 MG/DL (ref 65–99)
HDLC SERPL-MCNC: 51 MG/DL (ref 40–60)
LDLC SERPL CALC-MCNC: 50 MG/DL (ref 0–100)
LDLC/HDLC SERPL: 0.93 {RATIO}
POTASSIUM SERPL-SCNC: 4.8 MMOL/L (ref 3.5–5.2)
PROT SERPL-MCNC: 6.8 G/DL (ref 6–8.5)
SODIUM SERPL-SCNC: 139 MMOL/L (ref 136–145)
TRIGL SERPL-MCNC: 118 MG/DL (ref 0–150)
VLDLC SERPL-MCNC: 21 MG/DL (ref 5–40)

## 2020-12-31 RX ORDER — TOLTERODINE 4 MG/1
CAPSULE, EXTENDED RELEASE ORAL
Qty: 30 CAPSULE | Refills: 0 | OUTPATIENT
Start: 2020-12-31

## 2021-01-04 NOTE — TELEPHONE ENCOUNTER
Last visit:  10/6/20  Next visit: none  Last labs: 12/23/20    Rx requested:verapamil  Pharmacy: [facility]

## 2021-01-05 RX ORDER — ROSUVASTATIN CALCIUM 5 MG/1
5 TABLET, COATED ORAL DAILY
Qty: 90 TABLET | Refills: 1 | Status: SHIPPED | OUTPATIENT
Start: 2021-01-05 | End: 2021-03-30

## 2021-01-05 RX ORDER — ROSUVASTATIN CALCIUM 5 MG/1
5 TABLET, COATED ORAL DAILY
Qty: 90 TABLET | Refills: 1 | Status: CANCELLED | OUTPATIENT
Start: 2021-01-05

## 2021-01-05 NOTE — TELEPHONE ENCOUNTER
Pt states she hasn't been taking it much lately. Having periodic anxiety due to stress - daughter is having sx tomorrow.     153/85 hr 74 - most recent 2 wks ago  148/82 hr 79  150/79 hr 80  141/83 hr 80  150/87 hr 86  134/77 hr 81    Also states she needs refill of Crestor - [facility]

## 2021-01-07 RX ORDER — TOLTERODINE 4 MG/1
CAPSULE, EXTENDED RELEASE ORAL
COMMUNITY
End: 2021-01-07 | Stop reason: SDUPTHER

## 2021-01-07 RX ORDER — TOLTERODINE 4 MG/1
4 CAPSULE, EXTENDED RELEASE ORAL DAILY
Qty: 90 CAPSULE | Refills: 0 | Status: SHIPPED | OUTPATIENT
Start: 2021-01-07 | End: 2021-03-12

## 2021-01-07 NOTE — TELEPHONE ENCOUNTER
Patient called requesting the refill on the generic Detrol LA 4mg, looks like it was sent in in 10/2020 for 30/1 but was taken off the list at the same time and not added back on. Was she suppose to stop taking it?

## 2021-01-08 ENCOUNTER — TELEPHONE (OUTPATIENT)
Dept: FAMILY MEDICINE CLINIC | Facility: CLINIC | Age: 81
End: 2021-01-08

## 2021-01-08 NOTE — TELEPHONE ENCOUNTER
Patient was notified.     She also said that she had to reschedule her colonoscopy for 2/16/21 because of transportation issues.

## 2021-01-08 NOTE — TELEPHONE ENCOUNTER
Patient called back again about this:    Patient called requesting the refill on the generic Detrol LA 4mg, looks like it was sent in in 10/2020 for 30/1 but was taken off the list at the same time and not added back on. Was she suppose to stop taking it?

## 2021-01-22 ENCOUNTER — OFFICE VISIT (OUTPATIENT)
Dept: FAMILY MEDICINE CLINIC | Facility: CLINIC | Age: 81
End: 2021-01-22

## 2021-01-22 VITALS
DIASTOLIC BLOOD PRESSURE: 79 MMHG | BODY MASS INDEX: 30.39 KG/M2 | HEART RATE: 90 BPM | SYSTOLIC BLOOD PRESSURE: 118 MMHG | HEIGHT: 64 IN | OXYGEN SATURATION: 96 % | WEIGHT: 178 LBS | TEMPERATURE: 97.5 F | RESPIRATION RATE: 16 BRPM

## 2021-01-22 DIAGNOSIS — R21 RASH AND NONSPECIFIC SKIN ERUPTION: Primary | ICD-10-CM

## 2021-01-22 PROBLEM — Q14.1 RETINAL PIGMENTATION: Status: ACTIVE | Noted: 2021-01-11

## 2021-01-22 PROCEDURE — 99213 OFFICE O/P EST LOW 20 MIN: CPT | Performed by: FAMILY MEDICINE

## 2021-01-22 RX ORDER — ALBUTEROL SULFATE 90 UG/1
2 AEROSOL, METERED RESPIRATORY (INHALATION) EVERY 6 HOURS PRN
Qty: 18 G | Refills: 0 | Status: SHIPPED | OUTPATIENT
Start: 2021-01-22 | End: 2022-10-31

## 2021-01-22 RX ORDER — VALACYCLOVIR HYDROCHLORIDE 1 G/1
1000 TABLET, FILM COATED ORAL 3 TIMES DAILY
Qty: 21 TABLET | Refills: 0 | Status: SHIPPED | OUTPATIENT
Start: 2021-01-22 | End: 2021-05-19

## 2021-01-22 RX ORDER — TRIAMCINOLONE ACETONIDE 1 MG/G
CREAM TOPICAL 2 TIMES DAILY
Qty: 45 G | Refills: 0 | Status: SHIPPED | OUTPATIENT
Start: 2021-01-22

## 2021-01-25 RX ORDER — VALACYCLOVIR HYDROCHLORIDE 1 G/1
TABLET, FILM COATED ORAL
Qty: 21 TABLET | Refills: 0 | OUTPATIENT
Start: 2021-01-25

## 2021-02-08 DIAGNOSIS — E55.9 VITAMIN D DEFICIENCY: ICD-10-CM

## 2021-02-08 DIAGNOSIS — F51.04 PSYCHOPHYSIOLOGICAL INSOMNIA: ICD-10-CM

## 2021-02-08 RX ORDER — ERGOCALCIFEROL 1.25 MG/1
50000 CAPSULE ORAL
Qty: 8 CAPSULE | Refills: 0 | Status: SHIPPED | OUTPATIENT
Start: 2021-02-08 | End: 2021-04-19

## 2021-02-08 RX ORDER — ESZOPICLONE 3 MG/1
TABLET, FILM COATED ORAL
Qty: 90 TABLET | Refills: 0 | Status: SHIPPED | OUTPATIENT
Start: 2021-02-08 | End: 2021-05-24

## 2021-02-08 NOTE — TELEPHONE ENCOUNTER
Last visit:  1/22/21  Next visit: none  Last labs: 12/23/20    Rx requested: Lunesta,Vitamin D  Pharmacy: [facility] in [place]

## 2021-02-23 ENCOUNTER — OFFICE VISIT (OUTPATIENT)
Dept: PODIATRY | Facility: CLINIC | Age: 81
End: 2021-02-23

## 2021-02-23 VITALS
HEART RATE: 86 BPM | DIASTOLIC BLOOD PRESSURE: 73 MMHG | SYSTOLIC BLOOD PRESSURE: 129 MMHG | HEIGHT: 64 IN | WEIGHT: 178 LBS | BODY MASS INDEX: 30.39 KG/M2

## 2021-02-23 DIAGNOSIS — E11.42 DM TYPE 2 WITH DIABETIC PERIPHERAL NEUROPATHY (HCC): Primary | ICD-10-CM

## 2021-02-23 DIAGNOSIS — B35.1 ONYCHOMYCOSIS: ICD-10-CM

## 2021-02-23 PROCEDURE — 99213 OFFICE O/P EST LOW 20 MIN: CPT | Performed by: PODIATRIST

## 2021-02-23 NOTE — PROGRESS NOTES
02/23/2021  Foot and Ankle Surgery - Established Patient/Follow-up  Provider: Dr. Sean Ring DPM  Location: [facility]    Subjective:  Monica Pagan is a 80 y.o. female.     Chief Complaint   Patient presents with   • Left Foot - Follow-up   • Right Foot - Follow-up       HPI: Patient returns for diabetic foot check.  She states that she has been doing relatively well since last exam.  She has not had any new issues with her feet.  She feels that the glycemic control appears to be doing quite well.  She has not had any open wounds or infections.    Allergies   Allergen Reactions   • Amoxicillin Diarrhea     Diarrhea, stomach pain    • Cortisone Other (See Comments)   • Erythromycin Diarrhea   • Gabapentin    • Januvia [Sitagliptin] Nausea Only   • Levofloxacin Diarrhea   • Lipitor [Atorvastatin] Myalgia   • Nsaids GI Intolerance   • Pravastatin Other (See Comments)     Arthralgia   • Pregabalin Other (See Comments)     fogginess   • Sulfa Antibiotics Nausea Only   • Theophylline    • Compazine  [Prochlorperazine Edisylate] Anxiety     restless   • Prochlorperazine Anxiety     restless       Current Outpatient Medications on File Prior to Visit   Medication Sig Dispense Refill   • albuterol sulfate  (90 Base) MCG/ACT inhaler Inhale 2 puffs Every 6 (Six) Hours As Needed for Wheezing or Shortness of Air. 18 g 0   • BD PEN NEEDLE JENNIFER U/F 32G X 4 MM misc USE AS DIRECTED TWICE DAILY 100 each 0   • Blood Glucose Monitoring Suppl (TRUE METRIX METER) w/Device kit USE UTD  0   • Blood Glucose Monitoring Suppl (True Metrix Meter) w/Device kit 1 kit Daily. 1 kit 0   • cyanocobalamin 1000 MCG/ML injection INJECT 1ML UNDER THE SKIN EVERY 30 DAYS 3 mL 3   • Docusate Calcium (STOOL SOFTENER PO) Take  by mouth. 2 po qd     • DULoxetine HCl 40 MG capsule delayed-release particles Take 1 capsule by mouth Daily. 90 capsule 0   • eszopiclone (LUNESTA) 3 MG tablet TAKE ONE TABLET BY MOUTH EVERY NIGHT AT BEDTIME  "IMMEDIATELY BEFORE BEDTIME AS NEEDED FOR SLEEP 90 tablet 0   • glucose blood (True Metrix Blood Glucose Test) test strip Dx code E11.42 testing bs 1 x day 100 each 1   • HYDROcodone-acetaminophen (NORCO) 7.5-325 MG per tablet 1 tablet 2 (Two) Times a Day As Needed.  0   • hydrocortisone 2.5 % cream      • Insulin Pen Needle (Pen Needles) 32G X 4 MM misc 1 package Daily. 90 each 1   • Lancets 30G misc Enhance talking meter /testing bs 1 x day  Dx code  E11.42 100 each 1   • Lancets Micro Thin 33G misc 1 package 3 (Three) Times a Day Before Meals. 100 each 3   • Levemir FlexTouch 100 UNIT/ML injection INJECT 35 UNITS            SUBCUTANEOUSLY INTO THE    APPROPRIATE AREA AS        DIRECTED EVERY NIGHT 4 pen 2   • losartan (COZAAR) 50 MG tablet Take 1 tablet by mouth Daily. 90 tablet 1   • metFORMIN (GLUCOPHAGE) 1000 MG tablet Take 1 tablet by mouth 2 (Two) Times a Day With Meals. 180 tablet 1   • nystatin (MYCOSTATIN) 803487 UNIT/GM cream Apply  topically to the appropriate area as directed As Needed (groin rash). 90 g 0   • polyethylene glycol (MIRALAX) packet Take 17 g by mouth Daily.     • rosuvastatin (CRESTOR) 5 MG tablet Take 1 tablet by mouth Daily. 90 tablet 1   • Syringe 25G X 1\" 3 ML misc Inject 1 each into the appropriate muscle as directed by prescriber Every 28 (Twenty-Eight) Days. 12 each 1   • tolterodine LA (DETROL LA) 4 MG 24 hr capsule Take 1 capsule by mouth Daily. 90 capsule 0   • triamcinolone (KENALOG) 0.1 % cream Apply  topically to the appropriate area as directed 2 (Two) Times a Day. 45 g 0   • valACYclovir (Valtrex) 1000 MG tablet Take 1 tablet by mouth 3 (Three) Times a Day. 21 tablet 0   • verapamil SR (CALAN-SR) 120 MG CR tablet TAKE 1 TABLET EVERY NIGHT 90 tablet 0   • vitamin D (ERGOCALCIFEROL) 1.25 MG (98622 UT) capsule capsule Take 1 capsule by mouth Every 14 (Fourteen) Days. 8 capsule 0     No current facility-administered medications on file prior to visit.        Objective   BP " "129/73   Pulse 86   Ht 162.6 cm (64\")   Wt 80.7 kg (178 lb)   BMI 30.55 kg/m²     General:   Diabetic Foot Exam Performed    Appearance: appears stated age and healthy    Orientation: AAOx3    Affect: appropriate        Right Foot/Ankle:       Vascular   Normal vascular exam    Dorsalis pedis:  2+  Posterior tibial:  2+  Skin Temperature: warm    Edema Grading:  None  CFT:  < 3 seconds  Pedal Hair Growth:  Present  Varicosities: mild varicosities        Neurologic   Light touch sensation:  Normal  Hot/Cold sensation: normal    Protective Sensation using [place] Monofilament:  10  Achilles reflex:  2+      Musculoskeletal   Ecchymosis:  None  Tenderness: none    Arch:  Normal  Hammertoe:  Second toe, third toe, fourth toe and fifth toe (Reducible)      Muscle Strength   Normal strength    Foot dorsiflexion:  5  Foot plantar flexion:  5  Foot inversion:  5  Foot eversion:  5      Range of Motion   Foot and ankle ROM within normal limits        Dermatologic   Skin: skin intact    Nails: onychomycosis, abnormally thick and dystrophic nails        Additional Comments Moderate soft tissue rigidity.  No pain with palpation.      Left Foot/Ankle:       Vascular   Normal vascular exam    Dorsalis pedis:  2+  Posterior tibial:  2+  Skin Temperature: warm    Edema Grading:  None  CFT:  < 3 seconds  Pedal Hair Growth:  Present  Varicosities: mild varicosities        Neurologic   Light touch sensation:  Normal  Hot/cold sensation: normal    Protective Sensation using [place] Monofilament:  10  Achilles reflex:  2+      Musculoskeletal    Amputation   Toes amputated: first toe  Ecchymosis:  None  Tenderness: arch and dorsal foot    Arch:  Normal  Hammertoe:  Second toe, third toe, fourth toe and fifth toe (Reducible)      Muscle Strength   Normal strength    Foot dorsiflexion:  5  Foot plantar flexion:  5  Foot inversion:  5  Foot eversion:  5      Dermatologic   Skin: skin " intact    Nails: onychomycosis, abnormally thick and dystrophic nails        Additional Comments: Bony prominence noted to the dorsal midfoot.  Discomfort and limitation with range of motion present.  Moderate soft tissue rigidity    Assessment/Plan   Diagnoses and all orders for this visit:    1. 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 Onychomycosis      Physical exam is unchanged as compared to previous assessment.  She denies any new issues.  She has no open wounds or signs of infection.  I did continue to feel that her overall diabetic foot risk is minimal.  I would like her to continue to monitor the feet on a daily basis and call with any issues.  I will see her in 6 months for routine foot check.    No orders of the defined types were placed in this encounter.         Note is dictated utilizing voice recognition software. Unfortunately this leads to occasional typographical errors. I apologize in advance if the situation occurs. If questions occur please do not hesitate to call our office.

## 2021-02-25 DIAGNOSIS — E11.42 TYPE 2 DIABETES MELLITUS WITH PERIPHERAL NEUROPATHY (HCC): ICD-10-CM

## 2021-02-25 RX ORDER — INSULIN DETEMIR 100 [IU]/ML
INJECTION, SOLUTION SUBCUTANEOUS
Qty: 15 ML | Refills: 0 | Status: SHIPPED | OUTPATIENT
Start: 2021-02-25 | End: 2021-04-12

## 2021-03-01 ENCOUNTER — OFFICE VISIT (OUTPATIENT)
Dept: ORTHOPEDIC SURGERY | Facility: CLINIC | Age: 81
End: 2021-03-01

## 2021-03-01 VITALS
BODY MASS INDEX: 30.56 KG/M2 | HEIGHT: 64 IN | DIASTOLIC BLOOD PRESSURE: 85 MMHG | HEART RATE: 90 BPM | WEIGHT: 179 LBS | SYSTOLIC BLOOD PRESSURE: 148 MMHG

## 2021-03-01 DIAGNOSIS — M25.531 BILATERAL WRIST PAIN: Primary | ICD-10-CM

## 2021-03-01 DIAGNOSIS — M25.532 BILATERAL WRIST PAIN: Primary | ICD-10-CM

## 2021-03-01 PROCEDURE — 20612 ASPIRATE/INJ GANGLION CYST: CPT | Performed by: ORTHOPAEDIC SURGERY

## 2021-03-01 PROCEDURE — 99203 OFFICE O/P NEW LOW 30 MIN: CPT | Performed by: ORTHOPAEDIC SURGERY

## 2021-03-01 NOTE — PROGRESS NOTES
Patient ID: Monica Pagna is a 80 y.o. female.    Chief Complaint:    Chief Complaint   Patient presents with   • Left Wrist - Consult   • Right Wrist - Consult       HPI:  Monica is a 80-year-old female here with longstanding bilateral wrist pain. She has a history of ganglion cyst excision x2 on each wrist, most recently about 30 years ago. The left wrist has been quite symptomatic and painful especially at night. She has recurrence of the cyst. There is mild recurrence on the right but symptoms are more manageable  Past Medical History:   Diagnosis Date   • Anemia    • Anxiety    • Coccyx pain    • Colon polyp     TA   • DDD  lumbar    • DMII    • Fibromyalgia    • Hyperlipidemia    • Hypertension    • IBS    • Insomnia    • Lumbar spinal stenosis    • MAMMO    • Meniere's disease    • Nontoxic thyroid nodule    • Ocular histoplasmosis    • Osteoarthritis    • Peripheral neuropathy    • PVC    • Spondylosis    • Tinnitus    • Tremor    • Trigeminal neuralgia    • Vitamin D deficiency        Past Surgical History:   Procedure Laterality Date   • AMPUTATION DIGIT Left     Digit #1   • APPENDECTOMY  1962   • BUNIONECTOMY Bilateral    • CATARACT EXTRACTION      left   • CATARACT EXTRACTION      x2   • COLONOSCOPY  2012    NEG = 2012, rech 2017     • CYST REMOVAL Bilateral     WRIST   • HYSTERECTOMY  1980   • JOINT REPLACEMENT      Rt TKR x 2   • JOINT REPLACEMENT      Left THR   • LAPAROSCOPIC CHOLECYSTECTOMY      DR. LOBATO   • SPINE SURGERY      Lumbar Fusion   • SUBTOTAL HYSTERECTOMY         Family History   Problem Relation Age of Onset   • Asthma Mother    • COPD Mother    • Heart disease Father    • Alcohol abuse Father    • Other Father         WWII Gangrene/ Malaria   • Arthritis Sister    • Heart attack Brother    • Alzheimer's disease Brother    • Heart disease Brother    • Diabetes Brother    • Hyperlipidemia Brother    • Leukemia Sister    • Heart disease Brother    • Diabetes Brother    • Alcohol  "abuse Brother    • Heart attack Brother 42   • Cancer Brother         Ewings Sarcoma          Social History     Occupational History   • Not on file   Tobacco Use   • Smoking status: Never Smoker   • Smokeless tobacco: Never Used   Substance and Sexual Activity   • Alcohol use: Yes     Alcohol/week: 1.0 standard drinks     Types: 1 Glasses of wine per week     Comment: Rare   • Drug use: No   • Sexual activity: Defer      Review of Systems   Cardiovascular: Negative for chest pain.   Musculoskeletal: Positive for arthralgias.       Objective:    /85   Pulse 90   Ht 162.6 cm (64\")   Wt 81.2 kg (179 lb)   BMI 30.73 kg/m²     Physical Examination:  Both wrist demonstrate healed scars over the dorsum of the scapholunate joint. There is a moderately sized cyst on the left with small amount on the right. She has diffuse wrist crepitance and loss of wrist range of motion and near full range of motion of the digits  Sensory and motor exam are intact all distributions. 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 Under sterile technique and after verbal consent I aspirated several cc of benign cystic fluid. See me as needed      Procedures         Disclaimer: Please note that areas of this note were completed with computer voice recognition software.  Quite often unanticipated grammatical, syntax, homophones, " and other interpretive errors are inadvertently transcribed by the computer software. Please excuse any errors that have escaped final proofreading.

## 2021-03-11 NOTE — TELEPHONE ENCOUNTER
Last visit:  1/22/21  Next visit: none  Last labs: 12/23/20    Rx requested: Detrol,metformin  Pharmacy: [facility]

## 2021-03-12 RX ORDER — TOLTERODINE 4 MG/1
CAPSULE, EXTENDED RELEASE ORAL
Qty: 90 CAPSULE | Refills: 0 | Status: SHIPPED | OUTPATIENT
Start: 2021-03-12 | End: 2021-03-12 | Stop reason: SDUPTHER

## 2021-03-12 RX ORDER — TOLTERODINE 4 MG/1
4 CAPSULE, EXTENDED RELEASE ORAL DAILY
Qty: 90 CAPSULE | Refills: 0 | Status: SHIPPED | OUTPATIENT
Start: 2021-03-12 | End: 2021-04-16 | Stop reason: SDUPTHER

## 2021-03-12 RX ORDER — TOLTERODINE 4 MG/1
4 CAPSULE, EXTENDED RELEASE ORAL DAILY
Qty: 90 CAPSULE | Refills: 0 | Status: SHIPPED | OUTPATIENT
Start: 2021-03-12 | End: 2021-03-12 | Stop reason: SDUPTHER

## 2021-03-22 ENCOUNTER — OFFICE VISIT (OUTPATIENT)
Dept: FAMILY MEDICINE CLINIC | Facility: CLINIC | Age: 81
End: 2021-03-22

## 2021-03-22 VITALS
SYSTOLIC BLOOD PRESSURE: 129 MMHG | WEIGHT: 182 LBS | HEART RATE: 82 BPM | OXYGEN SATURATION: 96 % | TEMPERATURE: 97.3 F | RESPIRATION RATE: 18 BRPM | HEIGHT: 64 IN | BODY MASS INDEX: 31.07 KG/M2 | DIASTOLIC BLOOD PRESSURE: 80 MMHG

## 2021-03-22 DIAGNOSIS — E11.42 TYPE 2 DIABETES MELLITUS WITH PERIPHERAL NEUROPATHY (HCC): Primary | ICD-10-CM

## 2021-03-22 DIAGNOSIS — F43.21 GRIEF: ICD-10-CM

## 2021-03-22 DIAGNOSIS — E78.2 MIXED HYPERLIPIDEMIA: ICD-10-CM

## 2021-03-22 DIAGNOSIS — K59.00 CONSTIPATION, UNSPECIFIED CONSTIPATION TYPE: ICD-10-CM

## 2021-03-22 DIAGNOSIS — I10 ESSENTIAL HYPERTENSION: ICD-10-CM

## 2021-03-22 PROBLEM — M71.9 BURSITIS: Status: ACTIVE | Noted: 2021-03-22

## 2021-03-22 PROBLEM — G50.0 TRIGEMINAL NEURALGIA: Status: ACTIVE | Noted: 2021-03-22

## 2021-03-22 PROBLEM — R00.2 PALPITATIONS: Status: ACTIVE | Noted: 2021-03-22

## 2021-03-22 PROBLEM — M15.9 GENERALIZED OA: Status: ACTIVE | Noted: 2021-03-22

## 2021-03-22 PROBLEM — A07.1 GIARDIA: Status: ACTIVE | Noted: 2021-03-22

## 2021-03-22 PROBLEM — F41.9 ANXIETY: Status: ACTIVE | Noted: 2021-03-22

## 2021-03-22 PROBLEM — M19.90 OA (OSTEOARTHRITIS): Status: ACTIVE | Noted: 2021-03-22

## 2021-03-22 PROCEDURE — 99214 OFFICE O/P EST MOD 30 MIN: CPT | Performed by: FAMILY MEDICINE

## 2021-03-22 RX ORDER — DULOXETIN HYDROCHLORIDE 60 MG/1
60 CAPSULE, DELAYED RELEASE ORAL DAILY
Qty: 90 CAPSULE | Refills: 0 | Status: SHIPPED | OUTPATIENT
Start: 2021-03-22 | End: 2021-03-22 | Stop reason: SDUPTHER

## 2021-03-22 RX ORDER — DULOXETIN HYDROCHLORIDE 60 MG/1
60 CAPSULE, DELAYED RELEASE ORAL DAILY
Qty: 90 CAPSULE | Refills: 0 | Status: SHIPPED | OUTPATIENT
Start: 2021-03-22 | End: 2021-06-01 | Stop reason: SDUPTHER

## 2021-03-22 RX ORDER — PEN NEEDLE, DIABETIC 30 GX3/16"
1 NEEDLE, DISPOSABLE MISCELLANEOUS DAILY
Qty: 90 EACH | Refills: 1 | Status: SHIPPED | OUTPATIENT
Start: 2021-03-22 | End: 2021-10-06

## 2021-03-22 RX ORDER — DULOXETIN HYDROCHLORIDE 60 MG/1
60 CAPSULE, DELAYED RELEASE ORAL DAILY
Qty: 90 CAPSULE | Refills: 0 | Status: SHIPPED | OUTPATIENT
Start: 2021-03-22 | End: 2021-03-22

## 2021-03-22 RX ORDER — ATENOLOL 25 MG/1
25 TABLET ORAL NIGHTLY
Qty: 90 TABLET | Refills: 0 | Status: SHIPPED | OUTPATIENT
Start: 2021-03-22 | End: 2021-06-15

## 2021-03-25 ENCOUNTER — ON CAMPUS - OUTPATIENT (OUTPATIENT)
Dept: URBAN - METROPOLITAN AREA HOSPITAL 77 | Facility: HOSPITAL | Age: 81
End: 2021-03-25

## 2021-03-25 DIAGNOSIS — Z86.010 PERSONAL HISTORY OF COLONIC POLYPS: ICD-10-CM

## 2021-03-25 DIAGNOSIS — D12.0 BENIGN NEOPLASM OF CECUM: ICD-10-CM

## 2021-03-25 DIAGNOSIS — K57.30 DIVERTICULOSIS OF LARGE INTESTINE WITHOUT PERFORATION OR ABS: ICD-10-CM

## 2021-03-25 DIAGNOSIS — K64.8 OTHER HEMORRHOIDS: ICD-10-CM

## 2021-03-25 DIAGNOSIS — D12.8 BENIGN NEOPLASM OF RECTUM: ICD-10-CM

## 2021-03-25 PROCEDURE — 45380 COLONOSCOPY AND BIOPSY: CPT | Mod: PT | Performed by: INTERNAL MEDICINE

## 2021-03-30 ENCOUNTER — TELEPHONE (OUTPATIENT)
Dept: FAMILY MEDICINE CLINIC | Facility: CLINIC | Age: 81
End: 2021-03-30

## 2021-03-30 ENCOUNTER — CLINICAL SUPPORT (OUTPATIENT)
Dept: FAMILY MEDICINE CLINIC | Facility: CLINIC | Age: 81
End: 2021-03-30

## 2021-03-30 DIAGNOSIS — E11.42 TYPE 2 DIABETES MELLITUS WITH PERIPHERAL NEUROPATHY (HCC): Primary | ICD-10-CM

## 2021-03-30 LAB — HBA1C MFR BLD: 6.1 %

## 2021-03-30 PROCEDURE — 83036 HEMOGLOBIN GLYCOSYLATED A1C: CPT | Performed by: FAMILY MEDICINE

## 2021-03-30 RX ORDER — ROSUVASTATIN CALCIUM 5 MG/1
TABLET, COATED ORAL
Qty: 90 TABLET | Refills: 2 | Status: SHIPPED | OUTPATIENT
Start: 2021-03-30 | End: 2021-06-17 | Stop reason: SDUPTHER

## 2021-04-10 DIAGNOSIS — E11.42 TYPE 2 DIABETES MELLITUS WITH PERIPHERAL NEUROPATHY (HCC): ICD-10-CM

## 2021-04-12 RX ORDER — INSULIN DETEMIR 100 [IU]/ML
INJECTION, SOLUTION SUBCUTANEOUS
Qty: 4 PEN | Refills: 0 | Status: SHIPPED | OUTPATIENT
Start: 2021-04-12 | End: 2021-05-10 | Stop reason: SDUPTHER

## 2021-04-12 NOTE — TELEPHONE ENCOUNTER
Last visit:  3/22/21  Next visit: none  Last labs: 3/30/21    Rx requested: Levemir   Pharmacy: [facility]

## 2021-04-14 ENCOUNTER — TELEPHONE (OUTPATIENT)
Dept: FAMILY MEDICINE CLINIC | Facility: CLINIC | Age: 81
End: 2021-04-14

## 2021-04-14 NOTE — TELEPHONE ENCOUNTER
PATIENT HAD 2ND COVID VACCINE ON MARCH 29.  STATES THAT SHE WAS FINE FOR ABOUT THREE DAYS.  NOW SHE IS HAVING FLU LIKE SYMPTOMS AND FATIGUE. STATES SHE HAS MUSCLE AND JOINT ACHES. WANTS TO KNOW IF LINGERING SIDE EFFECTS ARE PROBABLE FOR HER.     STATES THAT HER PAIN DOCTOR PRESCRIBED HER 2 PAIN MEDICATIONS FOR BACK PAIN.     PLEASE ADVISE [phone]

## 2021-04-16 DIAGNOSIS — E55.9 VITAMIN D DEFICIENCY: ICD-10-CM

## 2021-04-16 RX ORDER — TOLTERODINE 4 MG/1
4 CAPSULE, EXTENDED RELEASE ORAL DAILY
Qty: 90 CAPSULE | Refills: 0 | Status: SHIPPED | OUTPATIENT
Start: 2021-04-16 | End: 2021-06-15

## 2021-04-16 NOTE — TELEPHONE ENCOUNTER
Caller: Monica Pagan    Relationship: Self    Best call back number: [phone]    Medication needed:   Requested Prescriptions     Pending Prescriptions Disp Refills   • tolterodine LA (DETROL LA) 4 MG 24 hr capsule 90 capsule 0     Sig: Take 1 capsule by mouth Daily.       When do you need the refill by: TODAY 04/16/2021    What additional details did the patient provide when requesting the medication: PATIENT IS COMPLETELY OUT OF MEDICATION AND HAS BEEN FOR 2 DAYS. PATIENT NEEDS A SHORT SUPPLY TO BE SENT TO LOCAL PHARMACY LISTED BELOW AND A 90 DAY SUPPLY WITH REFILLS TO BE SENT TO [facility].    Does the patient have less than a 3 day supply:  [x] Yes  [] No    What is the patient's preferred pharmacy: [facility] [address] [place] [address] [address] AT [facility] [address] [place] [address] - [phone] [facility] [phone] FX

## 2021-04-16 NOTE — TELEPHONE ENCOUNTER
is out today    Last visit: 3/22/21  Next visit: none  Last labs: 3/30/21    Rx requested: Detrol LA #90  Pharmacy: [facility] in [place]

## 2021-04-19 RX ORDER — ERGOCALCIFEROL 1.25 MG/1
CAPSULE ORAL
Qty: 6 CAPSULE | Refills: 0 | Status: SHIPPED | OUTPATIENT
Start: 2021-04-19 | End: 2021-07-03

## 2021-04-19 NOTE — TELEPHONE ENCOUNTER
Last visit: 3/22/21  Next visit: none   Last labs: 3/30/21     Rx requested: Vitamin D 50,000  Pharmacy:[facility] [place]

## 2021-05-10 ENCOUNTER — TELEPHONE (OUTPATIENT)
Dept: FAMILY MEDICINE CLINIC | Facility: CLINIC | Age: 81
End: 2021-05-10

## 2021-05-10 DIAGNOSIS — E11.42 TYPE 2 DIABETES MELLITUS WITH PERIPHERAL NEUROPATHY (HCC): ICD-10-CM

## 2021-05-10 RX ORDER — INSULIN DETEMIR 100 [IU]/ML
35 INJECTION, SOLUTION SUBCUTANEOUS NIGHTLY
Qty: 4 PEN | Refills: 0 | Status: SHIPPED | OUTPATIENT
Start: 2021-05-10 | End: 2021-05-19 | Stop reason: SDUPTHER

## 2021-05-10 RX ORDER — INSULIN DETEMIR 100 [IU]/ML
35 INJECTION, SOLUTION SUBCUTANEOUS NIGHTLY
Qty: 4 PEN | Refills: 0 | Status: SHIPPED | OUTPATIENT
Start: 2021-05-10 | End: 2021-05-10 | Stop reason: SDUPTHER

## 2021-05-10 NOTE — TELEPHONE ENCOUNTER
Caller: Monica Pagan    Relationship: Self    Best call back number: [phone] (H)    What is the best time to reach you: ANYTIME    Who are you requesting to speak with (clinical staff, provider,  specific staff member): DR BAUTISTA    Do you know the name of the person who called: MONICA PAGAN    What was the call regarding: PATIENT STATES SHE FELL THIS WEEKEND BUT DID NOT INJURE HERSELF, JUST VERY SORE, FELL IN HER FIELD, HAS SCRAPES AND BRUISES BUT WANTED TO LET DR BAUTISTA KNOW THAT SHE HAD FALLEN, STATED SHE WAS CHASING A CAT AND LOST HER BALANCE AND WAS WEAK AND TIRED AND WOBBLY    Do you require a callback: YES

## 2021-05-19 ENCOUNTER — OFFICE VISIT (OUTPATIENT)
Dept: FAMILY MEDICINE CLINIC | Facility: CLINIC | Age: 81
End: 2021-05-19

## 2021-05-19 ENCOUNTER — TELEPHONE (OUTPATIENT)
Dept: FAMILY MEDICINE CLINIC | Facility: CLINIC | Age: 81
End: 2021-05-19

## 2021-05-19 VITALS
BODY MASS INDEX: 30.73 KG/M2 | RESPIRATION RATE: 18 BRPM | DIASTOLIC BLOOD PRESSURE: 66 MMHG | HEART RATE: 84 BPM | TEMPERATURE: 97.3 F | SYSTOLIC BLOOD PRESSURE: 114 MMHG | HEIGHT: 64 IN | OXYGEN SATURATION: 96 % | WEIGHT: 180 LBS

## 2021-05-19 DIAGNOSIS — R22.42 LOCALIZED SWELLING OF LEFT FOOT: ICD-10-CM

## 2021-05-19 DIAGNOSIS — R53.81 PHYSICAL DECONDITIONING: ICD-10-CM

## 2021-05-19 DIAGNOSIS — R30.0 DYSURIA: Primary | ICD-10-CM

## 2021-05-19 DIAGNOSIS — E11.42 TYPE 2 DIABETES MELLITUS WITH PERIPHERAL NEUROPATHY (HCC): ICD-10-CM

## 2021-05-19 DIAGNOSIS — H91.93 BILATERAL HEARING LOSS, UNSPECIFIED HEARING LOSS TYPE: ICD-10-CM

## 2021-05-19 PROBLEM — M53.3 PAIN OF RIGHT SACROILIAC JOINT: Status: ACTIVE | Noted: 2021-04-09

## 2021-05-19 PROCEDURE — 99214 OFFICE O/P EST MOD 30 MIN: CPT | Performed by: FAMILY MEDICINE

## 2021-05-19 PROCEDURE — 81003 URINALYSIS AUTO W/O SCOPE: CPT | Performed by: FAMILY MEDICINE

## 2021-05-19 RX ORDER — INSULIN DETEMIR 100 [IU]/ML
35 INJECTION, SOLUTION SUBCUTANEOUS NIGHTLY
Qty: 12 PEN | Refills: 0 | Status: SHIPPED | OUTPATIENT
Start: 2021-05-19 | End: 2021-07-02

## 2021-05-19 NOTE — TELEPHONE ENCOUNTER
Regarding Rx error we keep getting.    I called [facility] - verified the correct fax#. They don't know why we keep getting an error...possibly bc the pt is not technically due for refills yet? States the Verapamil was filled 4/1 and is not due until June, and there's still a refill left on it. I verbally called in the Levemir for 2 boxes = 86 days worth, as they can not split boxes. So that might be the reason for the kickback on that one.     But maybe sending in for 30 days as pt mentioned below, will help fix the problem also.. ?

## 2021-05-19 NOTE — TELEPHONE ENCOUNTER
Caller: Monica Pagan    Relationship to patient: Self    Best call back number: [phone]    Patient is needing: PATIENT STATES SHE WILL BRING URINE SAMPLE TOMORROW TO THE OFFICE. PATIENT ALSO STATED THAT SHE SPOKE WITH IAN  TODAY AND WAS TOLD THAT HER PRESCRIPTIONS NEEDED TO BE FOR 30 DAY INSTEAD OF 90 DAYS. PLEASE CAN PATIENT AND ADVISE.

## 2021-05-19 NOTE — PROGRESS NOTES
Subjective   Monica Pagan is a 80 y.o. female.     Chief Complaint   Patient presents with   • Difficulty Urinating     unable to urinate today   • Lower Extremity Issue     L foot swelling: NKI   • Generalized weakness     fell and couldn't get up         Current Outpatient Medications:   •  albuterol sulfate  (90 Base) MCG/ACT inhaler, Inhale 2 puffs Every 6 (Six) Hours As Needed for Wheezing or Shortness of Air., Disp: 18 g, Rfl: 0  •  atenolol (Tenormin) 25 MG tablet, Take 1 tablet by mouth Every Night., Disp: 90 tablet, Rfl: 0  •  cyanocobalamin 1000 MCG/ML injection, INJECT 1ML UNDER THE SKIN EVERY 30 DAYS, Disp: 3 mL, Rfl: 3  •  Docusate Calcium (STOOL SOFTENER PO), Take  by mouth. 2 po qd, Disp: , Rfl:   •  DULoxetine (Cymbalta) 60 MG capsule, Take 1 capsule by mouth Daily., Disp: 90 capsule, Rfl: 0  •  eszopiclone (LUNESTA) 3 MG tablet, TAKE ONE TABLET BY MOUTH EVERY NIGHT AT BEDTIME IMMEDIATELY BEFORE BEDTIME AS NEEDED FOR SLEEP, Disp: 90 tablet, Rfl: 0  •  HYDROcodone-acetaminophen (NORCO) 7.5-325 MG per tablet, 1 tablet 2 (Two) Times a Day As Needed., Disp: , Rfl: 0  •  hydrocortisone 2.5 % cream, , Disp: , Rfl:   •  insulin detemir (Levemir FlexTouch) 100 UNIT/ML injection, Inject 35 Units under the skin into the appropriate area as directed Every Night., Disp: 12 pen, Rfl: 0  •  metFORMIN (GLUCOPHAGE) 1000 MG tablet, Take 1 tablet by mouth 2 (Two) Times a Day With Meals., Disp: 180 tablet, Rfl: 1  •  nystatin (MYCOSTATIN) 293739 UNIT/GM cream, Apply  topically to the appropriate area as directed As Needed (groin rash)., Disp: 90 g, Rfl: 0  •  polyethylene glycol (MIRALAX) packet, Take 17 g by mouth Daily., Disp: , Rfl:   •  rosuvastatin (CRESTOR) 5 MG tablet, TAKE 1 TABLET DAILY, Disp: 90 tablet, Rfl: 2  •  tolterodine LA (DETROL LA) 4 MG 24 hr capsule, Take 1 capsule by mouth Daily., Disp: 90 capsule, Rfl: 0  •  triamcinolone (KENALOG) 0.1 % cream, Apply  topically to the appropriate area as  "directed 2 (Two) Times a Day., Disp: 45 g, Rfl: 0  •  verapamil SR (CALAN-SR) 120 MG CR tablet, Take 1 tablet by mouth Every Night., Disp: 90 tablet, Rfl: 0  •  vitamin D (ERGOCALCIFEROL) 1.25 MG (35434 UT) capsule capsule, TAKE ONE TABLET BY MOUTH EVERY 14 DAYS., Disp: 6 capsule, Rfl: 0  •  BD PEN NEEDLE JENNIFER U/F 32G X 4 MM misc, USE AS DIRECTED TWICE DAILY, Disp: 100 each, Rfl: 0  •  Blood Glucose Monitoring Suppl (TRUE METRIX METER) w/Device kit, USE UTD, Disp: , Rfl: 0  •  Blood Glucose Monitoring Suppl (True Metrix Meter) w/Device kit, 1 kit Daily., Disp: 1 kit, Rfl: 0  •  glucose blood (True Metrix Blood Glucose Test) test strip, Dx code E11.42 testing bs 1 x day, Disp: 100 each, Rfl: 1  •  Insulin Pen Needle (Pen Needles) 32G X 4 MM misc, 1 package Daily., Disp: 90 each, Rfl: 1  •  Lancets 30G misc, Enhance talking meter /testing bs 1 x day  Dx code  E11.42, Disp: 100 each, Rfl: 1  •  Lancets Micro Thin 33G misc, 1 package 3 (Three) Times a Day Before Meals., Disp: 100 each, Rfl: 3  •  Syringe 25G X 1\" 3 ML misc, Inject 1 each into the appropriate muscle as directed by prescriber Every 28 (Twenty-Eight) Days., Disp: 12 each, Rfl: 1    Past Medical History:   Diagnosis Date   • Anemia    • Anxiety    • Coccyx pain    • Colon polyp     TA   • DDD  lumbar    • DMII    • Fibromyalgia    • Hyperlipidemia    • Hypertension    • IBS    • Insomnia    • Lumbar spinal stenosis    • MAMMO    • Meniere's disease    • Nontoxic thyroid nodule    • Ocular histoplasmosis    • Osteoarthritis    • Peripheral neuropathy    • PVC    • Spondylosis    • Tinnitus    • Tremor    • Trigeminal neuralgia    • Vitamin D deficiency        Past Surgical History:   Procedure Laterality Date   • AMPUTATION DIGIT Left     Digit #1   • APPENDECTOMY  1962   • BUNIONECTOMY Bilateral    • CATARACT EXTRACTION      left   • CATARACT EXTRACTION      x2   • COLONOSCOPY  2012    NEG = 2012/ 2021= TA, rech 2026   • CYST REMOVAL Bilateral     WRIST   • " HYSTERECTOMY  1980   • JOINT REPLACEMENT      Rt TKR x 2   • JOINT REPLACEMENT      Left THR   • LAPAROSCOPIC CHOLECYSTECTOMY      DR. LOBATO   • SPINE SURGERY      Lumbar Fusion   • SUBTOTAL HYSTERECTOMY         Family History   Problem Relation Age of Onset   • Asthma Mother    • COPD Mother    • Heart disease Father    • Alcohol abuse Father    • Other Father         WWII Gangrene/ Malaria   • Arthritis Sister    • Heart attack Brother    • Alzheimer's disease Brother    • Heart disease Brother    • Diabetes Brother    • Hyperlipidemia Brother    • Leukemia Sister    • Heart disease Brother    • Diabetes Brother    • Alcohol abuse Brother    • Heart attack Brother 42   • Cancer Brother         Ewings Sarcoma       Social History     Socioeconomic History   • Marital status:      Spouse name: Not on file   • Number of children: Not on file   • Years of education: Not on file   • Highest education level: Not on file   Tobacco Use   • Smoking status: Never Smoker   • Smokeless tobacco: Never Used   Vaping Use   • Vaping Use: Never used   Substance and Sexual Activity   • Alcohol use: Yes     Alcohol/week: 1.0 standard drinks     Types: 1 Glasses of wine per week     Comment: Rare   • Drug use: No   • Sexual activity: Defer       81 y/o C female here w/ mult concerns including urinary c/o/ foot swelling/ hearing loss and weakness    Pt states she has had urinary c/o's x long time but worse lately and the detrol 4mg not working; no other meds tried; pt has seen urology in past for a renal mass      Pt also w/ c/o's distal top of Left foot was swollen about 2 days ago and had tingling around the top of her foot; no known injury    Pt states she also is feeling overall weak and unsteady and wanting to disc poss PTx -----She has hired someone to help w/ the cleaning since she cant do this     Pt states she also wants to see ENT to get her hearing checked    Pt also frustrated because the mail order co. Sent her  diltiazem and not her verapamil .....needs a Rx sent locally       The following portions of the patient's history were reviewed and updated as appropriate: allergies, current medications, past family history, past medical history, past social history, past surgical history and problem list.    Review of Systems   Constitutional: Positive for activity change and fatigue. Negative for appetite change, fever, unexpected weight gain and unexpected weight loss.   HENT: Positive for hearing loss.    Cardiovascular: Negative for leg swelling.   Genitourinary: Positive for dysuria, flank pain, frequency and urgency. Negative for decreased urine volume, difficulty urinating and hematuria.   Musculoskeletal: Positive for arthralgias. Negative for gait problem, joint swelling and myalgias.   Skin: Negative for color change, rash and bruise.   Neurological: Positive for weakness.       Vitals:    05/19/21 0813   BP: 114/66   Pulse: 84   Resp: 18   Temp: 97.3 °F (36.3 °C)   SpO2: 96%       Objective   Physical Exam  Vitals and nursing note reviewed.   Constitutional:       General: She is not in acute distress.     Appearance: Normal appearance. She is not ill-appearing or toxic-appearing.   HENT:      Right Ear: Tympanic membrane, ear canal and external ear normal. There is no impacted cerumen.      Left Ear: Tympanic membrane, ear canal and external ear normal. There is no impacted cerumen.   Cardiovascular:      Pulses:           Dorsalis pedis pulses are 2+ on the right side and 2+ on the left side.   Pulmonary:      Effort: Pulmonary effort is normal. No respiratory distress.   Musculoskeletal:         General: No tenderness, deformity or signs of injury.      Right lower leg: No edema.      Left lower leg: No swelling or tenderness. No edema.   Feet:      Right foot:      Skin integrity: Skin integrity normal.      Toenail Condition: Right toenails are normal.      Left foot:      Skin integrity: Skin integrity normal.       Toenail Condition: Left toenails are normal.      Comments: Left foot digit #1 s/p amputation-----mild puffyness at dorsal distal aspect w/o erythema or pitting edema  Skin:     General: Skin is warm and dry.      Findings: No erythema or rash.   Neurological:      Mental Status: She is alert and oriented to person, place, and time.      Cranial Nerves: No cranial nerve deficit.   Psychiatric:         Attention and Perception: Attention and perception normal.         Mood and Affect: Mood and affect normal.         Speech: Speech normal.         Behavior: Behavior normal. Behavior is cooperative.         Thought Content: Thought content normal.         Cognition and Memory: Cognition and memory normal. Cognition is not impaired.         Judgment: Judgment normal.           Assessment/Plan   Diagnoses and all orders for this visit:    1. Dysuria (Primary)  -     POCT urinalysis dipstick, automated  -     Urine Culture - Urine, Urine, Clean Catch    2. Type 2 diabetes mellitus with peripheral neuropathy (CMS/HCC)  -     insulin detemir (Levemir FlexTouch) 100 UNIT/ML injection; Inject 35 Units under the skin into the appropriate area as directed Every Night.  Dispense: 12 pen; Refill: 0    3. Physical deconditioning  -     Ambulatory Referral to Physical Therapy Evaluate and treat; Strengthening; Full weight bearing    4. Bilateral hearing loss, unspecified hearing loss type  -     Ambulatory Referral to ENT (Otolaryngology)    Other orders  -     Discontinue: verapamil SR (CALAN-SR) 120 MG CR tablet; Take 1 tablet by mouth Every Night.  Dispense: 90 tablet; Refill: 0  -     Discontinue: verapamil SR (CALAN-SR) 120 MG CR tablet; Take 1 tablet by mouth Every Night.  Dispense: 90 tablet; Refill: 0  -     verapamil SR (CALAN-SR) 120 MG CR tablet; Take 1 tablet by mouth Every Night.  Dispense: 90 tablet; Refill: 0      Pt to try compression hose or elevate LE prn swelling  ENT for hearing eval  PTx for strengthening  UA  w/ poss C/s when able; if neg, poss trial of alt OAB med

## 2021-05-20 ENCOUNTER — TELEPHONE (OUTPATIENT)
Dept: FAMILY MEDICINE CLINIC | Facility: CLINIC | Age: 81
End: 2021-05-20

## 2021-05-20 LAB
BILIRUB BLD-MCNC: NEGATIVE MG/DL
CLARITY, POC: ABNORMAL
COLOR UR: YELLOW
GLUCOSE UR STRIP-MCNC: NEGATIVE MG/DL
KETONES UR QL: NEGATIVE
LEUKOCYTE EST, POC: ABNORMAL
NITRITE UR-MCNC: NEGATIVE MG/ML
PH UR: 7.5 [PH] (ref 5–8)
PROT UR STRIP-MCNC: NEGATIVE MG/DL
RBC # UR STRIP: NEGATIVE /UL
SP GR UR: 1.02 (ref 1–1.03)
UROBILINOGEN UR QL: NORMAL

## 2021-05-20 PROCEDURE — 87086 URINE CULTURE/COLONY COUNT: CPT | Performed by: FAMILY MEDICINE

## 2021-05-20 RX ORDER — NITROFURANTOIN 25; 75 MG/1; MG/1
100 CAPSULE ORAL 2 TIMES DAILY
Qty: 14 CAPSULE | Refills: 0 | Status: SHIPPED | OUTPATIENT
Start: 2021-05-20 | End: 2021-07-01

## 2021-05-20 NOTE — TELEPHONE ENCOUNTER
PATIENT CALLED; READ HUB TO SHARE MESSAGE. PATIENT VERBALIZED UNDERSTANDING AND STATED SHE WOULD GO  THE PRESCRIPTION.

## 2021-05-20 NOTE — TELEPHONE ENCOUNTER
HUB to share    Let pt know we are culturing UA but sent out macrobid, since cx wont be back before the weekend and she is symptomatic. Pt to start the abx.    LVM informing pt

## 2021-05-21 ENCOUNTER — TELEPHONE (OUTPATIENT)
Dept: FAMILY MEDICINE CLINIC | Facility: CLINIC | Age: 81
End: 2021-05-21

## 2021-05-21 LAB — BACTERIA SPEC AEROBE CULT: NORMAL

## 2021-05-21 NOTE — TELEPHONE ENCOUNTER
----- Message from Henny Long DO sent at 5/21/2021  1:42 PM EDT -----  Urine cx contaminated w/ mult tasha-----can finish abx if she feels it is helping

## 2021-05-21 NOTE — TELEPHONE ENCOUNTER
Pt informed. Says she feels like she's just totally wiped out today. I asked if she thought it was due to the med, and she said no. She will finish the abx and I advised her to go to the ER if anything changed or worsened over the wkd, and I would let Dr Long know.

## 2021-05-24 DIAGNOSIS — F51.04 PSYCHOPHYSIOLOGICAL INSOMNIA: ICD-10-CM

## 2021-05-24 RX ORDER — ESZOPICLONE 3 MG/1
TABLET, FILM COATED ORAL
Qty: 90 TABLET | Refills: 0 | Status: SHIPPED | OUTPATIENT
Start: 2021-05-24 | End: 2021-08-12

## 2021-05-24 NOTE — TELEPHONE ENCOUNTER
Last visit:  5/19/21  Next visit: none  Last labs: 3/30/21    Rx requested: Lunesta  Pharmacy: [facility] in [place]

## 2021-05-26 ENCOUNTER — TELEPHONE (OUTPATIENT)
Dept: FAMILY MEDICINE CLINIC | Facility: CLINIC | Age: 81
End: 2021-05-26

## 2021-05-26 NOTE — TELEPHONE ENCOUNTER
Left   HUB TO READ:  Pt needs to give us another urine sample, if negative she may need to make appt to be seen.

## 2021-05-26 NOTE — TELEPHONE ENCOUNTER
Caller: Monica Pagan    Relationship: Self    Best call back number: OK TO LEAVE A MESSAGE    What medication are you requesting:     What are your current symptoms: URINE IS DARK, PATIENT FEELS WEAK    How long have you been experiencing symptoms:     Have you had these symptoms before:    [x] Yes  [] No    Have you been treated for these symptoms before:   [] Yes  [] No    If a prescription is needed, what is your preferred pharmacy and phone number:    [facility]  [address]  [phone]  Additional notes:  PATIENT IS CALLING IN STATING THAT SHE HAS FINISHED HER UTI MEDICATION AND SHE IS STILL HAVING SYMPTOMS AND WANTS TO KNOW IF SOMETHING ELSE CAN BE CALLED IN FOR HER.

## 2021-06-01 RX ORDER — DULOXETIN HYDROCHLORIDE 60 MG/1
60 CAPSULE, DELAYED RELEASE ORAL DAILY
Qty: 90 CAPSULE | Refills: 0 | Status: SHIPPED | OUTPATIENT
Start: 2021-06-01 | End: 2021-08-24

## 2021-06-01 RX ORDER — DULOXETIN HYDROCHLORIDE 60 MG/1
60 CAPSULE, DELAYED RELEASE ORAL DAILY
Qty: 90 CAPSULE | Refills: 0 | Status: SHIPPED | OUTPATIENT
Start: 2021-06-01 | End: 2021-06-01 | Stop reason: SDUPTHER

## 2021-06-07 ENCOUNTER — TELEPHONE (OUTPATIENT)
Dept: FAMILY MEDICINE CLINIC | Facility: CLINIC | Age: 81
End: 2021-06-07

## 2021-06-07 NOTE — TELEPHONE ENCOUNTER
Adrien with [facility] called and left a  stating that they received a order from the hospital that she had a fall and a potential bout of Meniere's disease. They requested Vestibular rehab. Adrien performed eval on 6/3/21 and she was negative for central vestibular deficit,negative for bilateral vestibular deficit, negative for BPPV.  Patient does have balance issues and gets dizzy when she moves her head quickly. Adrien states that he will do some immunolateral vestibular treatments, balance and gait training.   He will see her twice a week for 4 weeks then he will re-evaluate her.   Adrien is requesting a verbal okay.     Adrien PT [facility] phone: [phone]

## 2021-06-10 ENCOUNTER — TELEPHONE (OUTPATIENT)
Dept: FAMILY MEDICINE CLINIC | Facility: CLINIC | Age: 81
End: 2021-06-10

## 2021-06-10 NOTE — TELEPHONE ENCOUNTER
Caller: Monica Pagan    Relationship: Self    Best call back number: [phone]    What is the best time to reach you: ANY    Do you know the name of the person who called: NO    What was the call regarding: PATIENT CALLED STATING THAT SHE WAS LEFT A VM THAT HER APPT FOR Friday NEEDED TO BE CHANGED. NO NOTES ON ACCOUNT AND NO ANSWER WHEN WARM TRANSFER WAS ATTEMPTED. PLEASE CALL PATIENT TO RESCHEDULE IF NEEDED.     Do you require a callback: YES

## 2021-06-15 RX ORDER — ATENOLOL 25 MG/1
TABLET ORAL
Qty: 90 TABLET | Refills: 0 | Status: SHIPPED | OUTPATIENT
Start: 2021-06-15 | End: 2021-09-10

## 2021-06-15 RX ORDER — TOLTERODINE 4 MG/1
CAPSULE, EXTENDED RELEASE ORAL
Qty: 90 CAPSULE | Refills: 0 | Status: SHIPPED | OUTPATIENT
Start: 2021-06-15 | End: 2021-10-04

## 2021-06-17 RX ORDER — ATENOLOL 25 MG/1
TABLET ORAL
Qty: 90 TABLET | Refills: 0 | OUTPATIENT
Start: 2021-06-17

## 2021-06-17 RX ORDER — ROSUVASTATIN CALCIUM 5 MG/1
5 TABLET, COATED ORAL DAILY
Qty: 90 TABLET | Refills: 1 | Status: SHIPPED | OUTPATIENT
Start: 2021-06-17 | End: 2021-12-14

## 2021-06-17 NOTE — TELEPHONE ENCOUNTER
Caller: Monica Pagan    Relationship: Self    Best call back number: [phone]    Medication needed:   Requested Prescriptions     Pending Prescriptions Disp Refills   • rosuvastatin (CRESTOR) 5 MG tablet 90 tablet 2     Sig: Take 1 tablet by mouth Daily.       When do you need the refill by: 6/28/21    What additional details did the patient provide when requesting the medication: PATIENT HAS ABOUT 2 WEEKS LEFT OF MEDICATION.     Does the patient have less than a 3 day supply:  [] Yes  [x] No    What is the patient's preferred pharmacy: [facility] [address] [place] [address] [address] [place] [address] [address] [place] [address] [address] - [phone] [facility] [phone] FX

## 2021-06-28 ENCOUNTER — HOSPITAL ENCOUNTER (EMERGENCY)
Facility: HOSPITAL | Age: 81
Discharge: HOME | End: 2021-06-28
Attending: EMERGENCY MEDICINE | Admitting: EMERGENCY MEDICINE

## 2021-06-28 ENCOUNTER — APPOINTMENT (OUTPATIENT)
Dept: GENERAL RADIOLOGY | Facility: HOSPITAL | Age: 81
End: 2021-06-28

## 2021-06-28 VITALS
RESPIRATION RATE: 18 BRPM | HEIGHT: 65 IN | DIASTOLIC BLOOD PRESSURE: 65 MMHG | WEIGHT: 179 LBS | HEART RATE: 67 BPM | SYSTOLIC BLOOD PRESSURE: 151 MMHG | TEMPERATURE: 97.7 F | BODY MASS INDEX: 29.82 KG/M2 | OXYGEN SATURATION: 94 %

## 2021-06-28 DIAGNOSIS — S96.912A MUSCLE STRAIN OF LEFT FOOT, INITIAL ENCOUNTER: Primary | ICD-10-CM

## 2021-06-28 PROCEDURE — 99283 EMERGENCY DEPT VISIT LOW MDM: CPT

## 2021-06-28 PROCEDURE — 73630 X-RAY EXAM OF FOOT: CPT

## 2021-06-29 ENCOUNTER — TELEPHONE (OUTPATIENT)
Dept: FAMILY MEDICINE CLINIC | Facility: CLINIC | Age: 81
End: 2021-06-29

## 2021-07-01 RX ORDER — DOCUSATE SODIUM 100 MG/1
100 CAPSULE, LIQUID FILLED ORAL 2 TIMES DAILY PRN
Start: 2021-07-01

## 2021-07-01 RX ORDER — SACCHAROMYCES BOULARDII 250 MG
250 CAPSULE ORAL DAILY
Start: 2021-07-01

## 2021-07-02 ENCOUNTER — OFFICE VISIT (OUTPATIENT)
Dept: FAMILY MEDICINE CLINIC | Facility: CLINIC | Age: 81
End: 2021-07-02

## 2021-07-02 VITALS
TEMPERATURE: 97.5 F | HEIGHT: 64 IN | BODY MASS INDEX: 30.7 KG/M2 | SYSTOLIC BLOOD PRESSURE: 119 MMHG | DIASTOLIC BLOOD PRESSURE: 74 MMHG | RESPIRATION RATE: 18 BRPM | HEART RATE: 65 BPM | WEIGHT: 179.8 LBS | OXYGEN SATURATION: 95 %

## 2021-07-02 DIAGNOSIS — E11.42 TYPE 2 DIABETES MELLITUS WITH PERIPHERAL NEUROPATHY (HCC): ICD-10-CM

## 2021-07-02 DIAGNOSIS — F41.9 ANXIETY: ICD-10-CM

## 2021-07-02 DIAGNOSIS — I10 ESSENTIAL HYPERTENSION: ICD-10-CM
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
## 2021-07-02 DIAGNOSIS — E53.8 VITAMIN B12 DEFICIENCY: ICD-10-CM

## 2021-07-02 DIAGNOSIS — E55.9 VITAMIN D DEFICIENCY: ICD-10-CM

## 2021-07-02 PROBLEM — H35.373 EPIRETINAL MEMBRANE (ERM) OF BOTH EYES: Status: ACTIVE | Noted: 2021-02-26

## 2021-07-02 PROCEDURE — 83036 HEMOGLOBIN GLYCOSYLATED A1C: CPT | Performed by: FAMILY MEDICINE

## 2021-07-02 PROCEDURE — 82607 VITAMIN B-12: CPT | Performed by: FAMILY MEDICINE

## 2021-07-02 PROCEDURE — 99214 OFFICE O/P EST MOD 30 MIN: CPT | Performed by: FAMILY MEDICINE

## 2021-07-02 PROCEDURE — 36415 COLL VENOUS BLD VENIPUNCTURE: CPT | Performed by: FAMILY MEDICINE

## 2021-07-02 PROCEDURE — 82306 VITAMIN D 25 HYDROXY: CPT | Performed by: FAMILY MEDICINE

## 2021-07-02 RX ORDER — INSULIN DETEMIR 100 [IU]/ML
30 INJECTION, SOLUTION SUBCUTANEOUS NIGHTLY
Qty: 12 PEN | Refills: 0
Start: 2021-07-02 | End: 2021-08-24

## 2021-07-02 NOTE — PROGRESS NOTES
Subjective   Monica Pagan is a 80 y.o. female.     Chief Complaint   Patient presents with   • Fall     went to ER 6/28-   • weakness/shaking     went to ER H 5/27 has not felt well since/ they did stroke work up   • Diabetes         Current Outpatient Medications:   •  albuterol sulfate  (90 Base) MCG/ACT inhaler, Inhale 2 puffs Every 6 (Six) Hours As Needed for Wheezing or Shortness of Air., Disp: 18 g, Rfl: 0  •  atenolol (TENORMIN) 25 MG tablet, TAKE ONE TABLET BY MOUTH ONCE NIGHTLY, Disp: 90 tablet, Rfl: 0  •  BD PEN NEEDLE JENNIFER U/F 32G X 4 MM misc, USE AS DIRECTED TWICE DAILY, Disp: 100 each, Rfl: 0  •  Blood Glucose Monitoring Suppl (True Metrix Meter) w/Device kit, 1 kit Daily., Disp: 1 kit, Rfl: 0  •  cyanocobalamin 1000 MCG/ML injection, INJECT 1ML UNDER THE SKIN EVERY 30 DAYS, Disp: 3 mL, Rfl: 3  •  Docusate Calcium (STOOL SOFTENER PO), Take  by mouth. 2 po qd, Disp: , Rfl:   •  docusate sodium (Colace) 100 MG capsule, Take 1 capsule by mouth 2 (Two) Times a Day As Needed for Constipation., Disp: , Rfl:   •  DULoxetine (Cymbalta) 60 MG capsule, Take 1 capsule by mouth Daily., Disp: 90 capsule, Rfl: 0  •  eszopiclone (LUNESTA) 3 MG tablet, TAKE 1 TABLET BY MOUTH EVERY NIGHT AT BEDTIME *IMMEDIATELY BEFORE BEDTIME AS NEEDED FOR SLEEP*, Disp: 90 tablet, Rfl: 0  •  glucose blood (True Metrix Blood Glucose Test) test strip, Dx code E11.42 testing bs 1 x day, Disp: 100 each, Rfl: 1  •  HYDROcodone-acetaminophen (NORCO) 7.5-325 MG per tablet, 1 tablet 2 (Two) Times a Day As Needed., Disp: , Rfl: 0  •  hydrocortisone 2.5 % cream, , Disp: , Rfl:   •  insulin detemir (Levemir FlexTouch) 100 UNIT/ML injection, Inject 30 Units under the skin into the appropriate area as directed Every Night., Disp: 12 pen, Rfl: 0  •  Insulin Pen Needle (Pen Needles) 32G X 4 MM misc, 1 package Daily., Disp: 90 each, Rfl: 1  •  Lancets Micro Thin 33G misc, 1 package 3 (Three) Times a Day Before Meals., Disp: 100 each, Rfl:  "3  •  metFORMIN (GLUCOPHAGE) 1000 MG tablet, Take 1 tablet by mouth 2 (Two) Times a Day With Meals., Disp: 180 tablet, Rfl: 1  •  nystatin (MYCOSTATIN) 607186 UNIT/GM cream, Apply  topically to the appropriate area as directed As Needed (groin rash)., Disp: 90 g, Rfl: 0  •  polyethylene glycol (MIRALAX) packet, Take 17 g by mouth Daily., Disp: , Rfl:   •  rosuvastatin (CRESTOR) 5 MG tablet, Take 1 tablet by mouth Daily., Disp: 90 tablet, Rfl: 1  •  saccharomyces boulardii (Florastor) 250 MG capsule, Take 1 capsule by mouth Daily., Disp: , Rfl:   •  Syringe 25G X 1\" 3 ML misc, Inject 1 each into the appropriate muscle as directed by prescriber Every 28 (Twenty-Eight) Days., Disp: 12 each, Rfl: 1  •  tolterodine LA (DETROL LA) 4 MG 24 hr capsule, TAKE ONE CAPSULE BY MOUTH DAILY, Disp: 90 capsule, Rfl: 0  •  triamcinolone (KENALOG) 0.1 % cream, Apply  topically to the appropriate area as directed 2 (Two) Times a Day., Disp: 45 g, Rfl: 0  •  verapamil SR (CALAN-SR) 120 MG CR tablet, Take 1 tablet by mouth Every Night., Disp: 90 tablet, Rfl: 0  •  Cholecalciferol (Vitamin D) 50 MCG (2000 UT) tablet, Take 2,000 Units by mouth Daily., Disp: , Rfl:     Past Medical History:   Diagnosis Date   • Anemia    • Anxiety    • Colon polyp     TA   • DDD  lumbar    • DMII    • Fibromyalgia    • Hyperlipidemia    • Hypertension    • IBS    • Insomnia    • Lumbar spinal stenosis    • MAMMO    • Meniere's disease    • Nontoxic thyroid nodule    • Ocular histoplasmosis    • Osteoarthritis    • Peripheral neuropathy    • PVC    • Spondylosis    • Tinnitus    • Tremor    • Trigeminal neuralgia    • Vitamin D deficiency        Past Surgical History:   Procedure Laterality Date   • AMPUTATION DIGIT Left     Digit #1   • APPENDECTOMY  1962   • BUNIONECTOMY Bilateral    • CATARACT EXTRACTION      left   • CATARACT EXTRACTION      x2   • COLONOSCOPY  2012    NEG = 2012/ 2021= TA, rech 2026   • CYST REMOVAL Bilateral     WRIST   • HYSTERECTOMY  " 1980   • JOINT REPLACEMENT      Rt TKR x 2   • JOINT REPLACEMENT      Left THR   • LAPAROSCOPIC CHOLECYSTECTOMY      DR. LOBATO   • SPINE SURGERY      Lumbar Fusion   • SUBTOTAL HYSTERECTOMY         Family History   Problem Relation Age of Onset   • Asthma Mother    • COPD Mother    • Heart disease Father    • Alcohol abuse Father    • Other Father         WWII Gangrene/ Malaria   • Arthritis Sister    • Heart attack Brother    • Alzheimer's disease Brother    • Heart disease Brother    • Diabetes Brother    • Hyperlipidemia Brother    • Leukemia Sister    • Heart disease Brother    • Diabetes Brother    • Alcohol abuse Brother    • Heart attack Brother 42   • Cancer Brother         Ewings Sarcoma       Social History     Socioeconomic History   • Marital status:      Spouse name: Not on file   • Number of children: Not on file   • Years of education: Not on file   • Highest education level: Not on file   Tobacco Use   • Smoking status: Never Smoker   • Smokeless tobacco: Never Used   Vaping Use   • Vaping Use: Never used   Substance and Sexual Activity   • Alcohol use: Yes     Alcohol/week: 1.0 standard drinks     Types: 1 Glasses of wine per week     Comment: Rare   • Drug use: No   • Sexual activity: Defer       79y/o C female here for f/u from ER x 2 in June-------one time @ [facility] ER and one @ [facility] ER w/ hx/o DMII/ HTN/ Anx    The first one was due to feeling shaky/ weak/ foggy---She went to ER and had a stroke w/u which was NEG and they sent her home    The second ER visit was due to a fall in her garage w/o LOC but she did hit her head on the garage door and called using her alert necklace-----Pt got disoriented when the light went out in the garage and pressed her alert; Pt went to ER for w/u after the fall and went home when w/u was NEG for fractures/ abn labs-----Pt had been going to [facility] for gait/ balance training and was told her issue was due to her Grt toe amp on Left foot; pt not sure why she fell in  the garage-----no lightheadedness/ tripping .....       The following portions of the patient's history were reviewed and updated as appropriate: allergies, current medications, past family history, past medical history, past social history, past surgical history and problem list.    Review of Systems   Constitutional: Positive for activity change. Negative for appetite change, unexpected weight gain and unexpected weight loss.   Eyes: Negative for blurred vision, double vision, pain and visual disturbance.   Respiratory: Negative for shortness of breath.    Cardiovascular: Negative for leg swelling.   Gastrointestinal: Negative for abdominal distention, abdominal pain, constipation, diarrhea, nausea and vomiting.   Endocrine: Negative for polydipsia, polyphagia and polyuria.   Genitourinary: Negative for frequency.   Musculoskeletal: Positive for gait problem.   Skin: Negative for color change, dry skin, rash and skin lesions.   Neurological: Negative for weakness and numbness.       Vitals:    07/02/21 0829   BP: 119/74   Pulse: 65   Resp: 18   Temp: 97.5 °F (36.4 °C)   SpO2: 95%       Objective   Physical Exam  Vitals and nursing note reviewed.   Constitutional:       General: She is not in acute distress.     Appearance: She is well-developed. She is not ill-appearing.   HENT:      Head: Normocephalic and atraumatic.   Cardiovascular:      Rate and Rhythm: Normal rate and regular rhythm.      Pulses:           Dorsalis pedis pulses are 1+ on the right side and 1+ on the left side.      Heart sounds: Normal heart sounds. No murmur heard.     Pulmonary:      Effort: Pulmonary effort is normal. No respiratory distress.      Breath sounds: Normal breath sounds. No stridor. No wheezing, rhonchi or rales.   Musculoskeletal:      Cervical back: Normal range of motion and neck supple.      Right foot: No deformity, bunion, Charcot foot, foot drop or prominent metatarsal heads.      Left foot: Deformity (amp grt toe)  present. No bunion, Charcot foot, foot drop or prominent metatarsal heads.   Feet:      Right foot:      Skin integrity: Skin integrity normal. No ulcer, skin breakdown, erythema, warmth, callus, dry skin or fissure.      Toenail Condition: Right toenails are normal.      Left foot:      Skin integrity: Skin integrity normal. No ulcer, skin breakdown, erythema, warmth, callus, dry skin or fissure.      Toenail Condition: Left toenails are normal.   Skin:     General: Skin is warm and dry.      Findings: No rash.   Neurological:      Mental Status: She is alert and oriented to person, place, and time.      Cranial Nerves: No cranial nerve deficit.   Psychiatric:         Attention and Perception: Attention and perception normal.         Mood and Affect: Mood and affect normal.         Speech: Speech normal.         Behavior: Behavior normal. Behavior is cooperative.         Thought Content: Thought content normal.         Cognition and Memory: Cognition and memory normal.         Judgment: Judgment normal.           Assessment/Plan   Diagnoses and all orders for this visit:    1. Fall, subsequent encounter (Primary)    2. Type 2 diabetes mellitus with peripheral neuropathy (CMS/HCC)  -     insulin detemir (Levemir FlexTouch) 100 UNIT/ML injection; Inject 30 Units under the skin into the appropriate area as directed Every Night.  Dispense: 12 pen; Refill: 0  -     Hemoglobin A1c    3. Essential hypertension    4. Anxiety    5. Vitamin D deficiency  -     Vitamin D 25 Hydroxy    6. Vitamin B12 deficiency  -     Vitamin B12    Reviewed both ER visits/ w/u w/  Pt  Cut the Levemir dose down to 30 Units SQ qday to avoid low BS's  Check A1C/ vit D/ B12 today  A1C in 3mos  Fasting labs in December

## 2021-07-03 LAB
25(OH)D3 SERPL-MCNC: 48.4 NG/ML (ref 30–100)
VIT B12 BLD-MCNC: 875 PG/ML (ref 211–946)

## 2021-07-03 RX ORDER — CHOLECALCIFEROL (VITAMIN D3) 50 MCG
2000 TABLET ORAL DAILY
Start: 2021-07-03 | End: 2022-07-03

## 2021-07-04 LAB — HBA1C MFR BLD: 6.1 % (ref 3.5–5.6)

## 2021-07-07 ENCOUNTER — TELEPHONE (OUTPATIENT)
Dept: FAMILY MEDICINE CLINIC | Facility: CLINIC | Age: 81
End: 2021-07-07

## 2021-07-07 DIAGNOSIS — E55.9 VITAMIN D DEFICIENCY: ICD-10-CM

## 2021-07-07 RX ORDER — ERGOCALCIFEROL 1.25 MG/1
CAPSULE ORAL
Qty: 6 CAPSULE | Refills: 0 | OUTPATIENT
Start: 2021-07-07

## 2021-07-07 NOTE — TELEPHONE ENCOUNTER
Adrien MURRAY PT #[phone]    States did 30 day re-assessment on 7/2, and pt is much improved w balance/ambulation with less dizziness upon activity. Needs verbal OK to cont pt's therapy for 2x wkly for 3 wks + 1 additional visit for final assessent.

## 2021-07-13 RX ORDER — CALCIUM CITRATE/VITAMIN D3 200MG-6.25
TABLET ORAL
Qty: 100 EACH | Refills: 1 | Status: SHIPPED | OUTPATIENT
Start: 2021-07-13 | End: 2022-07-26

## 2021-07-13 NOTE — TELEPHONE ENCOUNTER
Caller: Monica Pagan    Relationship: Self    Best call back number: [phone]    Medication needed:   Requested Prescriptions     Pending Prescriptions Disp Refills   • glucose blood (True Metrix Blood Glucose Test) test strip 100 each 1     Sig: Dx code E11.42 testing bs 1 x day       When do you need the refill by: ASAP    What additional details did the patient provide when requesting the medication: PATIENT REQUEST DIRECTIONS TO BE CHANGED TO TESTING BLOOD SUGAR 2 TIMES A DAY INSTEAD OF ONCE.    Does the patient have less than a 3 day supply:  [x] Yes  [] No    What is the patient's preferred pharmacy: [facility] [address] [place] [address] [address] [place] [address] 403 AT [facility] & [facility] - [phone] [facility] [phone] FX

## 2021-07-27 DIAGNOSIS — E55.9 VITAMIN D DEFICIENCY: ICD-10-CM

## 2021-07-27 RX ORDER — CYANOCOBALAMIN 1000 UG/ML
INJECTION, SOLUTION INTRAMUSCULAR; SUBCUTANEOUS
Qty: 3 ML | Refills: 2 | Status: SHIPPED | OUTPATIENT
Start: 2021-07-27 | End: 2022-01-05

## 2021-07-27 RX ORDER — ERGOCALCIFEROL 1.25 MG/1
CAPSULE ORAL
Qty: 6 CAPSULE | Refills: 0 | Status: SHIPPED | OUTPATIENT
Start: 2021-07-27 | End: 2021-08-12

## 2021-08-12 DIAGNOSIS — F51.04 PSYCHOPHYSIOLOGICAL INSOMNIA: ICD-10-CM

## 2021-08-12 DIAGNOSIS — E55.9 VITAMIN D DEFICIENCY: ICD-10-CM

## 2021-08-12 RX ORDER — ERGOCALCIFEROL 1.25 MG/1
CAPSULE ORAL
Qty: 6 CAPSULE | Refills: 0 | Status: SHIPPED | OUTPATIENT
Start: 2021-08-12 | End: 2022-01-05

## 2021-08-12 RX ORDER — ESZOPICLONE 3 MG/1
TABLET, FILM COATED ORAL
Qty: 90 TABLET | Refills: 0 | Status: SHIPPED | OUTPATIENT
Start: 2021-08-12 | End: 2021-11-30 | Stop reason: SDUPTHER

## 2021-08-12 NOTE — TELEPHONE ENCOUNTER
Rx Refill Note  Requested Prescriptions     Pending Prescriptions Disp Refills   • eszopiclone (LUNESTA) 3 MG tablet [Pharmacy Med Name: ESZOPICLONE 3 MG TABLET] 90 tablet      Sig: TAKE 1 TABLET BY MOUTH EVERY NIGHT AT BEDTIME **IMEDIATELY BEFORE BEDTIME AS NEEDED FOR SLEEP**      Last office visit with prescribing clinician: 7/2/2021      Next office visit with prescribing clinician: 10/4/2021     Lipid Panel (12/23/2020 08:54)         Miriam Mello, RT  08/12/21, 14:52 EDT

## 2021-08-12 NOTE — TELEPHONE ENCOUNTER
Rx Refill Note  Requested Prescriptions     Pending Prescriptions Disp Refills   • vitamin D (ERGOCALCIFEROL) 1.25 MG (67919 UT) capsule capsule [Pharmacy Med Name: VIT D2 (ERGOCAL) 1.25MG(50,000U) CP] 6 capsule 0     Sig: TAKE ONE TABLET BY MOUTH EVERY 14 DAYS.      Last office visit with prescribing clinician: 7/2/2021      Next office visit with prescribing clinician: 10/4/2021     Hemoglobin A1c (07/02/2021 09:28)  SCANNED - LABS (05/27/2021)  Lipid Panel (12/23/2020 08:54)         Paula Calero CMA  08/12/21, 15:16 EDT

## 2021-08-23 ENCOUNTER — OFFICE VISIT (OUTPATIENT)
Dept: PODIATRY | Facility: CLINIC | Age: 81
End: 2021-08-23

## 2021-08-23 VITALS
SYSTOLIC BLOOD PRESSURE: 151 MMHG | WEIGHT: 180 LBS | HEIGHT: 65 IN | DIASTOLIC BLOOD PRESSURE: 80 MMHG | HEART RATE: 71 BPM | BODY MASS INDEX: 29.99 KG/M2 | RESPIRATION RATE: 20 BRPM

## 2021-08-23 DIAGNOSIS — E11.42 DM TYPE 2 WITH DIABETIC PERIPHERAL NEUROPATHY (HCC): Primary | ICD-10-CM

## 2021-08-23 DIAGNOSIS — M19.072 ARTHRITIS OF LEFT FOOT: ICD-10-CM

## 2021-08-23 DIAGNOSIS — B35.1 ONYCHOMYCOSIS: ICD-10-CM

## 2021-08-23 PROCEDURE — 11721 DEBRIDE NAIL 6 OR MORE: CPT | Performed by: PODIATRIST

## 2021-08-23 PROCEDURE — 99213 OFFICE O/P EST LOW 20 MIN: CPT | Performed by: PODIATRIST

## 2021-08-23 RX ORDER — UBIDECARENONE 75 MG
1000 CAPSULE ORAL DAILY
COMMUNITY
End: 2022-04-13

## 2021-08-24 ENCOUNTER — TELEPHONE (OUTPATIENT)
Dept: FAMILY MEDICINE CLINIC | Facility: CLINIC | Age: 81
End: 2021-08-24

## 2021-08-24 DIAGNOSIS — E11.42 TYPE 2 DIABETES MELLITUS WITH PERIPHERAL NEUROPATHY (HCC): ICD-10-CM

## 2021-08-24 RX ORDER — INSULIN DETEMIR 100 [IU]/ML
INJECTION, SOLUTION SUBCUTANEOUS
Qty: 11 PEN | Refills: 0 | Status: SHIPPED | OUTPATIENT
Start: 2021-08-24 | End: 2021-09-07 | Stop reason: SDUPTHER

## 2021-08-24 RX ORDER — DULOXETIN HYDROCHLORIDE 60 MG/1
CAPSULE, DELAYED RELEASE ORAL
Qty: 90 CAPSULE | Refills: 0 | Status: SHIPPED | OUTPATIENT
Start: 2021-08-24 | End: 2021-09-10 | Stop reason: SDUPTHER

## 2021-08-24 NOTE — TELEPHONE ENCOUNTER
Caller: Monica Pagan    Relationship: Self    Best call back number: [phone]  What medication are you requesting: LINZESS 145 MCG    What are your current symptoms: BOWEL MOVEMENTS     Have you had these symptoms before:    [x] Yes  [] No    Have you been treated for these symptoms before:   [x] Yes  [] No    If a prescription is needed, what is your preferred pharmacy and phone number: [facility] [address] [facility] 162 - [phone]  - [phone] FX

## 2021-08-24 NOTE — TELEPHONE ENCOUNTER
Rx Refill Note  Requested Prescriptions      No prescriptions requested or ordered in this encounter      Last office visit with prescribing clinician: 7/2/2021      Next office visit with prescribing clinician: 8/24/2021     Hemoglobin A1c (07/02/2021 09:28)  SCANNED - LABS (05/27/2021)  Lipid Panel (12/23/2020 08:54)         Paula Calero, RUPA  08/24/21, 16:22 EDT

## 2021-08-24 NOTE — PROGRESS NOTES
08/23/2021  Foot and Ankle Surgery - Established Patient/Follow-up  Provider: Dr. Sean Ring DPM  Location: [facility]    Subjective:  Monica Pagan is a 81 y.o. female.     Chief Complaint   Patient presents with   • Left Foot - Pain, Follow-up   • Right Foot - Pain, Follow-up   • Diabetes     6 month diabetic foot check pcp kenzie ramirez 06/21       HPI: Patient returns for routine foot check.  She states that she has been doing quite well.  She has not had any open wounds or infections since last exam.  She has had difficulty trimming her nails and is asking that the nails are debrided today.  She feels that her overall health and glycemic control are relatively unchanged.  Her most recent A1c was 6.1%.  She has not had any pain or limitation to the feet.  No other issues to discuss    Allergies   Allergen Reactions   • Amoxicillin Diarrhea     Diarrhea, stomach pain    • Aspirin Unknown - High Severity     Aspirin   • Cortisone Other (See Comments)   • Erythromycin Diarrhea   • Gabapentin    • Januvia [Sitagliptin] Nausea Only   • Levofloxacin Diarrhea   • Lipitor [Atorvastatin] Myalgia   • Nsaids GI Intolerance     Other reaction(s): Unknown   • Pravastatin Myalgia   • Pregabalin Other (See Comments)     fogginess   • Sulfa Antibiotics Nausea Only     Other reaction(s): Unknown   • Theophylline    • Compazine  [Prochlorperazine Edisylate] Anxiety     restless   • Prochlorperazine Anxiety     restless       Current Outpatient Medications on File Prior to Visit   Medication Sig Dispense Refill   • albuterol sulfate  (90 Base) MCG/ACT inhaler Inhale 2 puffs Every 6 (Six) Hours As Needed for Wheezing or Shortness of Air. 18 g 0   • atenolol (TENORMIN) 25 MG tablet TAKE ONE TABLET BY MOUTH ONCE NIGHTLY 90 tablet 0   • BD PEN NEEDLE JENNIFER U/F 32G X 4 MM misc USE AS DIRECTED TWICE DAILY 100 each 0   • Blood Glucose Monitoring Suppl (True Metrix Meter) w/Device kit 1 kit Daily. 1 kit 0   •  "Cholecalciferol (Vitamin D) 50 MCG (2000 UT) tablet Take 2,000 Units by mouth Daily.     • cyanocobalamin 1000 MCG/ML injection INJECT 1 ML UNDER THE SKIN EVERY 30 DAYS 3 mL 2   • Docusate Calcium (STOOL SOFTENER PO) Take  by mouth. 2 po qd     • docusate sodium (Colace) 100 MG capsule Take 1 capsule by mouth 2 (Two) Times a Day As Needed for Constipation.     • DULoxetine (Cymbalta) 60 MG capsule Take 1 capsule by mouth Daily. 90 capsule 0   • eszopiclone (LUNESTA) 3 MG tablet TAKE 1 TABLET BY MOUTH EVERY NIGHT AT BEDTIME **IMEDIATELY BEFORE BEDTIME AS NEEDED FOR SLEEP** 90 tablet 0   • glucose blood (True Metrix Blood Glucose Test) test strip Dx code E11.42 testing bs 1 x day 100 each 1   • HYDROcodone-acetaminophen (NORCO) 7.5-325 MG per tablet 1 tablet 2 (Two) Times a Day As Needed.  0   • hydrocortisone 2.5 % cream      • insulin detemir (Levemir FlexTouch) 100 UNIT/ML injection Inject 30 Units under the skin into the appropriate area as directed Every Night. 12 pen 0   • Insulin Pen Needle (Pen Needles) 32G X 4 MM misc 1 package Daily. 90 each 1   • Lancets Micro Thin 33G misc 1 package 3 (Three) Times a Day Before Meals. 100 each 3   • metFORMIN (GLUCOPHAGE) 1000 MG tablet Take 1 tablet by mouth 2 (Two) Times a Day With Meals. 180 tablet 1   • nystatin (MYCOSTATIN) 696997 UNIT/GM cream Apply  topically to the appropriate area as directed As Needed (groin rash). 90 g 0   • polyethylene glycol (MIRALAX) packet Take 17 g by mouth Daily.     • rosuvastatin (CRESTOR) 5 MG tablet Take 1 tablet by mouth Daily. 90 tablet 1   • saccharomyces boulardii (Florastor) 250 MG capsule Take 1 capsule by mouth Daily.     • Syringe 25G X 1\" 3 ML misc Inject 1 each into the appropriate muscle as directed by prescriber Every 28 (Twenty-Eight) Days. 12 each 1   • tolterodine LA (DETROL LA) 4 MG 24 hr capsule TAKE ONE CAPSULE BY MOUTH DAILY 90 capsule 0   • triamcinolone (KENALOG) 0.1 % cream Apply  topically to the appropriate " "area as directed 2 (Two) Times a Day. 45 g 0   • verapamil SR (CALAN-SR) 120 MG CR tablet Take 1 tablet by mouth Every Night. 90 tablet 0   • vitamin B-12 (CYANOCOBALAMIN) 100 MCG tablet Vitamin B12     • vitamin D (ERGOCALCIFEROL) 1.25 MG (12403 UT) capsule capsule TAKE ONE TABLET BY MOUTH EVERY 14 DAYS. 6 capsule 0     No current facility-administered medications on file prior to visit.       Objective   /80 (BP Location: Left arm, Patient Position: Sitting, Cuff Size: Large Adult)   Pulse 71   Resp 20   Ht 163.8 cm (64.5\")   Wt 81.6 kg (180 lb)   BMI 30.42 kg/m²     General:   Diabetic Foot Exam Performed    Appearance: appears stated age and healthy    Orientation: AAOx3    Affect: appropriate        Right Foot/Ankle:       Vascular   Normal vascular exam    Dorsalis pedis:  2+  Posterior tibial:  2+  Skin Temperature: warm    Edema Grading:  None  CFT:  < 3 seconds  Pedal Hair Growth:  Present  Varicosities: mild varicosities        Neurologic   Light touch sensation:  Normal  Hot/Cold sensation: normal    Protective Sensation using [place] Monofilament:  10  Achilles reflex:  2+      Musculoskeletal   Ecchymosis:  None  Tenderness: none    Arch:  Normal  Hammertoe:  Second toe, third toe, fourth toe and fifth toe (Reducible)      Muscle Strength   Normal strength    Foot dorsiflexion:  5  Foot plantar flexion:  5  Foot inversion:  5  Foot eversion:  5      Range of Motion   Foot and ankle ROM within normal limits        Dermatologic   Skin: skin intact    Nails: onychomycosis, abnormally thick and dystrophic nails        Additional Comments Moderate soft tissue rigidity.  No pain with palpation.      Left Foot/Ankle:       Vascular   Normal vascular exam    Dorsalis pedis:  2+  Posterior tibial:  2+  Skin Temperature: warm    Edema Grading:  None  CFT:  < 3 seconds  Pedal Hair Growth:  Present  Varicosities: mild varicosities        Neurologic   Light touch sensation:  Normal  Hot/cold " sensation: normal    Protective Sensation using [place]ina Monofilament:  10  Achilles reflex:  2+      Musculoskeletal    Amputation   Toes amputated: first toe  Ecchymosis:  None  Tenderness: arch and dorsal foot    Arch:  Normal  Hammertoe:  Second toe, third toe, fourth toe and fifth toe (Reducible)      Muscle Strength   Normal strength    Foot dorsiflexion:  5  Foot plantar flexion:  5  Foot inversion:  5  Foot eversion:  5      Dermatologic   Skin: skin intact    Nails: onychomycosis, abnormally thick and dystrophic nails      Assessment/Plan   Diagnoses and all orders for this visit:    1. 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 Onychomycosis    3. Arthritis of left foot      Patient's physical exam is relatively unchanged and stable.  She denies any significant pain or limitation.  We did discuss appropriate shoes and the importance of daily diabetic foot check and glycemic control.  I continue to feel that she is at mild risk of diabetic pedal related complications.  Nails were debrided today without issue.  She is to call with any new issues or concerns.  I would like her to follow-up with TITUS Goode in 6 months for reevaluation and nail care.  Greater than 20 minutes was spent before, during, and after evaluation for patient care    Nail debridement: Both feet x9    Nails were debrided with a nail nipper without complication.  No anesthesia was required.  Indications for procedure were thickened, dystrophic, and fungal appearing nails which are difficult to trim.  Proper self-care and technique was discussed with patient.  Patient was stable after procedure.      No orders of the defined types were placed in this encounter.         Note is dictated utilizing voice recognition software. Unfortunately this leads to occasional typographical errors. I apologize in advance if the situation occurs. If questions occur please do not hesitate to call our office.

## 2021-08-24 NOTE — TELEPHONE ENCOUNTER
Rx Refill Note  Requested Prescriptions     Pending Prescriptions Disp Refills   • DULoxetine (CYMBALTA) 60 MG capsule [Pharmacy Med Name: DULOXETINE CAP 60MG DR] 90 capsule 0     Sig: TAKE 1 CAPSULE DAILY   • Levemir FlexTouch 100 UNIT/ML injection [Pharmacy Med Name: LEVEMIR FLEX PEN 100U/ML] 30 mL 0     Sig: INJECT 35 UNITS            SUBCUTANEOUSLY EVERY NIGHT AS DIRECTED      Last office visit with prescribing clinician: 7/2/2021      Next office visit with prescribing clinician: 10/4/2021     Hemoglobin A1c (07/02/2021 09:28)  Comprehensive Metabolic Panel (12/23/2020 08:54)  Lipid Panel (12/23/2020 08:54)         Paula Calero CMA  08/24/21, 16:09 EDT

## 2021-09-07 DIAGNOSIS — E11.42 TYPE 2 DIABETES MELLITUS WITH PERIPHERAL NEUROPATHY (HCC): ICD-10-CM

## 2021-09-07 RX ORDER — INSULIN DETEMIR 100 [IU]/ML
35 INJECTION, SOLUTION SUBCUTANEOUS NIGHTLY
Qty: 4 PEN | Refills: 2 | Status: SHIPPED | OUTPATIENT
Start: 2021-09-07 | End: 2021-12-29

## 2021-09-07 NOTE — TELEPHONE ENCOUNTER
Rx Refill Note  Requested Prescriptions     Pending Prescriptions Disp Refills   • verapamil SR (CALAN-SR) 120 MG CR tablet [Pharmacy Med Name: VERAPAMIL  MG TABLET] 90 tablet 0     Sig: TAKE ONE TABLET BY MOUTH ONCE NIGHTLY      Last office visit with prescribing clinician: 7/2/2021      Next office visit with prescribing clinician: 10/4/2021     Hemoglobin A1c (07/02/2021 09:28)  Comprehensive Metabolic Panel (12/23/2020 08:54)  Lipid Panel (12/23/2020 08:54)         Paula Calero CMA  09/07/21, 09:07 EDT

## 2021-09-10 RX ORDER — DULOXETIN HYDROCHLORIDE 60 MG/1
60 CAPSULE, DELAYED RELEASE ORAL DAILY
Qty: 90 CAPSULE | Refills: 0 | Status: SHIPPED | OUTPATIENT
Start: 2021-09-10 | End: 2021-11-16

## 2021-09-10 RX ORDER — ATENOLOL 25 MG/1
TABLET ORAL
Qty: 90 TABLET | Refills: 0 | Status: SHIPPED | OUTPATIENT
Start: 2021-09-10 | End: 2021-12-09

## 2021-09-10 NOTE — TELEPHONE ENCOUNTER
Caller: Monica Pagan    Relationship: Self    Best call back number: [phone]    Medication needed:   Requested Prescriptions     Pending Prescriptions Disp Refills   • DULoxetine (CYMBALTA) 60 MG capsule 90 capsule 0     Sig: Take 1 capsule by mouth Daily.       When do you need the refill by: 09/10    What additional details did the patient provide when requesting the medication: PATIENT STATED SHE HAS 2 DAYS LEFT. SHE ALSO STATED THAT THE MEDICATION LINZESS WORKS FOR HER BUT ITS TOO EXPENSIVE FOR HER (SHE SPENT ABOUT $220 FOR THE FIRST PRESCRIPTION) AND SHE WOULD LIKE TO KNOW IF THERE WAS SOMETHING CHEAPER FOR HER DUE TO HER BEING ON A FIXED INCOME    Does the patient have less than a 3 day supply:  [x] Yes  [] No    What is the patient's preferred pharmacy: [facility] [address] [place] [address] [address] [place] [address] [address] [facility] - [phone] [place] [phone] FX

## 2021-09-10 NOTE — TELEPHONE ENCOUNTER
Rx Refill Note  Requested Prescriptions     Pending Prescriptions Disp Refills   • DULoxetine (CYMBALTA) 60 MG capsule 90 capsule 0     Sig: Take 1 capsule by mouth Daily.      Last office visit with prescribing clinician: 7/2/2021      Next office visit with prescribing clinician: 10/4/2021     Hemoglobin A1c (07/02/2021 09:28)         Miriam Mello, RT  09/10/21, 14:43 EDT

## 2021-09-22 ENCOUNTER — TELEPHONE (OUTPATIENT)
Dept: FAMILY MEDICINE CLINIC | Facility: CLINIC | Age: 81
End: 2021-09-22

## 2021-09-22 RX ORDER — PLECANATIDE 3 MG/1
3 TABLET ORAL DAILY
Qty: 30 TABLET | Refills: 1 | Status: SHIPPED | OUTPATIENT
Start: 2021-09-22 | End: 2022-01-24

## 2021-09-22 NOTE — TELEPHONE ENCOUNTER
Caller: Monica Pagan    Relationship: Self    Best call back number: [phone]    What is the best time to reach you: ANY TIME    Who are you requesting to speak with (clinical staff, provider,  specific staff member): CLINICAL STAFF    What was the call regarding: PATIENT CALLED STATING SHE NEEDS DR BAUTISTA TO PRESCRIBE HER A CHEAPER ALTERNATIVE TO linaclotide (Linzess) 145 MCG capsule capsule, SHE CANNOT AFFORD THE COPAY FOR THIS MEDICATION AND CANNOT FIND ANY COUPONS TO HELP LOWER THE COST.    PLEASE ADVISE PATIENT    Do you require a callback: YES

## 2021-09-22 NOTE — TELEPHONE ENCOUNTER
I sent out trulance but it is Tier 4-----only other one similar to linzess.....otherwise it is miralax or stool softners or other things for constipation

## 2021-09-23 NOTE — TELEPHONE ENCOUNTER
PATIENT IS CALLING SHE WAS ABLE TO GET THE LINZESS SHE GOT A COUPON ON IT AND WAS ABLE TO GET CHEAPER. SHE WANTED DOCTOR TO KNOW.

## 2021-09-29 ENCOUNTER — TELEPHONE (OUTPATIENT)
Dept: FAMILY MEDICINE CLINIC | Facility: CLINIC | Age: 81
End: 2021-09-29

## 2021-09-29 NOTE — TELEPHONE ENCOUNTER
Caller: Monica Pagan    Relationship to patient: Self    Best call back number: [phone] ([phone]    Patient is needing: PATIENT CALLING TO LET DR BAUTISTA KNOW PATIENT RECIEVED FLU  SHOT TODAY     PATIENT ALSO WANTED TO LET DR BAUTISTA KNOW THAT SHE FELL ON HER BOTTOM YESTERDAY AND HAS AN APPOINTMENT WITH A PAIN MANAGEMENT DR ON 09/30/21

## 2021-10-04 ENCOUNTER — OFFICE VISIT (OUTPATIENT)
Dept: FAMILY MEDICINE CLINIC | Facility: CLINIC | Age: 81
End: 2021-10-04

## 2021-10-04 VITALS
HEIGHT: 65 IN | SYSTOLIC BLOOD PRESSURE: 108 MMHG | WEIGHT: 175 LBS | OXYGEN SATURATION: 97 % | RESPIRATION RATE: 14 BRPM | TEMPERATURE: 97.1 F | HEART RATE: 74 BPM | DIASTOLIC BLOOD PRESSURE: 69 MMHG | BODY MASS INDEX: 29.16 KG/M2

## 2021-10-04 DIAGNOSIS — R03.1 LOW BLOOD PRESSURE READING: ICD-10-CM

## 2021-10-04 DIAGNOSIS — E78.2 MIXED HYPERLIPIDEMIA: ICD-10-CM

## 2021-10-04 DIAGNOSIS — E11.42 TYPE 2 DIABETES MELLITUS WITH PERIPHERAL NEUROPATHY (HCC): Primary | ICD-10-CM

## 2021-10-04 DIAGNOSIS — M53.3 COCCYGEAL PAIN, ACUTE: ICD-10-CM

## 2021-10-04 DIAGNOSIS — E04.9 NODULAR GOITER: ICD-10-CM

## 2021-10-04 LAB — HBA1C MFR BLD: 6.1 %

## 2021-10-04 PROCEDURE — 84443 ASSAY THYROID STIM HORMONE: CPT | Performed by: FAMILY MEDICINE

## 2021-10-04 PROCEDURE — 83036 HEMOGLOBIN GLYCOSYLATED A1C: CPT | Performed by: FAMILY MEDICINE

## 2021-10-04 PROCEDURE — 99214 OFFICE O/P EST MOD 30 MIN: CPT | Performed by: FAMILY MEDICINE

## 2021-10-04 PROCEDURE — 84439 ASSAY OF FREE THYROXINE: CPT | Performed by: FAMILY MEDICINE

## 2021-10-04 PROCEDURE — 3044F HG A1C LEVEL LT 7.0%: CPT | Performed by: FAMILY MEDICINE

## 2021-10-04 PROCEDURE — 36415 COLL VENOUS BLD VENIPUNCTURE: CPT | Performed by: FAMILY MEDICINE

## 2021-10-04 RX ORDER — TOLTERODINE 4 MG/1
CAPSULE, EXTENDED RELEASE ORAL
Qty: 90 CAPSULE | Refills: 0 | Status: SHIPPED | OUTPATIENT
Start: 2021-10-04 | End: 2022-01-03

## 2021-10-04 NOTE — PROGRESS NOTES
Subjective   Monica Pagan is a 81 y.o. female.     Chief Complaint   Patient presents with   • Med Refill     metformin to [facility] in [place]    • Fall     Patient fell on 9/26/21 but did not go to the hospital. She fell on her buttocks.    • Diabetes         Current Outpatient Medications:   •  albuterol sulfate  (90 Base) MCG/ACT inhaler, Inhale 2 puffs Every 6 (Six) Hours As Needed for Wheezing or Shortness of Air., Disp: 18 g, Rfl: 0  •  atenolol (TENORMIN) 25 MG tablet, TAKE ONE TABLET BY MOUTH ONCE NIGHTLY, Disp: 90 tablet, Rfl: 0  •  BD PEN NEEDLE JENNIFER U/F 32G X 4 MM misc, USE AS DIRECTED TWICE DAILY, Disp: 100 each, Rfl: 0  •  Blood Glucose Monitoring Suppl (True Metrix Meter) w/Device kit, 1 kit Daily., Disp: 1 kit, Rfl: 0  •  Cholecalciferol (Vitamin D) 50 MCG (2000 UT) tablet, Take 2,000 Units by mouth Daily., Disp: , Rfl:   •  cyanocobalamin 1000 MCG/ML injection, INJECT 1 ML UNDER THE SKIN EVERY 30 DAYS, Disp: 3 mL, Rfl: 2  •  docusate sodium (Colace) 100 MG capsule, Take 1 capsule by mouth 2 (Two) Times a Day As Needed for Constipation., Disp: , Rfl:   •  DULoxetine (CYMBALTA) 60 MG capsule, Take 1 capsule by mouth Daily., Disp: 90 capsule, Rfl: 0  •  eszopiclone (LUNESTA) 3 MG tablet, TAKE 1 TABLET BY MOUTH EVERY NIGHT AT BEDTIME **IMEDIATELY BEFORE BEDTIME AS NEEDED FOR SLEEP**, Disp: 90 tablet, Rfl: 0  •  glucose blood (True Metrix Blood Glucose Test) test strip, Dx code E11.42 testing bs 1 x day, Disp: 100 each, Rfl: 1  •  HYDROcodone-acetaminophen (NORCO) 7.5-325 MG per tablet, 1 tablet 2 (Two) Times a Day As Needed., Disp: , Rfl: 0  •  hydrocortisone 2.5 % cream, , Disp: , Rfl:   •  insulin detemir (Levemir FlexTouch) 100 UNIT/ML injection, Inject 35 Units under the skin into the appropriate area as directed Every Night. (Patient taking differently: Inject 30 Units under the skin into the appropriate area as directed Every Morning.), Disp: 4 pen, Rfl: 2  •  Insulin Pen Needle (Pen  "Needles) 32G X 4 MM misc, 1 package Daily., Disp: 90 each, Rfl: 1  •  Lancets Micro Thin 33G misc, 1 package 3 (Three) Times a Day Before Meals., Disp: 100 each, Rfl: 3  •  linaclotide (Linzess) 145 MCG capsule capsule, Linzess 145 mcg capsule  Take by oral route for 30 days., Disp: , Rfl:   •  metFORMIN (GLUCOPHAGE) 1000 MG tablet, Take 1 tablet by mouth 2 (Two) Times a Day With Meals., Disp: 180 tablet, Rfl: 1  •  nystatin (MYCOSTATIN) 335726 UNIT/GM cream, Apply  topically to the appropriate area as directed As Needed (groin rash)., Disp: 90 g, Rfl: 0  •  Plecanatide (Trulance) 3 MG tablet, Take 1 tablet by mouth Daily., Disp: 30 tablet, Rfl: 1  •  polyethylene glycol (MIRALAX) packet, Take 17 g by mouth Daily., Disp: , Rfl:   •  rosuvastatin (CRESTOR) 5 MG tablet, Take 1 tablet by mouth Daily., Disp: 90 tablet, Rfl: 1  •  saccharomyces boulardii (Florastor) 250 MG capsule, Take 1 capsule by mouth Daily., Disp: , Rfl:   •  Syringe 25G X 1\" 3 ML misc, Inject 1 each into the appropriate muscle as directed by prescriber Every 28 (Twenty-Eight) Days., Disp: 12 each, Rfl: 1  •  tolterodine LA (DETROL LA) 4 MG 24 hr capsule, TAKE ONE CAPSULE BY MOUTH DAILY, Disp: 90 capsule, Rfl: 0  •  triamcinolone (KENALOG) 0.1 % cream, Apply  topically to the appropriate area as directed 2 (Two) Times a Day., Disp: 45 g, Rfl: 0  •  verapamil SR (CALAN-SR) 120 MG CR tablet, TAKE ONE TABLET BY MOUTH ONCE NIGHTLY, Disp: 90 tablet, Rfl: 0  •  vitamin B-12 (CYANOCOBALAMIN) 100 MCG tablet, Vitamin B12, Disp: , Rfl:   •  vitamin D (ERGOCALCIFEROL) 1.25 MG (59514 UT) capsule capsule, TAKE ONE TABLET BY MOUTH EVERY 14 DAYS., Disp: 6 capsule, Rfl: 0    Past Medical History:   Diagnosis Date   • Anemia    • Anxiety    • Colon polyp     TA   • DDD  lumbar    • DMII    • Fibromyalgia    • Hyperlipidemia    • Hypertension    • IBS    • Insomnia    • Lumbar spinal stenosis    • MAMMO    • Meniere's disease    • Nontoxic thyroid nodule    • Ocular " histoplasmosis    • Osteoarthritis    • Peripheral neuropathy    • PVC    • Spondylosis    • Tinnitus    • Tremor    • Trigeminal neuralgia    • Vitamin D deficiency        Past Surgical History:   Procedure Laterality Date   • AMPUTATION DIGIT Left     Digit #1   • APPENDECTOMY  1962   • BUNIONECTOMY Bilateral    • CATARACT EXTRACTION      left   • CATARACT EXTRACTION      x2   • COLONOSCOPY  2012    NEG = 2012/ 2021= TA, rech 2026   • CYST REMOVAL Bilateral     WRIST   • HYSTERECTOMY  1980   • JOINT REPLACEMENT      Rt TKR x 2   • JOINT REPLACEMENT      Left THR   • LAPAROSCOPIC CHOLECYSTECTOMY      DR. LOBATO   • SPINE SURGERY      Lumbar Fusion   • SUBTOTAL HYSTERECTOMY         Family History   Problem Relation Age of Onset   • Asthma Mother    • COPD Mother    • Heart disease Father    • Alcohol abuse Father    • Other Father         WWII Gangrene/ Malaria   • Arthritis Sister    • Heart attack Brother    • Alzheimer's disease Brother    • Heart disease Brother    • Diabetes Brother    • Hyperlipidemia Brother    • Leukemia Sister    • Heart disease Brother    • Diabetes Brother    • Alcohol abuse Brother    • Heart attack Brother 42   • Cancer Brother         Ewings Sarcoma       Social History     Socioeconomic History   • Marital status:      Spouse name: Not on file   • Number of children: Not on file   • Years of education: Not on file   • Highest education level: Not on file   Tobacco Use   • Smoking status: Never Smoker   • Smokeless tobacco: Never Used   Vaping Use   • Vaping Use: Never used   Substance and Sexual Activity   • Alcohol use: Yes     Alcohol/week: 1.0 standard drinks     Types: 1 Glasses of wine per week     Comment: Rare   • Drug use: No   • Sexual activity: Defer       80 y/o C female here for f/u on DMII/ recent fall w/ persistent pain/     Pt states she fell last week (SUN) onto a concrete floor when she tripped -----pt states she fell onto her buttocks and states she was worried  about poss fx; pt saw pain management last week and they ordered XRays which she did SAT.....the patient states she is some better, but coccyx hurts a lot;        The following portions of the patient's history were reviewed and updated as appropriate: allergies, current medications, past family history, past medical history, past social history, past surgical history and problem list.    Review of Systems   Constitutional: Positive for activity change and fatigue. Negative for appetite change, unexpected weight gain and unexpected weight loss.   Eyes: Negative for blurred vision, double vision, pain and visual disturbance.   Cardiovascular: Negative for leg swelling.   Gastrointestinal: Negative for abdominal distention, abdominal pain, constipation, diarrhea, nausea and vomiting.   Endocrine: Negative for polydipsia, polyphagia and polyuria.   Genitourinary: Negative for frequency.   Musculoskeletal: Positive for arthralgias. Negative for gait problem.   Skin: Negative for color change, dry skin, rash and skin lesions.   Neurological: Negative for weakness and numbness.       Vitals:    10/04/21 1017   BP: 108/69   Pulse: 74   Resp: 14   Temp: 97.1 °F (36.2 °C)   SpO2: 97%       Objective   Physical Exam  Vitals and nursing note reviewed.   Constitutional:       General: She is not in acute distress.     Appearance: Normal appearance. She is well-developed. She is not ill-appearing or toxic-appearing.   Cardiovascular:      Rate and Rhythm: Normal rate and regular rhythm.      Pulses:           Dorsalis pedis pulses are 1+ on the right side and 1+ on the left side.      Heart sounds: Normal heart sounds. No murmur heard.      Pulmonary:      Effort: Pulmonary effort is normal. No respiratory distress.      Breath sounds: Normal breath sounds. No stridor. No wheezing, rhonchi or rales.   Musculoskeletal:         General: No tenderness or deformity.        Back:       Right foot: No bunion, Charcot foot, foot drop  or prominent metatarsal heads.      Left foot: No bunion, Charcot foot, foot drop or prominent metatarsal heads.        Feet:    Feet:      Right foot:      Skin integrity: Dry skin present. No ulcer, blister, skin breakdown, erythema, warmth, callus or fissure.      Toenail Condition: Right toenails are normal. ingrown     Left foot:      Skin integrity: Dry skin present. No ulcer, blister, skin breakdown, erythema, warmth, callus or fissure.      Toenail Condition: Left toenails are normal. ingrown     Comments: DM foot exam done    Skin:     General: Skin is warm and dry.      Capillary Refill: Capillary refill takes less than 2 seconds.      Findings: No erythema or rash.   Neurological:      Mental Status: She is alert and oriented to person, place, and time.      Cranial Nerves: No cranial nerve deficit.   Psychiatric:         Attention and Perception: Attention normal.         Mood and Affect: Mood and affect normal.         Speech: Speech normal.         Behavior: Behavior normal. Behavior is cooperative.         Thought Content: Thought content normal.         Cognition and Memory: Cognition and memory normal.         Judgment: Judgment normal.           Assessment/Plan   Diagnoses and all orders for this visit:    1. Type 2 diabetes mellitus with peripheral neuropathy (HCC) (Primary)  -     POC Glycosylated Hemoglobin (Hb A1C)  -     Hemoglobin A1c; Future  -     MicroAlbumin, Urine, Random - Urine, Clean Catch; Future    2. Coccygeal pain, acute    3. Low blood pressure reading    4. Nodular goiter  -     T4, Free  -     TSH    5. Mixed hyperlipidemia  -     Lipid Panel; Future  -     Comprehensive Metabolic Panel; Future    fasting labs 1/2022  A1C= 6.1  Thyroid labs today

## 2021-10-04 NOTE — TELEPHONE ENCOUNTER
Rx Refill Note  Requested Prescriptions     Pending Prescriptions Disp Refills   • tolterodine LA (DETROL LA) 4 MG 24 hr capsule [Pharmacy Med Name: TOLTERODINE TART ER 4 MG CAP] 90 capsule 0     Sig: TAKE ONE CAPSULE BY MOUTH DAILY      Last office visit with prescribing clinician: 10/4/2021      Next office visit with prescribing clinician: Visit date not found     POC Glycosylated Hemoglobin (Hb A1C) (10/04/2021 10:38)  Lipid Panel (12/23/2020 08:54)         Paula Calero CMA  10/04/21, 14:12 EDT

## 2021-10-05 LAB
T4 FREE SERPL-MCNC: 1.2 NG/DL (ref 0.93–1.7)
TSH SERPL DL<=0.05 MIU/L-ACNC: 1.13 UIU/ML (ref 0.27–4.2)

## 2021-10-05 NOTE — TELEPHONE ENCOUNTER
Caller: Monica Pagan    Relationship: Self    Medication requested (name and dosage):   metFORMIN (GLUCOPHAGE) 1000 MG tablet    Pharmacy where request should be sent:   [facility] [address] [place] [address] [address]  - [phone]  - [phone]   [phone]    Additional details provided by patient:     Best call back number: [phone]    Does the patient have less than a 3 day supply:  [] Yes  [x] No    Gerard Romano Rep   10/05/21 10:27 EDT

## 2021-10-06 RX ORDER — PEN NEEDLE, DIABETIC 32GX 5/32"
NEEDLE, DISPOSABLE MISCELLANEOUS
Qty: 100 EACH | Refills: 0 | Status: SHIPPED | OUTPATIENT
Start: 2021-10-06 | End: 2022-02-23

## 2021-10-06 NOTE — TELEPHONE ENCOUNTER
Rx Refill Note  Requested Prescriptions     Pending Prescriptions Disp Refills   • BD Pen Needle Jennifer U/F 32G X 4 MM misc [Pharmacy Med Name: BD UF JENNIFER PEN NEEDLE [id number]] 100 each      Sig: USE ONE - DAILY      Last office visit with prescribing clinician: 10/4/2021      Next office visit with prescribing clinician: Visit date not found     POC Glycosylated Hemoglobin (Hb A1C) (10/04/2021 10:38)         Miriam Mello, RT  10/06/21, 10:29 EDT

## 2021-10-11 NOTE — TELEPHONE ENCOUNTER
Patient informed  Rx for Vitamin D sent    Appointment for both follow up and lab are scheduled    Pt will need to  and sign a new contract  López willis and in your folder

## 2021-11-16 RX ORDER — DULOXETIN HYDROCHLORIDE 60 MG/1
CAPSULE, DELAYED RELEASE ORAL
Qty: 90 CAPSULE | Refills: 0 | Status: SHIPPED | OUTPATIENT
Start: 2021-11-16 | End: 2022-03-04

## 2021-11-18 DIAGNOSIS — E55.9 VITAMIN D DEFICIENCY: ICD-10-CM

## 2021-11-18 RX ORDER — ERGOCALCIFEROL 1.25 MG/1
CAPSULE ORAL
Qty: 6 CAPSULE | Refills: 0 | Status: CANCELLED | OUTPATIENT
Start: 2021-11-18

## 2021-11-18 NOTE — TELEPHONE ENCOUNTER
Incoming Refill Request      Medication requested (name and dose): Vitamin D2 1.25mg    Pharmacy where request should be sent: [facility]    Additional details provided by patient: n/a    Best call back number:      Does the patient have less than a 3 day supply:  [] Yes  [x] No    Jazzy Cortez, RegSched Rep  11/18/21, 09:45 EST

## 2021-11-18 NOTE — TELEPHONE ENCOUNTER
Rx Refill Note  Requested Prescriptions     Pending Prescriptions Disp Refills   • vitamin D (ERGOCALCIFEROL) 1.25 MG (07306 UT) capsule capsule 6 capsule 0      Last office visit with prescribing clinician: 10/4/2021      Next office visit with prescribing clinician: Visit date not found     Vitamin D 25 Hydroxy (07/02/2021 09:28)         Miriam Mello, RT  11/18/21, 10:04 EST

## 2021-11-19 DIAGNOSIS — E55.9 VITAMIN D DEFICIENCY: Primary | ICD-10-CM

## 2021-11-30 DIAGNOSIS — F51.04 PSYCHOPHYSIOLOGICAL INSOMNIA: ICD-10-CM

## 2021-12-01 RX ORDER — ESZOPICLONE 3 MG/1
3 TABLET, FILM COATED ORAL NIGHTLY PRN
Qty: 90 TABLET | Refills: 0 | Status: SHIPPED | OUTPATIENT
Start: 2021-12-01 | End: 2022-02-25 | Stop reason: SDUPTHER

## 2021-12-06 NOTE — TELEPHONE ENCOUNTER
Rx Refill Note  Requested Prescriptions     Pending Prescriptions Disp Refills   • verapamil SR (CALAN-SR) 120 MG CR tablet [Pharmacy Med Name: VERAPAMIL  MG TABLET] 90 tablet 0     Sig: TAKE ONE TABLET BY MOUTH ONCE NIGHTLY      Last office visit with prescribing clinician: 10/4/2021      Next office visit with prescribing clinician: Visit date not found     POC Glycosylated Hemoglobin (Hb A1C) (10/04/2021 10:38)  Lipid Panel (12/23/2020 08:54)  Comprehensive Metabolic Panel (12/23/2020 08:54)         Paula Calero CMA  12/06/21, 09:39 EST     Protocol about verapamil and atenolol

## 2021-12-09 RX ORDER — ATENOLOL 25 MG/1
TABLET ORAL
Qty: 90 TABLET | Refills: 0 | Status: SHIPPED | OUTPATIENT
Start: 2021-12-09 | End: 2022-03-04

## 2021-12-09 NOTE — TELEPHONE ENCOUNTER
Rx Refill Note  Requested Prescriptions     Pending Prescriptions Disp Refills   • atenolol (TENORMIN) 25 MG tablet [Pharmacy Med Name: ATENOLOL 25 MG TABLET] 90 tablet 0     Sig: TAKE ONE TABLET BY MOUTH ONCE NIGHTLY      Last office visit with prescribing clinician: 10/4/2021      Next office visit with prescribing clinician: Visit date not found     Hemoglobin A1c (07/02/2021 09:28)  SCANNED - LABS (05/27/2021)         Paula Calero CMA  12/09/21, 09:26 EST     Protocol had a interaction

## 2021-12-13 RX ORDER — LANCETS 33 GAUGE
EACH MISCELLANEOUS
Qty: 100 EACH | Refills: 3 | Status: SHIPPED | OUTPATIENT
Start: 2021-12-13 | End: 2023-03-17

## 2021-12-13 NOTE — TELEPHONE ENCOUNTER
Rx Refill Note  Requested Prescriptions     Pending Prescriptions Disp Refills   • Easy Touch Lancets 33G/Twist misc [Pharmacy Med Name: Offerpop TWIST 33G LANCETS] 100 each 3     Sig: USE THREE TIMES A DAY AS DIRECED      Last office visit with prescribing clinician: 10/4/2021      Next office visit with prescribing clinician: Visit date not found            Miriam Mello, RT  12/13/21, 10:57 EST

## 2021-12-14 RX ORDER — ROSUVASTATIN CALCIUM 5 MG/1
TABLET, COATED ORAL
Qty: 90 TABLET | Refills: 1 | Status: SHIPPED | OUTPATIENT
Start: 2021-12-14 | End: 2022-04-13 | Stop reason: SDUPTHER

## 2021-12-22 RX ORDER — LINACLOTIDE 145 UG/1
CAPSULE, GELATIN COATED ORAL
Qty: 30 CAPSULE | Refills: 0 | Status: SHIPPED | OUTPATIENT
Start: 2021-12-22 | End: 2022-01-24 | Stop reason: SDUPTHER

## 2021-12-29 DIAGNOSIS — E11.42 TYPE 2 DIABETES MELLITUS WITH PERIPHERAL NEUROPATHY (HCC): ICD-10-CM

## 2021-12-29 RX ORDER — INSULIN DETEMIR 100 [IU]/ML
INJECTION, SOLUTION SUBCUTANEOUS
Qty: 4 PEN | Refills: 0 | Status: SHIPPED | OUTPATIENT
Start: 2021-12-29 | End: 2022-04-06 | Stop reason: SDUPTHER

## 2021-12-29 NOTE — TELEPHONE ENCOUNTER
Rx Refill Note  Requested Prescriptions     Pending Prescriptions Disp Refills   • Levemir FlexTouch 100 UNIT/ML injection [Pharmacy Med Name: LEVEMIR FLEXTOUCH 100 UNIT/ML]       Sig: INJECT 35 UNITS UNDER THE SKIN EVERY NIGHT      Last office visit with prescribing clinician: 10/4/2021      Next office visit with prescribing clinician: Visit date not found     POC Glycosylated Hemoglobin (Hb A1C) (10/04/2021 10:38)         Miriam Mello, RT  12/29/21, 12:30 EST

## 2022-01-03 RX ORDER — TOLTERODINE 4 MG/1
CAPSULE, EXTENDED RELEASE ORAL
Qty: 90 CAPSULE | Refills: 0 | Status: SHIPPED | OUTPATIENT
Start: 2022-01-03 | End: 2022-04-06 | Stop reason: SDUPTHER

## 2022-01-03 NOTE — TELEPHONE ENCOUNTER
Rx Refill Note  Requested Prescriptions     Pending Prescriptions Disp Refills   • tolterodine LA (DETROL LA) 4 MG 24 hr capsule [Pharmacy Med Name: TOLTERODINE TART ER 4 MG CAP] 90 capsule 0     Sig: TAKE ONE CAPSULE BY MOUTH DAILY      Last office visit with prescribing clinician: 10/4/2021      Next office visit with prescribing clinician: Visit date not found     Lipid Panel (12/23/2020 08:54)         Miriam Mello, RT  01/03/22, 08:29 EST

## 2022-01-05 ENCOUNTER — CLINICAL SUPPORT (OUTPATIENT)
Dept: FAMILY MEDICINE CLINIC | Facility: CLINIC | Age: 82
End: 2022-01-05

## 2022-01-05 DIAGNOSIS — E11.42 TYPE 2 DIABETES MELLITUS WITH PERIPHERAL NEUROPATHY: ICD-10-CM

## 2022-01-05 DIAGNOSIS — E78.2 MIXED HYPERLIPIDEMIA: ICD-10-CM

## 2022-01-05 DIAGNOSIS — E55.9 VITAMIN D DEFICIENCY: ICD-10-CM

## 2022-01-05 PROCEDURE — 83036 HEMOGLOBIN GLYCOSYLATED A1C: CPT | Performed by: FAMILY MEDICINE

## 2022-01-05 PROCEDURE — 80061 LIPID PANEL: CPT | Performed by: FAMILY MEDICINE

## 2022-01-05 PROCEDURE — 80053 COMPREHEN METABOLIC PANEL: CPT | Performed by: FAMILY MEDICINE

## 2022-01-05 PROCEDURE — 82306 VITAMIN D 25 HYDROXY: CPT | Performed by: FAMILY MEDICINE

## 2022-01-05 PROCEDURE — 36415 COLL VENOUS BLD VENIPUNCTURE: CPT | Performed by: FAMILY MEDICINE

## 2022-01-05 NOTE — PROGRESS NOTES
Venipuncture performed in R ARM by RT Arleen  with good hemostasis. Patient tolerated well. 01/05/22 Miriam Mello, RT

## 2022-01-06 LAB
25(OH)D3 SERPL-MCNC: 63.4 NG/ML (ref 30–100)
ALBUMIN SERPL-MCNC: 4.6 G/DL (ref 3.5–5.2)
ALBUMIN/GLOB SERPL: 2 G/DL
ALP SERPL-CCNC: 71 U/L (ref 39–117)
ALT SERPL W P-5'-P-CCNC: 15 U/L (ref 1–33)
ANION GAP SERPL CALCULATED.3IONS-SCNC: 10.6 MMOL/L (ref 5–15)
AST SERPL-CCNC: 19 U/L (ref 1–32)
BILIRUB SERPL-MCNC: 0.4 MG/DL (ref 0–1.2)
BUN SERPL-MCNC: 16 MG/DL (ref 8–23)
BUN/CREAT SERPL: 17.4 (ref 7–25)
CALCIUM SPEC-SCNC: 10.7 MG/DL (ref 8.6–10.5)
CHLORIDE SERPL-SCNC: 103 MMOL/L (ref 98–107)
CHOLEST SERPL-MCNC: 143 MG/DL (ref 0–200)
CO2 SERPL-SCNC: 27.4 MMOL/L (ref 22–29)
CREAT SERPL-MCNC: 0.92 MG/DL (ref 0.57–1)
GFR SERPL CREATININE-BSD FRML MDRD: 59 ML/MIN/1.73
GLOBULIN UR ELPH-MCNC: 2.3 GM/DL
GLUCOSE SERPL-MCNC: 131 MG/DL (ref 65–99)
HBA1C MFR BLD: 6.4 % (ref 3.5–5.6)
HDLC SERPL-MCNC: 57 MG/DL (ref 40–60)
LDLC SERPL CALC-MCNC: 66 MG/DL (ref 0–100)
LDLC/HDLC SERPL: 1.11 {RATIO}
POTASSIUM SERPL-SCNC: 4.4 MMOL/L (ref 3.5–5.2)
PROT SERPL-MCNC: 6.9 G/DL (ref 6–8.5)
SODIUM SERPL-SCNC: 141 MMOL/L (ref 136–145)
TRIGL SERPL-MCNC: 113 MG/DL (ref 0–150)
VLDLC SERPL-MCNC: 20 MG/DL (ref 5–40)

## 2022-01-13 ENCOUNTER — TELEPHONE (OUTPATIENT)
Dept: FAMILY MEDICINE CLINIC | Facility: CLINIC | Age: 82
End: 2022-01-13

## 2022-01-13 NOTE — TELEPHONE ENCOUNTER
SW pt and she is not symptomatic, but will take a home test and let us know if POS. States she bruised her R arm and fell on R side back around kidney area. EMS checked her out after she refused to go to ER, and thought she was ok. She will let us know if anything changes. Dr Moreno informed.

## 2022-01-13 NOTE — TELEPHONE ENCOUNTER
PATIENT CALLING IN REGARDS TO ASK DR BAUTISTA  WHAT SHE SHOULD DO AFTER BEING COVID EXPOSED BY HER DAUGHTER.     SHE ALSO WANTED TO LET DR BAUTISTA KNOW SHE FELL LAST NIGHT AND EMS CAME TO PICK HER UP.      PLEASE ADVISE THANK YOU!

## 2022-01-14 ENCOUNTER — TELEPHONE (OUTPATIENT)
Dept: FAMILY MEDICINE CLINIC | Facility: CLINIC | Age: 82
End: 2022-01-14

## 2022-01-14 NOTE — TELEPHONE ENCOUNTER
Caller: Monica Pagan    Relationship: Self    Best call back number: [phone] (M)    What is the best time to reach you: ANYTIME    Who are you requesting to speak with (clinical staff, provider,  specific staff member): CLINICAL STAFF    What was the call regarding: PATIENT STATES TOOK A AT HOME COVID TEST AND IS UNABLE TO READ TEST PATIENT STATES STRIP IS PINK AND HAS ONE LINE PATIENT WOULD LIKE TO DISCUSS WITH DR BAUTISTA    Do you require a callback: YES

## 2022-01-14 NOTE — TELEPHONE ENCOUNTER
HUB to share    I would guess one line is neg and 2 lines are POSITIVE but I do not know w/o seeing it.    LVM informing pt

## 2022-01-14 NOTE — TELEPHONE ENCOUNTER
Caller: Monica Pagan     Relationship: Self     Best call back number: [phone] (M)     What is the best time to reach you: ANYTIME     Who are you requesting to speak with (clinical staff, provider,  specific staff member): CLINICAL STAFF     What was the call regarding: PATIENT STATES TOOK A AT HOME COVID TEST AND IS UNABLE TO READ TEST PATIENT STATES STRIP IS PINK AND HAS ONE LINE PATIENT WOULD LIKE TO DISCUSS WITH DR BAUTISTA     Do you require a callback: YES   Patient notified and will call to schedule.    Daisy Ho RN

## 2022-01-31 ENCOUNTER — APPOINTMENT (OUTPATIENT)
Dept: GENERAL RADIOLOGY | Facility: HOSPITAL | Age: 82
End: 2022-01-31

## 2022-01-31 ENCOUNTER — HOSPITAL ENCOUNTER (EMERGENCY)
Facility: HOSPITAL | Age: 82
Discharge: HOME | End: 2022-01-31
Attending: EMERGENCY MEDICINE | Admitting: EMERGENCY MEDICINE

## 2022-01-31 VITALS
HEIGHT: 64 IN | DIASTOLIC BLOOD PRESSURE: 60 MMHG | OXYGEN SATURATION: 98 % | RESPIRATION RATE: 15 BRPM | SYSTOLIC BLOOD PRESSURE: 130 MMHG | BODY MASS INDEX: 29.88 KG/M2 | TEMPERATURE: 97.8 F | WEIGHT: 175 LBS | HEART RATE: 60 BPM

## 2022-01-31 DIAGNOSIS — S00.03XA CONTUSION OF SCALP, INITIAL ENCOUNTER: ICD-10-CM

## 2022-01-31 DIAGNOSIS — R19.7 DIARRHEA, UNSPECIFIED TYPE: Primary | ICD-10-CM

## 2022-01-31 DIAGNOSIS — S30.0XXA CONTUSION OF COCCYX, INITIAL ENCOUNTER: ICD-10-CM

## 2022-01-31 LAB
ALBUMIN SERPL-MCNC: 3.9 G/DL (ref 3.5–5.2)
ALBUMIN/GLOB SERPL: 1.6 G/DL
ALP SERPL-CCNC: 80 U/L (ref 39–117)
ALT SERPL W P-5'-P-CCNC: 15 U/L (ref 1–33)
ANION GAP SERPL CALCULATED.3IONS-SCNC: 9 MMOL/L (ref 5–15)
AST SERPL-CCNC: 19 U/L (ref 1–32)
BASOPHILS # BLD AUTO: 0.1 10*3/MM3 (ref 0–0.2)
BASOPHILS NFR BLD AUTO: 0.8 % (ref 0–1.5)
BILIRUB SERPL-MCNC: 0.3 MG/DL (ref 0–1.2)
BUN SERPL-MCNC: 15 MG/DL (ref 8–23)
BUN/CREAT SERPL: 14.6 (ref 7–25)
CALCIUM SPEC-SCNC: 10.7 MG/DL (ref 8.6–10.5)
CHLORIDE SERPL-SCNC: 104 MMOL/L (ref 98–107)
CO2 SERPL-SCNC: 26 MMOL/L (ref 22–29)
CREAT SERPL-MCNC: 1.03 MG/DL (ref 0.57–1)
DEPRECATED RDW RBC AUTO: 42 FL (ref 37–54)
EOSINOPHIL # BLD AUTO: 0.2 10*3/MM3 (ref 0–0.4)
EOSINOPHIL NFR BLD AUTO: 1.9 % (ref 0.3–6.2)
ERYTHROCYTE [DISTWIDTH] IN BLOOD BY AUTOMATED COUNT: 13.3 % (ref 12.3–15.4)
GFR SERPL CREATININE-BSD FRML MDRD: 52 ML/MIN/1.73
GLOBULIN UR ELPH-MCNC: 2.5 GM/DL
GLUCOSE SERPL-MCNC: 140 MG/DL (ref 65–99)
HCT VFR BLD AUTO: 36.8 % (ref 34–46.6)
HGB BLD-MCNC: 12.4 G/DL (ref 12–15.9)
LYMPHOCYTES # BLD AUTO: 1.7 10*3/MM3 (ref 0.7–3.1)
LYMPHOCYTES NFR BLD AUTO: 17.3 % (ref 19.6–45.3)
MCH RBC QN AUTO: 30.1 PG (ref 26.6–33)
MCHC RBC AUTO-ENTMCNC: 33.7 G/DL (ref 31.5–35.7)
MCV RBC AUTO: 89.3 FL (ref 79–97)
MONOCYTES # BLD AUTO: 0.4 10*3/MM3 (ref 0.1–0.9)
MONOCYTES NFR BLD AUTO: 4.6 % (ref 5–12)
NEUTROPHILS NFR BLD AUTO: 7.4 10*3/MM3 (ref 1.7–7)
NEUTROPHILS NFR BLD AUTO: 75.4 % (ref 42.7–76)
NRBC BLD AUTO-RTO: 0.1 /100 WBC (ref 0–0.2)
PLATELET # BLD AUTO: 211 10*3/MM3 (ref 140–450)
PMV BLD AUTO: 7.4 FL (ref 6–12)
POTASSIUM SERPL-SCNC: 4.3 MMOL/L (ref 3.5–5.2)
PROT SERPL-MCNC: 6.4 G/DL (ref 6–8.5)
PTH-INTACT SERPL-MCNC: 32.5 PG/ML (ref 15–65)
RBC # BLD AUTO: 4.13 10*6/MM3 (ref 3.77–5.28)
SODIUM SERPL-SCNC: 139 MMOL/L (ref 136–145)
WBC NRBC COR # BLD: 9.8 10*3/MM3 (ref 3.4–10.8)

## 2022-01-31 PROCEDURE — 99283 EMERGENCY DEPT VISIT LOW MDM: CPT

## 2022-01-31 PROCEDURE — 80053 COMPREHEN METABOLIC PANEL: CPT | Performed by: EMERGENCY MEDICINE

## 2022-01-31 PROCEDURE — 83970 ASSAY OF PARATHORMONE: CPT | Performed by: EMERGENCY MEDICINE

## 2022-01-31 PROCEDURE — 25010000002 ONDANSETRON PER 1 MG: Performed by: EMERGENCY MEDICINE

## 2022-01-31 PROCEDURE — 96374 THER/PROPH/DIAG INJ IV PUSH: CPT

## 2022-01-31 PROCEDURE — 72220 X-RAY EXAM SACRUM TAILBONE: CPT

## 2022-01-31 PROCEDURE — 85025 COMPLETE CBC W/AUTO DIFF WBC: CPT | Performed by: EMERGENCY MEDICINE

## 2022-01-31 RX ORDER — MECLIZINE HYDROCHLORIDE 25 MG/1
25 TABLET ORAL ONCE
Status: COMPLETED | OUTPATIENT
Start: 2022-01-31 | End: 2022-01-31

## 2022-01-31 RX ORDER — ONDANSETRON 2 MG/ML
4 INJECTION INTRAMUSCULAR; INTRAVENOUS ONCE
Status: COMPLETED | OUTPATIENT
Start: 2022-01-31 | End: 2022-01-31

## 2022-01-31 RX ADMIN — ONDANSETRON 4 MG: 2 INJECTION INTRAMUSCULAR; INTRAVENOUS at 09:56

## 2022-01-31 RX ADMIN — SODIUM CHLORIDE, POTASSIUM CHLORIDE, SODIUM LACTATE AND CALCIUM CHLORIDE 1000 ML: 600; 310; 30; 20 INJECTION, SOLUTION INTRAVENOUS at 08:54

## 2022-01-31 RX ADMIN — MECLIZINE HYDROCHLORIDE 25 MG: 25 TABLET ORAL at 09:56

## 2022-02-03 ENCOUNTER — PATIENT OUTREACH (OUTPATIENT)
Dept: CASE MANAGEMENT | Facility: OTHER | Age: 82
End: 2022-02-03

## 2022-02-03 NOTE — OUTREACH NOTE
Patient Outreach    Ambulatory Case Management Note    Call placed to pt to follow up recent ED visit. Introduced self and role of ACM. Pt states she was very unhappy with her experience. Offered to have patient relation follow up with her but she declined. Pt states the care was great but she could not believe she had to find her own way home. She came to ED via ambulance. Attempted to explain ambulance regulation. Pt states she is sore but feeling better. Pt states she has issues she needs to take care of. Her son is coming in from [place] to help her. No questions, concerns or needs regarding health wellness. Pt given number for 24/7 \[facility]\"". Pt appreciative of phone call and declined to participate in  Case Management Program. No needs identified.        Demetria Fritz RN  Ambulatory Case Management    2/3/2022, 13:19 EST

## 2022-02-23 RX ORDER — PEN NEEDLE, DIABETIC 32GX 5/32"
1 NEEDLE, DISPOSABLE MISCELLANEOUS
Qty: 100 EACH | Refills: 0 | Status: SHIPPED | OUTPATIENT
Start: 2022-02-23 | End: 2022-05-31

## 2022-02-25 DIAGNOSIS — F51.04 PSYCHOPHYSIOLOGICAL INSOMNIA: ICD-10-CM

## 2022-02-25 RX ORDER — ESZOPICLONE 3 MG/1
3 TABLET, FILM COATED ORAL NIGHTLY PRN
Qty: 90 TABLET | Refills: 0 | Status: SHIPPED | OUTPATIENT
Start: 2022-02-25 | End: 2022-06-01

## 2022-03-04 RX ORDER — DULOXETIN HYDROCHLORIDE 60 MG/1
CAPSULE, DELAYED RELEASE ORAL
Qty: 90 CAPSULE | Refills: 0 | Status: SHIPPED | OUTPATIENT
Start: 2022-03-04 | End: 2022-05-23

## 2022-03-04 RX ORDER — ATENOLOL 25 MG/1
TABLET ORAL
Qty: 90 TABLET | Refills: 0 | Status: SHIPPED | OUTPATIENT
Start: 2022-03-04 | End: 2022-05-23

## 2022-03-04 NOTE — TELEPHONE ENCOUNTER
Rx Refill Note  Requested Prescriptions     Pending Prescriptions Disp Refills   • atenolol (TENORMIN) 25 MG tablet [Pharmacy Med Name: ATENOLOL 25 MG TABLET] 90 tablet 0     Sig: TAKE ONE TABLET BY MOUTH ONCE NIGHTLY   • DULoxetine (CYMBALTA) 60 MG capsule [Pharmacy Med Name: DULOXETINE HCL DR 60 MG CAPSULE] 90 capsule 0     Sig: TAKE ONE CAPSULE BY MOUTH DAILY      Last office visit with prescribing clinician: 10/4/2021      Next office visit with prescribing clinician: Visit date not found     Office Visit with Henny Long DO (10/04/2021)  PTH, Intact (01/31/2022 09:56)  Comprehensive Metabolic Panel (01/31/2022 08:54)  CBC & Differential (01/31/2022 08:54)  Vitamin D 25 Hydroxy (01/05/2022 10:51)  Hemoglobin A1c (01/05/2022 10:51)  Comprehensive Metabolic Panel (01/05/2022 10:51)  Lipid Panel (01/05/2022 10:51)         Alfonso Patel  03/04/22, 11:28 EST     Did not pass auto appr protocol

## 2022-03-06 NOTE — TELEPHONE ENCOUNTER
4 Eyes Skin Assessment     NAME:  Renan Patton OF BIRTH:  1939  MEDICAL RECORD NUMBER:  [id number]    The patient is being assess for  Shift Handoff    I agree that 2 RN's have performed a thorough Head to Toe Skin Assessment on the patient. ALL assessment sites listed below have been assessed. Areas assessed by both nurses:    Head, Face, Ears, Shoulders, Back, Chest, Arms, Elbows, Hands, Sacrum. Buttock, Coccyx, Ischium and Legs. Feet and Heels        Does the Patient have a Wound?  No noted wound(s)       Kushal Prevention initiated:  Yes   Wound Care Orders initiated:  NA    Pressure Injury (Stage 3,4, Unstageable, DTI, NWPT, and Complex wounds) if present place consult order under [de-identified] NA    New and Established Ostomies if present place consult order under : NA      Nurse 1 eSignature: Electronically signed by Tj Moore RN on 3/6/22 at 7:05 AM EST    **SHARE this note so that the co-signing nurse is able to place an eSignature**    Nurse 2 eSignature: Electronically signed by Layla Wright RN on 3/6/22 at 8:29 AM EST Pt informed

## 2022-03-07 NOTE — TELEPHONE ENCOUNTER
Rx Refill Note  Requested Prescriptions     Pending Prescriptions Disp Refills   • Blood Glucose Monitoring Suppl (True Metrix Meter) w/Device kit [Pharmacy Med Name: TRUE METRIX BLOOD GLUCOSE MTR]       Sig: USE TO TEST BLOOD SUGAR      Last office visit with prescribing clinician: 10/4/2021      Next office visit with prescribing clinician: 3/24/2022     Comprehensive Metabolic Panel (01/31/2022 08:54)         Paula Calero CMA  03/07/22, 12:00 EST

## 2022-03-25 ENCOUNTER — TELEPHONE (OUTPATIENT)
Dept: FAMILY MEDICINE CLINIC | Facility: CLINIC | Age: 82
End: 2022-03-25

## 2022-03-25 NOTE — TELEPHONE ENCOUNTER
PATIENT CALLED AND STATES SHE IS EXPERIENCING STOMACH ISSUES SUCH AS DIARRHEA FOR 3 DAYS. SHE HAS IBS-C AND STATES THERE IS A STOMACH FLU GOING ON AT THE NURSING HOME WHERE HER DAUGHTER IS. SHE DID HAVE LUNCH AT THE NURSING HOME.  SHE WAS EXPERIENCING DIZZY SPELLS WHILE AT THE NURSING HOME, BUT THEY ARE GONE.     SHE HAS TAKEN PEPTO BUT ITS NOT WORKING.     PLEASE CALL [phone]    [facility] [address] [place] [address] [address] [facility] [address] - [phone] [place] [phone]   [phone]

## 2022-04-06 DIAGNOSIS — E11.42 TYPE 2 DIABETES MELLITUS WITH PERIPHERAL NEUROPATHY: ICD-10-CM

## 2022-04-06 RX ORDER — INSULIN DETEMIR 100 [IU]/ML
35 INJECTION, SOLUTION SUBCUTANEOUS NIGHTLY
Qty: 4 PEN | Refills: 0 | Status: SHIPPED | OUTPATIENT
Start: 2022-04-06 | End: 2022-04-13

## 2022-04-06 RX ORDER — TOLTERODINE 4 MG/1
4 CAPSULE, EXTENDED RELEASE ORAL DAILY
Qty: 90 CAPSULE | Refills: 0 | Status: SHIPPED | OUTPATIENT
Start: 2022-04-06 | End: 2022-04-13

## 2022-04-13 ENCOUNTER — OFFICE VISIT (OUTPATIENT)
Dept: FAMILY MEDICINE CLINIC | Facility: CLINIC | Age: 82
End: 2022-04-13

## 2022-04-13 VITALS
RESPIRATION RATE: 16 BRPM | TEMPERATURE: 97.8 F | OXYGEN SATURATION: 97 % | HEART RATE: 69 BPM | WEIGHT: 172 LBS | DIASTOLIC BLOOD PRESSURE: 66 MMHG | HEIGHT: 64 IN | SYSTOLIC BLOOD PRESSURE: 110 MMHG | BODY MASS INDEX: 29.37 KG/M2

## 2022-04-13 DIAGNOSIS — N32.81 OVERACTIVE BLADDER: ICD-10-CM

## 2022-04-13 DIAGNOSIS — E78.2 MIXED HYPERLIPIDEMIA: ICD-10-CM

## 2022-04-13 DIAGNOSIS — Z78.0 POSTMENOPAUSAL: ICD-10-CM

## 2022-04-13 DIAGNOSIS — I10 PRIMARY HYPERTENSION: ICD-10-CM

## 2022-04-13 DIAGNOSIS — Z12.31 ENCOUNTER FOR SCREENING MAMMOGRAM FOR MALIGNANT NEOPLASM OF BREAST: ICD-10-CM

## 2022-04-13 DIAGNOSIS — E11.42 TYPE 2 DIABETES MELLITUS WITH PERIPHERAL NEUROPATHY: Primary | ICD-10-CM

## 2022-04-13 LAB
EXPIRATION DATE: ABNORMAL
HBA1C MFR BLD: 5.8 %
Lab: ABNORMAL

## 2022-04-13 PROCEDURE — 3044F HG A1C LEVEL LT 7.0%: CPT | Performed by: FAMILY MEDICINE

## 2022-04-13 PROCEDURE — 99214 OFFICE O/P EST MOD 30 MIN: CPT | Performed by: FAMILY MEDICINE

## 2022-04-13 PROCEDURE — 83036 HEMOGLOBIN GLYCOSYLATED A1C: CPT | Performed by: FAMILY MEDICINE

## 2022-04-13 RX ORDER — INSULIN DETEMIR 100 [IU]/ML
25 INJECTION, SOLUTION SUBCUTANEOUS NIGHTLY
Qty: 4 PEN | Refills: 0 | Status: SHIPPED
Start: 2022-04-13 | End: 2022-05-27 | Stop reason: SDUPTHER

## 2022-04-13 RX ORDER — CYANOCOBALAMIN (VITAMIN B-12) 2000 MCG
2000 TABLET ORAL DAILY
COMMUNITY

## 2022-04-13 RX ORDER — SOLIFENACIN SUCCINATE 10 MG/1
10 TABLET, FILM COATED ORAL DAILY
Qty: 90 TABLET | Refills: 0
Start: 2022-04-13 | End: 2022-12-01

## 2022-04-13 RX ORDER — ROSUVASTATIN CALCIUM 5 MG/1
5 TABLET, COATED ORAL DAILY
Qty: 90 TABLET | Refills: 3 | Status: SHIPPED | OUTPATIENT
Start: 2022-04-13

## 2022-04-13 NOTE — PROGRESS NOTES
Fingerstick performed in right middle finger by Paula Calero CMA  with good hemostasis. Patient tolerated well. 04/13/22 Paula Calero CMA

## 2022-04-13 NOTE — PROGRESS NOTES
Subjective   Monica Pagan is a 81 y.o. female.     Chief Complaint   Patient presents with   • Diabetes   • Hypertension         Current Outpatient Medications:   •  albuterol sulfate  (90 Base) MCG/ACT inhaler, Inhale 2 puffs Every 6 (Six) Hours As Needed for Wheezing or Shortness of Air., Disp: 18 g, Rfl: 0  •  atenolol (TENORMIN) 25 MG tablet, TAKE ONE TABLET BY MOUTH ONCE NIGHTLY, Disp: 90 tablet, Rfl: 0  •  Blood Glucose Monitoring Suppl (True Metrix Meter) w/Device kit, USE TO TEST BLOOD SUGAR, Disp: 1 kit, Rfl: 0  •  Cholecalciferol (Vitamin D) 50 MCG (2000 UT) tablet, Take 2,000 Units by mouth Daily., Disp: , Rfl:   •  cyanocobalamin (VITAMIN B-12) 2000 MCG tablet tablet, Take 2,000 mcg by mouth Daily., Disp: , Rfl:   •  docusate sodium (Colace) 100 MG capsule, Take 1 capsule by mouth 2 (Two) Times a Day As Needed for Constipation., Disp: , Rfl:   •  DULoxetine (CYMBALTA) 60 MG capsule, TAKE ONE CAPSULE BY MOUTH DAILY, Disp: 90 capsule, Rfl: 0  •  Easy Touch Lancets 33G/Twist misc, USE THREE TIMES A DAY AS DIRECED, Disp: 100 each, Rfl: 3  •  eszopiclone (LUNESTA) 3 MG tablet, Take 1 tablet by mouth At Night As Needed for Sleep. Take immediately before bedtime, Disp: 90 tablet, Rfl: 0  •  glucose blood (True Metrix Blood Glucose Test) test strip, Dx code E11.42 testing bs 1 x day, Disp: 100 each, Rfl: 1  •  HYDROcodone-acetaminophen (NORCO) 7.5-325 MG per tablet, 1 tablet 2 (Two) Times a Day As Needed., Disp: , Rfl: 0  •  insulin detemir (Levemir FlexTouch) 100 UNIT/ML injection, Inject 25 Units under the skin into the appropriate area as directed Every Night., Disp: 4 pen, Rfl: 0  •  Insulin Pen Needle (BD Pen Needle Kati U/F) 32G X 4 MM misc, Inject 1 package under the skin into the appropriate area as directed Daily., Disp: 100 each, Rfl: 0  •  metFORMIN (GLUCOPHAGE) 1000 MG tablet, TAKE ONE TABLET BY MOUTH TWICE A DAY WITH MEALS, Disp: 180 tablet, Rfl: 1  •  nystatin (MYCOSTATIN) 496348 UNIT/GM  "cream, Apply  topically to the appropriate area as directed As Needed (groin rash)., Disp: 90 g, Rfl: 0  •  polyethylene glycol (MIRALAX) packet, Take 17 g by mouth Daily., Disp: , Rfl:   •  rosuvastatin (CRESTOR) 5 MG tablet, Take 1 tablet by mouth Daily., Disp: 90 tablet, Rfl: 3  •  saccharomyces boulardii (Florastor) 250 MG capsule, Take 1 capsule by mouth Daily., Disp: , Rfl:   •  Syringe 25G X 1\" 3 ML misc, Inject 1 each into the appropriate muscle as directed by prescriber Every 28 (Twenty-Eight) Days., Disp: 12 each, Rfl: 1  •  triamcinolone (KENALOG) 0.1 % cream, Apply  topically to the appropriate area as directed 2 (Two) Times a Day., Disp: 45 g, Rfl: 0  •  linaclotide (Linzess) 145 MCG capsule capsule, Take 1 capsule by mouth Every Morning Before Breakfast., Disp: 30 capsule, Rfl: 0  •  solifenacin (VESIcare) 10 MG tablet, Take 1 tablet by mouth Daily., Disp: 90 tablet, Rfl: 0  •  verapamil SR (CALAN-SR) 120 MG CR tablet, TAKE ONE TABLET BY MOUTH ONCE NIGHTLY, Disp: 90 tablet, Rfl: 0    Past Medical History:   Diagnosis Date   • Anemia    • Anxiety    • Colon polyp     TA   • DDD  lumbar    • DEXA    • DMII    • Fibromyalgia    • Hyperlipidemia    • Hypertension    • IBS    • Insomnia    • Lumbar spinal stenosis    • MAMMO     NEG= 2020   • Meniere's disease    • Nontoxic thyroid nodule    • Ocular histoplasmosis    • Osteoarthritis    • Peripheral neuropathy    • PVC    • Spondylosis    • Tinnitus    • Tremor    • Trigeminal neuralgia    • Vitamin D deficiency        Past Surgical History:   Procedure Laterality Date   • AMPUTATION DIGIT Left     Digit #1   • APPENDECTOMY  1962   • BUNIONECTOMY Bilateral    • CATARACT EXTRACTION      left   • CATARACT EXTRACTION      x2   • COLONOSCOPY  2012    NEG = 2012/ 2021= TA, rech 2026   • CYST REMOVAL Bilateral     WRIST   • HYSTERECTOMY  1980   • JOINT REPLACEMENT      Rt TKR x 2   • JOINT REPLACEMENT      Left THR   • LAPAROSCOPIC CHOLECYSTECTOMY      DR. LOBATO "   • SPINE SURGERY      Lumbar Fusion   • SUBTOTAL HYSTERECTOMY         Family History   Problem Relation Age of Onset   • Asthma Mother    • COPD Mother    • Heart disease Father    • Alcohol abuse Father    • Other Father         WWII Gangrene/ Malaria   • Arthritis Sister    • Leukemia Sister    • Kidney disease Brother    • Alzheimer's disease Brother    • Heart disease Brother    • Diabetes Brother    • Hyperlipidemia Brother    • Heart disease Brother    • Diabetes Brother    • Alcohol abuse Brother    • Heart attack Brother 42   • Cancer Brother         Ewings Sarcoma       Social History     Socioeconomic History   • Marital status:    Tobacco Use   • Smoking status: Never Smoker   • Smokeless tobacco: Never Used   Vaping Use   • Vaping Use: Never used   Substance and Sexual Activity   • Alcohol use: Yes     Alcohol/week: 1.0 standard drink     Types: 1 Glasses of wine per week     Comment: Rare   • Drug use: No   • Sexual activity: Defer       80 y/o C female here for 6mos f/u on DMII/ HTN     Pt's daughter has MS/ DMII and now having Kidney failure......the patient has been stressed out w/ her health-------her daughter has been in/out of the hosp mult times ----pt is stressed out trying to visit her QOD at the rehab facility    Pt states she has decreased her insulin to 30 units and has still had 2 symptomatic low BS =90's......    Pt states she thinks she has a UTI and left UA at urology office----she needs to call them today to see if needs abx       The following portions of the patient's history were reviewed and updated as appropriate: allergies, current medications, past family history, past medical history, past social history, past surgical history and problem list.    Review of Systems   Constitutional: Negative for activity change, appetite change, unexpected weight gain and unexpected weight loss.   Eyes: Negative for blurred vision, double vision, pain and visual disturbance.    Cardiovascular: Negative for leg swelling.   Gastrointestinal: Negative for abdominal distention, abdominal pain, constipation, diarrhea, nausea and vomiting.   Endocrine: Negative for polydipsia, polyphagia and polyuria.   Genitourinary: Positive for dysuria and urgency. Negative for frequency and hematuria.   Musculoskeletal: Negative for gait problem.   Skin: Negative for color change, dry skin, rash and skin lesions.   Neurological: Negative for weakness and numbness.   Psychiatric/Behavioral: Positive for stress. Negative for sleep disturbance.       Vitals:    04/13/22 0908   BP: 110/66   Pulse: 69   Resp: 16   Temp: 97.8 °F (36.6 °C)   SpO2: 97%       Objective   Physical Exam  Vitals and nursing note reviewed.   Constitutional:       General: She is not in acute distress.     Appearance: Normal appearance. She is well-developed. She is not ill-appearing or toxic-appearing.   Cardiovascular:      Rate and Rhythm: Normal rate and regular rhythm.      Heart sounds: Normal heart sounds. No murmur heard.  Pulmonary:      Effort: Pulmonary effort is normal. No respiratory distress.      Breath sounds: Normal breath sounds. No stridor. No wheezing, rhonchi or rales.   Musculoskeletal:         General: No tenderness or deformity.   Feet:      Right foot:      Skin integrity: No ulcer, blister or warmth.      Toenail Condition: ingrown     Left foot:      Skin integrity: No blister, warmth or callus.      Toenail Condition: ingrown  Skin:     General: Skin is warm and dry.      Capillary Refill: Capillary refill takes less than 2 seconds.      Findings: No erythema or rash.   Neurological:      Mental Status: She is alert and oriented to person, place, and time.      Cranial Nerves: No cranial nerve deficit.   Psychiatric:         Attention and Perception: Attention normal.         Mood and Affect: Mood and affect normal.         Speech: Speech normal.         Behavior: Behavior normal. Behavior is cooperative.          Thought Content: Thought content normal.         Cognition and Memory: Cognition and memory normal.         Judgment: Judgment normal.           Assessment/Plan   Diagnoses and all orders for this visit:    1. Type 2 diabetes mellitus with peripheral neuropathy (HCC) (Primary)  -     POC Glycosylated Hemoglobin (Hb A1C)  -     insulin detemir (Levemir FlexTouch) 100 UNIT/ML injection; Inject 25 Units under the skin into the appropriate area as directed Every Night.  Dispense: 4 pen; Refill: 0    2. Primary hypertension    3. Overactive bladder    4. Postmenopausal  -     DEXA Bone Density Axial; Future    5. Mixed hyperlipidemia    6. Encounter for screening mammogram for malignant neoplasm of breast  -     Mammo Screening Digital Tomosynthesis Bilateral With CAD; Future    Other orders  -     solifenacin (VESIcare) 10 MG tablet; Take 1 tablet by mouth Daily.  Dispense: 90 tablet; Refill: 0  -     rosuvastatin (CRESTOR) 5 MG tablet; Take 1 tablet by mouth Daily.  Dispense: 90 tablet; Refill: 3    Disc'd long acting daily insulin goal  A1C= 5.8  Rx---DEXA/ Mammo  Med refills  rech A1C in 3mos

## 2022-04-18 NOTE — TELEPHONE ENCOUNTER
Rx Refill Note  Requested Prescriptions     Pending Prescriptions Disp Refills   • verapamil SR (CALAN-SR) 120 MG CR tablet [Pharmacy Med Name: VERAPAMIL  MG TABLET] 90 tablet 0     Sig: TAKE ONE TABLET BY MOUTH ONCE NIGHTLY      Last office visit with prescribing clinician: 4/13/2022      Next office visit with prescribing clinician: Visit date not found     Lipid Panel (01/05/2022 10:51)         Miriam Mello, RT  04/18/22, 13:05 EDT

## 2022-05-23 RX ORDER — ATENOLOL 25 MG/1
TABLET ORAL
Qty: 90 TABLET | Refills: 1 | Status: SHIPPED | OUTPATIENT
Start: 2022-05-23 | End: 2022-11-21

## 2022-05-23 RX ORDER — DULOXETIN HYDROCHLORIDE 60 MG/1
CAPSULE, DELAYED RELEASE ORAL
Qty: 90 CAPSULE | Refills: 0 | Status: SHIPPED | OUTPATIENT
Start: 2022-05-23 | End: 2022-08-22

## 2022-05-23 NOTE — TELEPHONE ENCOUNTER
Rx Refill Note  Requested Prescriptions     Pending Prescriptions Disp Refills   • atenolol (TENORMIN) 25 MG tablet [Pharmacy Med Name: ATENOLOL 25 MG TABLET] 90 tablet 0     Sig: TAKE ONE TABLET BY MOUTH ONCE NIGHTLY   • DULoxetine (CYMBALTA) 60 MG capsule [Pharmacy Med Name: DULOXETINE HCL DR 60 MG CAPSULE] 90 capsule 0     Sig: TAKE ONE CAPSULE BY MOUTH DAILY      Last office visit with prescribing clinician: 4/13/2022      Next office visit with prescribing clinician: Visit date not found     Comprehensive Metabolic Panel (01/31/2022 08:54)  CBC & Differential (01/31/2022 08:54)  POC Glycosylated Hemoglobin (Hb A1C) (04/13/2022 09:29)  Lipid Panel (01/05/2022 10:51)         Paula Calero CMA  05/23/22, 09:11 EDT

## 2022-05-27 DIAGNOSIS — E11.42 TYPE 2 DIABETES MELLITUS WITH PERIPHERAL NEUROPATHY: ICD-10-CM

## 2022-05-27 RX ORDER — INSULIN DETEMIR 100 [IU]/ML
25 INJECTION, SOLUTION SUBCUTANEOUS NIGHTLY
Qty: 4 PEN | Refills: 0 | Status: SHIPPED | OUTPATIENT
Start: 2022-05-27 | End: 2022-08-15

## 2022-05-27 NOTE — TELEPHONE ENCOUNTER
Caller: Monica Pagan    Relationship: Self    Best call back number: [phone]    Requested Prescriptions:   Requested Prescriptions     Pending Prescriptions Disp Refills   • insulin detemir (Levemir FlexTouch) 100 UNIT/ML injection 4 pen 0     Sig: Inject 25 Units under the skin into the appropriate area as directed Every Night.        Pharmacy where request should be sent: [facility] [address] [place] [address] [address] [place] [id number] - [phone] [place] [phone] FX     Additional details provided by patient: PATIENT HAS THREE LEFT.    Does the patient have less than a 3 day supply:  [x] Yes  [] No    Gerard Henderson Rep   05/27/22 09:27 EDT

## 2022-05-29 DIAGNOSIS — F51.04 PSYCHOPHYSIOLOGICAL INSOMNIA: ICD-10-CM

## 2022-05-31 RX ORDER — PEN NEEDLE, DIABETIC 32GX 5/32"
NEEDLE, DISPOSABLE MISCELLANEOUS
Qty: 100 EACH | Refills: 0 | Status: SHIPPED | OUTPATIENT
Start: 2022-05-31 | End: 2022-09-06

## 2022-06-01 ENCOUNTER — TELEPHONE (OUTPATIENT)
Dept: FAMILY MEDICINE CLINIC | Facility: CLINIC | Age: 82
End: 2022-06-01

## 2022-06-01 RX ORDER — ESZOPICLONE 3 MG/1
TABLET, FILM COATED ORAL
Qty: 90 TABLET | Refills: 0 | Status: SHIPPED | OUTPATIENT
Start: 2022-06-01 | End: 2022-08-15

## 2022-06-01 NOTE — TELEPHONE ENCOUNTER
Caller: Monica Pagan    Relationship: Self    Best call back number: [phone]    What was the call regarding: PATIENT STATED THAT SHE WENT TO THE HOSPITAL YESTERDAY AND WAS GIVEN AN INJECTION OF ROCEPHIN. PATIENT STATED THAT SHE WILL BE CALLING BACK LATER TO SCHEDULE A FOLLOW UP APPOINTMENT WITH DR BAUTISTA, BUT JUST WANTED TO INFORM HER OF THIS.

## 2022-07-06 ENCOUNTER — TELEPHONE (OUTPATIENT)
Dept: FAMILY MEDICINE CLINIC | Facility: CLINIC | Age: 82
End: 2022-07-06

## 2022-07-06 DIAGNOSIS — E11.42 TYPE 2 DIABETES MELLITUS WITH PERIPHERAL NEUROPATHY: Primary | ICD-10-CM

## 2022-07-06 NOTE — TELEPHONE ENCOUNTER
Caller: Monica Pagan    Relationship: Self    Best call back number: [phone] ([phone])    PATIENT IS REQUESTING DR BAUTISTA SENDS HER A1C LAB ORDER OVER TO [facility] SO SHE CAN HAVE THAT DRAWN WHILE SHE DOES LABS FOR PREOP- SHES HAVING SURGERY ON 07/14/22.    HER SURGERY WILL TAKE PLACE AT THE SURGERY CENTER IN [place],   [address] [place] [address] STREET    SHE IS SCHEDULED IN OFFICE WITH US ON 07/13/22, BUT WAS THINKING SHE COULD GET HER LABS DRAWN AT [facility] WHEN SHE GOES FOR HER BLOOD WORK FOR THIS SURGERY TO SAVE HER A TRIP.    PLEASE CALL AND ADVISE PATIENT, DUE TO HER STILL BEING SCHEDULED WITH OUR OFFICE AND NEEDING TO KNOW IF SHE NEEDS TO KEEP THAT APPOINTMENT AND HAVE HER A1C CHECKED.    PLEASE ADVISE

## 2022-08-14 DIAGNOSIS — F51.04 PSYCHOPHYSIOLOGICAL INSOMNIA: ICD-10-CM

## 2022-08-14 DIAGNOSIS — E11.42 TYPE 2 DIABETES MELLITUS WITH PERIPHERAL NEUROPATHY: ICD-10-CM

## 2022-08-15 RX ORDER — INSULIN DETEMIR 100 [IU]/ML
INJECTION, SOLUTION SUBCUTANEOUS
Qty: 15 ML | Refills: 0 | Status: SHIPPED | OUTPATIENT
Start: 2022-08-15 | End: 2022-09-19

## 2022-08-15 RX ORDER — ESZOPICLONE 3 MG/1
TABLET, FILM COATED ORAL
Qty: 90 TABLET | Refills: 0 | Status: SHIPPED | OUTPATIENT
Start: 2022-08-15 | End: 2022-12-01 | Stop reason: SDUPTHER

## 2022-08-15 NOTE — TELEPHONE ENCOUNTER
Rx Refill Note  Requested Prescriptions     Pending Prescriptions Disp Refills   • Levemir FlexTouch 100 UNIT/ML injection [Pharmacy Med Name: LEVEMIR FLEXTOUCH 100 UNIT/ML] 15 mL      Sig: INJECT 25 UNITS UNDER THE SKIN EVERY NIGHT AT BEDTIME   • eszopiclone (LUNESTA) 3 MG tablet [Pharmacy Med Name: ESZOPICLONE 3 MG TABLET] 90 tablet      Sig: TAKE ONE TABLET BY MOUTH EVERY NIGHT AT BEDTIME AS NEEDED FOR SLEEP      Last office visit with prescribing clinician: 4/13/2022      Next office visit with prescribing clinician: Visit date not found     SCANNED - LABS (07/11/2022)  Hemoglobin A1c (07/11/2022)  SCANNED - LABS (05/31/2022)  Lipid Panel (01/05/2022 10:51)         Paula Calero CMA  08/15/22, 10:17 EDT

## 2022-08-22 RX ORDER — DULOXETIN HYDROCHLORIDE 60 MG/1
CAPSULE, DELAYED RELEASE ORAL
Qty: 90 CAPSULE | Refills: 0 | Status: SHIPPED | OUTPATIENT
Start: 2022-08-22 | End: 2022-11-21

## 2022-08-22 NOTE — TELEPHONE ENCOUNTER
Rx Refill Note  Requested Prescriptions     Pending Prescriptions Disp Refills   • DULoxetine (CYMBALTA) 60 MG capsule [Pharmacy Med Name: DULOXETINE HCL DR 60 MG CAPSULE] 90 capsule 0     Sig: TAKE ONE CAPSULE BY MOUTH DAILY      Last office visit with prescribing clinician: 4/13/2022      Next office visit with prescribing clinician: Visit date not found     SCANNED - LABS (07/11/2022)  Hemoglobin A1c (07/11/2022)  Lipid Panel (01/05/2022 10:51)  SCANNED - LABS (05/31/2022)         Paula Calero CMA  08/22/22, 08:40 EDT

## 2022-09-06 RX ORDER — PEN NEEDLE, DIABETIC 32GX 5/32"
NEEDLE, DISPOSABLE MISCELLANEOUS
Qty: 100 EACH | Refills: 0 | Status: SHIPPED | OUTPATIENT
Start: 2022-09-06 | End: 2022-12-01 | Stop reason: SDUPTHER

## 2022-09-17 DIAGNOSIS — E11.42 TYPE 2 DIABETES MELLITUS WITH PERIPHERAL NEUROPATHY: ICD-10-CM

## 2022-09-19 RX ORDER — INSULIN DETEMIR 100 [IU]/ML
INJECTION, SOLUTION SUBCUTANEOUS
Qty: 15 ML | Refills: 0 | Status: SHIPPED | OUTPATIENT
Start: 2022-09-19 | End: 2022-12-01 | Stop reason: SDUPTHER

## 2022-09-19 NOTE — TELEPHONE ENCOUNTER
Rx Refill Note  Requested Prescriptions     Pending Prescriptions Disp Refills   • Levemir FlexTouch 100 UNIT/ML injection [Pharmacy Med Name: LEVEMIR FLEXTOUCH 100 UNIT/ML] 15 mL 0     Sig: INJECT 25 UNITS UNDER THE SKIN EVERY NIGHT AT BEDTIME      Last office visit with prescribing clinician: 4/13/2022      Next office visit with prescribing clinician: Visit date not found     SCANNED - LABS (07/11/2022)  Hemoglobin A1c (07/11/2022)         Paula Calero CMA  09/19/22, 09:55 EDT

## 2022-09-30 NOTE — TELEPHONE ENCOUNTER
Rx Refill Note  Requested Prescriptions     Pending Prescriptions Disp Refills   • verapamil SR (CALAN-SR) 120 MG CR tablet [Pharmacy Med Name: VERAPAMIL  MG TABLET] 90 tablet 0     Sig: TAKE ONE TABLET BY MOUTH ONCE NIGHTLY      Last office visit with prescribing clinician: 4/13/2022      Next office visit with prescribing clinician: Visit date not found     SCANNED - LABS (07/11/2022)         Miriam Mello, RT  09/30/22, 09:35 EDT

## 2022-10-04 NOTE — TELEPHONE ENCOUNTER
Rx Refill Note  Requested Prescriptions     Pending Prescriptions Disp Refills   • metFORMIN (GLUCOPHAGE) 1000 MG tablet [Pharmacy Med Name: metFORMIN HCL 1,000 MG TABLET] 180 tablet 1     Sig: TAKE ONE TABLET BY MOUTH TWICE A DAY WITH MEALS      Last office visit with prescribing clinician: 4/13/2022      Next office visit with prescribing clinician: 10/31/2022     SCANNED - LABS (07/11/2022)  Hemoglobin A1c (07/11/2022)         Paula Calero CMA  10/04/22, 08:46 EDT

## 2022-10-20 ENCOUNTER — CLINICAL SUPPORT (OUTPATIENT)
Dept: FAMILY MEDICINE CLINIC | Facility: CLINIC | Age: 82
End: 2022-10-20

## 2022-10-20 VITALS — SYSTOLIC BLOOD PRESSURE: 135 MMHG | DIASTOLIC BLOOD PRESSURE: 74 MMHG | HEART RATE: 61 BPM

## 2022-10-20 DIAGNOSIS — I10 HYPERTENSION, UNSPECIFIED TYPE: Primary | ICD-10-CM

## 2022-10-28 ENCOUNTER — TELEPHONE (OUTPATIENT)
Dept: FAMILY MEDICINE CLINIC | Facility: CLINIC | Age: 82
End: 2022-10-28

## 2022-10-28 NOTE — TELEPHONE ENCOUNTER
Caller: Monica Pagan    Relationship: Self    Best call back number: [phone]    Who are you requesting to speak with (clinical staff, provider,  specific staff member): DR. BAUTISTA     What was the call regarding: PATIENT WAS AT [facility] YESTERDAY  FOR DIZZINESS AND UTI HAS BLOOD WORK DONE WANTS TO KNOW IF THAT BLOOD WORK CAN GET CALLED FOR BEFORE HER APPOINTMENT ON Monday     Do you require a callback: YES

## 2022-10-31 ENCOUNTER — OFFICE VISIT (OUTPATIENT)
Dept: FAMILY MEDICINE CLINIC | Facility: CLINIC | Age: 82
End: 2022-10-31

## 2022-10-31 VITALS
WEIGHT: 175 LBS | HEIGHT: 64 IN | SYSTOLIC BLOOD PRESSURE: 128 MMHG | BODY MASS INDEX: 29.88 KG/M2 | TEMPERATURE: 97.7 F | HEART RATE: 61 BPM | DIASTOLIC BLOOD PRESSURE: 67 MMHG | OXYGEN SATURATION: 95 % | RESPIRATION RATE: 18 BRPM

## 2022-10-31 DIAGNOSIS — E11.42 TYPE 2 DIABETES MELLITUS WITH PERIPHERAL NEUROPATHY: Primary | ICD-10-CM

## 2022-10-31 DIAGNOSIS — E83.42 HYPOMAGNESEMIA: ICD-10-CM

## 2022-10-31 DIAGNOSIS — I10 ESSENTIAL HYPERTENSION: ICD-10-CM

## 2022-10-31 DIAGNOSIS — F41.9 ANXIETY: ICD-10-CM

## 2022-10-31 DIAGNOSIS — H81.09 MENIERE'S DISEASE, UNSPECIFIED LATERALITY: ICD-10-CM

## 2022-10-31 PROBLEM — Z86.010 HISTORY OF COLON POLYPS: Status: RESOLVED | Noted: 2019-04-17 | Resolved: 2022-10-31

## 2022-10-31 PROBLEM — Z79.4 ENCOUNTER FOR LONG-TERM (CURRENT) USE OF INSULIN: Status: ACTIVE | Noted: 2021-11-24

## 2022-10-31 PROBLEM — M16.11 OSTEOARTHRITIS OF RIGHT HIP: Status: ACTIVE | Noted: 2021-11-24

## 2022-10-31 PROBLEM — H35.341 MACULAR HOLE OF RIGHT EYE: Status: ACTIVE | Noted: 2021-08-20

## 2022-10-31 PROBLEM — Z86.0100 HISTORY OF COLON POLYPS: Status: RESOLVED | Noted: 2019-04-17 | Resolved: 2022-10-31

## 2022-10-31 LAB — HBA1C MFR BLD: 6 % (ref 3.5–5.6)

## 2022-10-31 PROCEDURE — 36415 COLL VENOUS BLD VENIPUNCTURE: CPT | Performed by: FAMILY MEDICINE

## 2022-10-31 PROCEDURE — 99214 OFFICE O/P EST MOD 30 MIN: CPT | Performed by: FAMILY MEDICINE

## 2022-10-31 PROCEDURE — 83036 HEMOGLOBIN GLYCOSYLATED A1C: CPT | Performed by: FAMILY MEDICINE

## 2022-10-31 PROCEDURE — 83735 ASSAY OF MAGNESIUM: CPT | Performed by: FAMILY MEDICINE

## 2022-10-31 RX ORDER — CEPHALEXIN 500 MG/1
CAPSULE ORAL
COMMUNITY
Start: 2022-10-28 | End: 2022-10-31

## 2022-10-31 RX ORDER — SULFAMETHOXAZOLE AND TRIMETHOPRIM 800; 160 MG/1; MG/1
TABLET ORAL
COMMUNITY
End: 2022-10-31

## 2022-10-31 RX ORDER — BUSPIRONE HYDROCHLORIDE 5 MG/1
5 TABLET ORAL 3 TIMES DAILY
Qty: 60 TABLET | Refills: 0 | Status: SHIPPED | OUTPATIENT
Start: 2022-10-31 | End: 2022-11-17 | Stop reason: SDUPTHER

## 2022-10-31 RX ORDER — MECLIZINE HYDROCHLORIDE 25 MG/1
25 TABLET ORAL 3 TIMES DAILY PRN
Qty: 30 TABLET | Refills: 1 | Status: SHIPPED | OUTPATIENT
Start: 2022-10-31 | End: 2022-12-30 | Stop reason: SDUPTHER

## 2022-10-31 RX ORDER — ONDANSETRON 4 MG/1
TABLET, ORALLY DISINTEGRATING ORAL
COMMUNITY
End: 2023-03-17
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
## 2022-10-31 NOTE — PROGRESS NOTES
Venipuncture performed in right arm by Paula Calero CMA  with good hemostasis. Patient tolerated well. 10/31/22 Paula Calero CMA

## 2022-11-01 LAB — MAGNESIUM SERPL-MCNC: 1.8 MG/DL (ref 1.6–2.4)

## 2022-11-17 RX ORDER — BUSPIRONE HYDROCHLORIDE 5 MG/1
5 TABLET ORAL 3 TIMES DAILY
Qty: 90 TABLET | Refills: 2 | Status: SHIPPED | OUTPATIENT
Start: 2022-11-17 | End: 2022-12-01 | Stop reason: SDUPTHER

## 2022-11-21 RX ORDER — DULOXETIN HYDROCHLORIDE 60 MG/1
CAPSULE, DELAYED RELEASE ORAL
Qty: 90 CAPSULE | Refills: 0 | Status: SHIPPED | OUTPATIENT
Start: 2022-11-21 | End: 2023-02-22

## 2022-11-21 RX ORDER — ATENOLOL 25 MG/1
TABLET ORAL
Qty: 90 TABLET | Refills: 0 | Status: SHIPPED | OUTPATIENT
Start: 2022-11-21 | End: 2023-02-22

## 2022-11-21 NOTE — TELEPHONE ENCOUNTER
Rx Refill Note  Requested Prescriptions     Pending Prescriptions Disp Refills   • atenolol (TENORMIN) 25 MG tablet [Pharmacy Med Name: ATENOLOL 25 MG TABLET] 90 tablet 1     Sig: TAKE ONE TABLET BY MOUTH ONCE NIGHTLY   • DULoxetine (CYMBALTA) 60 MG capsule [Pharmacy Med Name: DULOXETINE HCL DR 60 MG CAPSULE] 90 capsule 0     Sig: TAKE ONE CAPSULE BY MOUTH DAILY      Last office visit with prescribing clinician: 10/31/2022      Next office visit with prescribing clinician: 12/1/2022     SCANNED - LABS (10/27/2022)         Paula Caldwell CMA  11/21/22, 07:59 EST

## 2022-12-01 ENCOUNTER — OFFICE VISIT (OUTPATIENT)
Dept: FAMILY MEDICINE CLINIC | Facility: CLINIC | Age: 82
End: 2022-12-01

## 2022-12-01 VITALS
TEMPERATURE: 97.7 F | HEART RATE: 77 BPM | WEIGHT: 172 LBS | SYSTOLIC BLOOD PRESSURE: 155 MMHG | OXYGEN SATURATION: 95 % | RESPIRATION RATE: 14 BRPM | HEIGHT: 64 IN | BODY MASS INDEX: 29.37 KG/M2 | DIASTOLIC BLOOD PRESSURE: 72 MMHG

## 2022-12-01 DIAGNOSIS — E11.42 TYPE 2 DIABETES MELLITUS WITH PERIPHERAL NEUROPATHY: ICD-10-CM

## 2022-12-01 DIAGNOSIS — F51.04 PSYCHOPHYSIOLOGICAL INSOMNIA: ICD-10-CM

## 2022-12-01 DIAGNOSIS — H81.09 MENIERE'S DISEASE, UNSPECIFIED LATERALITY: ICD-10-CM

## 2022-12-01 DIAGNOSIS — F41.9 ANXIETY: Primary | ICD-10-CM

## 2022-12-01 DIAGNOSIS — I10 ELEVATED BLOOD PRESSURE READING IN OFFICE WITH DIAGNOSIS OF HYPERTENSION: ICD-10-CM

## 2022-12-01 PROCEDURE — 99213 OFFICE O/P EST LOW 20 MIN: CPT | Performed by: FAMILY MEDICINE

## 2022-12-01 RX ORDER — ESZOPICLONE 3 MG/1
3 TABLET, FILM COATED ORAL NIGHTLY PRN
Qty: 90 TABLET | Refills: 0 | Status: SHIPPED | OUTPATIENT
Start: 2022-12-01 | End: 2023-02-27

## 2022-12-01 RX ORDER — BUSPIRONE HYDROCHLORIDE 7.5 MG/1
7.5 TABLET ORAL 3 TIMES DAILY
Qty: 90 TABLET | Refills: 1 | Status: SHIPPED | OUTPATIENT
Start: 2022-12-01 | End: 2023-01-27

## 2022-12-01 RX ORDER — PEN NEEDLE, DIABETIC 32GX 5/32"
1 NEEDLE, DISPOSABLE MISCELLANEOUS DAILY
Qty: 100 EACH | Refills: 1 | Status: SHIPPED | OUTPATIENT
Start: 2022-12-01

## 2022-12-01 RX ORDER — INSULIN DETEMIR 100 [IU]/ML
25 INJECTION, SOLUTION SUBCUTANEOUS DAILY
Qty: 15 ML | Refills: 1 | Status: SHIPPED | OUTPATIENT
Start: 2022-12-01

## 2022-12-01 RX ORDER — TRIAMTERENE AND HYDROCHLOROTHIAZIDE 37.5; 25 MG/1; MG/1
1 CAPSULE ORAL EVERY MORNING
Qty: 30 CAPSULE | Refills: 1 | Status: SHIPPED | OUTPATIENT
Start: 2022-12-01 | End: 2022-12-30 | Stop reason: SDUPTHER

## 2022-12-01 NOTE — PROGRESS NOTES
Answers for HPI/ROS submitted by the patient on 11/29/2022  Please describe your symptoms.: sweating a lot.  Have you had these symptoms before?: Yes  How long have you been having these symptoms?: Greater than 2 weeks  What is the primary reason for your visit?: Other    Subjective   Monica Pagan is a 82 y.o. female.     Chief Complaint   Patient presents with   • Diabetes   • Insomnia     Patient has been out of her Lunesta. She hasn't slept in days and states that she has been having sweating and hot flashes since she has been off of it.          Current Outpatient Medications:   •  atenolol (TENORMIN) 25 MG tablet, TAKE ONE TABLET BY MOUTH ONCE NIGHTLY, Disp: 90 tablet, Rfl: 0  •  Blood Glucose Monitoring Suppl (True Metrix Meter) w/Device kit, USE TO TEST BLOOD SUGAR, Disp: 1 kit, Rfl: 0  •  busPIRone (BUSPAR) 7.5 MG tablet, Take 1 tablet by mouth 3 (Three) Times a Day., Disp: 90 tablet, Rfl: 1  •  cyanocobalamin (VITAMIN B-12) 2000 MCG tablet tablet, Take 2,000 mcg by mouth Daily., Disp: , Rfl:   •  docusate sodium (Colace) 100 MG capsule, Take 1 capsule by mouth 2 (Two) Times a Day As Needed for Constipation., Disp: , Rfl:   •  DULoxetine (CYMBALTA) 60 MG capsule, TAKE ONE CAPSULE BY MOUTH DAILY, Disp: 90 capsule, Rfl: 0  •  Easy Touch Lancets 33G/Twist misc, USE THREE TIMES A DAY AS DIRECED, Disp: 100 each, Rfl: 3  •  eszopiclone (LUNESTA) 3 MG tablet, Take 1 tablet by mouth At Night As Needed for Sleep. Take immediately before bedtime, Disp: 90 tablet, Rfl: 0  •  glucose blood (True Metrix Blood Glucose Test) test strip, USE TO TEST DAILY, Dx code E11.42, Disp: 100 each, Rfl: 6  •  HYDROcodone-acetaminophen (NORCO) 7.5-325 MG per tablet, 1 tablet 2 (Two) Times a Day As Needed., Disp: , Rfl: 0  •  insulin detemir (Levemir FlexTouch) 100 UNIT/ML injection, Inject 25 Units under the skin into the appropriate area as directed Daily., Disp: 15 mL, Rfl: 1  •  Insulin Pen Needle (BD Pen Needle Kati 2nd Gen) 32G  X 4 MM misc, Inject 1 package under the skin into the appropriate area as directed Daily., Disp: 100 each, Rfl: 1  •  meclizine (ANTIVERT) 25 MG tablet, Take 1 tablet by mouth 3 (Three) Times a Day As Needed for Dizziness., Disp: 30 tablet, Rfl: 1  •  metFORMIN (GLUCOPHAGE) 1000 MG tablet, TAKE ONE TABLET BY MOUTH TWICE A DAY WITH MEALS, Disp: 180 tablet, Rfl: 0  •  nystatin (MYCOSTATIN) [id number] UNIT/GM cream, Apply  topically to the appropriate area as directed As Needed (groin rash)., Disp: 90 g, Rfl: 0  •  ondansetron ODT (ZOFRAN-ODT) 4 MG disintegrating tablet, ondansetron 4 mg disintegrating tablet  DISSOLVE 1 TABLET BY MOUTH EVERY 8 HOURS AS NEEDED, Disp: , Rfl:   •  polyethylene glycol (MIRALAX) packet, Take 17 g by mouth Daily., Disp: , Rfl:   •  rosuvastatin (CRESTOR) 5 MG tablet, Take 1 tablet by mouth Daily., Disp: 90 tablet, Rfl: 3  •  saccharomyces boulardii (Florastor) 250 MG capsule, Take 1 capsule by mouth Daily., Disp: , Rfl:   •  triamcinolone (KENALOG) 0.1 % cream, Apply  topically to the appropriate area as directed 2 (Two) Times a Day., Disp: 45 g, Rfl: 0  •  verapamil SR (CALAN-SR) 120 MG CR tablet, Take 1 tablet by mouth Every Night., Disp: 90 tablet, Rfl: 0  •  triamterene-hydrochlorothiazide (Dyazide) 37.5-25 MG per capsule, Take 1 capsule by mouth Every Morning., Disp: 30 capsule, Rfl: 1    Past Medical History:   Diagnosis Date   • Anemia    • Anxiety    • Colon polyp     TA   • DDD  lumbar    • DEXA    • DMII    • Fibromyalgia    • Hyperlipidemia    • Hypertension    • IBS    • Insomnia    • Lumbar spinal stenosis    • MAMMO     NEG= 2020/ 2022   • Meniere's disease    • Nontoxic thyroid nodule    • Ocular histoplasmosis    • Osteoarthritis    • Peripheral neuropathy    • PVC    • Spondylosis    • Tinnitus    • Tremor    • Trigeminal neuralgia    • Vitamin D deficiency        Past Surgical History:   Procedure Laterality Date   • AMPUTATION DIGIT Left     Digit #1   • APPENDECTOMY  1962    • BUNIONECTOMY Bilateral    • CATARACT EXTRACTION      left   • CATARACT EXTRACTION      x2   • COLONOSCOPY      NEG = 2012/ 2021= TA, rech 2026   • CYST REMOVAL Bilateral     WRIST   • HYSTERECTOMY  1980   • JOINT REPLACEMENT      Rt TKR x 2   • JOINT REPLACEMENT      Left THR   • LAPAROSCOPIC CHOLECYSTECTOMY      DR. LOBATO   • SPINE SURGERY      Lumbar Fusion   • SUBTOTAL HYSTERECTOMY         Family History   Problem Relation Age of Onset   • Asthma Mother    • COPD Mother    • Heart disease Father    • Alcohol abuse Father    • Other Father         WWII Gangrene/ Malaria   • Arthritis Sister    • Cancer Sister    • Leukemia Sister    • Heart attack Brother    • Cancer Brother    • Alcohol abuse Brother    • Kidney disease Brother    • Alzheimer's disease Brother    • Heart disease Brother    • Diabetes Brother    • Hyperlipidemia Brother    • Diabetes Son        Social History     Socioeconomic History   • Marital status:    Tobacco Use   • Smoking status: Never   • Smokeless tobacco: Never   Vaping Use   • Vaping Use: Never used   Substance and Sexual Activity   • Alcohol use: Yes     Alcohol/week: 1.0 standard drink     Types: 1 Glasses of wine per week     Comment: Rare   • Drug use: No   • Sexual activity: Never       History of Present Illness  83 y/o C female here for f/u on Anxiety/ HTN and c/o's sweating since she ran out of her sleep aid      Pt got a bladder stim placed and will f/u w/ urology monthly----pt feels this is helping her not to have to wear pads    Pt ran out of the lunesta a few weeks ago and states she feels she is w/d by sweating....... pt wants to cont w/ this med for sleep though    Pt states the buspar 5mg tid is helping and wanting to increase it a little if poss  Pt states her BP at home is running about the same as it is here..........    Pt got the latest covid shot too  Pt states she had a bad episode of vertigo/ meniere's and not taking anything for this as this time        The following portions of the patient's history were reviewed and updated as appropriate: allergies, current medications, past family history, past medical history, past social history, past surgical history and problem list.    Review of Systems   Constitutional: Positive for diaphoresis. Negative for activity change, appetite change, fatigue, unexpected weight gain and unexpected weight loss.   Respiratory: Negative for cough, chest tightness and shortness of breath.    Cardiovascular: Negative for chest pain, palpitations and leg swelling.   Genitourinary: Negative for frequency, urgency and urinary incontinence.   Musculoskeletal: Negative for arthralgias and myalgias.   Neurological: Positive for dizziness. Negative for syncope, facial asymmetry, speech difficulty, weakness, light-headedness, headache, memory problem and confusion.   Psychiatric/Behavioral: Positive for sleep disturbance and stress. Negative for dysphoric mood and depressed mood. The patient is nervous/anxious.        Vitals:    12/01/22 1057   BP: 155/72   Pulse: 77   Resp: 14   Temp: 97.7 °F (36.5 °C)   SpO2: 95%       Objective   Physical Exam  Vitals and nursing note reviewed.   Constitutional:       General: She is not in acute distress.  HENT:      Head: Normocephalic.   Pulmonary:      Effort: Pulmonary effort is normal.   Musculoskeletal:      Right lower leg: No edema.      Left lower leg: No edema.   Neurological:      Mental Status: She is alert and oriented to person, place, and time.      Cranial Nerves: No cranial nerve deficit.   Psychiatric:         Mood and Affect: Mood normal.         Behavior: Behavior normal.         Thought Content: Thought content normal.         Judgment: Judgment normal.           Assessment & Plan   Diagnoses and all orders for this visit:    1. Anxiety (Primary)    2. Elevated blood pressure reading in office with diagnosis of hypertension    3. Psychophysiological insomnia  -     eszopiclone  (LUNESTA) 3 MG tablet; Take 1 tablet by mouth At Night As Needed for Sleep. Take immediately before bedtime  Dispense: 90 tablet; Refill: 0    4. Meniere's disease, unspecified laterality    5. Type 2 diabetes mellitus with peripheral neuropathy (HCC)  -     insulin detemir (Levemir FlexTouch) 100 UNIT/ML injection; Inject 25 Units under the skin into the appropriate area as directed Daily.  Dispense: 15 mL; Refill: 1    Other orders  -     busPIRone (BUSPAR) 7.5 MG tablet; Take 1 tablet by mouth 3 (Three) Times a Day.  Dispense: 90 tablet; Refill: 1  -     Insulin Pen Needle (BD Pen Needle Kati 2nd Gen) 32G X 4 MM misc; Inject 1 package under the skin into the appropriate area as directed Daily.  Dispense: 100 each; Refill: 1  -     triamterene-hydrochlorothiazide (Dyazide) 37.5-25 MG per capsule; Take 1 capsule by mouth Every Morning.  Dispense: 30 capsule; Refill: 1    Increase buspar to 7.5mg tid  Start Dyazide for BP and meniere's control  Rx---Lunesta 3mg

## 2022-12-30 ENCOUNTER — OFFICE VISIT (OUTPATIENT)
Dept: FAMILY MEDICINE CLINIC | Facility: CLINIC | Age: 82
End: 2022-12-30
Payer: MEDICARE

## 2022-12-30 VITALS
HEART RATE: 64 BPM | TEMPERATURE: 97.1 F | HEIGHT: 64 IN | DIASTOLIC BLOOD PRESSURE: 63 MMHG | BODY MASS INDEX: 29.37 KG/M2 | RESPIRATION RATE: 16 BRPM | WEIGHT: 172 LBS | SYSTOLIC BLOOD PRESSURE: 114 MMHG | OXYGEN SATURATION: 97 %

## 2022-12-30 DIAGNOSIS — H81.09 MENIERE'S DISEASE, UNSPECIFIED LATERALITY: ICD-10-CM

## 2022-12-30 DIAGNOSIS — E11.42 TYPE 2 DIABETES MELLITUS WITH PERIPHERAL NEUROPATHY: Primary | ICD-10-CM

## 2022-12-30 DIAGNOSIS — R30.0 DYSURIA: ICD-10-CM

## 2022-12-30 DIAGNOSIS — I73.9 PVD (PERIPHERAL VASCULAR DISEASE): ICD-10-CM

## 2022-12-30 DIAGNOSIS — I10 PRIMARY HYPERTENSION: ICD-10-CM

## 2022-12-30 DIAGNOSIS — R82.90 ABNORMAL URINALYSIS: ICD-10-CM

## 2022-12-30 LAB
BILIRUB BLD-MCNC: NEGATIVE MG/DL
CLARITY, POC: CLEAR
COLOR UR: YELLOW
EXPIRATION DATE: ABNORMAL
GLUCOSE UR STRIP-MCNC: NEGATIVE MG/DL
KETONES UR QL: NEGATIVE
LEUKOCYTE EST, POC: ABNORMAL
Lab: ABNORMAL
NITRITE UR-MCNC: NEGATIVE MG/ML
PH UR: 6 [PH] (ref 5–8)
PROT UR STRIP-MCNC: NEGATIVE MG/DL
RBC # UR STRIP: ABNORMAL /UL
SP GR UR: 1.01 (ref 1–1.03)
UROBILINOGEN UR QL: ABNORMAL

## 2022-12-30 PROCEDURE — 81003 URINALYSIS AUTO W/O SCOPE: CPT | Performed by: FAMILY MEDICINE

## 2022-12-30 PROCEDURE — 99213 OFFICE O/P EST LOW 20 MIN: CPT | Performed by: FAMILY MEDICINE

## 2022-12-30 PROCEDURE — 87086 URINE CULTURE/COLONY COUNT: CPT | Performed by: FAMILY MEDICINE

## 2022-12-30 RX ORDER — TRIAMTERENE AND HYDROCHLOROTHIAZIDE 37.5; 25 MG/1; MG/1
1 CAPSULE ORAL EVERY MORNING
Qty: 90 CAPSULE | Refills: 1 | Status: SHIPPED | OUTPATIENT
Start: 2022-12-30

## 2022-12-30 RX ORDER — MECLIZINE HYDROCHLORIDE 25 MG/1
25 TABLET ORAL 2 TIMES DAILY
Qty: 60 TABLET | Refills: 3 | Status: SHIPPED | OUTPATIENT
Start: 2022-12-30 | End: 2023-01-16

## 2022-12-30 NOTE — PROGRESS NOTES
Subjective   Monica Pagan is a 82 y.o. female.     Chief Complaint   Patient presents with   • Diabetes     Patient states that she drank boost with 20g of sugar the last two nights in a row and yesterday morning it was 173 and this morning it was 178.    • Excessive Sweating     The sweating gets better when she has the meclizine.          Current Outpatient Medications:   •  atenolol (TENORMIN) 25 MG tablet, TAKE ONE TABLET BY MOUTH ONCE NIGHTLY, Disp: 90 tablet, Rfl: 0  •  Blood Glucose Monitoring Suppl (True Metrix Meter) w/Device kit, USE TO TEST BLOOD SUGAR, Disp: 1 kit, Rfl: 0  •  busPIRone (BUSPAR) 7.5 MG tablet, Take 1 tablet by mouth 3 (Three) Times a Day., Disp: 90 tablet, Rfl: 1  •  cyanocobalamin (VITAMIN B-12) 2000 MCG tablet tablet, Take 2,000 mcg by mouth Daily., Disp: , Rfl:   •  docusate sodium (Colace) 100 MG capsule, Take 1 capsule by mouth 2 (Two) Times a Day As Needed for Constipation., Disp: , Rfl:   •  DULoxetine (CYMBALTA) 60 MG capsule, TAKE ONE CAPSULE BY MOUTH DAILY, Disp: 90 capsule, Rfl: 0  •  Easy Touch Lancets 33G/Twist misc, USE THREE TIMES A DAY AS DIRECED, Disp: 100 each, Rfl: 3  •  eszopiclone (LUNESTA) 3 MG tablet, Take 1 tablet by mouth At Night As Needed for Sleep. Take immediately before bedtime, Disp: 90 tablet, Rfl: 0  •  glucose blood (True Metrix Blood Glucose Test) test strip, USE TO TEST DAILY, Dx code E11.42, Disp: 100 each, Rfl: 6  •  HYDROcodone-acetaminophen (NORCO) 7.5-325 MG per tablet, 1 tablet 2 (Two) Times a Day As Needed., Disp: , Rfl: 0  •  insulin detemir (Levemir FlexTouch) 100 UNIT/ML injection, Inject 25 Units under the skin into the appropriate area as directed Daily., Disp: 15 mL, Rfl: 1  •  Insulin Pen Needle (BD Pen Needle Kati 2nd Gen) 32G X 4 MM misc, Inject 1 package under the skin into the appropriate area as directed Daily., Disp: 100 each, Rfl: 1  •  meclizine (ANTIVERT) 25 MG tablet, Take 1 tablet by mouth 2 (Two) Times a Day., Disp: 60  tablet, Rfl: 3  •  nystatin (MYCOSTATIN) 557140 UNIT/GM cream, Apply  topically to the appropriate area as directed As Needed (groin rash)., Disp: 90 g, Rfl: 0  •  ondansetron ODT (ZOFRAN-ODT) 4 MG disintegrating tablet, ondansetron 4 mg disintegrating tablet  DISSOLVE 1 TABLET BY MOUTH EVERY 8 HOURS AS NEEDED, Disp: , Rfl:   •  polyethylene glycol (MIRALAX) packet, Take 17 g by mouth Daily., Disp: , Rfl:   •  rosuvastatin (CRESTOR) 5 MG tablet, Take 1 tablet by mouth Daily., Disp: 90 tablet, Rfl: 3  •  saccharomyces boulardii (Florastor) 250 MG capsule, Take 1 capsule by mouth Daily., Disp: , Rfl:   •  triamcinolone (KENALOG) 0.1 % cream, Apply  topically to the appropriate area as directed 2 (Two) Times a Day., Disp: 45 g, Rfl: 0  •  triamterene-hydrochlorothiazide (Dyazide) 37.5-25 MG per capsule, Take 1 capsule by mouth Every Morning., Disp: 90 capsule, Rfl: 1  •  verapamil SR (CALAN-SR) 120 MG CR tablet, Take 1 tablet by mouth Every Night., Disp: 90 tablet, Rfl: 0  •  Continuous Blood Gluc Sensor (FreeStyle Bonifacio 2 Sensor) misc, 1 package Every 14 (Fourteen) Days., Disp: 2 each, Rfl: 5  •  metFORMIN (GLUCOPHAGE) 1000 MG tablet, TAKE ONE TABLET BY MOUTH TWICE A DAY WITH MEALS, Disp: 180 tablet, Rfl: 0    Past Medical History:   Diagnosis Date   • Anemia    • Anxiety    • Colon polyp     TA   • DDD  lumbar    • DEXA    • DMII    • Fibromyalgia    • Hyperlipidemia    • Hypertension    • IBS    • Insomnia    • Lumbar spinal stenosis    • MAMMO     NEG= 2020/ 2022   • Meniere's disease    • Nontoxic thyroid nodule    • Ocular histoplasmosis    • Osteoarthritis    • Peripheral neuropathy    • PVC    • Spondylosis    • Tinnitus    • Tremor    • Trigeminal neuralgia    • Vitamin D deficiency        Past Surgical History:   Procedure Laterality Date   • AMPUTATION DIGIT Left     Digit #1   • APPENDECTOMY  1962   • BUNIONECTOMY Bilateral    • CATARACT EXTRACTION      left   • CATARACT EXTRACTION      x2   • COLONOSCOPY       NEG = 2012/ 2021= TA, rech 2026   • CYST REMOVAL Bilateral     WRIST   • HYSTERECTOMY  1980   • JOINT REPLACEMENT      Rt TKR x 2   • JOINT REPLACEMENT      Left THR   • LAPAROSCOPIC CHOLECYSTECTOMY      DR. LOBATO   • SPINE SURGERY      Lumbar Fusion   • SUBTOTAL HYSTERECTOMY         Family History   Problem Relation Age of Onset   • Asthma Mother    • COPD Mother    • Heart disease Father    • Alcohol abuse Father    • Other Father         WWII Gangrene/ Malaria   • Arthritis Sister    • Cancer Sister    • Leukemia Sister    • Heart attack Brother    • Cancer Brother    • Alcohol abuse Brother    • Kidney disease Brother    • Alzheimer's disease Brother    • Heart disease Brother    • Diabetes Brother    • Hyperlipidemia Brother    • Diabetes Son        Social History     Socioeconomic History   • Marital status:    Tobacco Use   • Smoking status: Never   • Smokeless tobacco: Never   Vaping Use   • Vaping Use: Never used   Substance and Sexual Activity   • Alcohol use: Yes     Alcohol/week: 1.0 standard drink     Types: 1 Glasses of wine per week     Comment: Rare   • Drug use: No   • Sexual activity: Never       History of Present Illness  81 y/o  C female here for f/u on DMII    Pt states she has noticed excessive sweating but it improves w/ taking the antivert       The following portions of the patient's history were reviewed and updated as appropriate: allergies, current medications, past family history, past medical history, past social history, past surgical history and problem list.    Review of Systems   Constitutional: Negative for unexpected weight gain and unexpected weight loss.   Eyes: Negative for blurred vision, double vision, pain and visual disturbance.   Cardiovascular: Negative for leg swelling.   Gastrointestinal: Negative for abdominal distention, abdominal pain, constipation, diarrhea, nausea and vomiting.   Endocrine: Negative for polydipsia, polyphagia and polyuria.    Genitourinary: Positive for dysuria. Negative for difficulty urinating, frequency, hematuria and urgency.   Musculoskeletal: Negative for gait problem.   Skin: Negative for color change, dry skin, rash and skin lesions.   Neurological: Negative for weakness and numbness.       Vitals:    12/30/22 0956   BP: 114/63   Pulse: 64   Resp: 16   Temp: 97.1 °F (36.2 °C)   SpO2: 97%       Objective   Physical Exam  Vitals and nursing note reviewed.   Constitutional:       General: She is not in acute distress.     Appearance: Normal appearance. She is well-developed. She is not ill-appearing or toxic-appearing.   Cardiovascular:      Rate and Rhythm: Normal rate and regular rhythm.      Pulses:           Dorsalis pedis pulses are 1+ on the right side and 1+ on the left side.      Heart sounds: Normal heart sounds. No murmur heard.  Pulmonary:      Effort: Pulmonary effort is normal. No respiratory distress.      Breath sounds: Normal breath sounds. No stridor. No wheezing, rhonchi or rales.   Musculoskeletal:         General: No tenderness or deformity.      Right foot: No bunion, Charcot foot, foot drop or prominent metatarsal heads.      Left foot: No bunion, Charcot foot, foot drop or prominent metatarsal heads.        Feet:    Feet:      Right foot:      Skin integrity: Callus and dry skin present. No ulcer, blister, skin breakdown, erythema, warmth or fissure.      Toenail Condition: Right toenails are long. ingrown     Left foot:      Skin integrity: Dry skin present. No ulcer, blister, skin breakdown, erythema, warmth, callus or fissure.      Toenail Condition: Left toenails are long. ingrown     Comments: DM foot exam done    Skin:     General: Skin is warm and dry.      Capillary Refill: Capillary refill takes less than 2 seconds.      Findings: No erythema or rash.   Neurological:      Mental Status: She is alert and oriented to person, place, and time.      Cranial Nerves: No cranial nerve deficit.    Psychiatric:         Attention and Perception: Attention normal.         Mood and Affect: Mood and affect normal.         Speech: Speech normal.         Behavior: Behavior normal. Behavior is cooperative.         Thought Content: Thought content normal.         Cognition and Memory: Cognition and memory normal.         Judgment: Judgment normal.           Assessment & Plan   Diagnoses and all orders for this visit:    1. Type 2 diabetes mellitus with peripheral neuropathy (HCC) (Primary)  -     POC Glycosylated Hemoglobin (Hb A1C); Future    2. Dysuria  -     POCT urinalysis dipstick, automated    3. Meniere's disease, unspecified laterality    4. Primary hypertension    5. Abnormal urinalysis  -     Urine Culture - Urine, Urine, Clean Catch    6. PVD (peripheral vascular disease) ([facility])    Other orders  -     Continuous Blood Gluc Sensor (FreeStyle Bonifacio 2 Sensor) misc; 1 package Every 14 (Fourteen) Days.  Dispense: 2 each; Refill: 5  -     meclizine (ANTIVERT) 25 MG tablet; Take 1 tablet by mouth 2 (Two) Times a Day.  Dispense: 60 tablet; Refill: 3  -     triamterene-hydrochlorothiazide (Dyazide) 37.5-25 MG per capsule; Take 1 capsule by mouth Every Morning.  Dispense: 90 capsule; Refill: 1    med refills  A1c=6.0 (Oct)  Cx UA

## 2023-01-01 LAB — BACTERIA SPEC AEROBE CULT: NORMAL

## 2023-01-11 ENCOUNTER — PRIOR AUTHORIZATION (OUTPATIENT)
Dept: FAMILY MEDICINE CLINIC | Facility: CLINIC | Age: 83
End: 2023-01-11
Payer: MEDICARE

## 2023-01-11 NOTE — TELEPHONE ENCOUNTER
Hub to read  My chart message was sent to the patient     We sent a prior authorization to your insurance for the Freestyle Bonifacio and it was denied. Insurance required that you have to be taking insulin 3 times a day to approve the Freestyle Bonifacio.

## 2023-01-16 RX ORDER — MECLIZINE HYDROCHLORIDE 25 MG/1
TABLET ORAL
Qty: 30 TABLET | Refills: 1 | Status: SHIPPED | OUTPATIENT
Start: 2023-01-16 | End: 2023-02-27

## 2023-01-17 ENCOUNTER — HOSPITAL ENCOUNTER (EMERGENCY)
Facility: HOSPITAL | Age: 83
Discharge: HOME | End: 2023-01-17
Attending: EMERGENCY MEDICINE | Admitting: EMERGENCY MEDICINE
Payer: MEDICARE

## 2023-01-17 ENCOUNTER — APPOINTMENT (OUTPATIENT)
Dept: CT IMAGING | Facility: HOSPITAL | Age: 83
End: 2023-01-17
Payer: MEDICARE

## 2023-01-17 VITALS
HEIGHT: 64 IN | RESPIRATION RATE: 17 BRPM | HEART RATE: 64 BPM | WEIGHT: 174 LBS | TEMPERATURE: 98 F | BODY MASS INDEX: 29.71 KG/M2 | DIASTOLIC BLOOD PRESSURE: 76 MMHG | OXYGEN SATURATION: 97 % | SYSTOLIC BLOOD PRESSURE: 127 MMHG

## 2023-01-17 DIAGNOSIS — K59.00 CONSTIPATION, UNSPECIFIED CONSTIPATION TYPE: ICD-10-CM

## 2023-01-17 DIAGNOSIS — R10.31 RIGHT LOWER QUADRANT ABDOMINAL PAIN: Primary | ICD-10-CM

## 2023-01-17 DIAGNOSIS — R10.9 RIGHT FLANK PAIN: ICD-10-CM

## 2023-01-17 DIAGNOSIS — N30.90 CYSTITIS: ICD-10-CM

## 2023-01-17 LAB
BILIRUB UR QL STRIP: NEGATIVE
CLARITY UR: CLEAR
COLOR UR: YELLOW
GLUCOSE UR STRIP-MCNC: NEGATIVE MG/DL
HGB UR QL STRIP.AUTO: ABNORMAL
KETONES UR QL STRIP: NEGATIVE
LEUKOCYTE ESTERASE UR QL STRIP.AUTO: ABNORMAL
NITRITE UR QL STRIP: NEGATIVE
PH UR STRIP.AUTO: <=5 [PH] (ref 5–8)
PROT UR QL STRIP: ABNORMAL
SP GR UR STRIP: 1.01 (ref 1–1.03)
UROBILINOGEN UR QL STRIP: ABNORMAL

## 2023-01-17 PROCEDURE — 99283 EMERGENCY DEPT VISIT LOW MDM: CPT

## 2023-01-17 PROCEDURE — 74176 CT ABD & PELVIS W/O CONTRAST: CPT

## 2023-01-17 PROCEDURE — 99283 EMERGENCY DEPT VISIT LOW MDM: CPT | Performed by: EMERGENCY MEDICINE

## 2023-01-17 PROCEDURE — 81003 URINALYSIS AUTO W/O SCOPE: CPT | Performed by: EMERGENCY MEDICINE

## 2023-01-17 RX ORDER — LACTULOSE 10 G/15ML
20 SOLUTION ORAL DAILY PRN
Qty: 237 ML | Refills: 1 | Status: SHIPPED | OUTPATIENT
Start: 2023-01-17

## 2023-01-17 RX ORDER — LACTULOSE 10 G/15ML
20 SOLUTION ORAL ONCE
Status: COMPLETED | OUTPATIENT
Start: 2023-01-17 | End: 2023-01-17

## 2023-01-17 RX ORDER — CEPHALEXIN 500 MG/1
500 CAPSULE ORAL 4 TIMES DAILY
Qty: 28 CAPSULE | Refills: 0 | Status: SHIPPED | OUTPATIENT
Start: 2023-01-17 | End: 2023-01-24

## 2023-01-17 RX ADMIN — LACTULOSE 20 G: 20 SOLUTION ORAL at 13:03

## 2023-01-17 NOTE — FSED PROVIDER NOTE
Subjective   History of Present Illness  Patient is an 82-year-old woman who presents complaining of right flank and back pain and right sided abdominal pain mostly in the lower region.  Symptoms have been ongoing for approximate 4 days and constant and fluctuating.  Patient denies any nausea or vomiting or fevers.  Patient concerned she may have a kidney stone as her daughter has had kidney stones and she felt she may have had similar symptoms.  She denies any dysuria or hematuria or urinary frequency or urgency.  Pain is mild to moderate intensity and worse with palpation.  Patient also denies any rash.  No chest pain or shortness of breath or throat pain or leg pain.  No bowel incontinence.        Review of Systems   Constitutional: Negative for activity change, appetite change, chills and fever.   HENT: Negative for sore throat and trouble swallowing.    Respiratory: Negative for cough and shortness of breath.    Cardiovascular: Negative for chest pain.   Gastrointestinal: Positive for abdominal pain. Negative for diarrhea, nausea and vomiting.   Genitourinary: Negative for difficulty urinating and dysuria.   Musculoskeletal: Positive for back pain. Negative for neck pain.   Skin: Negative for rash.   Neurological: Negative for weakness and numbness.       Past Medical History:   Diagnosis Date   • Anemia    • Anxiety    • Colon polyp     TA   • DDD  lumbar    • DEXA    • DMII    • Fibromyalgia    • Hyperlipidemia    • Hypertension    • IBS    • Insomnia    • Lumbar spinal stenosis    • MAMMO     NEG= 2020/ 2022   • Meniere's disease    • Nontoxic thyroid nodule    • Ocular histoplasmosis    • Osteoarthritis    • Peripheral neuropathy    • PVC    • Spondylosis    • Tinnitus    • Tremor    • Trigeminal neuralgia    • Vitamin D deficiency        Allergies   Allergen Reactions   • Amoxicillin Diarrhea   • Aspirin GI Intolerance   • Cortisone Other (See Comments)   • Erythromycin Diarrhea   • Gabapentin    •  Levofloxacin Diarrhea   • Lipitor [Atorvastatin] Myalgia   • Nsaids GI Intolerance   • Pravastatin Myalgia   • Pregabalin Mental Status Change     fogginess   • Sitagliptin Nausea Only   • Sulfa Antibiotics Nausea Only   • Theophylline    • Compazine  [Prochlorperazine Edisylate] Anxiety       Past Surgical History:   Procedure Laterality Date   • AMPUTATION DIGIT Left     Digit #1   • APPENDECTOMY  1962   • BUNIONECTOMY Bilateral    • CATARACT EXTRACTION      left   • CATARACT EXTRACTION      x2   • COLONOSCOPY      NEG = 2012/ 2021= TA, rech 2026   • CYST REMOVAL Bilateral     WRIST   • HYSTERECTOMY  1980   • JOINT REPLACEMENT      Rt TKR x 2   • JOINT REPLACEMENT      Left THR   • LAPAROSCOPIC CHOLECYSTECTOMY      DR. LOBATO   • SPINE SURGERY      Lumbar Fusion   • SUBTOTAL HYSTERECTOMY         Family History   Problem Relation Age of Onset   • Asthma Mother    • COPD Mother    • Heart disease Father    • Alcohol abuse Father    • Other Father         WWII Gangrene/ Malaria   • Arthritis Sister    • Cancer Sister    • Leukemia Sister    • Heart attack Brother    • Cancer Brother    • Alcohol abuse Brother    • Kidney disease Brother    • Alzheimer's disease Brother    • Heart disease Brother    • Diabetes Brother    • Hyperlipidemia Brother    • Diabetes Son        Social History     Socioeconomic History   • Marital status:    Tobacco Use   • Smoking status: Never   • Smokeless tobacco: Never   Vaping Use   • Vaping Use: Never used   Substance and Sexual Activity   • Alcohol use: Yes     Alcohol/week: 1.0 standard drink     Types: 1 Glasses of wine per week     Comment: Rare   • Drug use: No   • Sexual activity: Never           Objective   Physical Exam  Vitals and nursing note reviewed.   Constitutional:       General: She is not in acute distress.     Appearance: Normal appearance. She is not ill-appearing or toxic-appearing.      Comments: Patient sitting up on gurney.  She appears well-nourished  well-developed no acute distress at this time.   HENT:      Head: Normocephalic and atraumatic.      Nose: Nose normal.      Mouth/Throat:      Mouth: Mucous membranes are moist.      Pharynx: Oropharynx is clear.   Eyes:      Extraocular Movements: Extraocular movements intact.      Pupils: Pupils are equal, round, and reactive to light.   Cardiovascular:      Rate and Rhythm: Normal rate and regular rhythm.      Pulses: Normal pulses.      Heart sounds: Normal heart sounds.   Pulmonary:      Effort: Pulmonary effort is normal.      Breath sounds: Normal breath sounds.   Chest:      Chest wall: No tenderness.   Abdominal:      Palpations: Abdomen is soft.      Tenderness: There is abdominal tenderness.      Comments: Tenderness to the right side of the abdomen and right lower quadrant and right lower back and right flank.  No guarding or peritoneal signs.  Patient has normal active bowel sounds.  No pain to the left side.   Musculoskeletal:         General: No swelling or tenderness. Normal range of motion.      Cervical back: Normal range of motion and neck supple.      Right lower leg: No edema.      Left lower leg: No edema.   Skin:     General: Skin is dry.      Capillary Refill: Capillary refill takes less than 2 seconds.   Neurological:      General: No focal deficit present.      Mental Status: She is alert.      Sensory: No sensory deficit.      Motor: No weakness.         Procedures           ED Course            Narrative & Impression  CT ABDOMEN PELVIS STONE PROTOCOL     Date of Exam: 1/17/2023 11:16 AM EST     Indication: Flank pain, kidney stone suspected  right flank pain.     Comparison: None available.     Technique: Axial CT images were obtained of the abdomen and pelvis without the administration of contrast. Sagittal and coronal reconstructions were performed.  Automated exposure control and iterative reconstruction methods were used.      Findings:  Limited views of the lung bases are  unremarkable.     The liver is unremarkable. Status post cholecystectomy. The spleen is normal. The pancreas is unremarkable.     Bilateral adrenal glands are normal. No evidence of obstructive uropathy is identified. The bilateral kidneys are normal. The bladder is unremarkable. The bilateral adnexa are unremarkable. Status post hysterectomy. No significant free fluid is   identified.     Moderate amount of stool throughout the colon. The small bowel is unremarkable. The stomach is unremarkable. The esophagus is unremarkable.     No significant free fluid is identified. No adenopathy is identified. No aggressive appearing lytic or sclerotic bone lesions are identified.     IMPRESSION:  Impression:     1. No acute intra-abdominal pathology        Electronically Signed: Adrien Millan    1/17/2023 12:15 PM EST    Workstation ID: [id number]           Specimen Collected: 01/17/23 12:08 EST Last Resulted: 01/17/23 12:15 EST                                             [facility]  Patient with 4 days of constant fluctuating right flank and lower back pain and right-sided abdominal pain and right lower quadrant pain.  Patient is concerned that she may have a kidney stone since her daughter has had kidney stones and she felt her symptoms were similar to this.  She denies any dysuria hematuria or nausea or vomiting or fevers or cough or chest pain.  Patient appears comfortable in no distress.  She does have right lower back and right-sided abdominal pain.  At this time the patient does not have a surgical abdomen.  She does have normal active bowel sounds.  A CT scan was obtained to evaluate for possible renal stone, no renal stone was seen however there is a moderate amount of stool noted in the colon.  The patient has been advised of CT findings.  Patient states that she has been having bowel movements.  Advised patient that although she has been having bowel movements she still has a moderate amount of stool in the colon which can  be contributing to her symptoms.  There is no rash which can be seen with shingles.  The patient does not have a pulsatile abdomen and there is no evidence of aortic aneurysm.  Patient appears comfortable and in no distress and unlikely volvulus or bowel obstruction.    Urinalysis demonstrates large leuk esterase.  The patient advised that she may have a UTI causing her symptoms.  She does not appear toxic and symptoms not compatible or consistent with pyelonephritis.  Patient does not feel she is constipated.  We will use an approach with the patient will take Keflex 500 mg 4 times a day for 7 days and if her symptoms completely resolved and her cause likely from a UTI however her symptoms persist she will start taking the lactulose for possible constipation.  At any time the patient will seek immediate medical attention having worsening symptoms or fevers or any concerns.  Final diagnoses:   Right lower quadrant abdominal pain   Right flank pain   Constipation, unspecified constipation type   Cystitis       ED Disposition  ED Disposition     ED Disposition   Discharge    Condition   Stable    Comment   --             Henny Long,   [address]  [address]  [place] [id number] [phone]    In 1 week      [place] [address] [address] [place] [id number] [phone]    If symptoms worsen         Medication List      New Prescriptions    cephalexin 500 MG capsule  Commonly known as: KEFLEX  Take 1 capsule by mouth 4 (Four) Times a Day for 7 days.     lactulose 10 GM/15ML solution  Commonly known as: CHRONULAC  Take 30 mL by mouth Daily As Needed (Constipation.).           Where to Get Your Medications      These medications were sent to [facility] [id number] - [place], [place] - [address] [place] [address] [address]  - [phone]  - [phone] [address] [place] [address]    Phone: [phone]   · cephalexin 500 MG capsule  · lactulose 10  GM/15ML solution

## 2023-01-17 NOTE — DISCHARGE INSTRUCTIONS
Please start by taking Keflex 4 times a day for presumed urinary tract infection.  Start lactulose once a day for constipation if symptoms persist despite taking Keflex for UTI..  Drink plenty of water to help promote healthy bowel movements.  Avoid bananas and rice for 1 or 2 weeks as these foods can cause constipation.  Seek immediate medical attention have worsening symptoms or any concerns.

## 2023-01-19 ENCOUNTER — TELEPHONE (OUTPATIENT)
Dept: FAMILY MEDICINE CLINIC | Facility: CLINIC | Age: 83
End: 2023-01-19

## 2023-01-19 NOTE — TELEPHONE ENCOUNTER
Caller: Monica Pagan    Relationship: Self    Best call back number: [phone]    What is the best time to reach you: ANYTIME    Who are you requesting to speak with (clinical staff, provider,  specific staff member): CLINICAL    What was the call regarding: PATIENT WAS SEEN AT URGENT CARE ON Tuesday 01/17/23 AND WAS DIAGNOSED WITH, UTI. SHE IS STILL NOT FEELING MUCH BETTER AND WOULD LIKE SOME GUIDANCE AS TO WHAT SHE CAN DO FROM HERE.     PLEASE CALL TO DISCUSS AND ADVISE

## 2023-01-20 ENCOUNTER — PATIENT OUTREACH (OUTPATIENT)
Dept: CASE MANAGEMENT | Facility: OTHER | Age: 83
End: 2023-01-20
Payer: MEDICARE

## 2023-01-20 NOTE — OUTREACH NOTE
"AMBULATORY CASE MANAGEMENT NOTE    Patient Outreach  Name and Relationship of Patient/Support Person: Monica Pagan EDSON Carney    RN-ACM initial outreach completed with Ms Pagan re 1/17/23 ED visit for DX R flank pain, constipation, cystitis.   Patient states to be taking antibiotic as ordered & states sx of constipation, flank pain, cloudy urine, dizziness & malaise are a little improved today.   Denies immediate issues, needs Questions addressed & patient VU re education given.    See flow sheet details:     Adult Patient Profile  Questions/Answers    Flowsheet Row Most Recent Value   Symptoms/Conditions Managed at Home diabetes, type 2, neurological, gastrointestinal, genitourinary   Diabetes Management Strategies adequate rest, blood glucose testing, diet modification, medication therapy, routine screenings   Frequency of Blood Glucose Testing other (see comments)  [QD]   Diabetes Self-Management Outcome 3 (uncertain)   Diabetes Comment States \"I take all my medications\". States last FBG ~190.  States that is very high for her, usually FBG is 110-130. Education given and she VU. States will monitor & contact PCP if FBG does not decrease with infection resolution.   Gastrointestinal Symptoms/Conditions constipation   Gastrointestinal Self-Management Outcome 4 (good)   Gastrointestinal Comment States symptoms are a little improved today.  States never started lactulose bc her bowels have been \"flushing\" on their own since ED visit.  States 2 large BMs yesterday and one loose BM this AM. States right flank pain is improved. Has no c/o F/C, N/V, chest pain, palps, SOA at this time. States to be eating small frequent amts of food & hydrating well.   Genitourinary Symptoms/Conditions other (see comments)  [right flank pain, malaise]   Genitourinary Management Strategies adequate rest, fluid modification, medication therapy   Genitourinary Self-Management Outcome 3 (uncertain)   Genitourinary Comment States cystitis " symptoms are a little improved today.  States to be taking cephalexin QID, hydrating well, eating small amts.  She VU this is encouraged. States urine seems to be clearer, right flank pain improving.   Neurological Symptoms/Conditions other (see comments)  [Reports history of meniere's/dizziness]   Neurological Management Strategies adequate rest, coping strategies, medical device   Neurological Self-Management Outcome 3 (uncertain)   Neurological Comment States she thinks meniere's is returning.  [facility] meclizine (taking it BID) is helping dizziness, but still does get dizzy at night in the dark. She VU re fall precautions education given.  [facility] will have someone put together her new walker - for now [facility] is using a cat stroller inside as ambulation aide, has walker in car already.  Has medical alert device. Declines ENT or PCP appt at this time.        Appts Confirmed:  Declines assist to schedule appt w/ ENT Dr Chang or PCP Dr Long.  Patient VU AWV is due. [facility] she will be in contact w/ PCP if f/up needed.    Confirms 1/30/23 PCP MA/lab appt.    SDOH updated and reviewed with the patient during this program:    ,  lives alone now, moved from [facility] ~3 yrs ago to be near daughter w/ MS who is in SNF.  [facility] has medical alert device & helpful neighbors.  [facility] son Mika lives in [place], can be there in 4 hrs if needed.  [facility] has car & can drive if necessary, but generally uses delivery services.  States to be ineligible for Medicaid, states she thinks she may need in-home health if she gets sick again and states she has resources to find an aide. She denies insecurities re food, meds, transport, housing, DME.    Financial Resource Strain: Low Risk    • Difficulty of Paying Living Expenses: Not very hard      Food Insecurity: No Food Insecurity   • Worried About Running Out of Food in the Last Year: Never true   • Ran Out of Food in the Last Year: Never true      Transportation Needs: No  Transportation Needs   • Lack of Transportation (Medical): No   • Lack of Transportation (Non-Medical): No      Housing Stability: Low Risk    • Unable to Pay for Housing in the Last Year: No   • Number of Places Lived in the Last Year: 1   • Unstable Housing in the Last Year: No     Send Education  Questions/Answers    Flowsheet Row Most Recent Value   Annual Wellness Visit:  Patient Will Schedule   Other Patient Education/Resources  24/7 [facility] Nurse Call Line  [also RN-ACM contact information]   24/7 Nurse Call Line Education Method Verbal        Patient agreeable to additional RN-ACM outreach.    YOAV MAYORGA  Ambulatory Case Management  1/20/2023, 12:39 EST

## 2023-01-20 NOTE — TELEPHONE ENCOUNTER
Patient states she would like to see how she does over the weekend.  She has been taking Keflex and is feeling a little better each day.  Will call us Monday if she feels she still needs the Urine CX.

## 2023-01-23 ENCOUNTER — CLINICAL SUPPORT (OUTPATIENT)
Dept: FAMILY MEDICINE CLINIC | Facility: CLINIC | Age: 83
End: 2023-01-23
Payer: MEDICARE

## 2023-01-23 DIAGNOSIS — N39.0 RECURRENT UTI: Primary | ICD-10-CM

## 2023-01-23 PROCEDURE — 87086 URINE CULTURE/COLONY COUNT: CPT | Performed by: FAMILY MEDICINE

## 2023-01-24 LAB — BACTERIA SPEC AEROBE CULT: NO GROWTH

## 2023-01-26 NOTE — TELEPHONE ENCOUNTER
Rx Refill Note  Requested Prescriptions     Pending Prescriptions Disp Refills   • verapamil SR (CALAN-SR) 120 MG CR tablet [Pharmacy Med Name: VERAPAMIL  MG TABLET] 90 tablet 0     Sig: TAKE ONE TABLET BY MOUTH ONCE NIGHTLY      Last office visit with prescribing clinician: 12/30/2022   Last telemedicine visit with prescribing clinician: 1/30/2023   Next office visit with prescribing clinician: Visit date not found     Hemoglobin A1c (10/31/2022 10:37)  SCANNED - LABS (10/27/2022)                      Would you like a call back once the refill request has been completed: [] Yes [] No    If the office needs to give you a call back, can they leave a voicemail: [] Yes [] No    Paula Caldwell, [facility]  01/26/23, 09:03 EST

## 2023-01-27 RX ORDER — BUSPIRONE HYDROCHLORIDE 7.5 MG/1
TABLET ORAL
Qty: 90 TABLET | Refills: 1 | Status: SHIPPED | OUTPATIENT
Start: 2023-01-27 | End: 2023-03-24

## 2023-01-30 ENCOUNTER — CLINICAL SUPPORT (OUTPATIENT)
Dept: FAMILY MEDICINE CLINIC | Facility: CLINIC | Age: 83
End: 2023-01-30
Payer: MEDICARE

## 2023-01-30 DIAGNOSIS — E11.42 TYPE 2 DIABETES MELLITUS WITH PERIPHERAL NEUROPATHY: ICD-10-CM

## 2023-01-30 LAB
EXPIRATION DATE: ABNORMAL
HBA1C MFR BLD: 6.4 %
Lab: ABNORMAL

## 2023-01-30 PROCEDURE — 3044F HG A1C LEVEL LT 7.0%: CPT | Performed by: FAMILY MEDICINE

## 2023-01-30 PROCEDURE — 83036 HEMOGLOBIN GLYCOSYLATED A1C: CPT | Performed by: FAMILY MEDICINE

## 2023-01-30 NOTE — PROGRESS NOTES
Fingerstick performed in L -3rd finger by RT Arleen  with good hemostasis. Patient tolerated well. 01/30/23 Miriam Mello, RT

## 2023-02-21 ENCOUNTER — PATIENT OUTREACH (OUTPATIENT)
Dept: CASE MANAGEMENT | Facility: OTHER | Age: 83
End: 2023-02-21
Payer: MEDICARE

## 2023-02-21 NOTE — OUTREACH NOTE
AMBULATORY CASE MANAGEMENT NOTE    Patient Outreach  Name and Relationship of Patient/Support Person: Monica Pagan B - Self     RN-ACM routine f/up HRCM outreach completed.  See flow sheets for additional details.    Patient states she stopped dyazide d/t increased urination - denies changes in BPs or meniere's sx since DC - but states occasional dizziness conts on BID meclizine.     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  She declines RN-ACM assistance to schedule podiatry or PCP appt,  is very busy with daughter in SNF, but [facility] she has Dr Ring phone # and she VU importance of following up on this to prevent falls.   now has walkers in her home & car, as well as a cat stroller to ambulate outdoors. Wears medic alert device.     States finished antibiotic & denies urinary or bowel complaints- states BMs generally very regular since 2022 interstim placement.       has a PT  now.  conts to visit her daughter who is in SNF, but that all her children are encouraging her to look into LTC.  She states not ready for LTC, feels safe in her home.  AWV declined, [facility] will schedule herself.   has ACP, VU to bring copy to PCP for  EMR.     Denies questions, needs at this time. Verb appreciation for the call.    Adult Patient Profile  Questions/Answers    Flowsheet Row Most Recent Value   Symptoms/Conditions Managed at Home diabetes, type 2, neurological, gastrointestinal, genitourinary, respiratory   Barriers to Managing Health age   Diabetes Self-Management Outcome 4 (good)   Diabetes Comment Glucoses back to normal since UTI antibiotic completed   Gastrointestinal Self-Management Outcome 5 (very good)   Gastrointestinal Comment Denies issues - states BMs generally regular since interstim placment last year   Genitourinary Self-Management Outcome 5 (very good)   Genitourinary  Comment states cystitis sx & flank pain resolved since antibiotic completed.   Neurological Self-Management Outcome 3 (uncertain)   Neurological Comment Denies falls in last few months. Conts meclizine BID, stopped dyazide. Denies increase in Meniere's sx or BPs since dyazide DC.  Has medical alert device, two walkers (home & car) & a cat stroller for outdoors.   Respiratory Self-Management Outcome 5 (very good)   Respiratory Comment states does not have COPD.  Had asthma as a child, but it does not bother her now.        Social Work Assessment  Questions/Answers    Flowsheet Row Most Recent Value   Equipment Currently Used at Home walker, standard, walker, rolling        Send Education  Questions/Answers    Flowsheet Row Most Recent Value   Annual Wellness Visit:  Patient Will Schedule   Other Patient Education/Resources  24/7 [facility] Nurse Call Line, Advanced Care Planning, Housing, Transportation  [confirms has  nurse triage #, RN-ACM contact info, Dr Ring ph# and PCP ph#]   24/7 Nurse Call Line Education Method Verbal   ACP Education Method Verbal   Housing Education Method Verbal   Transportation Education Method Verbal   Advanced Directives: Patient Has  [encouraged to bring copy ot PCP for  EMR]        YOAV MAYORGA  Ambulatory Case Management  2/21/2023, 15:18 EST

## 2023-02-22 NOTE — TELEPHONE ENCOUNTER
Rx Refill Note  Requested Prescriptions     Pending Prescriptions Disp Refills   • atenolol (TENORMIN) 25 MG tablet [Pharmacy Med Name: ATENOLOL 25 MG TABLET] 90 tablet 1     Sig: TAKE ONE TABLET BY MOUTH ONCE NIGHTLY   • DULoxetine (CYMBALTA) 60 MG capsule [Pharmacy Med Name: DULoxetine HCL DR 60 MG CAPSULE] 90 capsule 1     Sig: TAKE ONE CAPSULE BY MOUTH DAILY      Last office visit with prescribing clinician: 12/30/2022   Last telemedicine visit with prescribing clinician: 4/26/2023   Next office visit with prescribing clinician: Visit date not found                       SCANNED - LABS (10/27/2022)    Would you like a call back once the refill request has been completed: [] Yes [] No    If the office needs to give you a call back, can they leave a voicemail: [] Yes [] No    Miriam Mello, RT  02/22/23, 08:45 EST

## 2023-02-23 RX ORDER — DULOXETIN HYDROCHLORIDE 60 MG/1
CAPSULE, DELAYED RELEASE ORAL
Qty: 90 CAPSULE | Refills: 0 | Status: SHIPPED | OUTPATIENT
Start: 2023-02-23

## 2023-02-23 RX ORDER — ATENOLOL 25 MG/1
TABLET ORAL
Qty: 90 TABLET | Refills: 0 | Status: SHIPPED | OUTPATIENT
Start: 2023-02-23

## 2023-02-27 DIAGNOSIS — F51.04 PSYCHOPHYSIOLOGICAL INSOMNIA: ICD-10-CM

## 2023-02-27 RX ORDER — MECLIZINE HYDROCHLORIDE 25 MG/1
TABLET ORAL
Qty: 30 TABLET | Refills: 1 | Status: SHIPPED | OUTPATIENT
Start: 2023-02-27

## 2023-02-27 RX ORDER — ESZOPICLONE 3 MG/1
TABLET, FILM COATED ORAL
Qty: 90 TABLET | Refills: 0 | Status: SHIPPED | OUTPATIENT
Start: 2023-02-27 | End: 2023-03-17

## 2023-03-17 RX ORDER — PROCHLORPERAZINE MALEATE 5 MG/1
5 TABLET ORAL EVERY 8 HOURS PRN
Qty: 20 TABLET | Refills: 0 | Status: SHIPPED | OUTPATIENT
Start: 2023-03-17 | End: 2023-03-30 | Stop reason: SDDI

## 2023-03-17 RX ORDER — BLOOD-GLUCOSE METER
1 KIT MISCELLANEOUS DAILY
Qty: 1 EACH | Refills: 0 | Status: SHIPPED | OUTPATIENT
Start: 2023-03-17

## 2023-03-17 RX ORDER — TRAZODONE HYDROCHLORIDE 50 MG/1
TABLET ORAL
Qty: 30 TABLET | Refills: 0 | Status: SHIPPED | OUTPATIENT
Start: 2023-03-17

## 2023-03-17 RX ORDER — LANCETS 33 GAUGE
EACH MISCELLANEOUS
Qty: 100 EACH | Refills: 3 | Status: SHIPPED | OUTPATIENT
Start: 2023-03-17

## 2023-03-17 NOTE — TELEPHONE ENCOUNTER
Rx Refill Note  Requested Prescriptions     Pending Prescriptions Disp Refills   • Easy Touch Lancets 33G/Twist misc [Pharmacy Med Name: SinDelantal.Mx TWIST 33G LANCETS] 100 each 3     Sig: USE ONE LANCET TO TEST THREE TIMES A DAY      Last office visit with prescribing clinician: 12/30/2022   Last telemedicine visit with prescribing clinician: 4/26/2023   Next office visit with prescribing clinician: Visit date not found                         Would you like a call back once the refill request has been completed: [] Yes [] No    If the office needs to give you a call back, can they leave a voicemail: [] Yes [] No    Miriam Mello, RT  03/17/23, 13:11 EDT

## 2023-03-24 RX ORDER — BUSPIRONE HYDROCHLORIDE 7.5 MG/1
TABLET ORAL
Qty: 90 TABLET | Refills: 1 | Status: SHIPPED | OUTPATIENT
Start: 2023-03-24

## 2023-03-24 NOTE — TELEPHONE ENCOUNTER
Rx Refill Note  Requested Prescriptions     Pending Prescriptions Disp Refills   • busPIRone (BUSPAR) 7.5 MG tablet [Pharmacy Med Name: busPIRone HCL 7.5 MG TABLET] 90 tablet 1     Sig: TAKE ONE TABLET BY MOUTH THREE TIMES A DAY      Last office visit with prescribing clinician: 12/30/2022   Last telemedicine visit with prescribing clinician: 4/26/2023   Next office visit with prescribing clinician: Visit date not found     Hemoglobin A1c (10/31/2022 10:37)  POC Glycosylated Hemoglobin (Hb A1C) (01/30/2023 11:14)                      Would you like a call back once the refill request has been completed: [] Yes [] No    If the office needs to give you a call back, can they leave a voicemail: [] Yes [] No    Paula Caldwell CMA  03/24/23, 08:42 EDT

## 2023-03-27 NOTE — TELEPHONE ENCOUNTER
INSPECT [facility]AN    Rx Refill Note  Requested Prescriptions     Pending Prescriptions Disp Refills   • traZODone (DESYREL) 50 MG tablet [Pharmacy Med Name: traZODone 50 MG TABLET] 30 tablet 0     Sig: TAKE 1/2 TO 3 TABLETS BY MOUTH EVERY NIGHT AT BEDTIME AS NEEDED FOR INSOMNIA      Last office visit with prescribing clinician: 12/30/2022   Last telemedicine visit with prescribing clinician: 4/26/2023   Next office visit with prescribing clinician: Visit date not found                         Would you like a call back once the refill request has been completed: [] Yes [] No    If the office needs to give you a call back, can they leave a voicemail: [] Yes [] No    Miriam Mello, RT  03/27/23, 10:14 EDT

## 2023-03-28 RX ORDER — TRAZODONE HYDROCHLORIDE 50 MG/1
TABLET ORAL
Qty: 30 TABLET | Refills: 0 | OUTPATIENT
Start: 2023-03-28

## 2023-03-28 NOTE — TELEPHONE ENCOUNTER
Pt states has not started the Trazodone yet, as she wants to finish the med she already has. But when she does start, she will start at the lowest dose.     Also, pt wants to know if you can refer her to [facility] in [place]. She currently goes to [facility], but she has a hard time driving over to [facility] sometimes. She will have her last appt with them on Friday 3/31. Needs to be referred for chronic back pain and neuropathy.

## 2023-03-28 NOTE — TELEPHONE ENCOUNTER
HUB to share    She should prob call the new pain management office and see if they will prescribe pain meds if that is what she gets at current pain office p#[phone]    M informing pt to call

## 2023-03-30 ENCOUNTER — PATIENT OUTREACH (OUTPATIENT)
Dept: CASE MANAGEMENT | Facility: OTHER | Age: 83
End: 2023-03-30
Payer: MEDICARE

## 2023-03-30 NOTE — OUTREACH NOTE
"AMBULATORY CASE MANAGEMENT NOTE    Patient Outreach  Name and Relationship of Patient/Support Person: Monica Pagan EDSON \"MONICA PAGAN\" - Self     RN-ACM f/up HRCM outreach completed with Ms Pagan.    See flow sheets details.    Patient states primary need/complaint is scratchy throat and nasal congestion since being outside yesterday while lawn was mowed. States has been taking antihistamine & hydrating well. Temp 99.9F without anti inflammatories.  States will take another antihistamine tonight & seek CV19 test &  treatment tomorrow if not better or worsening.    States cannot take compazine d/t prior drug reaction - EMR allergies updated. States has not needed meclizine in awhile since dizziness seems to have resolved.    States she will consult PCP as needed for meclizine substitute in future (insurance advised compazine substitute).    States bowels more regular since urinary interstim placement last year, has not needed lactulose.    Adult Patient Profile  Questions/Answers    Flowsheet Row Most Recent Value   Symptoms/Conditions Managed at Home neurological, gastrointestinal, genitourinary, HEENT (head, eyes, ears, nose, throat), musculoskeletal   Barriers to Managing Health age   Gastrointestinal Symptoms/Conditions constipation   Gastrointestinal Self-Management Outcome 4 (good)   Gastrointestinal Comment States has not needed lactulose, BMs continue pretty regular since interstim placment for urinary incontinence last year   Genitourinary Symptoms/Conditions other (see comments)  [HX cystitis and urinary incontinence]   Genitourinary Management Strategies --  [interstim device implanted 2022]   Genitourinary Comment no c/o dysuria, malaise or flank pain   HEENT Symptoms/Conditions other (see comments)  [scratchy throat, nasal congestion]   HEENT Management Strategies adequate rest, fluid modification, medication therapy, routine screening  [Pt questions if sx are allergies, so took antihistamine last " night, thinks it helped, & is hydrating well. States will take another antihistamine tonight.]   HEENT Self-Management Outcome 2 (bad)   HEENT Comment Went out yesterday while lawn was mowed - scratchy throat started last night, followed by nasal congestion today. Thinks it may be allergies. States last temp 99.9F without anti-inflammatories. She VU & agreement to seek CV19 test & tx tomorrow if sx cont or worsen.   Musculoskeletal Symptoms/Conditions joint pain   Musculoskeletal Management Strategies adequate rest, coping strategies, medication therapy   Musculoskeletal Comment States will reschedule 3/31/23 pain managment appt to next week d/t respiratory sx.  She requested new pain management referral from PCP earlier this week- was reminded during this call that PCP advises she check with [facility] pain management re medication refills before changing providers   Neurological Symptoms/Conditions --  [Hx menieres and dizziness]   Neurological Self-Management Outcome 4 (good)   Neurological Comment No reported falls since last outreach. States dizziness has stopped and has not needed meclizine at all recently.  States CANNOT TAKE COMPAZINE in lieu of meclizine d/t hx drug reaction. EMR allergy info updated during this call.  States she has not yet started trazadone since she still has lunesta left. Conts buspirone TID.        Addressed patient questions & she VU.    Care gaps discussed. Agreeable to [facility], but wants to schedule it herself.  Confirms appts:  podiatry 4/25/23, PCP MA 4/26/23, urology 4/28/23.    YOAV MAYORGA  Ambulatory Case Management  3/30/2023, 17:44 EDT

## 2023-03-31 ENCOUNTER — HOSPITAL ENCOUNTER (EMERGENCY)
Facility: HOSPITAL | Age: 83
Discharge: HOME | End: 2023-03-31
Attending: EMERGENCY MEDICINE | Admitting: EMERGENCY MEDICINE
Payer: MEDICARE

## 2023-03-31 ENCOUNTER — APPOINTMENT (OUTPATIENT)
Dept: GENERAL RADIOLOGY | Facility: HOSPITAL | Age: 83
End: 2023-03-31
Payer: MEDICARE

## 2023-03-31 VITALS
RESPIRATION RATE: 18 BRPM | DIASTOLIC BLOOD PRESSURE: 91 MMHG | OXYGEN SATURATION: 93 % | BODY MASS INDEX: 29.71 KG/M2 | SYSTOLIC BLOOD PRESSURE: 114 MMHG | TEMPERATURE: 99.3 F | HEIGHT: 64 IN | HEART RATE: 73 BPM | WEIGHT: 174 LBS

## 2023-03-31 DIAGNOSIS — R06.00 DYSPNEA, UNSPECIFIED TYPE: ICD-10-CM

## 2023-03-31 DIAGNOSIS — J20.9 ACUTE BRONCHITIS, UNSPECIFIED ORGANISM: Primary | ICD-10-CM

## 2023-03-31 LAB
ALBUMIN SERPL-MCNC: 4.3 G/DL (ref 3.5–5.2)
ALBUMIN/GLOB SERPL: 1.9 G/DL
ALP SERPL-CCNC: 65 U/L (ref 39–117)
ALT SERPL W P-5'-P-CCNC: 11 U/L (ref 1–33)
ANION GAP SERPL CALCULATED.3IONS-SCNC: 12 MMOL/L (ref 5–15)
AST SERPL-CCNC: 14 U/L (ref 1–32)
BASOPHILS # BLD AUTO: 0 10*3/MM3 (ref 0–0.2)
BASOPHILS NFR BLD AUTO: 0.4 % (ref 0–1.5)
BILIRUB SERPL-MCNC: 0.5 MG/DL (ref 0–1.2)
BUN SERPL-MCNC: 14 MG/DL (ref 8–23)
BUN/CREAT SERPL: 14.1 (ref 7–25)
CALCIUM SPEC-SCNC: 10.1 MG/DL (ref 8.6–10.5)
CHLORIDE SERPL-SCNC: 100 MMOL/L (ref 98–107)
CO2 SERPL-SCNC: 25 MMOL/L (ref 22–29)
CREAT SERPL-MCNC: 0.99 MG/DL (ref 0.57–1)
DEPRECATED RDW RBC AUTO: 41.6 FL (ref 37–54)
EGFRCR SERPLBLD CKD-EPI 2021: 57.8 ML/MIN/1.73
EOSINOPHIL # BLD AUTO: 0.1 10*3/MM3 (ref 0–0.4)
EOSINOPHIL NFR BLD AUTO: 1 % (ref 0.3–6.2)
ERYTHROCYTE [DISTWIDTH] IN BLOOD BY AUTOMATED COUNT: 13.1 % (ref 12.3–15.4)
GLOBULIN UR ELPH-MCNC: 2.3 GM/DL
GLUCOSE SERPL-MCNC: 179 MG/DL (ref 65–99)
HCT VFR BLD AUTO: 38.4 % (ref 34–46.6)
HGB BLD-MCNC: 12.2 G/DL (ref 12–15.9)
LYMPHOCYTES # BLD AUTO: 1.4 10*3/MM3 (ref 0.7–3.1)
LYMPHOCYTES NFR BLD AUTO: 12.7 % (ref 19.6–45.3)
MCH RBC QN AUTO: 29.2 PG (ref 26.6–33)
MCHC RBC AUTO-ENTMCNC: 31.8 G/DL (ref 31.5–35.7)
MCV RBC AUTO: 91.6 FL (ref 79–97)
MONOCYTES # BLD AUTO: 0.6 10*3/MM3 (ref 0.1–0.9)
MONOCYTES NFR BLD AUTO: 4.9 % (ref 5–12)
NEUTROPHILS NFR BLD AUTO: 81 % (ref 42.7–76)
NEUTROPHILS NFR BLD AUTO: 9.2 10*3/MM3 (ref 1.7–7)
NRBC BLD AUTO-RTO: 0 /100 WBC (ref 0–0.2)
NT-PROBNP SERPL-MCNC: 435.5 PG/ML (ref 0–1800)
PLATELET # BLD AUTO: 209 10*3/MM3 (ref 140–450)
PMV BLD AUTO: 7.3 FL (ref 6–12)
POTASSIUM SERPL-SCNC: 4 MMOL/L (ref 3.5–5.2)
PROT SERPL-MCNC: 6.6 G/DL (ref 6–8.5)
RBC # BLD AUTO: 4.19 10*6/MM3 (ref 3.77–5.28)
SARS-COV-2 RNA RESP QL NAA+PROBE: NOT DETECTED
SODIUM SERPL-SCNC: 137 MMOL/L (ref 136–145)
TROPONIN T SERPL HS-MCNC: 11 NG/L
WBC NRBC COR # BLD: 11.4 10*3/MM3 (ref 3.4–10.8)

## 2023-03-31 PROCEDURE — 93005 ELECTROCARDIOGRAM TRACING: CPT | Performed by: EMERGENCY MEDICINE

## 2023-03-31 PROCEDURE — 84484 ASSAY OF TROPONIN QUANT: CPT | Performed by: EMERGENCY MEDICINE

## 2023-03-31 PROCEDURE — 71045 X-RAY EXAM CHEST 1 VIEW: CPT

## 2023-03-31 PROCEDURE — 99284 EMERGENCY DEPT VISIT MOD MDM: CPT

## 2023-03-31 PROCEDURE — 80053 COMPREHEN METABOLIC PANEL: CPT | Performed by: EMERGENCY MEDICINE

## 2023-03-31 PROCEDURE — 85025 COMPLETE CBC W/AUTO DIFF WBC: CPT | Performed by: EMERGENCY MEDICINE

## 2023-03-31 PROCEDURE — 87635 SARS-COV-2 COVID-19 AMP PRB: CPT | Performed by: EMERGENCY MEDICINE

## 2023-03-31 PROCEDURE — 83880 ASSAY OF NATRIURETIC PEPTIDE: CPT | Performed by: EMERGENCY MEDICINE

## 2023-03-31 RX ORDER — DOXYCYCLINE 100 MG/1
100 CAPSULE ORAL 2 TIMES DAILY
Qty: 14 CAPSULE | Refills: 0 | Status: SHIPPED | OUTPATIENT
Start: 2023-03-31

## 2023-03-31 RX ORDER — GUAIFENESIN 600 MG/1
600 TABLET, EXTENDED RELEASE ORAL 2 TIMES DAILY
Qty: 15 TABLET | Refills: 0 | Status: SHIPPED | OUTPATIENT
Start: 2023-03-31 | End: 2023-04-17 | Stop reason: SDUPTHER

## 2023-03-31 RX ORDER — SODIUM CHLORIDE 0.9 % (FLUSH) 0.9 %
10 SYRINGE (ML) INJECTION AS NEEDED
Status: DISCONTINUED | OUTPATIENT
Start: 2023-03-31 | End: 2023-03-31 | Stop reason: HOSPADM

## 2023-03-31 NOTE — DISCHARGE INSTRUCTIONS
Rest, drink plenty of fluids.  Mucinex for cough.  Doxycycline for respiratory illness.  Return for increased shortness of breath, high fever, persistent vomiting or any other concerns

## 2023-03-31 NOTE — ED PROVIDER NOTES
"Subjective   History of Present Illness  82-year-old female presents from home describing cough nasal congestion and low-grade fever this morning.  States she has had cough congestion for the last few days.  She reports some mild shortness of breath tonight.  She reports no radiating chest pain or diaphoresis or palpitations.  She denies any known ill contacts.  She reports no vomiting or diarrhea or abdominal pain  Review of Systems    Past Medical History:   Diagnosis Date   • Anemia    • Anxiety    • Colon polyp     TA   • DDD  lumbar    • DEXA    • DMII    • Fibromyalgia    • Hyperlipidemia    • Hypertension    • IBS    • Insomnia    • Lumbar spinal stenosis    • MAMMO     NEG= 2020/ 2022   • Meniere's disease    • Nontoxic thyroid nodule    • Ocular histoplasmosis    • Osteoarthritis    • Peripheral neuropathy    • PVC    • Spondylosis    • Tinnitus    • Tremor    • Trigeminal neuralgia    • Vitamin D deficiency        Allergies   Allergen Reactions   • Compazine  [Prochlorperazine Edisylate] Anxiety and Other (See Comments)     States refuses to take compazine d/t prior reaction, \"felt like ants crawling all over me\", agitated & restless.   • Amoxicillin Diarrhea   • Aspirin GI Intolerance   • Cortisone Other (See Comments)   • Erythromycin Diarrhea   • Gabapentin    • Levofloxacin Diarrhea   • Lipitor [Atorvastatin] Myalgia   • Nsaids GI Intolerance   • Pravastatin Myalgia   • Pregabalin Mental Status Change     fogginess   • Sitagliptin Nausea Only   • Sulfa Antibiotics Nausea Only   • Theophylline        Past Surgical History:   Procedure Laterality Date   • AMPUTATION DIGIT Left     Digit #1   • APPENDECTOMY  1962   • BUNIONECTOMY Bilateral    • CATARACT EXTRACTION      left   • CATARACT EXTRACTION      x2   • COLONOSCOPY      NEG = 2012/ 2021= TA, rech 2026   • CYST REMOVAL Bilateral     WRIST   • HYSTERECTOMY  1980   • JOINT REPLACEMENT      Rt TKR x 2   • JOINT REPLACEMENT      Left THR   • LAPAROSCOPIC " CHOLECYSTECTOMY      DR. LOBATO   • SPINE SURGERY      Lumbar Fusion   • SUBTOTAL HYSTERECTOMY         Family History   Problem Relation Age of Onset   • Asthma Mother    • COPD Mother    • Heart disease Father    • Alcohol abuse Father    • Other Father         WWII Gangrene/ Malaria   • Arthritis Sister    • Cancer Sister    • Leukemia Sister    • Heart attack Brother    • Cancer Brother    • Alcohol abuse Brother    • Kidney disease Brother    • Alzheimer's disease Brother    • Heart disease Brother    • Diabetes Brother    • Hyperlipidemia Brother    • Diabetes Son        Social History     Socioeconomic History   • Marital status:    Tobacco Use   • Smoking status: Never   • Smokeless tobacco: Never   Vaping Use   • Vaping Use: Never used   Substance and Sexual Activity   • Alcohol use: Yes     Alcohol/week: 1.0 standard drink     Types: 1 Glasses of wine per week     Comment: Rare   • Drug use: No   • Sexual activity: Never     Prior to Admission medications    Medication Sig Start Date End Date Taking? Authorizing Provider   atenolol (TENORMIN) 25 MG tablet TAKE ONE TABLET BY MOUTH ONCE NIGHTLY 2/23/23   Henny Long, DO   Blood Glucose Monitoring Suppl (ONE TOUCH ULTRA MINI) w/Device kit 1 kit Daily. 3/17/23   Henny Long DO   busPIRone (BUSPAR) 7.5 MG tablet TAKE ONE TABLET BY MOUTH THREE TIMES A DAY 3/24/23   Henny Long, DO   Continuous Blood Gluc Sensor (FreeStyle Bonifacio 2 Sensor) misc 1 package Every 14 (Fourteen) Days. 12/30/22   Henny Long, DO   cyanocobalamin (VITAMIN B-12) 2000 MCG tablet tablet Take 2,000 mcg by mouth Daily.    Provider, MD Koki   docusate sodium (Colace) 100 MG capsule Take 1 capsule by mouth 2 (Two) Times a Day As Needed for Constipation. 7/1/21   Henny Long DO   DULoxetine (CYMBALTA) 60 MG capsule TAKE ONE CAPSULE BY MOUTH DAILY 2/23/23   Henny Long DO   Easy Touch Lancets 33G/Twist misc USE ONE LANCET TO TEST THREE TIMES A DAY 3/17/23    Henny Long DO   glucose blood (OneTouch Ultra) test strip 1 each by Other route 3 (Three) Times a Day Before Meals. Use as instructed 3/17/23   Henny Long DO   HYDROcodone-acetaminophen (NORCO) 7.5-325 MG per tablet 1 tablet 2 (Two) Times a Day As Needed. 12/17/15   Koki Garnett MD   insulin detemir (Levemir FlexTouch) 100 UNIT/ML injection Inject 25 Units under the skin into the appropriate area as directed Daily. 12/1/22   Henny Long DO   Insulin Pen Needle (BD Pen Needle Kati 2nd Gen) 32G X 4 MM misc Inject 1 package under the skin into the appropriate area as directed Daily. 12/1/22   Henny Long DO   lactulose (CHRONULAC) 10 GM/15ML solution Take 30 mL by mouth Daily As Needed (Constipation.). 1/17/23   Baldemar Martinez MD   meclizine (ANTIVERT) 25 MG tablet TAKE ONE TABLET BY MOUTH THREE TIMES A DAY AS NEEDED FOR DIZZINESS 2/27/23   Ella Lacy, DEBO   metFORMIN (GLUCOPHAGE) 1000 MG tablet TAKE ONE TABLET BY MOUTH TWICE A DAY WITH MEALS 1/4/23   Henny Long DO   nystatin (MYCOSTATIN) 359122 UNIT/GM cream Apply  topically to the appropriate area as directed As Needed (groin rash). 10/6/20   Henny Long DO   polyethylene glycol (MIRALAX) packet Take 17 g by mouth Daily.    ProviderKoki MD   rosuvastatin (CRESTOR) 5 MG tablet Take 1 tablet by mouth Daily. 4/13/22   Henny Long DO   saccharomyces boulardii (Florastor) 250 MG capsule Take 1 capsule by mouth Daily. 7/1/21   Henny Long DO   traZODone (DESYREL) 50 MG tablet 1/2 tab - 3 tabs po qhs prn insomnia 3/17/23   Henny Long DO   triamcinolone (KENALOG) 0.1 % cream Apply  topically to the appropriate area as directed 2 (Two) Times a Day. 1/22/21   Henny Long DO   triamterene-hydrochlorothiazide (Dyazide) 37.5-25 MG per capsule Take 1 capsule by mouth Every Morning. 12/30/22   Henny Long DO   verapamil SR (CALAN-SR) 120 MG CR tablet TAKE ONE TABLET BY MOUTH ONCE NIGHTLY  "1/26/23   Ethan, Henny, DO     /61   Pulse 72   Temp 99.3 °F (37.4 °C) (Oral)   Resp 17   Ht 162.6 cm (64\")   Wt 78.9 kg (174 lb)   SpO2 97%   BMI 29.87 kg/m²   I examined the patient using the appropriate personal protective equipment.          Objective   Physical Exam  General: Well-developed well-appearing, no acute distress, alert and appropriate  Eyes:  sclera nonicteric  HEENT: Mucous membranes moist, no mucosal swelling  Neck: Supple, no nuchal rigidity, no lymphadenopathy, no JVD  Respirations: Respirations nonlabored, equal breath sounds bilaterally, clear lungs, occasional raspy cough  Heart regular rate and rhythm, no murmurs rubs or gallops,   Abdomen soft nontender nondistended, no hepatosplenomegaly,  Extremities no clubbing cyanosis or edema, calves are symmetric and nontender  Neuro cranial nerves grossly intact, no focal limb deficits  Psych oriented, pleasant affect  Skin no rash, brisk cap refill  Procedures           ED Course      Results for orders placed or performed during the hospital encounter of 03/31/23   COVID-19,CEPHEID/MINH,COR/APRIL/PAD/CELINA/MAD IN-HOUSE(OR EMERGENT/ADD-ON),NP SWAB IN TRANSPORT MEDIA 3-4 HR TAT, RT-PCR - Swab, Nasopharynx    Specimen: Nasopharynx; Swab   Result Value Ref Range    COVID19 Not Detected Not Detected - Ref. Range   Comprehensive Metabolic Panel    Specimen: Blood   Result Value Ref Range    Glucose 179 (H) 65 - 99 mg/dL    BUN 14 8 - 23 mg/dL    Creatinine 0.99 0.57 - 1.00 mg/dL    Sodium 137 136 - 145 mmol/L    Potassium 4.0 3.5 - 5.2 mmol/L    Chloride 100 98 - 107 mmol/L    CO2 25.0 22.0 - 29.0 mmol/L    Calcium 10.1 8.6 - 10.5 mg/dL    Total Protein 6.6 6.0 - 8.5 g/dL    Albumin 4.3 3.5 - 5.2 g/dL    ALT (SGPT) 11 1 - 33 U/L    AST (SGOT) 14 1 - 32 U/L    Alkaline Phosphatase 65 39 - 117 U/L    Total Bilirubin 0.5 0.0 - 1.2 mg/dL    Globulin 2.3 gm/dL    A/G Ratio 1.9 g/dL    BUN/Creatinine Ratio 14.1 7.0 - 25.0    Anion Gap 12.0 5.0 - " 15.0 mmol/L    eGFR 57.8 (L) >60.0 mL/min/1.73   BNP    Specimen: Blood   Result Value Ref Range    proBNP 435.5 0.0 - 1,800.0 pg/mL   High Sensitivity Troponin T    Specimen: Blood   Result Value Ref Range    HS Troponin T 11 (H) <10 ng/L   CBC Auto Differential    Specimen: Blood   Result Value Ref Range    WBC 11.40 (H) 3.40 - 10.80 10*3/mm3    RBC 4.19 3.77 - 5.28 10*6/mm3    Hemoglobin 12.2 12.0 - 15.9 g/dL    Hematocrit 38.4 34.0 - 46.6 %    MCV 91.6 79.0 - 97.0 fL    MCH 29.2 26.6 - 33.0 pg    MCHC 31.8 31.5 - 35.7 g/dL    RDW 13.1 12.3 - 15.4 %    RDW-SD 41.6 37.0 - 54.0 fl    MPV 7.3 6.0 - 12.0 fL    Platelets 209 140 - 450 10*3/mm3    Neutrophil % 81.0 (H) 42.7 - 76.0 %    Lymphocyte % 12.7 (L) 19.6 - 45.3 %    Monocyte % 4.9 (L) 5.0 - 12.0 %    Eosinophil % 1.0 0.3 - 6.2 %    Basophil % 0.4 0.0 - 1.5 %    Neutrophils, Absolute 9.20 (H) 1.70 - 7.00 10*3/mm3    Lymphocytes, Absolute 1.40 0.70 - 3.10 10*3/mm3    Monocytes, Absolute 0.60 0.10 - 0.90 10*3/mm3    Eosinophils, Absolute 0.10 0.00 - 0.40 10*3/mm3    Basophils, Absolute 0.00 0.00 - 0.20 10*3/mm3    nRBC 0.0 0.0 - 0.2 /100 WBC     XR Chest 1 View    Result Date: 3/31/2023  Impression: No acute cardiopulmonary process. Electronically Signed: Kevin Givens  3/31/2023 6:45 AM EDT  Workstation ID: [id number]                                         Medical Decision Making  Patient presents with dyspnea and cough differential diagnosis including acute coronary syndrome, CHF, pneumonia, COVID    She is in no acute respiratory distress and oxygen saturation is 97% on room air.  She is having no chest pain of ischemia.  Acute coronary syndrome felt to be unlikely        Acute bronchitis, unspecified organism: acute illness or injury  Dyspnea, unspecified type: acute illness or injury  Amount and/or Complexity of Data Reviewed  Labs: ordered. Decision-making details documented in ED Course.     Details: COVID-negative, borderline leukocytosis,  high-sensitivity troponin indeterminate range, BNP normal  Radiology: ordered and independent interpretation performed.     Details: My independent interpretation of chest x-ray image no apparent pneumonia or congestive failure  ECG/medicine tests: ordered and independent interpretation performed.     Details: My EKG interpretation sinus rhythm, first-degree AV block, no acute ST or T wave abnormalities, rate of 76      Risk  Prescription drug management.      Patient was advised of findings and agreeable to the outpatient management plan.  She was prescribed doxycycline and Mucinex for her acute bronchitis.  She is discharged in good condition was given warning signs for return.    Final diagnoses:   Acute bronchitis, unspecified organism   Dyspnea, unspecified type       ED Disposition  ED Disposition     ED Disposition   Discharge    Condition   Stable    Comment   --             Henny Long,   [address]  [address]  [place] [facility]  [phone]    Schedule an appointment as soon as possible for a visit in 3 days           Medication List      New Prescriptions    doxycycline 100 MG capsule  Commonly known as: MONODOX  Take 1 capsule by mouth 2 (Two) Times a Day.     guaiFENesin 600 MG 12 hr tablet  Commonly known as: MUCINEX  Take 1 tablet by mouth 2 (Two) Times a Day.           Where to Get Your Medications      These medications were sent to [facility] [id number] - [place], [place] - [address] [place] [address] [address]  - [phone]  - [phone] [address] [place] [address]    Phone: [phone]   · doxycycline 100 MG capsule  · guaiFENesin 600 MG 12 hr tablet          Meño Mckeon MD  03/31/23 0662

## 2023-04-01 LAB — QT INTERVAL: 370 MS

## 2023-04-05 ENCOUNTER — TELEPHONE (OUTPATIENT)
Dept: FAMILY MEDICINE CLINIC | Facility: CLINIC | Age: 83
End: 2023-04-05

## 2023-04-05 ENCOUNTER — PRIOR AUTHORIZATION (OUTPATIENT)
Dept: FAMILY MEDICINE CLINIC | Facility: CLINIC | Age: 83
End: 2023-04-05
Payer: MEDICARE

## 2023-04-05 NOTE — TELEPHONE ENCOUNTER
HUB to read    PA was sent for True Metrix Blood Glucose Test strips    Waiting on the outcome from insurance.

## 2023-04-05 NOTE — TELEPHONE ENCOUNTER
"Caller: Monica Pagan \"MONICA PAGAN\"    Relationship: Self    Best call back number: [phone]    What medications are you currently taking:   Current Outpatient Medications on File Prior to Visit   Medication Sig Dispense Refill   • atenolol (TENORMIN) 25 MG tablet TAKE ONE TABLET BY MOUTH ONCE NIGHTLY 90 tablet 0   • Blood Glucose Monitoring Suppl (ONE TOUCH ULTRA MINI) w/Device kit 1 kit Daily. 1 each 0   • busPIRone (BUSPAR) 7.5 MG tablet TAKE ONE TABLET BY MOUTH THREE TIMES A DAY 90 tablet 1   • Continuous Blood Gluc Sensor (FreeStyle Bonifacio 2 Sensor) misc 1 package Every 14 (Fourteen) Days. 2 each 5   • cyanocobalamin (VITAMIN B-12) 2000 MCG tablet tablet Take 2,000 mcg by mouth Daily.     • docusate sodium (Colace) 100 MG capsule Take 1 capsule by mouth 2 (Two) Times a Day As Needed for Constipation.     • doxycycline (MONODOX) 100 MG capsule Take 1 capsule by mouth 2 (Two) Times a Day. 14 capsule 0   • DULoxetine (CYMBALTA) 60 MG capsule TAKE ONE CAPSULE BY MOUTH DAILY 90 capsule 0   • Easy Touch Lancets 33G/Twist misc USE ONE LANCET TO TEST THREE TIMES A  each 3   • glucose blood (OneTouch Ultra) test strip 1 each by Other route 3 (Three) Times a Day Before Meals. Use as instructed 100 each 12   • guaiFENesin (MUCINEX) 600 MG 12 hr tablet Take 1 tablet by mouth 2 (Two) Times a Day. 15 tablet 0   • HYDROcodone-acetaminophen (NORCO) 7.5-325 MG per tablet 1 tablet 2 (Two) Times a Day As Needed.  0   • insulin detemir (Levemir FlexTouch) 100 UNIT/ML injection Inject 25 Units under the skin into the appropriate area as directed Daily. 15 mL 1   • Insulin Pen Needle (BD Pen Needle Kati 2nd Gen) 32G X 4 MM misc Inject 1 package under the skin into the appropriate area as directed Daily. 100 each 1   • lactulose (CHRONULAC) 10 GM/15ML solution Take 30 mL by mouth Daily As Needed (Constipation.). 237 mL 1   • meclizine (ANTIVERT) 25 MG tablet TAKE ONE TABLET BY MOUTH THREE TIMES A DAY AS NEEDED FOR " DIZZINESS 30 tablet 1   • metFORMIN (GLUCOPHAGE) 1000 MG tablet TAKE ONE TABLET BY MOUTH TWICE A DAY WITH MEALS 180 tablet 0   • nystatin (MYCOSTATIN) 031702 UNIT/GM cream Apply  topically to the appropriate area as directed As Needed (groin rash). 90 g 0   • polyethylene glycol (MIRALAX) packet Take 17 g by mouth Daily.     • rosuvastatin (CRESTOR) 5 MG tablet Take 1 tablet by mouth Daily. 90 tablet 3   • saccharomyces boulardii (Florastor) 250 MG capsule Take 1 capsule by mouth Daily.     • traZODone (DESYREL) 50 MG tablet 1/2 tab - 3 tabs po qhs prn insomnia 30 tablet 0   • triamcinolone (KENALOG) 0.1 % cream Apply  topically to the appropriate area as directed 2 (Two) Times a Day. 45 g 0   • triamterene-hydrochlorothiazide (Dyazide) 37.5-25 MG per capsule Take 1 capsule by mouth Every Morning. 90 capsule 1   • verapamil SR (CALAN-SR) 120 MG CR tablet TAKE ONE TABLET BY MOUTH ONCE NIGHTLY 90 tablet 0     No current facility-administered medications on file prior to visit.          When did you start taking these medications: N/A    Which medication are you concerned about: ESZOPICLONE     Who prescribed you this medication: DR. BAUTISTA     What are your concerns: PATIENT CALLED AND SAID THAT SHE GOT A LETTER IN THE MAIL FROM HER INSURANCE COMPANY THAT THEY WILL NOT COVER ESZOPICLONE ANY LONGER AND WANT HER TO SWITCH TO EITHER TRAZODONE OR ANOTHER MEDICATION    SHE DOES NOT WANT TO TAKE TRAZODONE AND WOULD PREFER TO KEEP TAKING THE ESZOPICLONE     SHE SAID INSURANCE SAID THEY WOULD ONLY COVER THIS MEDICATION IF THERE WAS A PROVIDED REASON FOR THE PATIENT NEEDING IT     How long have you had these concerns: N/A

## 2023-04-05 NOTE — TELEPHONE ENCOUNTER
HUB to read    PA approved for True Metrix Blood Glucose Test strips    Request Reference Number: [id number]. TRUE METRIX BETSY GLUCOSE is approved through 12/31/2023. Your patient may now fill this prescription and it will be covered.    Patient ID: [id number] Status of Request: Approve  Medication Name: True Metrix Betsy Glucose [facility]: [id number]  Decision Notes:  TRUE METRIX BETSY GLUCOSE meter and test strip, use as directed, are approved under your Part B  benefit through 12/31/2023.  Reviewed by: JONATHAN [email or web address].

## 2023-04-06 ENCOUNTER — PATIENT OUTREACH (OUTPATIENT)
Dept: CASE MANAGEMENT | Facility: OTHER | Age: 83
End: 2023-04-06
Payer: MEDICARE

## 2023-04-06 DIAGNOSIS — G47.00 INSOMNIA, UNSPECIFIED TYPE: Primary | ICD-10-CM

## 2023-04-06 RX ORDER — ESZOPICLONE 3 MG/1
3 TABLET, FILM COATED ORAL NIGHTLY PRN
Qty: 30 TABLET | Refills: 0 | Status: SHIPPED | OUTPATIENT
Start: 2023-04-06 | End: 2023-04-17 | Stop reason: SDUPTHER

## 2023-04-06 NOTE — OUTREACH NOTE
"AMBULATORY CASE MANAGEMENT NOTE    Name and Relationship of Patient/Support Person: Monica Pagan EDSON \"MONICA PAGAN\" - Self    Patient Outreach    RN-ACM f/up HRCM outreach completed with Ms Pagan re 3/31/23 ED visit for DX bronchitis.    See flow sheets for details.    Patient states primary need/complaint:     1. Says chest & nasal congestion improved but not fully resolved: tight cough, LGF, malaise, mental fog conts. Asks if doxycycline should be continued (states completes D7 today). Pls send refill to [facility] pharmacy, if so.   2. Responds to PCP note: Please send GoodRX eszoplicone refill to [facility]. 1ST CHOICE is [facility] QTY 90 for $22.82.  2ND CHOICE is [facility] QTY 30 for $7.44     Confirms appts:   Pain management 4/10/23   PCP 4/11/23     Adult Patient Profile  Questions/Answers    Flowsheet Row Most Recent Value   Symptoms/Conditions Managed at Home diabetes, type 2, HEENT (head, eyes, ears, nose, throat), respiratory, musculoskeletal, cardiovascular, neurological   Cardiovascular Symptoms/Conditions dysrhythmia   Cardiovascular Management Strategies medication therapy   Cardiovascular Self-Management Outcome 4 (good)   Cardiovascular Comment states conts verapamil as directed, denies palpitations.   Diabetes Management Strategies blood glucose testing, diet modification, insulin therapy, medication therapy   Frequency of Blood Glucose Testing other (see comments)  [\"not as often as I should\"]   Usual Blood Glucose Usually ~130 fasting.  States today was 150, yesterday afternoon was >200.   Diabetes Self-Management Outcome 2 (bad)   Diabetes Comment Statres thinks glucoses were highter this week d/t URTI but was also so sick she forgot \"a couple of doses of insulin & metformin\" this week.  States is back on correct anti-DM med regimen now & is hydrating well, monitoring glucoses. States fasting this , yesterday afternoon was >200.  Verb understanding re possible link " between higher glucoses and fatigue, mental fog, continued malaise.   HEENT Comment Nasal congestion improving, but tight cough conts   Musculoskeletal Symptoms/Conditions joint pain   Musculoskeletal Self-Management Outcome 2 (bad)   Musculoskeletal Comment C/o back pain related to coughing.  States has pain mangement appt next week.   Neurological Symptoms/Conditions other (see comments)  [insomnia]   Neurological Self-Management Outcome 2 (bad)   Neurological Comment States didn't sleep at all last night d/t ran out of lunesta and was afraid to take trazadone.  She VU okay to start trazadone q HS prn as directed, until lunesta refill is received.   Respiratory Symptoms/Conditions cough   Respiratory Management Strategies medication therapy, fluid modification   Respiratory Self-Management Outcome 2 (bad)   Respiratory Comment C/O significant fatigue & mental fog, also tight cough and subjective low grade temp <100F.  States finishes doxycycline today prescribed by ED for bronchitis. Reports chest & head congestion much better, but thinks cough is d/t sinus drainage. Asks if doxycycline should be continued a little longer.  States used to get allergy shots years ago & take zyrtec.  States plans to restart zyrtec.  States conts verapamil as ordered and denies palps.        Addressed patient questions.  Patient denies questions, needs at this time.    YOAV MAYORGA  Ambulatory Case Management    4/6/2023, 14:59 EDT

## 2023-04-07 ENCOUNTER — TELEPHONE (OUTPATIENT)
Dept: FAMILY MEDICINE CLINIC | Facility: CLINIC | Age: 83
End: 2023-04-07

## 2023-04-07 NOTE — TELEPHONE ENCOUNTER
"    Caller: Monica Pagan \"MONICA PAGAN\"    Relationship to patient: Self    Best call back number: [phone]    Patient is needing: PATIENT IS WANTING TO LET DR BAUTISTA KNOW THAT SHE IS GOING TO JUST TAKE THE TRAZODONE.  SHE STATE SHE DOES NOT FEEL LIKE DRIVING TO  OTHER MEDICATIONS.  SHE STATES SHE WILL DISCUSS WITH DR BAUTISTA AT HER APPOINTMENT ON 04/11/23    PLEASE ADVISE.            "

## 2023-04-10 ENCOUNTER — TELEPHONE (OUTPATIENT)
Dept: FAMILY MEDICINE CLINIC | Facility: CLINIC | Age: 83
End: 2023-04-10

## 2023-04-10 NOTE — TELEPHONE ENCOUNTER
"Caller: Monica Pagan \"MONICA PAGAN\"    Relationship to patient: Self    Best call back number: [phone]    Patient is needing: PATIENT CALLED AND CANCELLED HER HOSPITAL FOLLOW UP WITH DR. BAUTISTA FOR TOMORROW    SHE RESCHEDULED FOR Monday, 04/17/23 AND SAID SHE IS STILL HAVING ISSUES GETTING OVER BRONCHITIS AND DID NOT FEEL LIKE SHE COULD DRIVE    SHE SAID IF DR. BAUTISTA WANTED TO TALK WITH HER, SHE COULD CALL HER        "

## 2023-04-14 ENCOUNTER — APPOINTMENT (OUTPATIENT)
Dept: GENERAL RADIOLOGY | Facility: HOSPITAL | Age: 83
End: 2023-04-14
Payer: MEDICARE

## 2023-04-14 ENCOUNTER — TELEPHONE (OUTPATIENT)
Dept: FAMILY MEDICINE CLINIC | Facility: CLINIC | Age: 83
End: 2023-04-14

## 2023-04-14 ENCOUNTER — HOSPITAL ENCOUNTER (OUTPATIENT)
Facility: HOSPITAL | Age: 83
Discharge: HOME | End: 2023-04-14
Attending: EMERGENCY MEDICINE | Admitting: EMERGENCY MEDICINE
Payer: MEDICARE

## 2023-04-14 VITALS
SYSTOLIC BLOOD PRESSURE: 163 MMHG | RESPIRATION RATE: 22 BRPM | BODY MASS INDEX: 29.71 KG/M2 | HEIGHT: 64 IN | WEIGHT: 174 LBS | TEMPERATURE: 98.2 F | HEART RATE: 83 BPM | DIASTOLIC BLOOD PRESSURE: 75 MMHG | OXYGEN SATURATION: 96 %

## 2023-04-14 DIAGNOSIS — J40 BRONCHITIS: Primary | ICD-10-CM

## 2023-04-14 PROCEDURE — 71046 X-RAY EXAM CHEST 2 VIEWS: CPT

## 2023-04-14 PROCEDURE — G0463 HOSPITAL OUTPT CLINIC VISIT: HCPCS

## 2023-04-14 RX ORDER — GUAIFENESIN 600 MG/1
1200 TABLET, EXTENDED RELEASE ORAL 2 TIMES DAILY
Qty: 20 TABLET | Refills: 0 | Status: SHIPPED | OUTPATIENT
Start: 2023-04-14 | End: 2023-04-17

## 2023-04-14 NOTE — FSED PROVIDER NOTE
"Subjective   History of Present Illness  82-year-old female reports having recurrent episodes of bronchitis, reports that she was given azithromycin 2 weeks ago and is still having a productive cough.  She was concerned that her productive cough could possibly becoming a pneumonia as this morning when she was laying in bed she felt it was hard to get a full breath.  She reports her cough is productive with clear sputum.  She denies fever.  She denies chest pain.  She denies nausea vomiting diarrhea.  She denies fever.  She indicates to me that she is a diabetic and she does not want to take any steroid medication.  She denies being a smoker and denies having a known diagnosis of COPD        Review of Systems   All other systems reviewed and are negative.      Past Medical History:   Diagnosis Date   • Anemia    • Anxiety    • Colon polyp     TA   • DDD  lumbar    • DEXA    • DMII    • Fibromyalgia    • Hyperlipidemia    • Hypertension    • IBS    • Insomnia    • Lumbar spinal stenosis    • MAMMO     NEG= 2020/ 2022   • Meniere's disease    • Nontoxic thyroid nodule    • Ocular histoplasmosis    • Osteoarthritis    • Peripheral neuropathy    • PVC    • Spondylosis    • Tinnitus    • Tremor    • Trigeminal neuralgia    • Vitamin D deficiency        Allergies   Allergen Reactions   • Compazine  [Prochlorperazine Edisylate] Anxiety and Other (See Comments)     States refuses to take compazine d/t prior reaction, \"felt like ants crawling all over me\", agitated & restless.   • Amoxicillin Diarrhea   • Aspirin GI Intolerance   • Cortisone Other (See Comments)   • Erythromycin Diarrhea   • Gabapentin    • Levofloxacin Diarrhea   • Lipitor [Atorvastatin] Myalgia   • Nsaids GI Intolerance   • Pravastatin Myalgia   • Pregabalin Mental Status Change     fogginess   • Sitagliptin Nausea Only   • Sulfa Antibiotics Nausea Only   • Theophylline        Past Surgical History:   Procedure Laterality Date   • AMPUTATION DIGIT Left     " Digit #1   • APPENDECTOMY  1962   • BUNIONECTOMY Bilateral    • CATARACT EXTRACTION      left   • CATARACT EXTRACTION      x2   • COLONOSCOPY      NEG = 2012/ 2021= TA, rech 2026   • CYST REMOVAL Bilateral     WRIST   • HYSTERECTOMY  1980   • JOINT REPLACEMENT      Rt TKR x 2   • JOINT REPLACEMENT      Left THR   • LAPAROSCOPIC CHOLECYSTECTOMY      DR. LOBATO   • SPINE SURGERY      Lumbar Fusion   • SUBTOTAL HYSTERECTOMY         Family History   Problem Relation Age of Onset   • Asthma Mother    • COPD Mother    • Heart disease Father    • Alcohol abuse Father    • Other Father         WWII Gangrene/ Malaria   • Arthritis Sister    • Cancer Sister    • Leukemia Sister    • Heart attack Brother    • Cancer Brother    • Alcohol abuse Brother    • Kidney disease Brother    • Alzheimer's disease Brother    • Heart disease Brother    • Diabetes Brother    • Hyperlipidemia Brother    • Diabetes Son        Social History     Socioeconomic History   • Marital status:    Tobacco Use   • Smoking status: Never   • Smokeless tobacco: Never   Vaping Use   • Vaping Use: Never used   Substance and Sexual Activity   • Alcohol use: Yes     Alcohol/week: 1.0 standard drink     Types: 1 Glasses of wine per week     Comment: Rare   • Drug use: No   • Sexual activity: Never           Objective   Physical Exam  Constitutional:       Appearance: Normal appearance.   HENT:      Head: Atraumatic.      Nose: Nose normal. No congestion or rhinorrhea.      Mouth/Throat:      Pharynx: Oropharynx is clear.   Eyes:      Extraocular Movements: Extraocular movements intact.      Pupils: Pupils are equal, round, and reactive to light.   Cardiovascular:      Rate and Rhythm: Normal rate.      Pulses: Normal pulses.      Heart sounds: Normal heart sounds.   Pulmonary:      Effort: No respiratory distress.      Breath sounds: No stridor. Rhonchi (Scattered rhonchi bilateral upper lobe) present. No wheezing or rales.   Chest:      Chest wall: No  tenderness.   Abdominal:      General: Bowel sounds are normal.      Palpations: Abdomen is soft.   Musculoskeletal:      Cervical back: Neck supple.   Skin:     General: Skin is warm and dry.   Neurological:      General: No focal deficit present.      Mental Status: She is alert and oriented to person, place, and time.         Procedures           ED Course  ED Course as of 04/14/23 1826   Fri Apr 14, 2023   1632 Vital signs are normal as interpreted by me with the exception of a mildly elevated blood pressure [WF]   1635 X-ray report indicates no acute cardiopulmonary abnormality [WF]   1641 Assessment of patient she is resting comfortably.  Vital signs are normal.  She is afebrile.  I have given her an incentive spirometer and taught her how to use it and she verbalizes understanding [WF]      ED Course User Index  [WF] Chace Tavares Jr., APRN                                           Medical Decision Making  DDx: Acute on chronic bronchitis, viral URI with cough, pneumonia    Amount and/or Complexity of Data Reviewed  Radiology: ordered.          Final diagnoses:   Bronchitis       ED Disposition  ED Disposition     ED Disposition   Discharge    Condition   Stable    Comment   --             Henny Long,   [address]  [address]  [place] [address]  [phone]    In 1 week  If symptoms worsen         Medication List      Changed    * guaiFENesin 600 MG 12 hr tablet  Commonly known as: MUCINEX  Take 1 tablet by mouth 2 (Two) Times a Day.  What changed: Another medication with the same name was added. Make sure you understand how and when to take each.     * guaiFENesin 600 MG 12 hr tablet  Commonly known as: MUCINEX  Take 2 tablets by mouth 2 (Two) Times a Day.  What changed: You were already taking a medication with the same name, and this prescription was added. Make sure you understand how and when to take each.         * This list has 2 medication(s) that are the same as other medications  prescribed for you. Read the directions carefully, and ask your doctor or other care provider to review them with you.               Where to Get Your Medications      These medications were sent to [facility] [id number] - [place], [place] - [address] [place] [address] [address]  - [phone]  - [phone] FX  [address] [place] [address] [place] [address]    Phone: [phone]   · guaiFENesin 600 MG 12 hr tablet

## 2023-04-14 NOTE — DISCHARGE INSTRUCTIONS
Recommend that you take Mucinex daily    Increase your fluid intake    Use the incentive spirometer 3-4 times daily    If you develop fever worsening shortness of breath seek immediate medical care

## 2023-04-14 NOTE — TELEPHONE ENCOUNTER
"Caller: Monica Pagan \"MONICA PAGAN\"    Relationship to patient: Self    Best call back number: [phone]    Patient is needing:     PATIENT SAID SHE IS IN HER THIRD WEEK OF AN UPPER RESPIRATORY INFECTION AND FEELS HORRIBLE    SHE IS HAVING COUGHING, AND NIGHT SWEATS AND JUST OVERALL DOES NOT FEEL GOOD    SHE SAID SHE HAD TAKEN SEVEN DAYS OF ANTIBIOTICS STARTING 03/31 AND HAS NOT RECOVERED STILL    SHE IS WANTING TO SEE IF DR. BAUTISTA WANTS TO SEND IN A DIFFERENT MEDICATION FOR HER, SHE SAID SHE IS CURRENTLY TAKING AN OVER THE COUNTER ANTIHISTAMINE     PHARMACY:   [facility] [id number] - [place] [address] [address] - [phone] [facility] [phone]   [phone]    "

## 2023-04-14 NOTE — TELEPHONE ENCOUNTER
Patient informed.  Patient already has an appt scheduled with Dr. Long on Monday and no sooner appt available.  Advised pt to go to [facility] to be evaluated if feeling bad.

## 2023-04-17 ENCOUNTER — OFFICE VISIT (OUTPATIENT)
Dept: FAMILY MEDICINE CLINIC | Facility: CLINIC | Age: 83
End: 2023-04-17
Payer: MEDICARE

## 2023-04-17 VITALS
BODY MASS INDEX: 28.68 KG/M2 | HEART RATE: 79 BPM | SYSTOLIC BLOOD PRESSURE: 118 MMHG | TEMPERATURE: 98.2 F | DIASTOLIC BLOOD PRESSURE: 68 MMHG | OXYGEN SATURATION: 97 % | RESPIRATION RATE: 18 BRPM | WEIGHT: 168 LBS | HEIGHT: 64 IN

## 2023-04-17 DIAGNOSIS — E53.8 VITAMIN B12 DEFICIENCY: ICD-10-CM

## 2023-04-17 DIAGNOSIS — G47.00 INSOMNIA, UNSPECIFIED TYPE: ICD-10-CM

## 2023-04-17 DIAGNOSIS — R06.2 WHEEZING: Primary | ICD-10-CM

## 2023-04-17 DIAGNOSIS — E55.9 VITAMIN D DEFICIENCY: ICD-10-CM

## 2023-04-17 DIAGNOSIS — E11.42 TYPE 2 DIABETES MELLITUS WITH PERIPHERAL NEUROPATHY: ICD-10-CM

## 2023-04-17 DIAGNOSIS — E78.2 MIXED HYPERLIPIDEMIA: ICD-10-CM

## 2023-04-17 PROBLEM — R29.6 REPEATED FALLS: Status: ACTIVE | Noted: 2021-05-29

## 2023-04-17 PROBLEM — R26.81 UNSTEADINESS ON FEET: Status: ACTIVE | Noted: 2021-05-27

## 2023-04-17 PROBLEM — Z60.2 PROBLEMS RELATED TO LIVING ALONE: Status: ACTIVE | Noted: 2021-05-29

## 2023-04-17 PROBLEM — F41.9 ANXIETY DISORDER, UNSPECIFIED: Status: ACTIVE | Noted: 2021-05-29

## 2023-04-17 PROBLEM — Z79.4 LONG TERM (CURRENT) USE OF INSULIN: Status: ACTIVE | Noted: 2021-05-29

## 2023-04-17 RX ORDER — INSULIN DETEMIR 100 [IU]/ML
25 INJECTION, SOLUTION SUBCUTANEOUS DAILY
Qty: 15 ML | Refills: 1 | Status: SHIPPED | OUTPATIENT
Start: 2023-04-17

## 2023-04-17 RX ORDER — ESZOPICLONE 3 MG/1
3 TABLET, FILM COATED ORAL NIGHTLY PRN
Qty: 90 TABLET | Refills: 0 | Status: SHIPPED | OUTPATIENT
Start: 2023-04-17

## 2023-04-17 RX ORDER — ALBUTEROL SULFATE 90 UG/1
2 AEROSOL, METERED RESPIRATORY (INHALATION) EVERY 6 HOURS PRN
Qty: 18 G | Refills: 0 | Status: SHIPPED | OUTPATIENT
Start: 2023-04-17

## 2023-04-17 RX ORDER — GUAIFENESIN 600 MG/1
600 TABLET, EXTENDED RELEASE ORAL 2 TIMES DAILY PRN
Qty: 60 TABLET | Refills: 2 | Status: SHIPPED | OUTPATIENT
Start: 2023-04-17

## 2023-04-17 NOTE — PROGRESS NOTES
Subjective   Monica Pagan is a 82 y.o. female.     Chief Complaint   Patient presents with   • Transitional Care Management     ER follow up on bronchitis         Current Outpatient Medications:   •  atenolol (TENORMIN) 25 MG tablet, TAKE ONE TABLET BY MOUTH ONCE NIGHTLY, Disp: 90 tablet, Rfl: 0  •  Blood Glucose Monitoring Suppl (ONE TOUCH ULTRA MINI) w/Device kit, 1 kit Daily., Disp: 1 each, Rfl: 0  •  busPIRone (BUSPAR) 7.5 MG tablet, TAKE ONE TABLET BY MOUTH THREE TIMES A DAY, Disp: 90 tablet, Rfl: 1  •  Continuous Blood Gluc Sensor (FreeStyle Bonifacio 2 Sensor) misc, 1 package Every 14 (Fourteen) Days., Disp: 2 each, Rfl: 5  •  cyanocobalamin (VITAMIN B-12) 2000 MCG tablet tablet, Take 1 tablet by mouth Daily., Disp: , Rfl:   •  docusate sodium (Colace) 100 MG capsule, Take 1 capsule by mouth 2 (Two) Times a Day As Needed for Constipation., Disp: , Rfl:   •  doxycycline (MONODOX) 100 MG capsule, Take 1 capsule by mouth 2 (Two) Times a Day. (Patient not taking: Reported on 4/25/2023), Disp: 14 capsule, Rfl: 0  •  Easy Touch Lancets 33G/Twist misc, USE ONE LANCET TO TEST THREE TIMES A DAY, Disp: 100 each, Rfl: 3  •  eszopiclone (LUNESTA) 3 MG tablet, Take 1 tablet by mouth At Night As Needed for Sleep. Take immediately before bedtime, Disp: 90 tablet, Rfl: 0  •  glucose blood (OneTouch Ultra) test strip, 1 each by Other route 3 (Three) Times a Day Before Meals. Use as instructed, Disp: 100 each, Rfl: 12  •  guaiFENesin (MUCINEX) 600 MG 12 hr tablet, Take 1 tablet by mouth 2 (Two) Times a Day As Needed for Cough or Congestion., Disp: 60 tablet, Rfl: 2  •  insulin detemir (Levemir FlexTouch) 100 UNIT/ML injection, Inject 25 Units under the skin into the appropriate area as directed Daily., Disp: 15 mL, Rfl: 1  •  Insulin Pen Needle (BD Pen Needle Kati 2nd Gen) 32G X 4 MM misc, Inject 1 package under the skin into the appropriate area as directed Daily., Disp: 100 each, Rfl: 1  •  lactulose (CHRONULAC) 10 GM/15ML  solution, Take 30 mL by mouth Daily As Needed (Constipation.)., Disp: 237 mL, Rfl: 1  •  meclizine (ANTIVERT) 25 MG tablet, TAKE ONE TABLET BY MOUTH THREE TIMES A DAY AS NEEDED FOR DIZZINESS, Disp: 30 tablet, Rfl: 1  •  metFORMIN (GLUCOPHAGE) 1000 MG tablet, Take 1 tablet by mouth 2 (Two) Times a Day With Meals., Disp: 180 tablet, Rfl: 0  •  nystatin (MYCOSTATIN) 746344 UNIT/GM cream, Apply  topically to the appropriate area as directed As Needed (groin rash)., Disp: 90 g, Rfl: 0  •  polyethylene glycol (MIRALAX) packet, Take 17 g by mouth Daily., Disp: , Rfl:   •  rosuvastatin (CRESTOR) 5 MG tablet, Take 1 tablet by mouth Daily., Disp: 90 tablet, Rfl: 3  •  saccharomyces boulardii (Florastor) 250 MG capsule, Take 1 capsule by mouth Daily., Disp: , Rfl:   •  triamcinolone (KENALOG) 0.1 % cream, Apply  topically to the appropriate area as directed 2 (Two) Times a Day., Disp: 45 g, Rfl: 0  •  triamterene-hydrochlorothiazide (Dyazide) 37.5-25 MG per capsule, Take 1 capsule by mouth Every Morning., Disp: 90 capsule, Rfl: 1  •  verapamil SR (CALAN-SR) 120 MG CR tablet, TAKE ONE TABLET BY MOUTH ONCE NIGHTLY, Disp: 90 tablet, Rfl: 0  •  albuterol sulfate  (90 Base) MCG/ACT inhaler, Inhale 2 puffs Every 6 (Six) Hours As Needed for Wheezing or Shortness of Air (cough)., Disp: 18 g, Rfl: 0  •  DULoxetine (CYMBALTA) 60 MG capsule, TAKE ONE CAPSULE BY MOUTH DAILY, Disp: 90 capsule, Rfl: 0  •  triamterene (DYRENIUM) 50 MG capsule, , Disp: , Rfl:     Past Medical History:   Diagnosis Date   • Anemia    • Anxiety    • Childhood Asthma    • Colon polyp     TA   • DDD  lumbar    • DEXA    • DMII    • Fibromyalgia    • Hyperlipidemia    • Hypertension    • IBS    • Insomnia    • Lumbar spinal stenosis    • MAMMO     NEG= 2020/ 2022   • Meniere's disease    • Nontoxic thyroid nodule    • Ocular histoplasmosis    • Osteoarthritis    • Peripheral neuropathy    • PVC    • Spondylosis    • Tinnitus    • Tremor    • Trigeminal  neuralgia    • Vitamin D deficiency        Past Surgical History:   Procedure Laterality Date   • AMPUTATION DIGIT Left     Digit #1   • APPENDECTOMY  1962   • BUNIONECTOMY Bilateral    • CATARACT EXTRACTION      left   • CATARACT EXTRACTION      x2   • COLONOSCOPY      NEG = 2012/ 2021= TA, rech 2026   • CYST REMOVAL Bilateral     WRIST   • HYSTERECTOMY  1980   • JOINT REPLACEMENT      Rt TKR x 2   • JOINT REPLACEMENT      Left THR   • LAPAROSCOPIC CHOLECYSTECTOMY      DR. LOBATO   • SPINE SURGERY      Lumbar Fusion   • SUBTOTAL HYSTERECTOMY         Family History   Problem Relation Age of Onset   • Asthma Mother    • COPD Mother    • Heart disease Father    • Alcohol abuse Father    • Other Father         WWII Gangrene/ Malaria   • Arthritis Sister    • Cancer Sister    • Leukemia Sister    • Heart attack Brother    • Cancer Brother    • Alcohol abuse Brother    • Kidney disease Brother    • Alzheimer's disease Brother    • Heart disease Brother    • Diabetes Brother    • Hyperlipidemia Brother    • Diabetes Son        Social History     Socioeconomic History   • Marital status:    Tobacco Use   • Smoking status: Never   • Smokeless tobacco: Never   Vaping Use   • Vaping Use: Never used   Substance and Sexual Activity   • Alcohol use: Yes     Alcohol/week: 1.0 standard drink     Types: 1 Glasses of wine per week     Comment: Rare   • Drug use: No   • Sexual activity: Never       History of Present Illness  81 y/o C female here for f/u from ER 2x in last 2-3 weeks for Bronchitis    Patient states that her cough is somewhat better today-----she was given abx the first time and mucinex and Incentive Spirometry recently which she feels may be helping     She was seen and diagnosed with bronchitis 2 weeks ago, finished her antibiotics, but she is not feeling much improved. She is wheezing, when she walks around, she cannot breathe. She denies any fever. She is lightheaded and she does not sleep. Trazodone does not  help her insomnia. She uses melatonin over the counter.      Her  smoked in the house for 40 years and she was exposed to secondhand smoke. She was diagnosed with primary asthma as a child.    The following portions of the patient's history were reviewed and updated as appropriate: allergies, current medications, past family history, past medical history, past social history, past surgical history and problem list.    Review of Systems   Constitutional: Negative for activity change, appetite change, chills, fatigue, fever, unexpected weight gain and unexpected weight loss.   HENT: Negative for congestion, ear discharge, ear pain, postnasal drip, rhinorrhea, sinus pressure, sneezing, sore throat and swollen glands.    Eyes: Negative for pain, discharge, redness, itching and visual disturbance.   Respiratory: Positive for shortness of breath and wheezing. Negative for cough and stridor.    Skin: Negative for rash.   Allergic/Immunologic: Negative for environmental allergies.   Neurological: Positive for light-headedness.   Psychiatric/Behavioral: Positive for sleep disturbance.       Vitals:    04/17/23 1551   BP: 118/68   Pulse: 79   Resp: 18   Temp: 98.2 °F (36.8 °C)   SpO2: 97%       Objective   Physical Exam  Vitals and nursing note reviewed.   Constitutional:       General: She is not in acute distress.     Appearance: She is well-developed. She is not ill-appearing or toxic-appearing.   HENT:      Head: Normocephalic and atraumatic.   Cardiovascular:      Rate and Rhythm: Normal rate and regular rhythm.      Heart sounds: Normal heart sounds. No murmur heard.  Pulmonary:      Effort: Pulmonary effort is normal.      Breath sounds: Examination of the right-upper field reveals wheezing. Examination of the right-middle field reveals wheezing. Wheezing present. No decreased breath sounds, rhonchi or rales.   Skin:     General: Skin is warm and dry.      Findings: No rash.   Neurological:      Mental Status:  She is alert and oriented to person, place, and time.      Cranial Nerves: No cranial nerve deficit.   Psychiatric:         Mood and Affect: Mood normal.         Behavior: Behavior normal.         Thought Content: Thought content normal.         Judgment: Judgment normal.           Assessment & Plan   Diagnoses and all orders for this visit:    1. Wheezing (Primary)    2. Insomnia, unspecified type  -     eszopiclone (LUNESTA) 3 MG tablet; Take 1 tablet by mouth At Night As Needed for Sleep. Take immediately before bedtime  Dispense: 90 tablet; Refill: 0    3. Type 2 diabetes mellitus with peripheral neuropathy  -     insulin detemir (Levemir FlexTouch) 100 UNIT/ML injection; Inject 25 Units under the skin into the appropriate area as directed Daily.  Dispense: 15 mL; Refill: 1  -     Hemoglobin A1c; Future  -     MicroAlbumin, Urine, Random - Urine, Clean Catch; Future    4. Mixed hyperlipidemia  -     Lipid Panel; Future    5. Vitamin D deficiency  -     Vitamin D,25-Hydroxy; Future    6. Vitamin B12 deficiency  -     Vitamin B12; Future    Other orders  -     albuterol sulfate  (90 Base) MCG/ACT inhaler; Inhale 2 puffs Every 6 (Six) Hours As Needed for Wheezing or Shortness of Air (cough).  Dispense: 18 g; Refill: 0  -     guaiFENesin (MUCINEX) 600 MG 12 hr tablet; Take 1 tablet by mouth 2 (Two) Times a Day As Needed for Cough or Congestion.  Dispense: 60 tablet; Refill: 2  -     metFORMIN (GLUCOPHAGE) 1000 MG tablet; Take 1 tablet by mouth 2 (Two) Times a Day With Meals.  Dispense: 180 tablet; Refill: 0    Trial of Alb HFA prn SOB/ Mucinex prn  Trial of Lunesta 3mg qhs prn insomnia  Reviewed ER visit and recs        Transcribed from ambient dictation for Henny Long DO by Edith Cruz.  04/17/23   19:36 EDT    Patient or patient representative verbalized consent to the visit recording.  I have personally performed the services described in this document as transcribed by the above individual, and it is  both accurate and complete.

## 2023-04-25 ENCOUNTER — OFFICE VISIT (OUTPATIENT)
Dept: PODIATRY | Facility: CLINIC | Age: 83
End: 2023-04-25
Payer: MEDICARE

## 2023-04-25 VITALS — BODY MASS INDEX: 28.68 KG/M2 | OXYGEN SATURATION: 99 % | HEART RATE: 95 BPM | WEIGHT: 168 LBS | HEIGHT: 64 IN

## 2023-04-25 DIAGNOSIS — E11.42 DM TYPE 2 WITH DIABETIC PERIPHERAL NEUROPATHY: Primary | ICD-10-CM

## 2023-04-25 DIAGNOSIS — B35.1 ONYCHOMYCOSIS: ICD-10-CM

## 2023-04-25 DIAGNOSIS — Z89.412 LEFT GREAT TOE AMPUTEE: ICD-10-CM

## 2023-04-25 RX ORDER — TRIAMTERENE CAPSULES 50 MG/1
CAPSULE ORAL
COMMUNITY

## 2023-04-25 NOTE — PROGRESS NOTES
"04/25/2023  Foot and Ankle Surgery - Established Patient/Follow-up  Provider: DEBO Nicholson   Location: [facility]    Subjective:  Monica Pagan is a 82 y.o. female.     Chief Complaint   Patient presents with   • Left Foot - Follow-up, Diabetes, Pain     DM foot care   • Right Foot - Follow-up, Diabetes, Nail Problem     DM foot care   • Follow-up     HAILEY Long 1/27/2023       HPI: The patient presents today for a routine diabetic foot check.    The patient has been diabetic for several years. The patient states she has had pneumonia from the end of march to about 3-4 days ago. Her blood sugars have been \"all over.\"  The patients last A1c was  6% she states her blood sugar usually runs in the 130s.    The patient had a left toe amputation approximately in 2018. She denies any problems with blood flow to her feet that she is aware of. She notes that she has been having muscle cramps in her foot. She wears sketchers.     The patient ambulates with a walker.     Allergies   Allergen Reactions   • Compazine  [Prochlorperazine Edisylate] Anxiety and Other (See Comments)     States refuses to take compazine d/t prior reaction, \"felt like ants crawling all over me\", agitated & restless.   • Amoxicillin Diarrhea   • Aspirin GI Intolerance   • Cortisone Other (See Comments)   • Erythromycin Diarrhea   • Gabapentin    • Levofloxacin Diarrhea   • Lipitor [Atorvastatin] Myalgia   • Nsaids GI Intolerance   • Pravastatin Myalgia   • Pregabalin Mental Status Change     fogginess   • Sitagliptin Nausea Only   • Sulfa Antibiotics Nausea Only   • Theophylline        Current Outpatient Medications on File Prior to Visit   Medication Sig Dispense Refill   • albuterol sulfate  (90 Base) MCG/ACT inhaler Inhale 2 puffs Every 6 (Six) Hours As Needed for Wheezing or Shortness of Air (cough). 18 g 0   • atenolol (TENORMIN) 25 MG tablet TAKE ONE TABLET BY MOUTH ONCE NIGHTLY 90 tablet 0   • Blood Glucose Monitoring " Suppl (ONE TOUCH ULTRA MINI) w/Device kit 1 kit Daily. 1 each 0   • busPIRone (BUSPAR) 7.5 MG tablet TAKE ONE TABLET BY MOUTH THREE TIMES A DAY 90 tablet 1   • Continuous Blood Gluc Sensor (FreeStyle Bonifacio 2 Sensor) misc 1 package Every 14 (Fourteen) Days. 2 each 5   • cyanocobalamin (VITAMIN B-12) 2000 MCG tablet tablet Take 1 tablet by mouth Daily.     • docusate sodium (Colace) 100 MG capsule Take 1 capsule by mouth 2 (Two) Times a Day As Needed for Constipation.     • DULoxetine (CYMBALTA) 60 MG capsule TAKE ONE CAPSULE BY MOUTH DAILY 90 capsule 0   • Easy Touch Lancets 33G/Twist misc USE ONE LANCET TO TEST THREE TIMES A  each 3   • eszopiclone (LUNESTA) 3 MG tablet Take 1 tablet by mouth At Night As Needed for Sleep. Take immediately before bedtime 90 tablet 0   • glucose blood (OneTouch Ultra) test strip 1 each by Other route 3 (Three) Times a Day Before Meals. Use as instructed 100 each 12   • guaiFENesin (MUCINEX) 600 MG 12 hr tablet Take 1 tablet by mouth 2 (Two) Times a Day As Needed for Cough or Congestion. 60 tablet 2   • insulin detemir (Levemir FlexTouch) 100 UNIT/ML injection Inject 25 Units under the skin into the appropriate area as directed Daily. 15 mL 1   • Insulin Pen Needle (BD Pen Needle Kati 2nd Gen) 32G X 4 MM misc Inject 1 package under the skin into the appropriate area as directed Daily. 100 each 1   • lactulose (CHRONULAC) 10 GM/15ML solution Take 30 mL by mouth Daily As Needed (Constipation.). 237 mL 1   • meclizine (ANTIVERT) 25 MG tablet TAKE ONE TABLET BY MOUTH THREE TIMES A DAY AS NEEDED FOR DIZZINESS 30 tablet 1   • metFORMIN (GLUCOPHAGE) 1000 MG tablet Take 1 tablet by mouth 2 (Two) Times a Day With Meals. 180 tablet 0   • nystatin (MYCOSTATIN) 622483 UNIT/GM cream Apply  topically to the appropriate area as directed As Needed (groin rash). 90 g 0   • polyethylene glycol (MIRALAX) packet Take 17 g by mouth Daily.     • rosuvastatin (CRESTOR) 5 MG tablet Take 1 tablet by  "mouth Daily. 90 tablet 3   • saccharomyces boulardii (Florastor) 250 MG capsule Take 1 capsule by mouth Daily.     • triamcinolone (KENALOG) 0.1 % cream Apply  topically to the appropriate area as directed 2 (Two) Times a Day. 45 g 0   • triamterene (DYRENIUM) 50 MG capsule      • triamterene-hydrochlorothiazide (Dyazide) 37.5-25 MG per capsule Take 1 capsule by mouth Every Morning. 90 capsule 1   • verapamil SR (CALAN-SR) 120 MG CR tablet TAKE ONE TABLET BY MOUTH ONCE NIGHTLY 90 tablet 0   • doxycycline (MONODOX) 100 MG capsule Take 1 capsule by mouth 2 (Two) Times a Day. (Patient not taking: Reported on 4/25/2023) 14 capsule 0     No current facility-administered medications on file prior to visit.       Objective   Pulse 95   Ht 162.6 cm (64\")   Wt 76.2 kg (168 lb)   SpO2 99%   BMI 28.84 kg/m²     Foot/Ankle Exam    GENERAL  Orientation:  AAOx3  Affect:  appropriate  Assistance:  walker    VASCULAR     Right Foot Vascularity   Normal vascular exam    Dorsalis pedis:  2+  Posterior tibial:  2+  Skin temperature:  warm  Edema grading:  None  CFT:  < 3 seconds  Pedal hair growth:  Present  Varicosities:  none     Left Foot Vascularity   Normal vascular exam    Dorsalis pedis:  2+  Posterior tibial:  2+  Skin temperature:  warm  Edema grading:  None  CFT:  < 3 seconds  Pedal hair growth:  Present  Varicosities:  none     NEUROLOGIC     Right Foot Neurologic   Protective Sensation using [place]-Caterina Monofilament:   Sites intact: 2     Left Foot Neurologic   Protective Sensation using [place]ina Monofilament:   Sites intact: 4    MUSCULOSKELETAL     Right Foot Musculoskeletal   Tenderness:  none       Left Foot Musculoskeletal    Amputation   Toes amputated: first toe  Hammertoe:  Second toe    MUSCLE STRENGTH     Right Foot Muscle Strength   Normal strength    Foot dorsiflexion:  5  Foot plantar flexion:  5  Foot inversion:  5  Foot eversion:  5    DERMATOLOGIC      Right Foot Dermatologic   Skin  Right " foot skin is intact.   Nails  1.  Positive for elongated, onychomycosis, abnormal thickness and dystrophic nail.  2.  Positive for elongated, onychomycosis, abnormal thickness and dystrophic nail.  3.  Positive for elongated, onychomycosis, abnormal thickness and dystrophic nail.  4.  Positive for elongated, onychomycosis, abnormal thickness and dystrophic nail.  5.  Positive for elongated, onychomycosis, abnormal thickness and dystrophic nail.     Left Foot Dermatologic   Skin  Positive for corn.   Nails  1.  Positive for elongated, onychomycosis, abnormal thickness and dystrophic nail.  2.  Positive for elongated, onychomycosis, abnormal thickness and dystrophic nail.  3.  Positive for elongated, onychomycosis, abnormal thickness and dystrophic nail.  4.  Positive for elongated, onychomycosis, abnormally thick and dystrophic nail.      Assessment & Plan   Diagnoses and all orders for this visit:    1. 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 Onychomycosis    3. Left great toe amputee        1. Routine Diabetic Foot Check     Do feel patient is at moderate risk for pedal complications related to diabetes.  Explained importance of diabetic foot care, daily foot checks, and glycemic control. Patient should check both feet on a daily basis, monitor and control blood sugars, make sure that both feet and in between toes are towel dried after baths or showers. Avoid barefoot walking at all times. Check shoes before putting them on. The patient was given information on proper foot care. Call the office at the first signs of a wound or with signs of infection. The patient will return in 3 months.    Nail debridement: Bilateral feet x9    Consent and time out was performed before proceeding with the procedure. Nails were debrided with a nail nipper without complication.  No anesthesia was required.  Indications for procedure were thickened, dystrophic, and fungal appearing nails which are difficult to  trim.  Proper self-care and technique was discussed with patient.  Patient was stable after procedure.      No orders of the defined types were placed in this encounter.         Note is dictated utilizing voice recognition software. Unfortunately this leads to occasional typographical errors. I apologize in advance if the situation occurs. If questions occur please do not hesitate to call our office.      Transcribed from ambient dictation for DEBO Valverde by Nidhi Fair.  04/25/23   14:22 EDT    Patient or patient representative verbalized consent to the visit recording.  I have personally performed the services described in this document as transcribed by the above individual, and it is both accurate and complete.  DEBO Valverde  4/27/2023  14:12 EDT

## 2023-04-26 ENCOUNTER — CLINICAL SUPPORT (OUTPATIENT)
Dept: FAMILY MEDICINE CLINIC | Facility: CLINIC | Age: 83
End: 2023-04-26
Payer: MEDICARE

## 2023-04-26 DIAGNOSIS — E11.42 TYPE 2 DIABETES MELLITUS WITH PERIPHERAL NEUROPATHY: ICD-10-CM

## 2023-04-26 DIAGNOSIS — E55.9 VITAMIN D DEFICIENCY: ICD-10-CM

## 2023-04-26 DIAGNOSIS — E53.8 VITAMIN B12 DEFICIENCY: ICD-10-CM

## 2023-04-26 PROCEDURE — 83036 HEMOGLOBIN GLYCOSYLATED A1C: CPT | Performed by: FAMILY MEDICINE

## 2023-04-26 PROCEDURE — 82607 VITAMIN B-12: CPT | Performed by: FAMILY MEDICINE

## 2023-04-26 PROCEDURE — 82306 VITAMIN D 25 HYDROXY: CPT | Performed by: FAMILY MEDICINE

## 2023-04-26 PROCEDURE — 36415 COLL VENOUS BLD VENIPUNCTURE: CPT | Performed by: FAMILY MEDICINE

## 2023-04-27 LAB
25(OH)D3 SERPL-MCNC: 47.1 NG/ML (ref 30–100)
HBA1C MFR BLD: 6.5 % (ref 4.8–5.6)
VIT B12 BLD-MCNC: >2000 PG/ML (ref 211–946)

## 2023-04-28 ENCOUNTER — CLINICAL SUPPORT (OUTPATIENT)
Dept: FAMILY MEDICINE CLINIC | Facility: CLINIC | Age: 83
End: 2023-04-28
Payer: MEDICARE

## 2023-04-28 DIAGNOSIS — E11.42 TYPE 2 DIABETES MELLITUS WITH PERIPHERAL NEUROPATHY: Primary | ICD-10-CM

## 2023-04-28 DIAGNOSIS — E78.2 MIXED HYPERLIPIDEMIA: ICD-10-CM

## 2023-04-28 PROCEDURE — 82043 UR ALBUMIN QUANTITATIVE: CPT | Performed by: FAMILY MEDICINE

## 2023-04-28 PROCEDURE — 36415 COLL VENOUS BLD VENIPUNCTURE: CPT | Performed by: FAMILY MEDICINE

## 2023-04-28 PROCEDURE — 80061 LIPID PANEL: CPT | Performed by: FAMILY MEDICINE

## 2023-04-28 NOTE — PROGRESS NOTES
Venipuncture performed in L ARM by RT Arleen  with good hemostasis. Patient tolerated well. 04/28/23 Miriam Mello, RT

## 2023-04-29 LAB
ALBUMIN UR-MCNC: <1.2 MG/DL
CHOLEST SERPL-MCNC: 148 MG/DL (ref 0–200)
HDLC SERPL-MCNC: 56 MG/DL (ref 40–60)
LDLC SERPL CALC-MCNC: 72 MG/DL (ref 0–100)
LDLC/HDLC SERPL: 1.24 {RATIO}
TRIGL SERPL-MCNC: 112 MG/DL (ref 0–150)
VLDLC SERPL-MCNC: 20 MG/DL (ref 5–40)

## 2023-05-17 NOTE — TELEPHONE ENCOUNTER
".    Caller: Monica Pagan \"MONICA PAGAN\"    Relationship: Self    Best call back number: *[phone] (Mobile)    Requested Prescriptions:   Requested Prescriptions     Pending Prescriptions Disp Refills   • verapamil SR (CALAN-SR) 120 MG CR tablet 90 tablet 0     Sig: Take 1 tablet by mouth Every Night.        Pharmacy where request should be sent:   [facility]       Last office visit with prescribing clinician: 4/17/2023   Last telemedicine visit with prescribing clinician: 4/28/2023   Next office visit with prescribing clinician: Visit date not found     Additional details provided by patient:   Does the patient have less than a 3 day supply:  [x] Yes  [] No    Would you like a call back once the refill request has been completed: [] Yes [] No    If the office needs to give you a call back, can they leave a voicemail: [] Yes [] No    Gerard Nguyen Rep   05/17/23 10:55 EDT         "

## 2023-05-17 NOTE — TELEPHONE ENCOUNTER
Rx Refill Note  Requested Prescriptions     Pending Prescriptions Disp Refills   • verapamil SR (CALAN-SR) 120 MG CR tablet 90 tablet 1     Sig: Take 1 tablet by mouth Every Night.      Last office visit with prescribing clinician: 4/17/2023   Last telemedicine visit with prescribing clinician: 4/28/2023   Next office visit with prescribing clinician: Visit date not found                       Lipid Panel (04/28/2023 09:54)    Would you like a call back once the refill request has been completed: [] Yes [] No    If the office needs to give you a call back, can they leave a voicemail: [] Yes [] No    Miriam Mello, RT  05/17/23, 11:10 EDT

## 2023-05-22 RX ORDER — DULOXETIN HYDROCHLORIDE 60 MG/1
CAPSULE, DELAYED RELEASE ORAL
Qty: 90 CAPSULE | Refills: 0 | Status: SHIPPED | OUTPATIENT
Start: 2023-05-22

## 2023-05-22 RX ORDER — BUSPIRONE HYDROCHLORIDE 7.5 MG/1
TABLET ORAL
Qty: 90 TABLET | Refills: 1 | Status: SHIPPED | OUTPATIENT
Start: 2023-05-22

## 2023-05-22 RX ORDER — ROSUVASTATIN CALCIUM 5 MG/1
TABLET, COATED ORAL
Qty: 90 TABLET | Refills: 3 | Status: SHIPPED | OUTPATIENT
Start: 2023-05-22

## 2023-05-22 RX ORDER — ATENOLOL 25 MG/1
TABLET ORAL
Qty: 90 TABLET | Refills: 2 | Status: SHIPPED | OUTPATIENT
Start: 2023-05-22

## 2023-05-25 ENCOUNTER — PATIENT OUTREACH (OUTPATIENT)
Dept: CASE MANAGEMENT | Facility: OTHER | Age: 83
End: 2023-05-25
Payer: MEDICARE

## 2023-05-25 NOTE — OUTREACH NOTE
"AMBULATORY CASE MANAGEMENT NOTE    Patient Outreach  Name and Relationship of Patient/Support Person: Monica Pagan \"MONICA PAGAN\" - Self    RN-ACM f/up HRCM outreach completed.    Patient states she's doing \"fine\" now, bronchitis is resolved, diabetes better controlled, getting around better.    Denies F/C, N/V, chest pain, palps, SOA, dizziness, bowel or bladder complaints.    See flow sheets details.    Adult Patient Profile  Questions/Answers    Flowsheet Row Most Recent Value   Symptoms/Conditions Managed at Home cardiovascular, diabetes, type 2, genitourinary, skin   Cardiovascular Symptoms/Conditions dysrhythmia   Cardiovascular Self-Management Outcome 4 (good)   Cardiovascular Comment states conts verapamil, denies palpitations   Diabetes Management Strategies activity, adequate rest, blood glucose testing, diet modification, insulin therapy, medication therapy, routine screenings   Usual Blood Glucose States in general sugars are better controlled now that bronchitis is cleared up.  States this afternoon it was 114.  No reported HEs.   Last A1C Result 6.5   Diabetes Self-Management Outcome 4 (good)   Diabetes Comment States is keeping to her DM med regimen and sugars are better controlled since URTI cleared up.   Genitourinary Symptoms/Conditions other (see comments)  [history cystitis and urinary incontinence. Interstim implanted 2022.]   Genitourinary Self-Management Outcome 4 (good)   Genitourinary Comment no reported dysuria, malaise, flank pain   Musculoskeletal Symptoms/Conditions joint pain   Musculoskeletal Self-Management Outcome 4 (good)   Musculoskeletal Comment joint pain a little better & is outside gardening more.   Neurological Symptoms/Conditions other (see comments)  [insomnia]   Neurological Management Strategies medication therapy   Neurological Self-Management Outcome 4 (good)   Neurological Comment States sleeping well on new lunesta prescription.  States trazadone didn't help at " "all.   Respiratory Symptoms/Conditions cough, shortness of breath, seasonal allergies   Respiratory Management Strategies breathing techniques, fluid modification, medication therapy   Respiratory Self-Management Outcome 4 (good)   Respiratory Comment States URTI sx resolved. Denies cough, SOA or fever.   still gets fatigued by ~ 2pm every day, but has learned to \"doing a little then rest\".   is hydrating well, taking claritin, muccinex prn, albuteral HFA prn.  Denies needs.   Skin Symptoms/Conditions other (see comments)  [lesion on forehead]   Skin Management Strategies routine screening   Skin Self-Management Outcome 3 (uncertain)   Skin Comment  has appt 6/2/23 at [facility] to remove crusty lesion from her forehead.   Difficulty Communicating no   Difficulty Eating/Swallowing no   Walking or Climbing Stairs Difficulty no   Dressing/Bathing Difficulty no   Doing Errands Independently Difficulty (such as shopping) no   Change in Functional Status Since Onset of Current Illness/Injury yes  [Mobility and strength improving, has been outside more gardening.]        Send Education  Questions/Answers    Flowsheet Row Most Recent Value   Annual Wellness Visit:  Patient Refuses at This Time   Other Patient Education/Resources  24/[facility] Nurse Call Line  [Confirms has RN-ACM ph # also]   Advanced Directives: Patient Has        SDOH updated and reviewed with the patient during this program:  , lives alone in owned home, moved from [facility] ~3 yrs ago to be nearer to chronically ill 62 yr old daughter (MS & DM) who [facility].  [facility] has medical alert device & helpful neighbors.  [facility] son Mika lives in [place], can be there in 4 hrs if needed.  [facility] has car and can drive if necessary, but generally uses delivery services. [facility] to be ineligible for Medicaid. Denies insecurities re food, meds, transport, housing. Tries to stay active in garden, has help with " the lawn.    Financial Resource Strain: Low Risk    • Difficulty of Paying Living Expenses: Not very hard      Food Insecurity: No Food Insecurity   • Worried About Running Out of Food in the Last Year: Never true   • Ran Out of Food in the Last Year: Never true      Transportation Needs: No Transportation Needs   • Lack of Transportation (Medical): No   • Lack of Transportation (Non-Medical): No      Housing Stability: Low Risk    • Unable to Pay for Housing in the Last Year: No   • Number of Places Lived in the Last Year: 1   • Unstable Housing in the Last Year: No     Declines AWV appt, care gap reminder post-poned until October.  Has podiatry f/up appt 7/25/23  Denies needs/complaints.    YOAV MAYORGA  Ambulatory Case Management  5/25/2023, 16:03 EDT

## 2023-07-31 RX ORDER — BUSPIRONE HYDROCHLORIDE 7.5 MG/1
TABLET ORAL
Qty: 90 TABLET | Refills: 0 | Status: SHIPPED | OUTPATIENT
Start: 2023-07-31

## 2023-08-10 DIAGNOSIS — G47.00 INSOMNIA, UNSPECIFIED TYPE: ICD-10-CM

## 2023-08-10 RX ORDER — ESZOPICLONE 3 MG/1
TABLET, FILM COATED ORAL
Qty: 90 TABLET | Refills: 0 | Status: SHIPPED | OUTPATIENT
Start: 2023-08-10

## 2023-08-10 RX ORDER — DULOXETIN HYDROCHLORIDE 60 MG/1
CAPSULE, DELAYED RELEASE ORAL
Qty: 90 CAPSULE | Refills: 0 | Status: SHIPPED | OUTPATIENT
Start: 2023-08-10

## 2023-08-10 NOTE — TELEPHONE ENCOUNTER
INSPECT [facility]AN    Rx Refill Note  Requested Prescriptions     Pending Prescriptions Disp Refills    eszopiclone (LUNESTA) 3 MG tablet [Pharmacy Med Name: Eszopiclone Oral Tablet 3 MG] 90 tablet 0     Sig: TAKE 1 TABLET BY MOUTH EVERY DAY AT NIGHT AS NEEDED for sleep *take immediately before bedtime      Last office visit with prescribing clinician: 4/17/2023   Last telemedicine visit with prescribing clinician: Visit date not found   Next office visit with prescribing clinician: 8/29/2023        Lipid Panel (04/28/2023 09:54)                  Would you like a call back once the refill request has been completed: [] Yes [] No    If the office needs to give you a call back, can they leave a voicemail: [] Yes [] No    Miriam Mello, RT  08/10/23, 10:33 EDT

## 2023-08-23 DIAGNOSIS — E11.42 TYPE 2 DIABETES MELLITUS WITH PERIPHERAL NEUROPATHY: ICD-10-CM

## 2023-08-23 RX ORDER — INSULIN DETEMIR 100 [IU]/ML
INJECTION, SOLUTION SUBCUTANEOUS
Qty: 15 ML | Refills: 1 | Status: SHIPPED | OUTPATIENT
Start: 2023-08-23

## 2023-08-23 RX ORDER — PEN NEEDLE, DIABETIC 32GX 5/32"
NEEDLE, DISPOSABLE MISCELLANEOUS
Qty: 100 EACH | Refills: 1 | Status: SHIPPED | OUTPATIENT
Start: 2023-08-23

## 2023-08-28 RX ORDER — BUSPIRONE HYDROCHLORIDE 7.5 MG/1
TABLET ORAL
Qty: 90 TABLET | Refills: 0 | Status: SHIPPED | OUTPATIENT
Start: 2023-08-28

## 2023-08-28 NOTE — TELEPHONE ENCOUNTER
Rx Refill Note  Requested Prescriptions     Pending Prescriptions Disp Refills    busPIRone (BUSPAR) 7.5 MG tablet [Pharmacy Med Name: busPIRone HCL 7.5 MG TABLET] 90 tablet 0     Sig: TAKE ONE TABLET BY MOUTH THREE TIMES A DAY      Last office visit with prescribing clinician: 4/17/2023   Last telemedicine visit with prescribing clinician: Visit date not found   Next office visit with prescribing clinician: 8/29/2023        Lipid Panel (04/28/2023 09:54)                  Would you like a call back once the refill request has been completed: [] Yes [] No    If the office needs to give you a call back, can they leave a voicemail: [] Yes [] No    Miriam Mello, RT  08/28/23, 09:18 EDT

## 2023-08-29 ENCOUNTER — OFFICE VISIT (OUTPATIENT)
Dept: FAMILY MEDICINE CLINIC | Facility: CLINIC | Age: 83
End: 2023-08-29
Payer: MEDICARE

## 2023-08-29 VITALS
HEIGHT: 64 IN | RESPIRATION RATE: 16 BRPM | WEIGHT: 172 LBS | DIASTOLIC BLOOD PRESSURE: 62 MMHG | SYSTOLIC BLOOD PRESSURE: 104 MMHG | HEART RATE: 67 BPM | BODY MASS INDEX: 29.37 KG/M2 | TEMPERATURE: 97.8 F | OXYGEN SATURATION: 98 %

## 2023-08-29 DIAGNOSIS — E11.42 TYPE 2 DIABETES MELLITUS WITH PERIPHERAL NEUROPATHY: Primary | ICD-10-CM

## 2023-08-29 DIAGNOSIS — R03.1 LOW BLOOD PRESSURE READING: ICD-10-CM

## 2023-08-29 DIAGNOSIS — R06.02 SHORTNESS OF BREATH: ICD-10-CM

## 2023-08-29 DIAGNOSIS — M48.02 CERVICAL SPINAL STENOSIS: ICD-10-CM

## 2023-08-29 PROBLEM — M54.9 DORSALGIA, UNSPECIFIED: Status: ACTIVE | Noted: 2021-05-29

## 2023-08-29 PROBLEM — I10 ESSENTIAL (PRIMARY) HYPERTENSION: Status: ACTIVE | Noted: 2021-05-29

## 2023-08-29 PROBLEM — G89.29 OTHER CHRONIC PAIN: Status: ACTIVE | Noted: 2021-05-29

## 2023-08-29 LAB
EXPIRATION DATE: ABNORMAL
HBA1C MFR BLD: 6.5 %
Lab: ABNORMAL

## 2023-08-29 PROCEDURE — 3078F DIAST BP <80 MM HG: CPT | Performed by: FAMILY MEDICINE

## 2023-08-29 PROCEDURE — 1159F MED LIST DOCD IN RCRD: CPT | Performed by: FAMILY MEDICINE

## 2023-08-29 PROCEDURE — 1160F RVW MEDS BY RX/DR IN RCRD: CPT | Performed by: FAMILY MEDICINE

## 2023-08-29 PROCEDURE — 83036 HEMOGLOBIN GLYCOSYLATED A1C: CPT | Performed by: FAMILY MEDICINE

## 2023-08-29 PROCEDURE — 3074F SYST BP LT 130 MM HG: CPT | Performed by: FAMILY MEDICINE

## 2023-08-29 PROCEDURE — 3044F HG A1C LEVEL LT 7.0%: CPT | Performed by: FAMILY MEDICINE

## 2023-08-29 PROCEDURE — 99214 OFFICE O/P EST MOD 30 MIN: CPT | Performed by: FAMILY MEDICINE

## 2023-08-29 RX ORDER — LOPERAMIDE HYDROCHLORIDE 2 MG/1
2 CAPSULE ORAL 4 TIMES DAILY PRN
COMMUNITY

## 2023-08-29 RX ORDER — ATENOLOL 25 MG/1
12.5 TABLET ORAL NIGHTLY
Qty: 90 TABLET | Refills: 2
Start: 2023-08-29 | End: 2023-09-07 | Stop reason: SDUPTHER

## 2023-08-29 RX ORDER — FLUTICASONE FUROATE, UMECLIDINIUM BROMIDE AND VILANTEROL TRIFENATATE 100; 62.5; 25 UG/1; UG/1; UG/1
1 POWDER RESPIRATORY (INHALATION)
Qty: 60 EACH | Refills: 0 | COMMUNITY
Start: 2023-08-29

## 2023-08-29 NOTE — PROGRESS NOTES
Subjective   Monica Pagan is a 83 y.o. female.     Chief Complaint   Patient presents with    Neck Pain     Neck and shoulder pain     Diabetes     Patients fasting sugar level at home this morning was 116. Patients HgbA1c while here in the office today is 6.5%         Current Outpatient Medications:     albuterol sulfate  (90 Base) MCG/ACT inhaler, Inhale 2 puffs Every 6 (Six) Hours As Needed for Wheezing or Shortness of Air (cough)., Disp: 18 g, Rfl: 0    atenolol (TENORMIN) 25 MG tablet, Take 0.5 tablets by mouth Every Night., Disp: 90 tablet, Rfl: 2    Blood Glucose Monitoring Suppl (ONE TOUCH ULTRA MINI) w/Device kit, 1 kit Daily., Disp: 1 each, Rfl: 0    busPIRone (BUSPAR) 7.5 MG tablet, TAKE ONE TABLET BY MOUTH THREE TIMES A DAY, Disp: 90 tablet, Rfl: 0    Continuous Blood Gluc Sensor (FreeStyle Bonifacio 2 Sensor) misc, 1 package Every 14 (Fourteen) Days., Disp: 2 each, Rfl: 5    cyanocobalamin (VITAMIN B-12) 2000 MCG tablet tablet, Take 1 tablet by mouth Daily., Disp: , Rfl:     DULoxetine (CYMBALTA) 60 MG capsule, TAKE 1 CAPSULE BY MOUTH DAILY, Disp: 90 capsule, Rfl: 0    Easy Touch Lancets 33G/Twist misc, USE ONE LANCET TO TEST THREE TIMES A DAY, Disp: 100 each, Rfl: 3    eszopiclone (LUNESTA) 3 MG tablet, TAKE 1 TABLET BY MOUTH EVERY DAY AT NIGHT AS NEEDED for sleep *take immediately before bedtime, Disp: 90 tablet, Rfl: 0    glucose blood (OneTouch Ultra) test strip, 1 each by Other route 3 (Three) Times a Day Before Meals. Use as instructed, Disp: 100 each, Rfl: 12    guaiFENesin (MUCINEX) 600 MG 12 hr tablet, Take 1 tablet by mouth 2 (Two) Times a Day As Needed for Cough or Congestion., Disp: 60 tablet, Rfl: 2    [employer] Pen Needles 32G X 4 MM misc, USE TO INJECT ONCE A DAY, Disp: 100 each, Rfl: 1    Levemir FlexPen 100 UNIT/ML injection, INJECT 25 UNITS UNDER THE SKIN DAILY AS DIRECTED, Disp: 15 mL, Rfl: 1    loperamide (IMODIUM) 2 MG capsule, Take 1 capsule by mouth 4 (Four) Times a Day As  Needed for Diarrhea., Disp: , Rfl:     meclizine (ANTIVERT) 25 MG tablet, TAKE ONE TABLET BY MOUTH THREE TIMES A DAY AS NEEDED FOR DIZZINESS, Disp: 30 tablet, Rfl: 1    metFORMIN (GLUCOPHAGE) 1000 MG tablet, TAKE ONE TABLET BY MOUTH TWICE A DAY WITH MEALS, Disp: 180 tablet, Rfl: 2    nystatin (MYCOSTATIN) 963117 UNIT/GM cream, Apply  topically to the appropriate area as directed As Needed (groin rash)., Disp: 90 g, Rfl: 0    rosuvastatin (CRESTOR) 5 MG tablet, TAKE ONE TABLET BY MOUTH DAILY, Disp: 90 tablet, Rfl: 3    saccharomyces boulardii (Florastor) 250 MG capsule, Take 1 capsule by mouth Daily., Disp: , Rfl:     triamcinolone (KENALOG) 0.1 % cream, Apply  topically to the appropriate area as directed 2 (Two) Times a Day., Disp: 45 g, Rfl: 0    triamterene-hydrochlorothiazide (Dyazide) 37.5-25 MG per capsule, Take 1 capsule by mouth Every Morning., Disp: 90 capsule, Rfl: 1    verapamil SR (CALAN-SR) 120 MG CR tablet, Take 1 tablet by mouth Every Night., Disp: 90 tablet, Rfl: 1    Fluticasone-Umeclidin-Vilant (Trelegy Ellipta) 100-62.5-25 MCG/ACT inhaler, Inhale 1 puff Daily., Disp: 60 each, Rfl: 0    Past Medical History:   Diagnosis Date    Anemia     Anxiety     Childhood Asthma     Colon polyp     TA    DDD  lumbar     DEXA     DMII     Fibromyalgia     Hyperlipidemia     Hypertension     IBS     Insomnia     Lumbar spinal stenosis     Macular degeneration, left eye     MAMMO     NEG= 2020/ 2022    Meniere's disease     Nontoxic thyroid nodule     Ocular histoplasmosis     Osteoarthritis     Peripheral neuropathy     PVC     Spondylosis     Tinnitus     Tremor     Trigeminal neuralgia     Vitamin D deficiency        Past Surgical History:   Procedure Laterality Date    AMPUTATION DIGIT Left     Digit #1    APPENDECTOMY  1962    BUNIONECTOMY Bilateral     CATARACT EXTRACTION      left    CATARACT EXTRACTION      x2    COLONOSCOPY      NEG = 2012/ 2021= TA, rech 2026    CYST REMOVAL Bilateral     WRIST     HYSTERECTOMY  1980    JOINT REPLACEMENT      Rt TKR x 2    JOINT REPLACEMENT      Left THR    LAPAROSCOPIC CHOLECYSTECTOMY      DR. LOBATO    SPINE SURGERY      Lumbar Fusion    SUBTOTAL HYSTERECTOMY         Family History   Problem Relation Age of Onset    Asthma Mother     COPD Mother     Heart disease Father     Alcohol abuse Father     Other Father         WWII Gangrene/ Malaria    Arthritis Sister     Cancer Sister     Leukemia Sister     Heart attack Brother     Cancer Brother     Alcohol abuse Brother     Kidney disease Brother     Alzheimer's disease Brother     Heart disease Brother     Diabetes Brother     Hyperlipidemia Brother     Diabetes Son        Social History     Socioeconomic History    Marital status:    Tobacco Use    Smoking status: Never     Passive exposure: Never    Smokeless tobacco: Never   Vaping Use    Vaping Use: Never used   Substance and Sexual Activity    Alcohol use: Yes     Alcohol/week: 1.0 standard drink     Types: 1 Glasses of wine per week     Comment: Rare    Drug use: No    Sexual activity: Never   82 y/o C female here for f/u on DMII and neck pain    Neck Pain   This is a chronic problem. The current episode started more than 1 year ago. The problem occurs intermittently. Pertinent negatives include no numbness, weakness or weight loss.   Diabetes  Associated symptoms include fatigue. Pertinent negatives for diabetes include no blurred vision, no polydipsia, no polyphagia, no polyuria, no weakness and no weight loss.     The patient has been having pain for the last month and a half in her neck radiating down her upper extremities and shoulders and believes it is referred pain from her cervical spine. She states her shoulders pop when she tries to push off her bed. She has tried physical therapy in the past that did help. She would rather do physical therapy at home because she does not like to drive if she does not have to. She drives to the nursing home to visit her  "daughter. She takes Tylenol for pain.     Her blood pressure cuff is accurate. She has been monitoring her blood pressure and her heart rate, her heart rate has been 60 bpm and blood pressure in the 130s and lower. She is tired all the time and gets short of breath. She had childhood asthma. She was exposed to secondhand smoke from her  for over 40 years. She does have a rescue inhaler.     She canceled her last appointment with podiatry because she was having issues with her bowel movements. Since she had the \"stem put in her bladder\" she has not had good bowel movements. She does take Imodium and she has to get it down fast but if she has 3 to 4 bowel movements, she cannot control the last 2.     She does have a  and someone to mow her lawn. The patient had her eye examination this year and she has macular degeneration in her left eye and her right eye has a crinkle in the retina.      When she was younger, she was very active and skied when she lived in [place]. She states, \"she will be active until her body tells her not to be active\". She has seen a cardiologist in the past for a follow-up to a panic attack. She was told she has as murmur.      The following portions of the patient's history were reviewed and updated as appropriate: allergies, current medications, past family history, past medical history, past social history, past surgical history and problem list.    Review of Systems   Constitutional:  Positive for fatigue. Negative for activity change, appetite change, unexpected weight gain and unexpected weight loss.   Eyes:  Positive for visual disturbance. Negative for blurred vision, double vision and pain.   Respiratory:  Positive for shortness of breath. Negative for cough and wheezing.    Cardiovascular:  Negative for leg swelling.   Gastrointestinal:  Positive for constipation. Negative for abdominal distention, abdominal pain, diarrhea, nausea and vomiting.   Endocrine: Negative " for polydipsia, polyphagia and polyuria.   Genitourinary:  Negative for frequency.   Musculoskeletal:  Positive for arthralgias and neck pain. Negative for gait problem.   Skin:  Negative for color change, dry skin, rash and skin lesions.   Neurological:  Negative for weakness and numbness.   Psychiatric/Behavioral:  Positive for stress.      Vitals:    08/29/23 0935   BP: 104/62   Pulse: 67   Resp: 16   Temp: 97.8 °F (36.6 °C)   SpO2: 98%       Objective   Physical Exam  Vitals and nursing note reviewed.   Constitutional:       General: She is not in acute distress.     Appearance: She is not ill-appearing or toxic-appearing.   HENT:      Head: Normocephalic and atraumatic.   Pulmonary:      Effort: Pulmonary effort is normal.   Skin:     Findings: Abrasion present.             Comments: Rt distal lateral LE w/ 2 healing eschars w/o signs of infection   Neurological:      Mental Status: She is alert and oriented to person, place, and time.      Cranial Nerves: No cranial nerve deficit.   Psychiatric:         Attention and Perception: Attention and perception normal.         Mood and Affect: Mood normal. Affect is flat.         Speech: Speech normal.         Behavior: Behavior normal. Behavior is cooperative.         Thought Content: Thought content normal.         Cognition and Memory: Cognition and memory normal.         Judgment: Judgment normal.     Assessment & Plan   Diagnoses and all orders for this visit:    1. Type 2 diabetes mellitus with peripheral neuropathy (Primary)  -     POC Glycosylated Hemoglobin (Hb A1C)    2. Cervical spinal stenosis    3. Shortness of breath    4. Low blood pressure reading    Other orders  -     Fluticasone-Umeclidin-Vilant (Trelegy Ellipta) 100-62.5-25 MCG/ACT inhaler; Inhale 1 puff Daily.  Dispense: 60 each; Refill: 0  -     atenolol (TENORMIN) 25 MG tablet; Take 0.5 tablets by mouth Every Night.  Dispense: 90 tablet; Refill: 2    Diabetes mellitus   Continue current  regimen.    Shortness of breath  Given sample of Trelegy 1 puff a day. Advised to brush teeth after using.     Health maintenance  Discussed exercises for strengthening.   Discussed abrasions on right lateral ankle.     Fatigue/ low Blood pressure  Advised to cut atenolol dose in half.   Transcribed from ambient dictation for Henny Long DO by Vidhi Bae.  08/29/23   13:41 EDT    Patient or patient representative verbalized consent to the visit recording.  I have personally performed the services described in this document as transcribed by the above individual, and it is both accurate and complete.

## 2023-08-29 NOTE — PROGRESS NOTES
Fingerstick performed in right middle finger by Paula Caldwell CMA  with good hemostasis. Patient tolerated well. 08/29/23 Paula Caldwell CMA

## 2023-09-07 RX ORDER — ATENOLOL 25 MG/1
12.5 TABLET ORAL NIGHTLY
Qty: 90 TABLET | Refills: 1 | Status: SHIPPED | OUTPATIENT
Start: 2023-09-07

## 2023-09-22 ENCOUNTER — OFFICE VISIT (OUTPATIENT)
Dept: FAMILY MEDICINE CLINIC | Facility: CLINIC | Age: 83
End: 2023-09-22
Payer: MEDICARE

## 2023-09-22 VITALS
BODY MASS INDEX: 29.71 KG/M2 | HEART RATE: 84 BPM | WEIGHT: 174 LBS | HEIGHT: 64 IN | DIASTOLIC BLOOD PRESSURE: 80 MMHG | SYSTOLIC BLOOD PRESSURE: 129 MMHG | TEMPERATURE: 97.7 F | OXYGEN SATURATION: 98 %

## 2023-09-22 DIAGNOSIS — R30.0 DYSURIA: Primary | ICD-10-CM

## 2023-09-22 DIAGNOSIS — R50.9 LOW GRADE FEVER: ICD-10-CM

## 2023-09-22 DIAGNOSIS — R53.83 OTHER FATIGUE: ICD-10-CM

## 2023-09-22 DIAGNOSIS — R53.1 WEAKNESS: ICD-10-CM

## 2023-09-22 DIAGNOSIS — R19.7 DIARRHEA, UNSPECIFIED TYPE: ICD-10-CM

## 2023-09-22 LAB
BILIRUB BLD-MCNC: NEGATIVE MG/DL
CLARITY, POC: CLEAR
COLOR UR: YELLOW
EXPIRATION DATE: ABNORMAL
GLUCOSE UR STRIP-MCNC: NEGATIVE MG/DL
KETONES UR QL: NEGATIVE
LEUKOCYTE EST, POC: ABNORMAL
Lab: ABNORMAL
NITRITE UR-MCNC: NEGATIVE MG/ML
PH UR: 6 [PH] (ref 5–8)
PROT UR STRIP-MCNC: NEGATIVE MG/DL
RBC # UR STRIP: NEGATIVE /UL
SP GR UR: 1.01 (ref 1–1.03)
UROBILINOGEN UR QL: ABNORMAL

## 2023-09-22 PROCEDURE — 1159F MED LIST DOCD IN RCRD: CPT | Performed by: FAMILY MEDICINE

## 2023-09-22 PROCEDURE — 99213 OFFICE O/P EST LOW 20 MIN: CPT | Performed by: FAMILY MEDICINE

## 2023-09-22 PROCEDURE — 1160F RVW MEDS BY RX/DR IN RCRD: CPT | Performed by: FAMILY MEDICINE

## 2023-09-22 PROCEDURE — 3079F DIAST BP 80-89 MM HG: CPT | Performed by: FAMILY MEDICINE

## 2023-09-22 PROCEDURE — 81003 URINALYSIS AUTO W/O SCOPE: CPT | Performed by: FAMILY MEDICINE

## 2023-09-22 PROCEDURE — 3074F SYST BP LT 130 MM HG: CPT | Performed by: FAMILY MEDICINE

## 2023-09-22 PROCEDURE — 87086 URINE CULTURE/COLONY COUNT: CPT | Performed by: FAMILY MEDICINE

## 2023-09-22 PROCEDURE — 85025 COMPLETE CBC W/AUTO DIFF WBC: CPT | Performed by: FAMILY MEDICINE

## 2023-09-22 PROCEDURE — 36415 COLL VENOUS BLD VENIPUNCTURE: CPT | Performed by: FAMILY MEDICINE

## 2023-09-22 RX ORDER — VERAPAMIL HYDROCHLORIDE 100 MG/1
100 CAPSULE, EXTENDED RELEASE ORAL NIGHTLY
Qty: 30 CAPSULE | Refills: 1 | Status: SHIPPED | OUTPATIENT
Start: 2023-09-22

## 2023-09-22 NOTE — PROGRESS NOTES
Subjective   Monica Pagan is a 83 y.o. female.     Chief Complaint   Patient presents with    Difficulty Urinating    Fall     In the last week fell 4 times         Current Outpatient Medications:     albuterol sulfate  (90 Base) MCG/ACT inhaler, Inhale 2 puffs Every 6 (Six) Hours As Needed for Wheezing or Shortness of Air (cough)., Disp: 18 g, Rfl: 0    atenolol (TENORMIN) 25 MG tablet, Take 0.5 tablets by mouth Every Night., Disp: 90 tablet, Rfl: 1    Blood Glucose Monitoring Suppl (ONE TOUCH ULTRA MINI) w/Device kit, 1 kit Daily., Disp: 1 each, Rfl: 0    busPIRone (BUSPAR) 7.5 MG tablet, TAKE ONE TABLET BY MOUTH THREE TIMES A DAY, Disp: 90 tablet, Rfl: 0    Continuous Blood Gluc Sensor (FreeStyle Bonifacio 2 Sensor) misc, 1 package Every 14 (Fourteen) Days., Disp: 2 each, Rfl: 5    cyanocobalamin (VITAMIN B-12) 2000 MCG tablet tablet, Take 1 tablet by mouth Daily., Disp: , Rfl:     DULoxetine (CYMBALTA) 60 MG capsule, TAKE 1 CAPSULE BY MOUTH DAILY, Disp: 90 capsule, Rfl: 0    Easy Touch Lancets 33G/Twist misc, USE ONE LANCET TO TEST THREE TIMES A DAY, Disp: 100 each, Rfl: 3    eszopiclone (LUNESTA) 3 MG tablet, TAKE 1 TABLET BY MOUTH EVERY DAY AT NIGHT AS NEEDED for sleep *take immediately before bedtime, Disp: 90 tablet, Rfl: 0    Fluticasone-Umeclidin-Vilant (Trelegy Ellipta) 100-62.5-25 MCG/ACT inhaler, Inhale 1 puff Daily., Disp: 60 each, Rfl: 0    glucose blood (OneTouch Ultra) test strip, 1 each by Other route 3 (Three) Times a Day Before Meals. Use as instructed, Disp: 100 each, Rfl: 12    guaiFENesin (MUCINEX) 600 MG 12 hr tablet, Take 1 tablet by mouth 2 (Two) Times a Day As Needed for Cough or Congestion., Disp: 60 tablet, Rfl: 2    [employer] Pen Needles 32G X 4 MM misc, USE TO INJECT ONCE A DAY, Disp: 100 each, Rfl: 1    Levemir FlexPen 100 UNIT/ML injection, INJECT 25 UNITS UNDER THE SKIN DAILY AS DIRECTED, Disp: 15 mL, Rfl: 1    loperamide (IMODIUM) 2 MG capsule, Take 1 capsule by mouth 4 (Four)  Times a Day As Needed for Diarrhea., Disp: , Rfl:     nystatin (MYCOSTATIN) 664206 UNIT/GM cream, Apply  topically to the appropriate area as directed As Needed (groin rash)., Disp: 90 g, Rfl: 0    rosuvastatin (CRESTOR) 5 MG tablet, TAKE ONE TABLET BY MOUTH DAILY, Disp: 90 tablet, Rfl: 3    triamcinolone (KENALOG) 0.1 % cream, Apply  topically to the appropriate area as directed 2 (Two) Times a Day., Disp: 45 g, Rfl: 0    triamterene-hydrochlorothiazide (Dyazide) 37.5-25 MG per capsule, Take 1 capsule by mouth Every Morning., Disp: 90 capsule, Rfl: 1    meclizine (ANTIVERT) 25 MG tablet, TAKE ONE TABLET BY MOUTH THREE TIMES A DAY AS NEEDED FOR DIZZINESS (Patient not taking: Reported on 9/22/2023), Disp: 30 tablet, Rfl: 1    verapamil (VERELAN) 100 MG 24 hr capsule, Take 1 capsule by mouth Every Night., Disp: 30 capsule, Rfl: 1    Past Medical History:   Diagnosis Date    Anemia     Anxiety     Childhood Asthma     Colon polyp     TA    DDD  lumbar     DEXA     DMII     Fibromyalgia     Hyperlipidemia     Hypertension     IBS     Insomnia     Lumbar spinal stenosis     Macular degeneration, left eye     MAMMO     NEG= 2020/ 2022    Meniere's disease     Nontoxic thyroid nodule     Ocular histoplasmosis     Osteoarthritis     Peripheral neuropathy     PVC     Spondylosis     Tinnitus     Tremor     Trigeminal neuralgia     Vitamin D deficiency        Past Surgical History:   Procedure Laterality Date    AMPUTATION DIGIT Left     Digit #1    APPENDECTOMY  1962    BUNIONECTOMY Bilateral     CATARACT EXTRACTION      left    CATARACT EXTRACTION      x2    COLONOSCOPY      NEG = 2012/ 2021= TA, rech 2026    CYST REMOVAL Bilateral     WRIST    HYSTERECTOMY  1980    JOINT REPLACEMENT      Rt TKR x 2    JOINT REPLACEMENT      Left THR    LAPAROSCOPIC CHOLECYSTECTOMY      DR. LOBATO    SPINE SURGERY      Lumbar Fusion    SUBTOTAL HYSTERECTOMY         Family History   Problem Relation Age of Onset    Asthma Mother     COPD  Mother     Heart disease Father     Alcohol abuse Father     Other Father         WWII Gangrene/ Malaria    Arthritis Sister     Cancer Sister     Leukemia Sister     Heart attack Brother     Cancer Brother     Alcohol abuse Brother     Kidney disease Brother     Alzheimer's disease Brother     Heart disease Brother     Diabetes Brother     Hyperlipidemia Brother     Diabetes Son        Social History     Socioeconomic History    Marital status:    Tobacco Use    Smoking status: Never     Passive exposure: Never    Smokeless tobacco: Never   Vaping Use    Vaping Use: Never used   Substance and Sexual Activity    Alcohol use: Yes     Alcohol/week: 1.0 standard drink     Types: 1 Glasses of wine per week     Comment: Rare    Drug use: No    Sexual activity: Never       Difficulty Urinating  Associated symptoms include fatigue and weakness. Pertinent negatives include no fever.   Fall  Pertinent negatives include no fever.      The patient presents for recurrent falls and dysuria.    The patient [place] at the park and went to the hospital She then fell in the yard doing yard work. She denies having a seizure or passing out. She called her neighbors to help pick her up. She denies checking her blood glucose level. She currently feels fatigue. She complains of back pain. She has hit her head in the past during a fall. She denies losing consciousness. She denies any changes to medications. She denies anyone being sick that she has been around. She has had a fever of 99 degrees Fahrenheit and admits to sweating. She reports to rhinorrhea. She denies any respiratory symptoms.     The patient denies any new allergies. She denies taking a probiotic due to diarrhea. She admits to having diarrhea daily or every other daily. She began taking atenolol due to panic attacks and elevated heart rate.     The following portions of the patient's history were reviewed and updated as appropriate: allergies, current medications, past  family history, past medical history, past social history, past surgical history and problem list.    Review of Systems   Constitutional:  Positive for fatigue. Negative for activity change, appetite change, fever, unexpected weight gain and unexpected weight loss.   Gastrointestinal:  Positive for diarrhea. Negative for constipation.   Genitourinary:  Positive for difficulty urinating and dysuria.   Musculoskeletal:  Positive for back pain.   Neurological:  Positive for weakness. Negative for seizures and syncope.     Vitals:    09/22/23 1252   BP: 129/80   Pulse: 84   Temp: 97.7 °F (36.5 °C)   SpO2: 98%       Objective   Physical Exam  Vitals and nursing note reviewed.   Constitutional:       General: She is not in acute distress.     Appearance: She is well-developed. She is not ill-appearing or toxic-appearing.   HENT:      Head: Normocephalic and atraumatic.   Cardiovascular:      Rate and Rhythm: Normal rate and regular rhythm.      Heart sounds: Normal heart sounds. No murmur heard.  Pulmonary:      Effort: Pulmonary effort is normal. No respiratory distress.      Breath sounds: Normal breath sounds. No stridor. No wheezing, rhonchi or rales.   Musculoskeletal:      Right lower leg: No edema.      Left lower leg: No edema.   Skin:     General: Skin is warm and dry.      Findings: No rash.   Neurological:      General: No focal deficit present.      Mental Status: She is alert and oriented to person, place, and time.      Cranial Nerves: No cranial nerve deficit.   Psychiatric:         Mood and Affect: Mood normal.         Behavior: Behavior normal.         Thought Content: Thought content normal.         Judgment: Judgment normal.             Assessment & Plan   Diagnoses and all orders for this visit:    1. Dysuria (Primary)  -     POC Urinalysis Dipstick, Automated  -     Urine Culture - Urine, Urine, Clean Catch    2. Diarrhea, unspecified type    3. Other fatigue  -     CBC & Differential  -      Comprehensive Metabolic Panel  -     T4, Free  -     TSH    4. Low grade fever  -     CBC & Differential  -     Comprehensive Metabolic Panel  -     T4, Free  -     TSH    5. Weakness  -     CBC & Differential  -     Comprehensive Metabolic Panel  -     T4, Free  -     TSH    Other orders  -     verapamil (VERELAN) 100 MG 24 hr capsule; Take 1 capsule by mouth Every Night.  Dispense: 30 capsule; Refill: 1    1. Dysuria-  We will perform a urine culture due to small leukocytes.     2. Diarrhea-  Discontinue use of metformin.  Begin taking verapamil 100 mg qhs.    Hold metformin  Cut verapamil to 100mg qhs  Urine for cx    Transcribed from ambient dictation for Henny Long DO by Janet Lux.  09/22/23   15:05 EDT    Patient or patient representative verbalized consent to the visit recording.  I have personally performed the services described in this document as transcribed by the above individual, and it is both accurate and complete.

## 2023-09-22 NOTE — PROGRESS NOTES
Venipuncture performed in right arm by Jodie Gonzalez MA with good hemostasis. Patient tolerated well. Jodie Gonzalez MA 04/14/23

## 2023-09-23 LAB
BASOPHILS # BLD AUTO: 0.08 10*3/MM3 (ref 0–0.2)
BASOPHILS NFR BLD AUTO: 0.7 % (ref 0–1.5)
DEPRECATED RDW RBC AUTO: 40 FL (ref 37–54)
EOSINOPHIL # BLD AUTO: 0.15 10*3/MM3 (ref 0–0.4)
EOSINOPHIL NFR BLD AUTO: 1.4 % (ref 0.3–6.2)
ERYTHROCYTE [DISTWIDTH] IN BLOOD BY AUTOMATED COUNT: 12.3 % (ref 12.3–15.4)
HCT VFR BLD AUTO: 38.5 % (ref 34–46.6)
HGB BLD-MCNC: 12.8 G/DL (ref 12–15.9)
IMM GRANULOCYTES # BLD AUTO: 0.04 10*3/MM3 (ref 0–0.05)
IMM GRANULOCYTES NFR BLD AUTO: 0.4 % (ref 0–0.5)
LYMPHOCYTES # BLD AUTO: 2.72 10*3/MM3 (ref 0.7–3.1)
LYMPHOCYTES NFR BLD AUTO: 25.3 % (ref 19.6–45.3)
MCH RBC QN AUTO: 29.6 PG (ref 26.6–33)
MCHC RBC AUTO-ENTMCNC: 33.2 G/DL (ref 31.5–35.7)
MCV RBC AUTO: 88.9 FL (ref 79–97)
MONOCYTES # BLD AUTO: 0.57 10*3/MM3 (ref 0.1–0.9)
MONOCYTES NFR BLD AUTO: 5.3 % (ref 5–12)
NEUTROPHILS NFR BLD AUTO: 66.9 % (ref 42.7–76)
NEUTROPHILS NFR BLD AUTO: 7.19 10*3/MM3 (ref 1.7–7)
NRBC BLD AUTO-RTO: 0 /100 WBC (ref 0–0.2)
PLATELET # BLD AUTO: 201 10*3/MM3 (ref 140–450)
PMV BLD AUTO: 10.5 FL (ref 6–12)
RBC # BLD AUTO: 4.33 10*6/MM3 (ref 3.77–5.28)
WBC NRBC COR # BLD: 10.75 10*3/MM3 (ref 3.4–10.8)

## 2023-09-24 LAB — BACTERIA SPEC AEROBE CULT: NO GROWTH

## 2023-09-25 RX ORDER — BUSPIRONE HYDROCHLORIDE 7.5 MG/1
TABLET ORAL
Qty: 90 TABLET | Refills: 0 | Status: SHIPPED | OUTPATIENT
Start: 2023-09-25

## 2023-09-25 NOTE — TELEPHONE ENCOUNTER
Rx Refill Note  Requested Prescriptions     Pending Prescriptions Disp Refills    busPIRone (BUSPAR) 7.5 MG tablet [Pharmacy Med Name: busPIRone HCL 7.5 MG TABLET] 90 tablet 0     Sig: TAKE ONE TABLET BY MOUTH THREE TIMES A DAY      Last office visit with prescribing clinician: 9/22/2023   Last telemedicine visit with prescribing clinician: Visit date not found   Next office visit with prescribing clinician: Visit date not found        Lipid Panel (04/28/2023 09:54)                  Would you like a call back once the refill request has been completed: [] Yes [] No    If the office needs to give you a call back, can they leave a voicemail: [] Yes [] No    Miriam Mello, RT  09/25/23, 09:56 EDT

## 2023-09-29 ENCOUNTER — TELEPHONE (OUTPATIENT)
Dept: FAMILY MEDICINE CLINIC | Facility: CLINIC | Age: 83
End: 2023-09-29

## 2023-09-29 ENCOUNTER — CLINICAL SUPPORT (OUTPATIENT)
Dept: FAMILY MEDICINE CLINIC | Facility: CLINIC | Age: 83
End: 2023-09-29
Payer: MEDICARE

## 2023-09-29 DIAGNOSIS — R19.7 DIARRHEA, UNSPECIFIED TYPE: ICD-10-CM

## 2023-09-29 DIAGNOSIS — R53.1 WEAKNESS: ICD-10-CM

## 2023-09-29 DIAGNOSIS — R53.83 OTHER FATIGUE: ICD-10-CM

## 2023-09-29 DIAGNOSIS — R50.9 LOW GRADE FEVER: ICD-10-CM

## 2023-09-29 DIAGNOSIS — R30.0 DYSURIA: ICD-10-CM

## 2023-09-29 LAB — TSH SERPL DL<=0.05 MIU/L-ACNC: 0.76 UIU/ML (ref 0.27–4.2)

## 2023-09-29 PROCEDURE — 84443 ASSAY THYROID STIM HORMONE: CPT | Performed by: FAMILY MEDICINE

## 2023-09-29 PROCEDURE — 84439 ASSAY OF FREE THYROXINE: CPT | Performed by: FAMILY MEDICINE

## 2023-09-29 PROCEDURE — 80053 COMPREHEN METABOLIC PANEL: CPT | Performed by: FAMILY MEDICINE

## 2023-09-29 PROCEDURE — 36415 COLL VENOUS BLD VENIPUNCTURE: CPT | Performed by: FAMILY MEDICINE

## 2023-09-29 NOTE — TELEPHONE ENCOUNTER
Pt states you took her off metformin bc she hadn't been feeling good at most recent OVs. However, she's noted that her BS is creeping up there @ almost 300 last night, so she did take 1 metformin. Gave a BS diary to front to be scanned in.

## 2023-09-29 NOTE — PROGRESS NOTES
Venipuncture performed in L ARM by RT Arleen  with good hemostasis. Patient tolerated well. 09/29/23 Miriam Mello, RT

## 2023-09-30 LAB
ALBUMIN SERPL-MCNC: 4.1 G/DL (ref 3.5–5.2)
ALBUMIN/GLOB SERPL: 1.5 G/DL
ALP SERPL-CCNC: 64 U/L (ref 39–117)
ALT SERPL W P-5'-P-CCNC: 17 U/L (ref 1–33)
ANION GAP SERPL CALCULATED.3IONS-SCNC: 14.3 MMOL/L (ref 5–15)
AST SERPL-CCNC: 20 U/L (ref 1–32)
BILIRUB SERPL-MCNC: 0.5 MG/DL (ref 0–1.2)
BUN SERPL-MCNC: 20 MG/DL (ref 8–23)
BUN/CREAT SERPL: 17.1 (ref 7–25)
CALCIUM SPEC-SCNC: 10.4 MG/DL (ref 8.6–10.5)
CHLORIDE SERPL-SCNC: 99 MMOL/L (ref 98–107)
CO2 SERPL-SCNC: 23.7 MMOL/L (ref 22–29)
CREAT SERPL-MCNC: 1.17 MG/DL (ref 0.57–1)
EGFRCR SERPLBLD CKD-EPI 2021: 47 ML/MIN/1.73
GLOBULIN UR ELPH-MCNC: 2.7 GM/DL
GLUCOSE SERPL-MCNC: 297 MG/DL (ref 65–99)
POTASSIUM SERPL-SCNC: 4.4 MMOL/L (ref 3.5–5.2)
PROT SERPL-MCNC: 6.8 G/DL (ref 6–8.5)
SODIUM SERPL-SCNC: 137 MMOL/L (ref 136–145)
T4 FREE SERPL-MCNC: 1.13 NG/DL (ref 0.93–1.7)

## 2023-10-02 ENCOUNTER — TELEPHONE (OUTPATIENT)
Dept: FAMILY MEDICINE CLINIC | Facility: CLINIC | Age: 83
End: 2023-10-02

## 2023-10-02 RX ORDER — DAPAGLIFLOZIN AND METFORMIN HYDROCHLORIDE 10; 500 MG/1; MG/1
1 TABLET, FILM COATED, EXTENDED RELEASE ORAL DAILY
Qty: 30 TABLET | Refills: 1 | Status: SHIPPED | OUTPATIENT
Start: 2023-10-02

## 2023-10-02 NOTE — TELEPHONE ENCOUNTER
"    Caller: Monica Pagan \"MONICA PAGAN\"    Relationship: Self    Best call back number: [phone]     What medication are you requesting: XIGDUO    What are your current symptoms:         Have you had these symptoms before:    [x] Yes  [] No    Have you been treated for these symptoms before:   [x] Yes  [] No    If a prescription is needed, what is your preferred pharmacy and phone number:  [facility] [id number] [address] AT [facility] [address] [facility] [address] - [phone] [facility] [phone] FX     Additional notes:  PATIENT WILL TRY MEDICATION.          "

## 2023-10-02 NOTE — TELEPHONE ENCOUNTER
RELAY    Henny Long, DO      She want to try xigduo??   YA=121!! Kidneys some dry----due to high BS and water pill; will monitor  Thyroid ok     LVM informing pt of lab results and asked about Xigduo.

## 2023-10-03 ENCOUNTER — TELEPHONE (OUTPATIENT)
Dept: FAMILY MEDICINE CLINIC | Facility: CLINIC | Age: 83
End: 2023-10-03

## 2023-10-03 NOTE — TELEPHONE ENCOUNTER
"    Caller: Monica Pagan \"MONICA PAGAN\"    Relationship to patient: Self    Best call back number: [phone]     Patient is needing: PATIENT IS CALLING BACK TO LET DR BAUTISTA KNOW SHE IS PICKING UP THE DIABETIC MEDICATION TODAY FROM THE PHARMACY.         "

## 2023-10-06 ENCOUNTER — TELEPHONE (OUTPATIENT)
Dept: FAMILY MEDICINE CLINIC | Facility: CLINIC | Age: 83
End: 2023-10-06

## 2023-10-06 NOTE — TELEPHONE ENCOUNTER
Caller:     [facility] [phone]       What was the call regarding: [facility] IS  NEEDING OFFICE VISIT NOTES FOR THE DIABETIC SUPPLIES SENT YESTERDAY

## 2023-10-09 ENCOUNTER — TELEPHONE (OUTPATIENT)
Dept: FAMILY MEDICINE CLINIC | Facility: CLINIC | Age: 83
End: 2023-10-09
Payer: MEDICARE

## 2023-10-09 NOTE — TELEPHONE ENCOUNTER
Patient states she started Xigduo about 6 days ago and she states her blood sugar has not been below 170 since starting.  She is taking the Xigduo every a.m. and still taking Levemir every a.m. as well.  Patient states she is just overall not feeling well.  She feels sluggish, clammy, and doesn't seem to have much energy.  Patient said metformin was keeping her blood sugars controlled but has been high since switching medications.  Please advise.

## 2023-10-23 RX ORDER — TRIAMTERENE AND HYDROCHLOROTHIAZIDE 37.5; 25 MG/1; MG/1
1 CAPSULE ORAL EVERY MORNING
Qty: 90 CAPSULE | Refills: 1 | Status: SHIPPED | OUTPATIENT
Start: 2023-10-23

## 2023-10-23 NOTE — TELEPHONE ENCOUNTER
Incoming Refill Request      Medication requested (name and dose): TRIAMTERENE-HCTZ 37.5-25MG    Pharmacy where request should be sent: [facility]    Additional details provided by patient: N/A    Best call back number:     Does the patient have less than a 3 day supply:  [] Yes  [x] No    Gerard Coleman Rep  10/23/23, 15:14 EDT

## 2023-10-23 NOTE — TELEPHONE ENCOUNTER
Rx Refill Note  Requested Prescriptions     Pending Prescriptions Disp Refills    busPIRone (BUSPAR) 7.5 MG tablet [Pharmacy Med Name: busPIRone HCL 7.5 MG TABLET] 90 tablet 0     Sig: TAKE ONE TABLET BY MOUTH THREE TIMES A DAY      Last office visit with prescribing clinician: 9/22/2023   Last telemedicine visit with prescribing clinician: Visit date not found   Next office visit with prescribing clinician: Visit date not found     Comprehensive Metabolic Panel (09/29/2023 10:38)   CBC & Differential (09/22/2023 13:47)   Lipid Panel (04/28/2023 09:54)                       Would you like a call back once the refill request has been completed: [] Yes [] No    If the office needs to give you a call back, can they leave a voicemail: [] Yes [] No    Paula Caldwell CMA  10/23/23, 09:37 EDT

## 2023-10-25 RX ORDER — BUSPIRONE HYDROCHLORIDE 7.5 MG/1
TABLET ORAL
Qty: 90 TABLET | Refills: 0 | Status: SHIPPED | OUTPATIENT
Start: 2023-10-25

## 2023-11-02 ENCOUNTER — TELEPHONE (OUTPATIENT)
Dept: FAMILY MEDICINE CLINIC | Facility: CLINIC | Age: 83
End: 2023-11-02
Payer: MEDICARE

## 2023-11-02 NOTE — TELEPHONE ENCOUNTER
"    Caller: Monica Pagan \"MONICA PAGAN\"    Relationship: Self    Best call back number: [phone]    What medication are you requesting:     ALTERNATIVE TO:   dapagliflozin-metformin HCl ER (Xigduo XR)  MG   tablet sustained-release 24 hour tablet      PREFERABLY BACK TO METFORMIN    Have you had these symptoms before:    [x] Yes  [] No    Have you been treated for these symptoms before:   [x] Yes  [] No    If a prescription is needed, what is your preferred pharmacy and phone number: [id number] - [place] [address] [address]  - [phone]  - [phone] FX     Additional notes:    STATES THE XIGDUO IS TOO EXPENSIVE AND WOULD LIKE TO KNOW IF THERE IS AN ALTERNATIVE OR IF THE METFORMIN CAN BE RE-SPRIBED.    PLEASE CALL TO CONFIRM OR DENY.     "

## 2023-11-07 RX ORDER — DULOXETIN HYDROCHLORIDE 60 MG/1
CAPSULE, DELAYED RELEASE ORAL
Qty: 90 CAPSULE | Refills: 0 | Status: SHIPPED | OUTPATIENT
Start: 2023-11-07

## 2023-11-07 NOTE — TELEPHONE ENCOUNTER
Rx Refill Note  Requested Prescriptions     Pending Prescriptions Disp Refills    DULoxetine (CYMBALTA) 60 MG capsule [Pharmacy Med Name: DULoxetine HCL DR 60 MG CAPSULE] 90 capsule 0     Sig: TAKE 1 CAPSULE BY MOUTH DAILY      Last office visit with prescribing clinician: 9/22/2023   Last telemedicine visit with prescribing clinician: Visit date not found   Next office visit with prescribing clinician: Visit date not found                         Would you like a call back once the refill request has been completed: [] Yes [] No    If the office needs to give you a call back, can they leave a voicemail: [] Yes [] No    Paula Caldwell CMA  11/07/23, 08:35 EST

## 2023-11-08 DIAGNOSIS — G47.00 INSOMNIA, UNSPECIFIED TYPE: ICD-10-CM

## 2023-11-08 RX ORDER — ESZOPICLONE 3 MG/1
3 TABLET, FILM COATED ORAL NIGHTLY PRN
Qty: 90 TABLET | Refills: 0 | Status: SHIPPED | OUTPATIENT
Start: 2023-11-08

## 2023-11-08 NOTE — TELEPHONE ENCOUNTER
PATIENT CALLED FOR MEDICATION REFILL OF  eszopiclone (LUNESTA) 3 MG tablet     SHE HAS 2 TABLETS LEFT    [id number] - [facility] [place] - [address] [place] - [phone]  - [phone]  [phone]     CALL BACK NUMBER [phone]    SHE ALSO WANTS TO HAVE VACCINES UPDATED. SHE HAD HER RSV AND COVID VACCINE ON 10/15/23

## 2023-11-27 RX ORDER — VERAPAMIL HYDROCHLORIDE 100 MG/1
100 CAPSULE, EXTENDED RELEASE ORAL NIGHTLY
Qty: 90 CAPSULE | Refills: 0 | Status: SHIPPED | OUTPATIENT
Start: 2023-11-27

## 2023-11-27 RX ORDER — BUSPIRONE HYDROCHLORIDE 7.5 MG/1
TABLET ORAL
Qty: 90 TABLET | Refills: 0 | Status: SHIPPED | OUTPATIENT
Start: 2023-11-27

## 2023-11-27 NOTE — TELEPHONE ENCOUNTER
Rx Refill Note  Requested Prescriptions     Pending Prescriptions Disp Refills    busPIRone (BUSPAR) 7.5 MG tablet [Pharmacy Med Name: busPIRone HCL 7.5 MG TABLET] 90 tablet 0     Sig: TAKE ONE TABLET BY MOUTH THREE TIMES A DAY      Last office visit with prescribing clinician: 9/22/2023   Last telemedicine visit with prescribing clinician: Visit date not found   Next office visit with prescribing clinician: 11/25/2023     Comprehensive Metabolic Panel (09/29/2023 10:38)   CBC & Differential (09/22/2023 13:47)   Lipid Panel (04/28/2023 09:54)                     Would you like a call back once the refill request has been completed: [] Yes [] No    If the office needs to give you a call back, can they leave a voicemail: [] Yes [] No    Paula Caldwell, RUPA  11/27/23, 08:36 EST

## 2023-11-27 NOTE — TELEPHONE ENCOUNTER
Rx Refill Note  Requested Prescriptions     Pending Prescriptions Disp Refills    verapamil (VERELAN) 100 MG 24 hr capsule [Pharmacy Med Name: VERAPAMIL 24HR ER 100MG PLT CP (PM)] 30 capsule 1     Sig: TAKE ONE CAPSULE BY MOUTH ONCE NIGHTLY      Last office visit with prescribing clinician: 9/22/2023   Last telemedicine visit with prescribing clinician: Visit date not found   Next office visit with prescribing clinician: Visit date not found     Comprehensive Metabolic Panel (09/29/2023 10:38)   CBC & Differential (09/22/2023 13:47)                     Would you like a call back once the refill request has been completed: [] Yes [] No    If the office needs to give you a call back, can they leave a voicemail: [] Yes [] No    Paula Caldwell CMA  11/27/23, 09:27 EST

## 2023-11-28 ENCOUNTER — TELEPHONE (OUTPATIENT)
Dept: FAMILY MEDICINE CLINIC | Facility: CLINIC | Age: 83
End: 2023-11-28

## 2023-11-28 NOTE — TELEPHONE ENCOUNTER
"  Caller: Monica Pagan \"MONICA PAGAN\"    Relationship to patient: Self    Best call back number: [phone]     Date of exposure: 11/22/23    Date of positive COVID19 test: NONE    Date if possible COVID19 exposure: 11/22/23    COVID19 symptoms:  NAUSEA     Date of initial quarantine: 11/22/23    Additional information or concerns:   WHAT IS THE INCUBATION PERIOD OF COVID?      What is the patients preferred pharmacy: NOT NECESSARY         "

## 2023-11-28 NOTE — TELEPHONE ENCOUNTER
PATIENT NOTIFIED, SHE STATES SHE IS FEELING FINE, SHE TRIED TO SELF TEST BUT IT WAS FAULTY. IF SHE ISN'T FEELING WELL TOMORROW OR TEST POSITIVE SHE WILL CALL BACK AND RESCHEDULE.

## 2023-11-29 ENCOUNTER — TELEPHONE (OUTPATIENT)
Dept: FAMILY MEDICINE CLINIC | Facility: CLINIC | Age: 83
End: 2023-11-29
Payer: MEDICARE

## 2023-11-29 ENCOUNTER — CLINICAL SUPPORT (OUTPATIENT)
Dept: FAMILY MEDICINE CLINIC | Facility: CLINIC | Age: 83
End: 2023-11-29
Payer: MEDICARE

## 2023-11-29 DIAGNOSIS — E11.42 TYPE 2 DIABETES MELLITUS WITH PERIPHERAL NEUROPATHY: Primary | ICD-10-CM

## 2023-11-29 LAB
EXPIRATION DATE: ABNORMAL
HBA1C MFR BLD: 6.8 % (ref 4.5–5.7)
Lab: ABNORMAL

## 2023-11-29 NOTE — PROGRESS NOTES
Fingerstick performed in L -3rd finger by RT Arleen  with good hemostasis. Patient tolerated well. 11/29/23 Miriam Mello, RT

## 2023-11-29 NOTE — TELEPHONE ENCOUNTER
Pt states having a really hard time with the [place] sensors working that you gave her. It did read the first couple times, then stopped. She will try a new sensor today and let us know if it works. However, states her BS the past few days was 150, and that's high for her.     A1c = 6.8 today in office

## 2023-11-29 NOTE — TELEPHONE ENCOUNTER
RELAY.    LEFT MESSAGE FOR PATIENT THAT DR BAUTISTA SAID HER A1C WAS GOOD AND THAT SHE WANTS TO SEE HER BACK FOR AN OFFICE VISIT IN 3 MONTHS.

## 2023-12-24 DIAGNOSIS — E11.42 TYPE 2 DIABETES MELLITUS WITH PERIPHERAL NEUROPATHY: ICD-10-CM

## 2023-12-26 RX ORDER — BUSPIRONE HYDROCHLORIDE 7.5 MG/1
TABLET ORAL
Qty: 90 TABLET | Refills: 0 | Status: SHIPPED | OUTPATIENT
Start: 2023-12-26

## 2023-12-26 RX ORDER — INSULIN DETEMIR 100 [IU]/ML
INJECTION, SOLUTION SUBCUTANEOUS
Qty: 15 ML | Refills: 1 | Status: SHIPPED | OUTPATIENT
Start: 2023-12-26

## 2024-01-02 DIAGNOSIS — E11.42 TYPE 2 DIABETES MELLITUS WITH PERIPHERAL NEUROPATHY: ICD-10-CM

## 2024-01-02 RX ORDER — INSULIN DETEMIR 100 [IU]/ML
25 INJECTION, SOLUTION SUBCUTANEOUS DAILY
Qty: 15 ML | Refills: 1 | Status: SHIPPED | OUTPATIENT
Start: 2024-01-02

## 2024-01-05 ENCOUNTER — OFFICE VISIT (OUTPATIENT)
Dept: FAMILY MEDICINE CLINIC | Facility: CLINIC | Age: 84
End: 2024-01-05
Payer: MEDICARE

## 2024-01-05 VITALS
DIASTOLIC BLOOD PRESSURE: 66 MMHG | HEART RATE: 72 BPM | TEMPERATURE: 97.1 F | HEIGHT: 64 IN | WEIGHT: 172 LBS | SYSTOLIC BLOOD PRESSURE: 106 MMHG | BODY MASS INDEX: 29.37 KG/M2 | OXYGEN SATURATION: 99 % | RESPIRATION RATE: 16 BRPM

## 2024-01-05 DIAGNOSIS — E11.42 TYPE 2 DIABETES MELLITUS WITH PERIPHERAL NEUROPATHY: Primary | ICD-10-CM

## 2024-01-05 DIAGNOSIS — R30.0 DYSURIA: ICD-10-CM

## 2024-01-05 DIAGNOSIS — M16.11 PRIMARY OSTEOARTHRITIS OF RIGHT HIP: ICD-10-CM

## 2024-01-05 DIAGNOSIS — I10 ESSENTIAL HYPERTENSION: ICD-10-CM

## 2024-01-05 LAB
BILIRUB BLD-MCNC: NEGATIVE MG/DL
CLARITY, POC: CLEAR
COLOR UR: YELLOW
EXPIRATION DATE: ABNORMAL
GLUCOSE UR STRIP-MCNC: NEGATIVE MG/DL
KETONES UR QL: NEGATIVE
LEUKOCYTE EST, POC: ABNORMAL
Lab: ABNORMAL
NITRITE UR-MCNC: NEGATIVE MG/ML
PH UR: 5.5 [PH] (ref 5–8)
PROT UR STRIP-MCNC: NEGATIVE MG/DL
RBC # UR STRIP: NEGATIVE /UL
SP GR UR: 1.01 (ref 1–1.03)
UROBILINOGEN UR QL: NORMAL

## 2024-01-05 PROCEDURE — 87088 URINE BACTERIA CULTURE: CPT | Performed by: FAMILY MEDICINE

## 2024-01-05 PROCEDURE — 1160F RVW MEDS BY RX/DR IN RCRD: CPT | Performed by: FAMILY MEDICINE

## 2024-01-05 PROCEDURE — 1159F MED LIST DOCD IN RCRD: CPT | Performed by: FAMILY MEDICINE

## 2024-01-05 PROCEDURE — 3078F DIAST BP <80 MM HG: CPT | Performed by: FAMILY MEDICINE

## 2024-01-05 PROCEDURE — 81003 URINALYSIS AUTO W/O SCOPE: CPT | Performed by: FAMILY MEDICINE

## 2024-01-05 PROCEDURE — 87086 URINE CULTURE/COLONY COUNT: CPT | Performed by: FAMILY MEDICINE

## 2024-01-05 PROCEDURE — 87186 SC STD MICRODIL/AGAR DIL: CPT | Performed by: FAMILY MEDICINE

## 2024-01-05 PROCEDURE — 3074F SYST BP LT 130 MM HG: CPT | Performed by: FAMILY MEDICINE

## 2024-01-05 PROCEDURE — 99214 OFFICE O/P EST MOD 30 MIN: CPT | Performed by: FAMILY MEDICINE

## 2024-01-05 RX ORDER — BLOOD SUGAR DIAGNOSTIC
1 STRIP MISCELLANEOUS
Qty: 300 EACH | Refills: 1 | Status: SHIPPED | OUTPATIENT
Start: 2024-01-05 | End: 2024-01-08

## 2024-01-05 RX ORDER — PEN NEEDLE, DIABETIC 30 GX3/16"
1 NEEDLE, DISPOSABLE MISCELLANEOUS
Qty: 360 EACH | Refills: 1 | Status: SHIPPED | OUTPATIENT
Start: 2024-01-05

## 2024-01-05 RX ORDER — INSULIN LISPRO 100 [IU]/ML
INJECTION, SOLUTION INTRAVENOUS; SUBCUTANEOUS
Qty: 9 ML | Refills: 1 | Status: SHIPPED | OUTPATIENT
Start: 2024-01-05

## 2024-01-05 RX ORDER — LANCETS
EACH MISCELLANEOUS
Qty: 360 EACH | Refills: 1 | Status: SHIPPED | OUTPATIENT
Start: 2024-01-05

## 2024-01-05 RX ORDER — PHENAZOPYRIDINE HYDROCHLORIDE 200 MG/1
200 TABLET, FILM COATED ORAL 3 TIMES DAILY PRN
Qty: 30 TABLET | Refills: 0 | Status: SHIPPED | OUTPATIENT
Start: 2024-01-05

## 2024-01-05 NOTE — PROGRESS NOTES
Subjective   Monica Pagan is a 83 y.o. female.     Chief Complaint   Patient presents with    Hyperglycemia    Urinary Tract Infection    Hip Pain         Current Outpatient Medications:     albuterol sulfate  (90 Base) MCG/ACT inhaler, Inhale 2 puffs Every 6 (Six) Hours As Needed for Wheezing or Shortness of Air (cough)., Disp: 18 g, Rfl: 0    Blood Glucose Monitoring Suppl (ONE TOUCH ULTRA MINI) w/Device kit, 1 kit Daily., Disp: 1 each, Rfl: 0    busPIRone (BUSPAR) 7.5 MG tablet, TAKE ONE TABLET BY MOUTH THREE TIMES A DAY, Disp: 90 tablet, Rfl: 0    cyanocobalamin (VITAMIN B-12) 2000 MCG tablet tablet, Take 1 tablet by mouth Daily., Disp: , Rfl:     DULoxetine (CYMBALTA) 60 MG capsule, TAKE 1 CAPSULE BY MOUTH DAILY, Disp: 90 capsule, Rfl: 0    eszopiclone (LUNESTA) 3 MG tablet, Take 1 tablet by mouth At Night As Needed for Sleep. Take immediately before bedtime, Disp: 90 tablet, Rfl: 0    guaiFENesin (MUCINEX) 600 MG 12 hr tablet, Take 1 tablet by mouth 2 (Two) Times a Day As Needed for Cough or Congestion., Disp: 60 tablet, Rfl: 2    loperamide (IMODIUM) 2 MG capsule, Take 1 capsule by mouth 4 (Four) Times a Day As Needed for Diarrhea., Disp: , Rfl:     nystatin (MYCOSTATIN) 387687 UNIT/GM cream, Apply  topically to the appropriate area as directed As Needed (groin rash)., Disp: 90 g, Rfl: 0    rosuvastatin (CRESTOR) 5 MG tablet, TAKE ONE TABLET BY MOUTH DAILY, Disp: 90 tablet, Rfl: 3    triamcinolone (KENALOG) 0.1 % cream, Apply  topically to the appropriate area as directed 2 (Two) Times a Day., Disp: 45 g, Rfl: 0    triamterene-hydrochlorothiazide (Dyazide) 37.5-25 MG per capsule, Take 1 capsule by mouth Every Morning., Disp: 90 capsule, Rfl: 1    verapamil (VERELAN) 100 MG 24 hr capsule, TAKE ONE CAPSULE BY MOUTH ONCE NIGHTLY, Disp: 90 capsule, Rfl: 0    Fluticasone-Umeclidin-Vilant (Trelegy Ellipta) 100-62.5-25 MCG/ACT inhaler, Inhale 1 puff Daily., Disp: 60 each, Rfl: 2    glucose blood (True  Metrix Blood Glucose Test) test strip, 1 each by Other route 3 (Three) Times a Day Before Meals. Use as instructed, Disp: 100 each, Rfl: 1    insulin detemir (Levemir FlexPen) 100 UNIT/ML injection, Inject 28 Units under the skin into the appropriate area as directed Daily., Disp: , Rfl:     Insulin Lispro, 1 Unit Dial, (HumaLOG KwikPen) 100 UNIT/ML solution pen-injector, Use QAC per sliding scale to max of 10units/ meal and 30units/ day, Disp: 9 mL, Rfl: 1    Insulin Pen Needle (Pen Needles) 32G X 4 MM misc, Use 1 package 4 (Four) Times a Day Before Meals & at Bedtime., Disp: 360 each, Rfl: 1    Lancets (onetouch ultrasoft) lancets, Use to check BS QAC/ QHS, Disp: 360 each, Rfl: 1    phenazopyridine (Pyridium) 200 MG tablet, Take 1 tablet by mouth 3 (Three) Times a Day As Needed for Dysuria., Disp: 30 tablet, Rfl: 0    Past Medical History:   Diagnosis Date    Anemia     Anxiety     Childhood Asthma     Colon polyp     TA    DDD  lumbar     DEXA     DMII     Fibromyalgia     Hyperlipidemia     Hypertension     IBS     Insomnia     Lumbar spinal stenosis     Macular degeneration, left eye     MAMMO     NEG= 2020/ 2022    Meniere's disease     Nontoxic thyroid nodule     Ocular histoplasmosis     Osteoarthritis     Peripheral neuropathy     PVC     Spondylosis     Tinnitus     Tremor     Trigeminal neuralgia     Vitamin D deficiency        Past Surgical History:   Procedure Laterality Date    AMPUTATION DIGIT Left     Digit #1    APPENDECTOMY  1962    BUNIONECTOMY Bilateral     CATARACT EXTRACTION      left    CATARACT EXTRACTION      x2    COLONOSCOPY      NEG = 2012/ 2021= TA, rech 2026    CYST REMOVAL Bilateral     WRIST    HYSTERECTOMY  1980    JOINT REPLACEMENT      Rt TKR x 2    JOINT REPLACEMENT      Left THR    LAPAROSCOPIC CHOLECYSTECTOMY      DR. LOBATO    SPINE SURGERY      Lumbar Fusion    SUBTOTAL HYSTERECTOMY         Family History   Problem Relation Age of Onset    Asthma Mother     COPD Mother     Heart  disease Father     Alcohol abuse Father     Other Father         WWII Gangrene/ Malaria    Arthritis Sister     Cancer Sister     Leukemia Sister     Heart attack Brother     Cancer Brother     Alcohol abuse Brother     Kidney disease Brother     Alzheimer's disease Brother     Heart disease Brother     Diabetes Brother     Hyperlipidemia Brother     Diabetes Son        Social History     Socioeconomic History    Marital status:    Tobacco Use    Smoking status: Never     Passive exposure: Never    Smokeless tobacco: Never   Vaping Use    Vaping Use: Never used   Substance and Sexual Activity    Alcohol use: Yes     Alcohol/week: 1.0 standard drink of alcohol     Types: 1 Glasses of wine per week     Comment: Rare    Drug use: No    Sexual activity: Never   Answers submitted by the patient for this visit:  Other (Submitted on 1/4/2024)  Please describe your symptoms.: overall aches,fatigue,legs week,hip pain ,mostly the fatigue is bad  Have you had these symptoms before?: No  How long have you been having these symptoms?: 5-7 days  Please list any medications you are currently taking for this condition.: Tylonal  Please describe any probable cause for these symptoms. : maybe uti  Primary Reason for Visit (Submitted on 1/4/2024)  What is the primary reason for your visit?: Other    History of Present Illness   The patient is an 83-year-old female who is here with concerns of blood sugar issues, brain fog, possible UTI, and right hip pain.    She has intermittent burning with urination and incomplete bladder emptying. She is experiencing burning in the bladder as well. She has not tried Pyridium.    She has right hip pain. She fell at the park on concrete in 08/2023 and had an x-ray of her hip. She has pain when she turns her foot in and tries to step on the brake. She experiences a poking sensation when moving her lower extremity. Her lower extremity occasionally feels numb. She experiences back pain in her  middle back. She iced her back which was helpful. Tylenol helps with her hip pain. She ambulates daily. She has had a left hip replacement.    She has fogginess. She went to  her insulin at [facility] and was unable to remember her telephone number. She attempted to make dinner and was unable to stand for more than 5 minutes due to fatigue. She sleeps constantly. She has not had COVID-19 infection recently.    She is on verapamil 100 mg and atenolol 0.5 tablet for her blood pressure. She has lost 2 pounds. She denies any lightheadedness. She is on water pill for her Meniere's. She denies a history of a heart attack. She takes insulin in the morning.    She takes Trelegy as needed. She has a dry cough. She has a rescue inhaler.     The following portions of the patient's history were reviewed and updated as appropriate: allergies, current medications, past family history, past medical history, past social history, past surgical history and problem list.    Review of Systems   Constitutional:  Positive for fatigue. Negative for activity change, appetite change, unexpected weight gain and unexpected weight loss.   HENT:  Positive for tinnitus.    Respiratory:  Positive for cough. Negative for shortness of breath and wheezing.    Genitourinary:  Positive for difficulty urinating and dysuria. Negative for decreased urine volume, flank pain, frequency, hematuria and urinary incontinence.   Musculoskeletal:  Positive for arthralgias, back pain and gait problem. Negative for joint swelling.   Neurological:  Positive for numbness and confusion. Negative for light-headedness.   Psychiatric/Behavioral:  Positive for sleep disturbance.        Vitals:    01/05/24 1420   BP: 106/66   Pulse: 72   Resp: 16   Temp: 97.1 °F (36.2 °C)   SpO2: 99%       Objective   Physical Exam  Vitals and nursing note reviewed.   Constitutional:       General: She is not in acute distress.     Appearance: She is well-developed. She is not  ill-appearing or toxic-appearing.   HENT:      Head: Normocephalic and atraumatic.   Cardiovascular:      Rate and Rhythm: Normal rate and regular rhythm.      Heart sounds: Normal heart sounds. No murmur heard.  Pulmonary:      Effort: Pulmonary effort is normal.      Breath sounds: Normal breath sounds.   Skin:     General: Skin is warm and dry.      Findings: No rash.   Neurological:      Mental Status: She is alert and oriented to person, place, and time.   Psychiatric:         Behavior: Behavior normal.         Thought Content: Thought content normal.         Judgment: Judgment normal.       Assessment & Plan   Diagnoses and all orders for this visit:    1. Type 2 diabetes mellitus with peripheral neuropathy (Primary)    2. Dysuria  -     POC Urinalysis Dipstick, Automated  -     Urine Culture - Urine, Urine, Clean Catch    3. Primary osteoarthritis of right hip    4. Essential hypertension    Other orders  -     phenazopyridine (Pyridium) 200 MG tablet; Take 1 tablet by mouth 3 (Three) Times a Day As Needed for Dysuria.  Dispense: 30 tablet; Refill: 0  -     Insulin Lispro, 1 Unit Dial, (HumaLOG KwikPen) 100 UNIT/ML solution pen-injector; Use QAC per sliding scale to max of 10units/ meal and 30units/ day  Dispense: 9 mL; Refill: 1  -     Insulin Pen Needle (Pen Needles) 32G X 4 MM misc; Use 1 package 4 (Four) Times a Day Before Meals & at Bedtime.  Dispense: 360 each; Refill: 1  -     Lancets (onetouch ultrasoft) lancets; Use to check BS QAC/ QHS  Dispense: 360 each; Refill: 1  -     Discontinue: glucose blood (OneTouch Ultra) test strip; 1 each by Other route 3 (Three) Times a Day Before Meals. Use as instructed  Dispense: 300 each; Refill: 1      1. Burning with urination.  - We will culture her urine today.  - I will prescribe her Pyridium, 3 times daily as needed for bladder pain and burning with urination.    2. Right hip pain.  She declines physical therapy.   She will continue Tylenol.    3.  Diabetes.  I will switch her from metformin to short-acting insulin.   She declined Humalog KwikPen 10 units per meal, 30 units per day.   She will use sliding scale insulin 10 units per meal.   When less than 120 mg/dL, no insulin is needed.  She will use 2 units when blood glucose is greater than 121 to 150 mg/dL.  She will use 3 units if her blood glucose is greater than 151- 175 mg/dL.  If her blood glucose is greater than 176 to 200 mg/dL, she will take 4 units.   If her blood glucose is equal to 201 to 230 mg/dL, she will take 5 units.   I will reorder OneTouch glucose strips and lancets.    4. Hypertension.  Discontinue atenolol.    5. Fatigue.  Discontinue metformin.    Follow-up  The patient will follow up in 1 month.       Transcribed from ambient dictation for Henny Long DO by Janet Barone.  01/05/24   15:51 EST    Patient or patient representative verbalized consent to the visit recording.  I have personally performed the services described in this document as transcribed by the above individual, and it is both accurate and complete.

## 2024-01-07 LAB — BACTERIA SPEC AEROBE CULT: ABNORMAL

## 2024-01-08 RX ORDER — CALCIUM CITRATE/VITAMIN D3 200MG-6.25
1 TABLET ORAL
Qty: 100 EACH | Refills: 1 | Status: SHIPPED | OUTPATIENT
Start: 2024-01-08

## 2024-01-08 NOTE — PROGRESS NOTES
Pt states she is not having any pain or discomfort. She stopped taking the Pyridium on Sat. Only having frequency, but she is going to turn up her bladder stimulator. If you want her on an abx to not get sick, she's ok with this.

## 2024-01-10 RX ORDER — FLUTICASONE FUROATE, UMECLIDINIUM BROMIDE AND VILANTEROL TRIFENATATE 100; 62.5; 25 UG/1; UG/1; UG/1
1 POWDER RESPIRATORY (INHALATION)
Qty: 60 EACH | Refills: 2 | Status: SHIPPED | OUTPATIENT
Start: 2024-01-10

## 2024-01-12 ENCOUNTER — TELEPHONE (OUTPATIENT)
Dept: FAMILY MEDICINE CLINIC | Facility: CLINIC | Age: 84
End: 2024-01-12
Payer: MEDICARE

## 2024-01-12 NOTE — TELEPHONE ENCOUNTER
Yes, taking Levemir 25 units daily. And the Humalog sliding scale before each meal.     This morning BS = 181 - 2 units  Afternoon (seems to spike) BS = 240 - 6 units    Also, she is not sure how much to give if over 250, as the scale max showing is 250.     Please advise.

## 2024-01-12 NOTE — TELEPHONE ENCOUNTER
Patient called stating that her diabetic medication was changed at her last appointment.  Since her last appointment she has been having some blood sugar readings that are not typical for her.      1/11/24 a.m. 159 (fasting)  1/11/24 before lunch 309  1/11/24 p.m. 145  1/12/24 a.m 181 (fasting)    Patient requesting a call back from clinical staff to clarify that she is taking medication as she is supposed to.    Please advise.

## 2024-01-18 ENCOUNTER — TELEPHONE (OUTPATIENT)
Dept: FAMILY MEDICINE CLINIC | Facility: CLINIC | Age: 84
End: 2024-01-18
Payer: MEDICARE

## 2024-01-18 DIAGNOSIS — E11.42 TYPE 2 DIABETES MELLITUS WITH PERIPHERAL NEUROPATHY: ICD-10-CM

## 2024-01-18 RX ORDER — INSULIN DETEMIR 100 [IU]/ML
28 INJECTION, SOLUTION SUBCUTANEOUS DAILY
Status: SHIPPED
Start: 2024-01-18

## 2024-01-18 NOTE — TELEPHONE ENCOUNTER
"  Caller: Monica Pagan \"MONICA PAGAN\"    Relationship: Self    Best call back number: [phone]    What was the call regarding: PATIENT STATES THAT SHE FIRST CHANGED HER MEDICATION FOR BLOOD SUGAR ON 01/08. HER NUMBERS   01/08 - 148   6PM  01/09 - 156   6PM  01/10 - 146   6PM  01/11 - 159 AM, 309 PM, 145 6PM  01/12 -181 AM, 249 PM, V152 6PM\  01/13- 178 AM, 252 PM, 260 6PM  01/15- 169 AM, 279 PM, 140 6PM   01/16 -(CHANGED MEDICATION) 177AM, 314 PM, 177 6PM  01/17 - 203 AM, 253 PM, 142 6PM   01/18 - 186 AM     PATIENT STATES THAT SHE IS SHAKY BUT DOING WELL. [place]  "

## 2024-01-18 NOTE — TELEPHONE ENCOUNTER
Pt states you already increase her Levemir to 28 units, which she start on 1/16. Keep this at 28, and start the new sliding scale? Please advise.

## 2024-01-19 NOTE — TELEPHONE ENCOUNTER
Pt informed. Wanted to let you know that she is having issues with constipation. She has to take 2 Docalax last night, and finally had a BM this morning. Wanted to verify its ok for her to take this PRN? Also, would stopping the water pill help w this constipation?

## 2024-01-25 RX ORDER — BUSPIRONE HYDROCHLORIDE 7.5 MG/1
TABLET ORAL
Qty: 90 TABLET | Refills: 0 | Status: SHIPPED | OUTPATIENT
Start: 2024-01-25

## 2024-02-01 ENCOUNTER — APPOINTMENT (OUTPATIENT)
Dept: CT IMAGING | Facility: HOSPITAL | Age: 84
End: 2024-02-01
Payer: MEDICARE

## 2024-02-01 ENCOUNTER — HOSPITAL ENCOUNTER (EMERGENCY)
Facility: HOSPITAL | Age: 84
Discharge: HOME | End: 2024-02-01
Attending: EMERGENCY MEDICINE | Admitting: EMERGENCY MEDICINE
Payer: MEDICARE

## 2024-02-01 VITALS
HEIGHT: 64 IN | OXYGEN SATURATION: 97 % | TEMPERATURE: 98.9 F | WEIGHT: 160 LBS | BODY MASS INDEX: 27.31 KG/M2 | SYSTOLIC BLOOD PRESSURE: 153 MMHG | RESPIRATION RATE: 18 BRPM | DIASTOLIC BLOOD PRESSURE: 83 MMHG | HEART RATE: 96 BPM

## 2024-02-01 DIAGNOSIS — N39.0 ACUTE UTI: Primary | ICD-10-CM

## 2024-02-01 LAB
ALBUMIN SERPL-MCNC: 4 G/DL (ref 3.5–5.2)
ALBUMIN/GLOB SERPL: 1.5 G/DL
ALP SERPL-CCNC: 80 U/L (ref 39–117)
ALT SERPL W P-5'-P-CCNC: 15 U/L (ref 1–33)
ANION GAP SERPL CALCULATED.3IONS-SCNC: 9.1 MMOL/L (ref 5–15)
AST SERPL-CCNC: 15 U/L (ref 1–32)
BACTERIA UR QL AUTO: ABNORMAL /HPF
BASOPHILS # BLD AUTO: 0.03 10*3/MM3 (ref 0–0.2)
BASOPHILS NFR BLD AUTO: 0.3 % (ref 0–1.5)
BILIRUB SERPL-MCNC: 0.3 MG/DL (ref 0–1.2)
BILIRUB UR QL STRIP: NEGATIVE
BUN SERPL-MCNC: 22 MG/DL (ref 8–23)
BUN/CREAT SERPL: 19 (ref 7–25)
CALCIUM SPEC-SCNC: 10.6 MG/DL (ref 8.6–10.5)
CHLORIDE SERPL-SCNC: 97 MMOL/L (ref 98–107)
CLARITY UR: ABNORMAL
CO2 SERPL-SCNC: 25.9 MMOL/L (ref 22–29)
COLOR UR: YELLOW
CREAT SERPL-MCNC: 1.16 MG/DL (ref 0.57–1)
DEPRECATED RDW RBC AUTO: 43.2 FL (ref 37–54)
EGFRCR SERPLBLD CKD-EPI 2021: 47.5 ML/MIN/1.73
EOSINOPHIL # BLD AUTO: 0.11 10*3/MM3 (ref 0–0.4)
EOSINOPHIL NFR BLD AUTO: 1.2 % (ref 0.3–6.2)
ERYTHROCYTE [DISTWIDTH] IN BLOOD BY AUTOMATED COUNT: 12.8 % (ref 12.3–15.4)
GLOBULIN UR ELPH-MCNC: 2.7 GM/DL
GLUCOSE SERPL-MCNC: 201 MG/DL (ref 65–99)
GLUCOSE UR STRIP-MCNC: NEGATIVE MG/DL
HBA1C MFR BLD: 7.2 % (ref 4.8–5.6)
HCT VFR BLD AUTO: 38.7 % (ref 34–46.6)
HGB BLD-MCNC: 12.6 G/DL (ref 12–15.9)
HGB UR QL STRIP.AUTO: ABNORMAL
HYALINE CASTS UR QL AUTO: ABNORMAL /LPF
IMM GRANULOCYTES # BLD AUTO: 0 10*3/MM3 (ref 0–0.05)
IMM GRANULOCYTES NFR BLD AUTO: 0 % (ref 0–0.5)
KETONES UR QL STRIP: NEGATIVE
LEUKOCYTE ESTERASE UR QL STRIP.AUTO: ABNORMAL
LYMPHOCYTES # BLD AUTO: 2.53 10*3/MM3 (ref 0.7–3.1)
LYMPHOCYTES NFR BLD AUTO: 27.5 % (ref 19.6–45.3)
MCH RBC QN AUTO: 30 PG (ref 26.6–33)
MCHC RBC AUTO-ENTMCNC: 32.6 G/DL (ref 31.5–35.7)
MCV RBC AUTO: 92.1 FL (ref 79–97)
MONOCYTES # BLD AUTO: 0.5 10*3/MM3 (ref 0.1–0.9)
MONOCYTES NFR BLD AUTO: 5.4 % (ref 5–12)
NEUTROPHILS NFR BLD AUTO: 6.03 10*3/MM3 (ref 1.7–7)
NEUTROPHILS NFR BLD AUTO: 65.6 % (ref 42.7–76)
NITRITE UR QL STRIP: NEGATIVE
PH UR STRIP.AUTO: 6 [PH] (ref 5–8)
PLATELET # BLD AUTO: 229 10*3/MM3 (ref 140–450)
PMV BLD AUTO: 8.8 FL (ref 6–12)
POTASSIUM SERPL-SCNC: 3.5 MMOL/L (ref 3.5–5.2)
PROT SERPL-MCNC: 6.7 G/DL (ref 6–8.5)
PROT UR QL STRIP: NEGATIVE
RBC # BLD AUTO: 4.2 10*6/MM3 (ref 3.77–5.28)
RBC # UR STRIP: ABNORMAL /HPF
REF LAB TEST METHOD: ABNORMAL
SODIUM SERPL-SCNC: 132 MMOL/L (ref 136–145)
SP GR UR STRIP: 1.01 (ref 1–1.03)
SQUAMOUS #/AREA URNS HPF: ABNORMAL /HPF
UROBILINOGEN UR QL STRIP: ABNORMAL
WBC # UR STRIP: ABNORMAL /HPF
WBC NRBC COR # BLD AUTO: 9.2 10*3/MM3 (ref 3.4–10.8)

## 2024-02-01 PROCEDURE — 83036 HEMOGLOBIN GLYCOSYLATED A1C: CPT | Performed by: NURSE PRACTITIONER

## 2024-02-01 PROCEDURE — 99284 EMERGENCY DEPT VISIT MOD MDM: CPT | Performed by: NURSE PRACTITIONER

## 2024-02-01 PROCEDURE — 85025 COMPLETE CBC W/AUTO DIFF WBC: CPT | Performed by: NURSE PRACTITIONER

## 2024-02-01 PROCEDURE — 81001 URINALYSIS AUTO W/SCOPE: CPT | Performed by: NURSE PRACTITIONER

## 2024-02-01 PROCEDURE — 80053 COMPREHEN METABOLIC PANEL: CPT | Performed by: NURSE PRACTITIONER

## 2024-02-01 PROCEDURE — 74176 CT ABD & PELVIS W/O CONTRAST: CPT

## 2024-02-01 PROCEDURE — 99284 EMERGENCY DEPT VISIT MOD MDM: CPT

## 2024-02-01 RX ORDER — SODIUM CHLORIDE 0.9 % (FLUSH) 0.9 %
10 SYRINGE (ML) INJECTION AS NEEDED
Status: DISCONTINUED | OUTPATIENT
Start: 2024-02-01 | End: 2024-02-01 | Stop reason: HOSPADM

## 2024-02-01 RX ORDER — CEPHALEXIN 500 MG/1
500 CAPSULE ORAL 3 TIMES DAILY
Qty: 21 CAPSULE | Refills: 0 | Status: SHIPPED | OUTPATIENT
Start: 2024-02-01 | End: 2024-02-08

## 2024-02-01 NOTE — FS[facility] PROVIDER NOTE
EMERGENCY DEPARTMENT ENCOUNTER    Room Number:  04/04  Date seen:  2/1/2024  Time seen: 13:54 EST  PCP: Henny Long DO  Historian: patient    Discussed/obtained information from independent historians: n/a    HPI:  Chief complaint:dysuria  A complete HPI/ROS/PMH/PSH/SH/FH are unobtainable due to: n/a  Context:Monica Pagan is a 83 y.o. female with past medical history significant for fibromyalgia, type 2 diabetes, hypertension, bladder stimulator who presents to the ED with c/o 1 week of dysuria that got worse last night.  Dysuria was described as moderate and she also had some bladder spasms.  She denies any fever but has had nausea and just feels fatigued.  Since being here she has been unable to provide a urine specimen.    ALLERGIES  Compazine [prochlorperazine edisylate], Amoxicillin, Aspirin, Cortisone, Erythromycin, Gabapentin, Levofloxacin, Lipitor [atorvastatin], Nsaids, Pravastatin, Pregabalin, Sitagliptin, Sulfa antibiotics, and Theophylline    PAST MEDICAL HISTORY  Active Ambulatory Problems     Diagnosis Date Noted    Anemia 03/01/2016    Anxiety 03/01/2016    Pain in the coccyx 03/01/2016    Degeneration of intervertebral disc of lumbar region 03/01/2016    Type 2 diabetes mellitus with peripheral neuropathy 03/01/2016    Fibromyalgia 03/01/2016    Hyperlipidemia 03/01/2016    Essential hypertension 03/01/2016    Insomnia 03/01/2016    Spinal stenosis of lumbar region 03/01/2016    Tremor 03/01/2016    Vitamin D deficiency 03/01/2016    Nontoxic multinodular goiter 05/02/2016    Panic attack 01/19/2017    Multilevel degenerative disc disease 05/19/2017    Vitamin B12 deficiency 08/25/2017    Meniere disease 02/13/2018    PVC's (premature ventricular contractions) 07/06/2018    Diabetic neuropathy 02/15/2018    Cervical spinal stenosis 11/30/2018    Low back pain 09/19/2019    Cervical spondylosis 09/19/2019    DDD (degenerative disc disease), cervical 09/19/2019    Degeneration of lumbar  intervertebral disc 09/19/2019    Lumbar spondylosis 09/19/2019    Neck pain 09/19/2019    Vitreomacular traction syndrome 06/05/2020    Retinal pigmentation 01/11/2021    Bursitis 03/22/2021    Colon polyp 09/19/2007    Generalized OA 03/22/2021    Giardia 03/22/2021    Palpitations 03/22/2021    OA (osteoarthritis) 03/22/2021    Spondylosis 03/30/2012    HTN (hypertension) 03/22/2021    Trigeminal neuralgia 03/22/2021    Pain of right sacroiliac joint 04/09/2021    Epiretinal membrane (ERM) of both eyes 02/26/2021    Encounter for long-term (current) use of insulin 11/24/2021    Macular hole of right eye 08/20/2021    Osteoarthritis of right hip 11/24/2021    Long term (current) use of insulin 05/29/2021    Repeated falls 05/29/2021    Problems related to living alone 05/29/2021    Unsteadiness on feet 05/27/2021    Anxiety disorder, unspecified 05/29/2021    Dorsalgia, unspecified 05/29/2021    Essential (primary) hypertension 05/29/2021    Other chronic pain 05/29/2021     Resolved Ambulatory Problems     Diagnosis Date Noted    Nontoxic uninodular goiter 03/01/2016    History of colon polyps 04/17/2019    Diabetes mellitus 12/21/2015    Type 2 diabetes mellitus 11/17/2015    Anxiety 03/22/2021    History of colonic polyps 10/06/2014     Past Medical History:   Diagnosis Date    Childhood Asthma     DDD  lumbar     DEXA     Hypertension     IBS     Lumbar spinal stenosis     Macular degeneration, left eye     MAMMO     Meniere's disease     Nontoxic thyroid nodule     Ocular histoplasmosis     Osteoarthritis     Peripheral neuropathy     PVC     Tinnitus        PAST SURGICAL HISTORY  Past Surgical History:   Procedure Laterality Date    AMPUTATION DIGIT Left     Digit #1    APPENDECTOMY  1962    BUNIONECTOMY Bilateral     CATARACT EXTRACTION      left    CATARACT EXTRACTION      x2    COLONOSCOPY      NEG = 2012/ 2021= TA, rech 2026    CYST REMOVAL Bilateral     WRIST    HYSTERECTOMY  1980    JOINT REPLACEMENT       Rt TKR x 2    JOINT REPLACEMENT      Left THR    LAPAROSCOPIC CHOLECYSTECTOMY      DR. LOBATO    SPINE SURGERY      Lumbar Fusion    SUBTOTAL HYSTERECTOMY         FAMILY HISTORY  Family History   Problem Relation Age of Onset    Asthma Mother     COPD Mother     Heart disease Father     Alcohol abuse Father     Other Father         WWII Gangrene/ Malaria    Arthritis Sister     Cancer Sister     Leukemia Sister     Heart attack Brother     Cancer Brother     Alcohol abuse Brother     Kidney disease Brother     Alzheimer's disease Brother     Heart disease Brother     Diabetes Brother     Hyperlipidemia Brother     Diabetes Son        SOCIAL HISTORY  Social History     Socioeconomic History    Marital status:    Tobacco Use    Smoking status: Never     Passive exposure: Never    Smokeless tobacco: Never   Vaping Use    Vaping Use: Never used   Substance and Sexual Activity    Alcohol use: Yes     Alcohol/week: 1.0 standard drink of alcohol     Types: 1 Glasses of wine per week     Comment: Rare    Drug use: No    Sexual activity: Never       REVIEW OF SYSTEMS  Review of Systems    All systems reviewed and negative except for those discussed in HPI.     PHYSICAL EXAM    I have reviewed the triage vital signs and nursing notes.  Vitals:    02/01/24 1238   BP: 153/83   Pulse: 96   Resp: 18   Temp: 98.9 °F (37.2 °C)   SpO2: 97%     Physical Exam    GENERAL: not distressed  HENT: nares patent, mm moist  EYES: no scleral icterus  NECK: no ROM limitations  CV: regular rhythm, regular rate  RESPIRATORY: normal effort  ABDOMEN: soft, mild right abdominal pain, no rebound/guarding  : deferred  MUSCULOSKELETAL: no deformity  NEURO: alert, moves all extremities, follows commands  SKIN: warm, dry    LAB RESULTS  Recent Results (from the past 24 hour(s))   Urinalysis With Culture If Indicated - Urine, Clean Catch    Collection Time: 02/01/24  2:41 PM    Specimen: Urine, Clean Catch   Result Value Ref Range    Color, UA  Yellow Yellow, Straw    Appearance, UA Slightly Cloudy (A) Clear    pH, UA 6.0 5.0 - 8.0    Specific Gravity, UA 1.010 1.005 - 1.030    Glucose, UA Negative Negative    Ketones, UA Negative Negative    Bilirubin, UA Negative Negative    Blood, UA Trace (A) Negative    Protein, UA Negative Negative    Leuk Esterase, UA Small (1+) (A) Negative    Nitrite, UA Negative Negative    Urobilinogen, UA 0.2 E.U./dL 0.2 - 1.0 E.U./dL   Comprehensive Metabolic Panel    Collection Time: 02/01/24  3:12 PM    Specimen: Blood   Result Value Ref Range    Glucose 201 (H) 65 - 99 mg/dL    BUN 22 8 - 23 mg/dL    Creatinine 1.16 (H) 0.57 - 1.00 mg/dL    Sodium 132 (L) 136 - 145 mmol/L    Potassium 3.5 3.5 - 5.2 mmol/L    Chloride 97 (L) 98 - 107 mmol/L    CO2 25.9 22.0 - 29.0 mmol/L    Calcium 10.6 (H) 8.6 - 10.5 mg/dL    Total Protein 6.7 6.0 - 8.5 g/dL    Albumin 4.0 3.5 - 5.2 g/dL    ALT (SGPT) 15 1 - 33 U/L    AST (SGOT) 15 1 - 32 U/L    Alkaline Phosphatase 80 39 - 117 U/L    Total Bilirubin 0.3 0.0 - 1.2 mg/dL    Globulin 2.7 gm/dL    A/G Ratio 1.5 g/dL    BUN/Creatinine Ratio 19.0 7.0 - 25.0    Anion Gap 9.1 5.0 - 15.0 mmol/L    eGFR 47.5 (L) >60.0 mL/min/1.73   CBC Auto Differential    Collection Time: 02/01/24  3:12 PM    Specimen: Blood   Result Value Ref Range    WBC 9.20 3.40 - 10.80 10*3/mm3    RBC 4.20 3.77 - 5.28 10*6/mm3    Hemoglobin 12.6 12.0 - 15.9 g/dL    Hematocrit 38.7 34.0 - 46.6 %    MCV 92.1 79.0 - 97.0 fL    MCH 30.0 26.6 - 33.0 pg    MCHC 32.6 31.5 - 35.7 g/dL    RDW 12.8 12.3 - 15.4 %    RDW-SD 43.2 37.0 - 54.0 fl    MPV 8.8 6.0 - 12.0 fL    Platelets 229 140 - 450 10*3/mm3    Neutrophil % 65.6 42.7 - 76.0 %    Lymphocyte % 27.5 19.6 - 45.3 %    Monocyte % 5.4 5.0 - 12.0 %    Eosinophil % 1.2 0.3 - 6.2 %    Basophil % 0.3 0.0 - 1.5 %    Immature Grans % 0.0 0.0 - 0.5 %    Neutrophils, Absolute 6.03 1.70 - 7.00 10*3/mm3    Lymphocytes, Absolute 2.53 0.70 - 3.10 10*3/mm3    Monocytes, Absolute 0.50 0.10 - 0.90  10*3/mm3    Eosinophils, Absolute 0.11 0.00 - 0.40 10*3/mm3    Basophils, Absolute 0.03 0.00 - 0.20 10*3/mm3    Immature Grans, Absolute 0.00 0.00 - 0.05 10*3/mm3       Ordered the above labs and independently interpreted results.  My findings will be discussed in the ED course or medical decision making section below    RADIOLOGY RESULTS  CT Abdomen Pelvis Stone Protocol    Result Date: 2/1/2024  CT ABDOMEN PELVIS STONE PROTOCOL Date of Exam: 2/1/2024 3:27 PM EST Indication: Flank pain, kidney stone suspected. Comparison: 1/17/2023 Technique: Axial CT images were obtained of the abdomen and pelvis without the administration of contrast. Sagittal and coronal reconstructions were performed.  Automated exposure control and iterative reconstruction methods were used. FINDINGS: The lung bases are clear without evidence for focal consolidation or significant pleural effusion. The liver, spleen, and pancreas are unremarkable without contrast. The patient is status post prior cholecystectomy. The bilateral adrenal glands are symmetric and unremarkable without contrast. No definitive nephrolithiasis is seen bilaterally. There is no hydronephrosis or hydroureter. There is no evidence for obstructive uropathy. No stones are seen in the bladder. The bilateral kidneys are otherwise unremarkable without contrast. There is no bowel dilatation or obstruction. No significant free fluid is observed. No abnormal fluid collections are identified. No significant lymphadenopathy is seen throughout the abdomen or pelvis. The bladder is partially decompressed. Mild atherosclerosis is noted. No acute osseous abnormalities are observed.     1.No evidence for significant nephrolithiasis. There is no evidence for obstructive uropathy. 2.No evidence for acute abnormality throughout the abdomen or pelvis on this noncontrast exam. Electronically Signed: Lion Marrero MD  2/1/2024 3:44 PM EST  Workstation ID: [id number]      Ordered the above  noted radiological studies.  Independently interpreted by me.  My findings will be discussed in the medical decision section below.     PROGRESS, DATA ANALYSIS, CONSULTS AND MEDICAL DECISION MAKING    Please note that this section constitutes my independent interpretation of clinical data including lab results, radiology, EKG's.  This constitutes my independent professional opinion regarding differential diagnosis and management of this patient.  It may include any factors such as history from outside sources, review of external records, social determinants of health, management of medications, response to those treatments, and discussions with other providers.    ED Course as of 02/01/24 1713   Thu Feb 01, 2024   5714 I updated the patient that I will check some basic labs since she feels so poorly and also a scan to rule out renal stone.  She tells me that Dr. Long  has recently changed her diabetes meds and she has follow-up with Dr. Gold on fibber the eighth.  I am adding a hemoglobin A1c today which will help Dr. Long evaluate the diabetes medication changes. [EW]   1625 CT  negative for stones.  [EW]      ED Course User Index  [EW] Leesa Herr APRN     Orders placed during this visit:  Orders Placed This Encounter   Procedures    CT Abdomen Pelvis Stone Protocol    Urinalysis With Culture If Indicated - Urine, Clean Catch    Urinalysis, Microscopic Only - Urine, Clean Catch    [place] Urine Culture Tube -    Comprehensive Metabolic Panel    Hemoglobin A1c    CBC Auto Differential    Blood Draw With IV Start    Insert peripheral IV    CBC & Differential    ED Acknowledgement Form Needed;            Medical Decision Making  Problems Addressed:  Acute UTI: complicated acute illness or injury    Amount and/or Complexity of Data Reviewed  Labs: ordered.  Radiology: ordered.    Risk  Prescription drug management.    This patient presents with symptoms consistent with acute uncomplicated cystitis. No  systemic symptoms. Not septic. Well appearing. Low suspicion for acute pyelonephritis given lack of fever, CVAT, or systemic features. Low suspicion for kidney stone or infected stone. I did check labs given her age and comorbidities.  CT negative for renal stone. Prior urine culture sensitive to multiple agents.  We chose Keflex.         DIAGNOSIS  Final diagnoses:   Acute UTI          Medication List        New Prescriptions      cephalexin 500 MG capsule  Commonly known as: KEFLEX  Take 1 capsule by mouth 3 (Three) Times a Day for 7 days.               Where to Get Your Medications        These medications were sent to [facility] [id number] - [place], [place] - [address]  - [phone] PH - [phone] FX  [address], [place] [address]      Phone: [phone]   cephalexin 500 MG capsule         FOLLOW-UP  Henny Long,   [address]  [address]  [place] [address]  [phone]    Schedule an appointment as soon as possible for a visit in 1 week          Latest Documented Vital Signs:  As of 17:13 EST  BP- 153/83 HR- 96 Temp- 98.9 °F (37.2 °C) O2 sat- 97%    Appropriate PPE utilized throughout this patient encounter to include mask, if indicated, per current protocol. Hand hygiene was performed before donning PPE and after removal when leaving the room.    Please note that portions of this were completed with a voice recognition program.     Note Disclaimer: At [facility], we believe that sharing information builds trust and better relationships. You are receiving this note because you are receiving care at [facility] or recently visited. It is possible you will see health information before a provider has talked with you about it. This kind of information can be easy to misunderstand. To help you fully understand what it means for your health, we urge you to discuss this note with your provider.

## 2024-02-01 NOTE — DISCHARGE INSTRUCTIONS
Always wipe front to back  Wear cotton panties    Return Precautions    Although you are being discharged from the ED today, I encourage you to return for worsening symptoms.  Things can, and do, change such that treatment at home with medication may not be adequate.      Specifically, return for any of the following:    Chest pain, shortness of breath, pain or nausea and vomiting not controlled by medications provided.    Please make a follow up with your Primary Care Provider for a blood pressure recheck.

## 2024-02-01 NOTE — ED NOTES
Pt is unable to give urine specimen at this time. Pt states she did urinate several times at home today.

## 2024-02-02 DIAGNOSIS — G47.00 INSOMNIA, UNSPECIFIED TYPE: ICD-10-CM

## 2024-02-02 RX ORDER — ESZOPICLONE 3 MG/1
TABLET, FILM COATED ORAL
Qty: 90 TABLET | Refills: 0 | Status: SHIPPED | OUTPATIENT
Start: 2024-02-02

## 2024-02-02 RX ORDER — DULOXETIN HYDROCHLORIDE 60 MG/1
CAPSULE, DELAYED RELEASE ORAL
Qty: 90 CAPSULE | Refills: 0 | Status: SHIPPED | OUTPATIENT
Start: 2024-02-02

## 2024-02-02 NOTE — TELEPHONE ENCOUNTER
INSPECT [facility]AN    Rx Refill Note  Requested Prescriptions     Pending Prescriptions Disp Refills    eszopiclone (LUNESTA) 3 MG tablet [Pharmacy Med Name: Eszopiclone Oral Tablet 3 MG] 90 tablet 0     Sig: TAKE 1 TABLET BY MOUTH AT NIGHT (IMMEDIATELY BEFORE BEDTIME) AS NEEDED FOR SLEEP      Last office visit with prescribing clinician: 1/5/2024   Last telemedicine visit with prescribing clinician: Visit date not found   Next office visit with prescribing clinician: 2/8/2024                         Would you like a call back once the refill request has been completed: [] Yes [] No    If the office needs to give you a call back, can they leave a voicemail: [] Yes [] No    Miriam Mello, RT  02/02/24, 11:08 EST

## 2024-02-08 ENCOUNTER — OFFICE VISIT (OUTPATIENT)
Dept: FAMILY MEDICINE CLINIC | Facility: CLINIC | Age: 84
End: 2024-02-08
Payer: MEDICARE

## 2024-02-08 VITALS
RESPIRATION RATE: 16 BRPM | HEART RATE: 86 BPM | DIASTOLIC BLOOD PRESSURE: 76 MMHG | TEMPERATURE: 97.8 F | OXYGEN SATURATION: 97 % | BODY MASS INDEX: 29.37 KG/M2 | HEIGHT: 64 IN | SYSTOLIC BLOOD PRESSURE: 149 MMHG | WEIGHT: 172 LBS

## 2024-02-08 DIAGNOSIS — F41.9 ANXIETY: ICD-10-CM

## 2024-02-08 DIAGNOSIS — E11.42 TYPE 2 DIABETES MELLITUS WITH PERIPHERAL NEUROPATHY: Primary | ICD-10-CM

## 2024-02-08 DIAGNOSIS — E78.2 MIXED HYPERLIPIDEMIA: ICD-10-CM

## 2024-02-08 DIAGNOSIS — I10 PRIMARY HYPERTENSION: ICD-10-CM

## 2024-02-08 DIAGNOSIS — R82.90 ABNORMAL URINALYSIS: ICD-10-CM

## 2024-02-08 DIAGNOSIS — N28.9 RENAL INSUFFICIENCY: ICD-10-CM

## 2024-02-08 LAB
BILIRUB BLD-MCNC: NEGATIVE MG/DL
CLARITY, POC: CLEAR
COLOR UR: YELLOW
EXPIRATION DATE: ABNORMAL
GLUCOSE UR STRIP-MCNC: ABNORMAL MG/DL
KETONES UR QL: NEGATIVE
LEUKOCYTE EST, POC: ABNORMAL
Lab: ABNORMAL
NITRITE UR-MCNC: NEGATIVE MG/ML
PH UR: 6 [PH] (ref 5–8)
PROT UR STRIP-MCNC: NEGATIVE MG/DL
RBC # UR STRIP: NEGATIVE /UL
SP GR UR: 1.01 (ref 1–1.03)
UROBILINOGEN UR QL: NORMAL

## 2024-02-08 PROCEDURE — 3077F SYST BP >= 140 MM HG: CPT | Performed by: FAMILY MEDICINE

## 2024-02-08 PROCEDURE — 1159F MED LIST DOCD IN RCRD: CPT | Performed by: FAMILY MEDICINE

## 2024-02-08 PROCEDURE — 1160F RVW MEDS BY RX/DR IN RCRD: CPT | Performed by: FAMILY MEDICINE

## 2024-02-08 PROCEDURE — 36415 COLL VENOUS BLD VENIPUNCTURE: CPT | Performed by: FAMILY MEDICINE

## 2024-02-08 PROCEDURE — 3078F DIAST BP <80 MM HG: CPT | Performed by: FAMILY MEDICINE

## 2024-02-08 PROCEDURE — 99213 OFFICE O/P EST LOW 20 MIN: CPT | Performed by: FAMILY MEDICINE

## 2024-02-08 PROCEDURE — 81003 URINALYSIS AUTO W/O SCOPE: CPT | Performed by: FAMILY MEDICINE

## 2024-02-08 PROCEDURE — 87086 URINE CULTURE/COLONY COUNT: CPT | Performed by: FAMILY MEDICINE

## 2024-02-08 PROCEDURE — 80048 BASIC METABOLIC PNL TOTAL CA: CPT | Performed by: FAMILY MEDICINE

## 2024-02-08 RX ORDER — ATENOLOL 25 MG/1
25 TABLET ORAL NIGHTLY
Qty: 30 TABLET | Refills: 2 | Status: SHIPPED | OUTPATIENT
Start: 2024-02-08

## 2024-02-08 RX ORDER — BUSPIRONE HYDROCHLORIDE 10 MG/1
10 TABLET ORAL 3 TIMES DAILY
Qty: 90 TABLET | Refills: 1 | Status: SHIPPED | OUTPATIENT
Start: 2024-02-08 | End: 2024-02-12 | Stop reason: SDUPTHER

## 2024-02-08 NOTE — PROGRESS NOTES
Subjective   Monica Pagan is a 83 y.o. female.     Chief Complaint   Patient presents with    Hypertension    Diabetes    Fatigue         Current Outpatient Medications:     albuterol sulfate  (90 Base) MCG/ACT inhaler, Inhale 2 puffs Every 6 (Six) Hours As Needed for Wheezing or Shortness of Air (cough)., Disp: 18 g, Rfl: 0    Blood Glucose Monitoring Suppl (ONE TOUCH ULTRA MINI) w/Device kit, 1 kit Daily., Disp: 1 each, Rfl: 0    cyanocobalamin (VITAMIN B-12) 2000 MCG tablet tablet, Take 1 tablet by mouth Daily., Disp: , Rfl:     DULoxetine (CYMBALTA) 60 MG capsule, TAKE 1 CAPSULE BY MOUTH DAILY, Disp: 90 capsule, Rfl: 0    eszopiclone (LUNESTA) 3 MG tablet, TAKE 1 TABLET BY MOUTH AT NIGHT (IMMEDIATELY BEFORE BEDTIME) AS NEEDED FOR SLEEP, Disp: 90 tablet, Rfl: 0    Fluticasone-Umeclidin-Vilant (Trelegy Ellipta) 100-62.5-25 MCG/ACT inhaler, Inhale 1 puff Daily., Disp: 60 each, Rfl: 2    glucose blood (True Metrix Blood Glucose Test) test strip, 1 each by Other route 3 (Three) Times a Day Before Meals. Use as instructed, Disp: 100 each, Rfl: 1    guaiFENesin (MUCINEX) 600 MG 12 hr tablet, Take 1 tablet by mouth 2 (Two) Times a Day As Needed for Cough or Congestion., Disp: 60 tablet, Rfl: 2    insulin detemir (Levemir FlexPen) 100 UNIT/ML injection, Inject 28 Units under the skin into the appropriate area as directed Daily., Disp: , Rfl:     Lancets (onetouch ultrasoft) lancets, Use to check BS QAC/ QHS, Disp: 360 each, Rfl: 1    loperamide (IMODIUM) 2 MG capsule, Take 1 capsule by mouth 4 (Four) Times a Day As Needed for Diarrhea., Disp: , Rfl:     nystatin (MYCOSTATIN) 151519 UNIT/GM cream, Apply  topically to the appropriate area as directed As Needed (groin rash)., Disp: 90 g, Rfl: 0    phenazopyridine (Pyridium) 200 MG tablet, Take 1 tablet by mouth 3 (Three) Times a Day As Needed for Dysuria., Disp: 30 tablet, Rfl: 0    rosuvastatin (CRESTOR) 5 MG tablet, TAKE ONE TABLET BY MOUTH DAILY, Disp: 90  tablet, Rfl: 3    triamcinolone (KENALOG) 0.1 % cream, Apply  topically to the appropriate area as directed 2 (Two) Times a Day., Disp: 45 g, Rfl: 0    triamterene-hydrochlorothiazide (Dyazide) 37.5-25 MG per capsule, Take 1 capsule by mouth Every Morning., Disp: 90 capsule, Rfl: 1    verapamil (VERELAN) 100 MG 24 hr capsule, TAKE ONE CAPSULE BY MOUTH ONCE NIGHTLY, Disp: 90 capsule, Rfl: 0    atenolol (Tenormin) 25 MG tablet, Take 1 tablet by mouth Every Night., Disp: 30 tablet, Rfl: 2    busPIRone (BUSPAR) 7.5 MG tablet, Take 1 tablet by mouth 3 (Three) Times a Day., Disp: 90 tablet, Rfl: 0    dapagliflozin (Farxiga) 5 MG tablet tablet, Take 1 tablet by mouth Daily., Disp: 30 tablet, Rfl: 0    Past Medical History:   Diagnosis Date    Anemia     Anxiety     Childhood Asthma     Colon polyp     TA    DDD  lumbar     DEXA     DMII     Fibromyalgia     Hyperlipidemia     Hypertension     IBS     Insomnia     Lumbar spinal stenosis     Macular degeneration, left eye     MAMMO     NEG= 2020/ 2022    Meniere's disease     Nontoxic thyroid nodule     Ocular histoplasmosis     Osteoarthritis     Peripheral neuropathy     PVC     Spondylosis     Tinnitus     Tremor     Trigeminal neuralgia     Vitamin D deficiency        Past Surgical History:   Procedure Laterality Date    AMPUTATION DIGIT Left     Digit #1    APPENDECTOMY  1962    BUNIONECTOMY Bilateral     CATARACT EXTRACTION      left    CATARACT EXTRACTION      x2    COLONOSCOPY      NEG = 2012/ 2021= TA, rech 2026    CYST REMOVAL Bilateral     WRIST    HYSTERECTOMY  1980    JOINT REPLACEMENT      Rt TKR x 2    JOINT REPLACEMENT      Left THR    LAPAROSCOPIC CHOLECYSTECTOMY      DR. LOBATO    SPINE SURGERY      Lumbar Fusion    SUBTOTAL HYSTERECTOMY         Family History   Problem Relation Age of Onset    Asthma Mother     COPD Mother     Heart disease Father     Alcohol abuse Father     Other Father         WWII Gangrene/ Malaria    Arthritis Sister     Cancer Sister      Leukemia Sister     Heart attack Brother     Cancer Brother     Alcohol abuse Brother     Kidney disease Brother     Alzheimer's disease Brother     Heart disease Brother     Diabetes Brother     Hyperlipidemia Brother     Diabetes Son        Social History     Socioeconomic History    Marital status:    Tobacco Use    Smoking status: Never     Passive exposure: Never    Smokeless tobacco: Never   Vaping Use    Vaping Use: Never used   Substance and Sexual Activity    Alcohol use: Yes     Alcohol/week: 1.0 standard drink of alcohol     Types: 1 Glasses of wine per week     Comment: Rare    Drug use: No    Sexual activity: Never       History of Present Illness     The patient is an 83-year-old female who is here for 1-month follow-up on diabetes and hypertension.    Her blood pressure has been elevated since she stopped taking atenolol. Yesterday, 02/07/2024 she got a phone call from rehab facility where her daughter lives and her blood pressure went up to 191/101 mmHg. She checked her BP  because she was not feeling great.  She checks her blood pressure when she hears a loud noise in her head.    She has 1 more day of her antibiotic left. She went to urgent care because she was getting very weak and was afraid that she was cutting back on her food.    She is on Levemir 28 units a day and uses a sliding scale for meals. She was on metformin. She was taken off of it because her kidneys were dry. She has not tried Farxiga. She was put on insulin because her blood glucose leveles were in the high 100s.    She is shaky inside. She has a lot of stress. She is trying breathing techniques. She is taking BuSpar 3 times a day.    She has tingling in her legs every time she needs to go to the bathroom. Her bladder goes into spasms. Her doctor is messing with the stimulator. She does not have an appointment with urology until 4/2024. She is making headway. She was urinating on herself when she first went to the  urologist. She had MRSA when she had back surgery.      The following portions of the patient's history were reviewed and updated as appropriate: allergies, current medications, past family history, past medical history, past social history, past surgical history and problem list.    Review of Systems   Constitutional:  Positive for fatigue.   Psychiatric/Behavioral:  Positive for stress.        Vitals:    02/08/24 1027   BP: 149/76   Pulse: 86   Resp: 16   Temp: 97.8 °F (36.6 °C)   SpO2: 97%       Objective   Physical Exam  Vitals and nursing note reviewed.   Constitutional:       General: She is not in acute distress.     Appearance: She is well-developed. She is not ill-appearing or toxic-appearing.   HENT:      Head: Normocephalic and atraumatic.   Cardiovascular:      Rate and Rhythm: Normal rate and regular rhythm.      Heart sounds: Normal heart sounds. No murmur heard.  Pulmonary:      Effort: Pulmonary effort is normal. No respiratory distress.      Breath sounds: Normal breath sounds. No wheezing, rhonchi or rales.   Skin:     General: Skin is warm and dry.      Findings: No rash.   Neurological:      Mental Status: She is alert and oriented to person, place, and time.   Psychiatric:         Behavior: Behavior normal.         Thought Content: Thought content normal.         Judgment: Judgment normal.         BMI is >= 25 and <30. (Overweight) The following options were offered after discussion;: exercise counseling/recommendations and nutrition counseling/recommendations      Assessment & Plan   Diagnoses and all orders for this visit:    1. Type 2 diabetes mellitus with peripheral neuropathy (Primary)    2. Primary hypertension    3. Anxiety    4. Renal insufficiency  -     Basic Metabolic Panel    5. Abnormal urinalysis  -     POC Urinalysis Dipstick, Automated  -     Urine Culture - Urine, Urine, Clean Catch    6. Mixed hyperlipidemia    Other orders  -     atenolol (Tenormin) 25 MG tablet; Take 1  tablet by mouth Every Night.  Dispense: 30 tablet; Refill: 2  -     Discontinue: busPIRone (BUSPAR) 10 MG tablet; Take 1 tablet by mouth 3 (Three) Times a Day.  Dispense: 90 tablet; Refill: 1  -     dapagliflozin (Farxiga) 5 MG tablet tablet; Take 1 tablet by mouth Daily.  Dispense: 30 tablet; Refill: 0        1. Hypertension.  Her blood pressure is elevated today. I will restart her on atenolol 12.5 mg. She will take 1 tablet at night and monitor her blood pressure.     2. Diabetes.  Her A1c is 7.2 percent. She will take Farxiga 5 mg 1 tablet a day with the first meal of the day. She will take Levemir 28 units once a day.    3. Anxiety.  I will increase her BuSpar to 10 mg 3 times a day.    4. Urinary tract infection.  Her sodium was low. I will order a urinalysis and BMP.     Follow-up  The patient will follow up in 3 months with fasting labs.         Transcribed from ambient dictation for Henny Long DO by Aureliano Diaz.  02/08/24   12:44 EST    Patient or patient representative verbalized consent to the visit recording.  I have personally performed the services described in this document as transcribed by the above individual, and it is both accurate and complete.

## 2024-02-08 NOTE — PROGRESS NOTES
Venipuncture performed in right arm by Paula Caldwell CMA  with good hemostasis. Patient tolerated well. 02/08/24 Paula Caldwell CMA

## 2024-02-09 LAB
ANION GAP SERPL CALCULATED.3IONS-SCNC: 9 MMOL/L (ref 5–15)
BACTERIA SPEC AEROBE CULT: NO GROWTH
BUN SERPL-MCNC: 15 MG/DL (ref 8–23)
BUN/CREAT SERPL: 12.4 (ref 7–25)
CALCIUM SPEC-SCNC: 10.4 MG/DL (ref 8.6–10.5)
CHLORIDE SERPL-SCNC: 101 MMOL/L (ref 98–107)
CO2 SERPL-SCNC: 26 MMOL/L (ref 22–29)
CREAT SERPL-MCNC: 1.21 MG/DL (ref 0.57–1)
EGFRCR SERPLBLD CKD-EPI 2021: 45.1 ML/MIN/1.73
GLUCOSE SERPL-MCNC: 319 MG/DL (ref 65–99)
POTASSIUM SERPL-SCNC: 4 MMOL/L (ref 3.5–5.2)
SODIUM SERPL-SCNC: 136 MMOL/L (ref 136–145)

## 2024-02-12 RX ORDER — BUSPIRONE HYDROCHLORIDE 7.5 MG/1
7.5 TABLET ORAL 3 TIMES DAILY
Start: 2024-02-12 | End: 2024-02-15

## 2024-02-15 RX ORDER — BUSPIRONE HYDROCHLORIDE 5 MG/1
15 TABLET ORAL 3 TIMES DAILY
Status: SHIPPED
Start: 2024-02-15

## 2024-02-23 NOTE — TELEPHONE ENCOUNTER
Rx Refill Note  Requested Prescriptions     Pending Prescriptions Disp Refills    busPIRone (BUSPAR) 7.5 MG tablet [Pharmacy Med Name: busPIRone HCL 7.5 MG TABLET] 90 tablet 0     Sig: TAKE ONE TABLET BY MOUTH THREE TIMES A DAY    verapamil (VERELAN) 100 MG 24 hr capsule [Pharmacy Med Name: VERAPAMIL 24HR ER 100MG PLT CP (PM)] 90 capsule 0     Sig: TAKE ONE CAPSULE BY MOUTH ONCE NIGHTLY      Last office visit with prescribing clinician: 2/8/2024   Last telemedicine visit with prescribing clinician: Visit date not found   Next office visit with prescribing clinician: Visit date not found        Comprehensive Metabolic Panel (09/29/2023 10:38)                  Would you like a call back once the refill request has been completed: [] Yes [] No    If the office needs to give you a call back, can they leave a voicemail: [] Yes [] No    Miriam Mello, RT  02/23/24, 08:39 EST

## 2024-02-27 RX ORDER — BUSPIRONE HYDROCHLORIDE 7.5 MG/1
TABLET ORAL
Qty: 90 TABLET | Refills: 0 | OUTPATIENT
Start: 2024-02-27

## 2024-02-27 RX ORDER — VERAPAMIL HYDROCHLORIDE 100 MG/1
100 CAPSULE, EXTENDED RELEASE ORAL NIGHTLY
Qty: 90 CAPSULE | Refills: 0 | OUTPATIENT
Start: 2024-02-27

## 2024-02-27 RX ORDER — BUSPIRONE HYDROCHLORIDE 7.5 MG/1
7.5 TABLET ORAL 3 TIMES DAILY
Qty: 90 TABLET | Refills: 0 | Status: SHIPPED | OUTPATIENT
Start: 2024-02-27

## 2024-02-29 RX ORDER — BUSPIRONE HYDROCHLORIDE 7.5 MG/1
7.5 TABLET ORAL 3 TIMES DAILY
Qty: 90 TABLET | Refills: 0 | OUTPATIENT
Start: 2024-02-29

## 2024-02-29 RX ORDER — VERAPAMIL HYDROCHLORIDE 100 MG/1
100 CAPSULE, EXTENDED RELEASE ORAL NIGHTLY
Qty: 90 CAPSULE | Refills: 0 | Status: SHIPPED | OUTPATIENT
Start: 2024-02-29

## 2024-02-29 NOTE — TELEPHONE ENCOUNTER
Rx Refill Note  Requested Prescriptions     Pending Prescriptions Disp Refills    verapamil (VERELAN) 100 MG 24 hr capsule [Pharmacy Med Name: VERAPAMIL 24HR ER 100MG PLT CP (PM)] 90 capsule 0     Sig: TAKE ONE CAPSULE BY MOUTH ONCE NIGHTLY     Refused Prescriptions Disp Refills    busPIRone (BUSPAR) 7.5 MG tablet [Pharmacy Med Name: busPIRone HCL 7.5 MG TABLET] 90 tablet 0     Sig: TAKE ONE TABLET BY MOUTH THREE TIMES A DAY      Last office visit with prescribing clinician: 2/8/2024   Last telemedicine visit with prescribing clinician: Visit date not found   Next office visit with prescribing clinician: 3/5/2024                         Would you like a call back once the refill request has been completed: [] Yes [] No    If the office needs to give you a call back, can they leave a voicemail: [] Yes [] No    Miriam Mello, RT  02/29/24, 10:23 EST

## 2024-03-05 ENCOUNTER — OFFICE VISIT (OUTPATIENT)
Dept: FAMILY MEDICINE CLINIC | Facility: CLINIC | Age: 84
End: 2024-03-05
Payer: MEDICARE

## 2024-03-05 VITALS
TEMPERATURE: 97.8 F | BODY MASS INDEX: 29.02 KG/M2 | RESPIRATION RATE: 18 BRPM | DIASTOLIC BLOOD PRESSURE: 71 MMHG | HEIGHT: 64 IN | HEART RATE: 86 BPM | SYSTOLIC BLOOD PRESSURE: 115 MMHG | WEIGHT: 170 LBS | OXYGEN SATURATION: 97 %

## 2024-03-05 DIAGNOSIS — N28.9 RENAL INSUFFICIENCY: ICD-10-CM

## 2024-03-05 DIAGNOSIS — U07.1 COVID-19 VIRUS DETECTED: Primary | ICD-10-CM

## 2024-03-05 DIAGNOSIS — G93.31 POSTVIRAL FATIGUE SYNDROME: ICD-10-CM

## 2024-03-05 DIAGNOSIS — E11.42 TYPE 2 DIABETES MELLITUS WITH PERIPHERAL NEUROPATHY: ICD-10-CM

## 2024-03-05 PROCEDURE — 80048 BASIC METABOLIC PNL TOTAL CA: CPT | Performed by: FAMILY MEDICINE

## 2024-03-05 RX ORDER — INSULIN DETEMIR 100 [IU]/ML
28 INJECTION, SOLUTION SUBCUTANEOUS NIGHTLY
Status: SHIPPED
Start: 2024-03-05

## 2024-03-05 RX ORDER — BUSPIRONE HYDROCHLORIDE 7.5 MG/1
7.5 TABLET ORAL 3 TIMES DAILY
Qty: 90 TABLET | Refills: 0 | Status: SHIPPED | OUTPATIENT
Start: 2024-03-05

## 2024-03-05 RX ORDER — ALBUTEROL SULFATE 90 UG/1
2 AEROSOL, METERED RESPIRATORY (INHALATION) EVERY 6 HOURS PRN
Start: 2024-03-05

## 2024-03-05 NOTE — PROGRESS NOTES
Venipuncture performed in right arm by Paula Caldwell CMA  with good hemostasis. Patient tolerated well. 03/05/24 Paula Caldwell CMA

## 2024-03-05 NOTE — PROGRESS NOTES
Subjective   Monica Pagan is a 83 y.o. female.     Chief Complaint   Patient presents with    Transitional Care Management     Hospital follow up on COVID from 2/24/24         Current Outpatient Medications:     albuterol sulfate  (90 Base) MCG/ACT inhaler, Inhale 2 puffs Every 6 (Six) Hours As Needed for Wheezing or Shortness of Air (cough)., Disp: , Rfl:     Blood Glucose Monitoring Suppl (ONE TOUCH ULTRA MINI) w/Device kit, 1 kit Daily., Disp: 1 each, Rfl: 0    busPIRone (BUSPAR) 7.5 MG tablet, Take 1 tablet by mouth 3 (Three) Times a Day., Disp: 90 tablet, Rfl: 0    cyanocobalamin (VITAMIN B-12) 2000 MCG tablet tablet, Take 1 tablet by mouth Daily., Disp: , Rfl:     DULoxetine (CYMBALTA) 60 MG capsule, TAKE 1 CAPSULE BY MOUTH DAILY, Disp: 90 capsule, Rfl: 0    eszopiclone (LUNESTA) 3 MG tablet, TAKE 1 TABLET BY MOUTH AT NIGHT (IMMEDIATELY BEFORE BEDTIME) AS NEEDED FOR SLEEP, Disp: 90 tablet, Rfl: 0    Fluticasone-Umeclidin-Vilant (Trelegy Ellipta) 100-62.5-25 MCG/ACT inhaler, Inhale 1 puff Daily., Disp: 60 each, Rfl: 2    glucose blood (True Metrix Blood Glucose Test) test strip, 1 each by Other route 3 (Three) Times a Day Before Meals. Use as instructed, Disp: 100 each, Rfl: 1    guaiFENesin (MUCINEX) 600 MG 12 hr tablet, Take 1 tablet by mouth 2 (Two) Times a Day As Needed for Cough or Congestion., Disp: 60 tablet, Rfl: 2    insulin detemir (Levemir FlexPen) 100 UNIT/ML injection, Inject 28 Units under the skin into the appropriate area as directed Every Night., Disp: , Rfl:     Lancets (onetouch ultrasoft) lancets, Use to check BS QAC/ QHS, Disp: 360 each, Rfl: 1    loperamide (IMODIUM) 2 MG capsule, Take 1 capsule by mouth 4 (Four) Times a Day As Needed for Diarrhea., Disp: , Rfl:     metFORMIN (GLUCOPHAGE) 1000 MG tablet, Take 0.5 tablets by mouth 2 (Two) Times a Day With Meals., Disp: , Rfl:     nystatin (MYCOSTATIN) 208729 UNIT/GM cream, Apply  topically to the appropriate area as directed As  Needed (groin rash)., Disp: 90 g, Rfl: 0    rosuvastatin (CRESTOR) 5 MG tablet, TAKE ONE TABLET BY MOUTH DAILY, Disp: 90 tablet, Rfl: 3    triamcinolone (KENALOG) 0.1 % cream, Apply  topically to the appropriate area as directed 2 (Two) Times a Day., Disp: 45 g, Rfl: 0    verapamil (VERELAN) 100 MG 24 hr capsule, TAKE ONE CAPSULE BY MOUTH ONCE NIGHTLY, Disp: 90 capsule, Rfl: 0    vitamin D3 125 MCG (5000 UT) capsule capsule, 1 po once a month, Disp: , Rfl:     Past Medical History:   Diagnosis Date    Anemia     Anxiety     Childhood Asthma     Colon polyp     TA    DDD  lumbar     DEXA     DMII     Fibromyalgia     Hyperlipidemia     Hypertension     IBS     Insomnia     Lumbar spinal stenosis     Macular degeneration, left eye     MAMMO     NEG= 2020/ 2022    Meniere's disease     Nontoxic thyroid nodule     Ocular histoplasmosis     Osteoarthritis     Peripheral neuropathy     PVC     Spondylosis     Tinnitus     Tremor     Trigeminal neuralgia     Vitamin D deficiency        Past Surgical History:   Procedure Laterality Date    AMPUTATION DIGIT Left     Digit #1    APPENDECTOMY  1962    BUNIONECTOMY Bilateral     CATARACT EXTRACTION      left    CATARACT EXTRACTION      x2    COLONOSCOPY      NEG = 2012/ 2021= TA, rech 2026    CYST REMOVAL Bilateral     WRIST    HYSTERECTOMY  1980    JOINT REPLACEMENT      Rt TKR x 2    JOINT REPLACEMENT      Left THR    LAPAROSCOPIC CHOLECYSTECTOMY      DR. LOBATO    SPINE SURGERY      Lumbar Fusion    SUBTOTAL HYSTERECTOMY         Family History   Problem Relation Age of Onset    Asthma Mother     COPD Mother     Heart disease Father     Alcohol abuse Father     Other Father         WWII Gangrene/ Malaria    Arthritis Sister     Cancer Sister     Leukemia Sister     Heart attack Brother     Cancer Brother     Alcohol abuse Brother     Kidney disease Brother     Alzheimer's disease Brother     Heart disease Brother     Diabetes Brother     Hyperlipidemia Brother     Diabetes Son         Social History     Socioeconomic History    Marital status:    Tobacco Use    Smoking status: Never     Passive exposure: Never    Smokeless tobacco: Never   Vaping Use    Vaping status: Never Used   Substance and Sexual Activity    Alcohol use: Yes     Alcohol/week: 1.0 standard drink of alcohol     Types: 1 Glasses of wine per week     Comment: Rare    Drug use: No    Sexual activity: Never       History of Present Illness     The patient is an 83-year-old female who presents for a hospital follow-up.    She was admitted to [facility] from 02/24/2024 - 02/25/2024. She passed out around 3:00 AM, which scared her, but she has been sick since Tuesday. Her neighbor, who is a nurse, came over and gave her some fluids. She was kept in the hospital on Saturday and Sunday. She told them that she passed out, so they decided to do a syncopal workup. She was very weak on Tuesday and was at the hospital all day on Monday. She denies vomiting or diarrhea. She got up at 2:00 AM or 3:00 AM and passed out. She never passed out before. She had gone to the bathroom to get water. She was found to have COVID-19 when she went in for the syncopal episode. She was not given any medication for the COVID-19. She has been very tired since being home. She can take care of herself, but she cannot take food as she does not get hungry. She makes herself eat. She tries to move around quite a bit. She tries to  a little bit of things every day to do, but they are very difficult for her to do. She has to concentrate, which started with the COVID-19. She is still driving.    She was put back on metformin 1000 mg twice a day because she had an acute kidney injury. Her blood glucose levels has been 101, 128, 108, 105, and 101 mg/dL. She checks her blood glucose levels in the morning. She was taken off of Farxiga 5 mg.    She is supposed to follow up with cardiology in 3 weeks. Her atenolol was discontinued. She does not  normally need a rescue inhaler. She uses Trelegy every day. She has albuterol for emergencies. She is taking verapamil 100 mg 1 capsule at bedtime.    She is having hot flashes. She is lightheaded going from sit to stand.    The following portions of the patient's history were reviewed and updated as appropriate: allergies, current medications, past family history, past medical history, past social history, past surgical history and problem list.    Review of Systems   Constitutional:  Positive for activity change, appetite change, diaphoresis and fatigue. Negative for unexpected weight gain.   Respiratory:  Negative for cough, chest tightness and shortness of breath.    Cardiovascular:  Negative for chest pain, palpitations and leg swelling.   Gastrointestinal:  Negative for abdominal distention and diarrhea.   Musculoskeletal:  Negative for arthralgias and myalgias.   Neurological:  Positive for syncope and light-headedness. Negative for dizziness, facial asymmetry, speech difficulty, weakness, headache, memory problem and confusion.       Vitals:    03/05/24 1028   BP: 115/71   Pulse: 86   Resp: 18   Temp: 97.8 °F (36.6 °C)   SpO2: 97%       Objective   Physical Exam  Vitals and nursing note reviewed.   Constitutional:       General: She is not in acute distress.     Appearance: Normal appearance. She is well-developed. She is not ill-appearing or toxic-appearing.   HENT:      Head: Normocephalic and atraumatic.   Cardiovascular:      Rate and Rhythm: Normal rate and regular rhythm.      Heart sounds: Normal heart sounds. No murmur heard.  Pulmonary:      Effort: Pulmonary effort is normal. No respiratory distress.      Breath sounds: Normal breath sounds. No wheezing, rhonchi or rales.   Skin:     General: Skin is warm and dry.      Findings: No rash.   Neurological:      Mental Status: She is alert and oriented to person, place, and time.   Psychiatric:         Mood and Affect: Mood normal.         Behavior:  Behavior normal.         Thought Content: Thought content normal.         Judgment: Judgment normal.                Assessment & Plan   Diagnoses and all orders for this visit:    1. COVID-19 virus detected (Primary)    2. Postviral fatigue syndrome    3. Type 2 diabetes mellitus with peripheral neuropathy  -     insulin detemir (Levemir FlexPen) 100 UNIT/ML injection; Inject 28 Units under the skin into the appropriate area as directed Every Night.    4. Renal insufficiency  -     Basic Metabolic Panel    Other orders  -     albuterol sulfate  (90 Base) MCG/ACT inhaler; Inhale 2 puffs Every 6 (Six) Hours As Needed for Wheezing or Shortness of Air (cough).  -     busPIRone (BUSPAR) 7.5 MG tablet; Take 1 tablet by mouth 3 (Three) Times a Day.  Dispense: 90 tablet; Refill: 0      1. Hospital follow-up.  - She just got COVID-19 and does not feel good. She is still wiped out. She was advised to take Ensure for nutrition and vitamins. She was advised to take naps as needed, eat, drink, and move around.    2. Diabetes.  - She will take metformin 1000 mg 0.5 tablet twice a day. If her sugars start dropping, we will have to cut her insulin down.    3. Acute kidney injury.  - Her blood pressure is still running a little on the low end. I will recheck her kidney function today.    4. Hypertension.  - She will continue verapamil 100 mg 1 capsule at bedtime.    5. Anxiety.  - She will continue BuSpar 7.5 mg 3 times a day.    Follow-up  The patient will follow up in 05/2024.               Transcribed from ambient dictation for Henny Long DO by Edith Cruz.  03/05/24   11:12 EST    Patient or patient representative verbalized consent to the visit recording.  I have personally performed the services described in this document as transcribed by the above individual, and it is both accurate and complete.

## 2024-03-06 LAB
ANION GAP SERPL CALCULATED.3IONS-SCNC: 9 MMOL/L (ref 5–15)
BUN SERPL-MCNC: 14 MG/DL (ref 8–23)
BUN/CREAT SERPL: 12.1 (ref 7–25)
CALCIUM SPEC-SCNC: 10.3 MG/DL (ref 8.6–10.5)
CHLORIDE SERPL-SCNC: 106 MMOL/L (ref 98–107)
CO2 SERPL-SCNC: 23 MMOL/L (ref 22–29)
CREAT SERPL-MCNC: 1.16 MG/DL (ref 0.57–1)
EGFRCR SERPLBLD CKD-EPI 2021: 47.5 ML/MIN/1.73
GLUCOSE SERPL-MCNC: 204 MG/DL (ref 65–99)
POTASSIUM SERPL-SCNC: 4.4 MMOL/L (ref 3.5–5.2)
SODIUM SERPL-SCNC: 138 MMOL/L (ref 136–145)

## 2024-03-29 RX ORDER — BUSPIRONE HYDROCHLORIDE 7.5 MG/1
7.5 TABLET ORAL 3 TIMES DAILY
Qty: 270 TABLET | Refills: 0 | Status: SHIPPED | OUTPATIENT
Start: 2024-03-29

## 2024-03-29 NOTE — TELEPHONE ENCOUNTER
Rx Refill Note  Requested Prescriptions     Pending Prescriptions Disp Refills    busPIRone (BUSPAR) 7.5 MG tablet [Pharmacy Med Name: busPIRone HCL 7.5 MG TABLET] 90 tablet 0     Sig: TAKE 1 TABLET BY MOUTH 3 TIMES A DAY      Last office visit with prescribing clinician: 3/5/2024   Last telemedicine visit with prescribing clinician: Visit date not found   Next office visit with prescribing clinician: 5/6/2024     Basic Metabolic Panel (03/05/2024 11:01)   Basic Metabolic Panel (02/08/2024 11:26)   Hemoglobin A1c (02/01/2024 15:12)   Comprehensive Metabolic Panel (02/01/2024 15:12)                     Would you like a call back once the refill request has been completed: [] Yes [] No    If the office needs to give you a call back, can they leave a voicemail: [] Yes [] No    Paula Caldwell, RUPA  03/29/24, 12:25 EDT

## 2024-05-03 ENCOUNTER — TELEPHONE (OUTPATIENT)
Dept: FAMILY MEDICINE CLINIC | Facility: CLINIC | Age: 84
End: 2024-05-03
Payer: MEDICARE

## 2024-05-03 DIAGNOSIS — E11.42 TYPE 2 DIABETES MELLITUS WITH PERIPHERAL NEUROPATHY: Primary | ICD-10-CM

## 2024-05-03 DIAGNOSIS — I10 ESSENTIAL HYPERTENSION: ICD-10-CM

## 2024-05-03 DIAGNOSIS — E78.2 MIXED HYPERLIPIDEMIA: ICD-10-CM

## 2024-05-03 RX ORDER — DULOXETIN HYDROCHLORIDE 60 MG/1
CAPSULE, DELAYED RELEASE ORAL
Qty: 90 CAPSULE | Refills: 0 | Status: SHIPPED | OUTPATIENT
Start: 2024-05-03

## 2024-05-06 DIAGNOSIS — G47.00 INSOMNIA, UNSPECIFIED TYPE: ICD-10-CM

## 2024-05-06 RX ORDER — ESZOPICLONE 3 MG/1
TABLET, FILM COATED ORAL
Qty: 90 TABLET | Refills: 0 | Status: SHIPPED | OUTPATIENT
Start: 2024-05-06

## 2024-05-14 ENCOUNTER — OFFICE VISIT (OUTPATIENT)
Dept: FAMILY MEDICINE CLINIC | Facility: CLINIC | Age: 84
End: 2024-05-14
Payer: MEDICARE

## 2024-05-14 VITALS
HEART RATE: 85 BPM | BODY MASS INDEX: 29.19 KG/M2 | WEIGHT: 171 LBS | OXYGEN SATURATION: 93 % | TEMPERATURE: 97.5 F | DIASTOLIC BLOOD PRESSURE: 80 MMHG | RESPIRATION RATE: 14 BRPM | HEIGHT: 64 IN | SYSTOLIC BLOOD PRESSURE: 134 MMHG

## 2024-05-14 DIAGNOSIS — E11.42 TYPE 2 DIABETES MELLITUS WITH PERIPHERAL NEUROPATHY: Primary | ICD-10-CM

## 2024-05-14 DIAGNOSIS — E55.9 VITAMIN D DEFICIENCY: ICD-10-CM

## 2024-05-14 DIAGNOSIS — J45.20 MILD INTERMITTENT ASTHMA WITHOUT COMPLICATION: ICD-10-CM

## 2024-05-14 DIAGNOSIS — E78.2 MIXED HYPERLIPIDEMIA: ICD-10-CM

## 2024-05-14 DIAGNOSIS — I10 ESSENTIAL HYPERTENSION: ICD-10-CM

## 2024-05-14 LAB
EXPIRATION DATE: ABNORMAL
HBA1C MFR BLD: 7.1 % (ref 4.5–5.7)
Lab: ABNORMAL

## 2024-05-14 PROCEDURE — 80053 COMPREHEN METABOLIC PANEL: CPT | Performed by: FAMILY MEDICINE

## 2024-05-14 PROCEDURE — 82306 VITAMIN D 25 HYDROXY: CPT | Performed by: FAMILY MEDICINE

## 2024-05-14 PROCEDURE — 80061 LIPID PANEL: CPT | Performed by: FAMILY MEDICINE

## 2024-05-14 PROCEDURE — 82043 UR ALBUMIN QUANTITATIVE: CPT | Performed by: FAMILY MEDICINE

## 2024-05-14 RX ORDER — VERAPAMIL HYDROCHLORIDE 100 MG/1
100 CAPSULE, EXTENDED RELEASE ORAL NIGHTLY
Qty: 90 CAPSULE | Refills: 0 | Status: SHIPPED | OUTPATIENT
Start: 2024-05-14 | End: 2024-05-15 | Stop reason: SDUPTHER

## 2024-05-14 RX ORDER — ALBUTEROL SULFATE 90 UG/1
2 AEROSOL, METERED RESPIRATORY (INHALATION) EVERY 6 HOURS PRN
Start: 2024-05-14 | End: 2024-05-15 | Stop reason: SDUPTHER

## 2024-05-14 NOTE — PROGRESS NOTES
Subjective   Monica Pagan is a 83 y.o. female.     Chief Complaint   Patient presents with    Diabetes     Patients HgbA1c while in the office today is 7.1%    Hypertension         Current Outpatient Medications:     Blood Glucose Monitoring Suppl (ONE TOUCH ULTRA MINI) w/Device kit, 1 kit Daily., Disp: 1 each, Rfl: 0    cyanocobalamin (VITAMIN B-12) 2000 MCG tablet tablet, Take 1 tablet by mouth Daily., Disp: , Rfl:     DULoxetine (CYMBALTA) 60 MG capsule, TAKE 1 CAPSULE BY MOUTH DAILY, Disp: 90 capsule, Rfl: 0    eszopiclone (LUNESTA) 3 MG tablet, TAKE 1 TABLET BY MOUTH IMMEDIATELY BEFORE BEDTIME AS NEEDED FOR SLEEP, Disp: 90 tablet, Rfl: 0    glucose blood (True Metrix Blood Glucose Test) test strip, 1 each by Other route 3 (Three) Times a Day Before Meals. Use as instructed, Disp: 100 each, Rfl: 1    guaiFENesin (MUCINEX) 600 MG 12 hr tablet, Take 1 tablet by mouth 2 (Two) Times a Day As Needed for Cough or Congestion., Disp: 60 tablet, Rfl: 2    loperamide (IMODIUM) 2 MG capsule, Take 1 capsule by mouth 4 (Four) Times a Day As Needed for Diarrhea., Disp: , Rfl:     triamcinolone (KENALOG) 0.1 % cream, Apply  topically to the appropriate area as directed 2 (Two) Times a Day., Disp: 45 g, Rfl: 0    vitamin D3 125 MCG (5000 UT) capsule capsule, 1 po once a month, Disp: , Rfl:     albuterol sulfate  (90 Base) MCG/ACT inhaler, Inhale 2 puffs Every 6 (Six) Hours As Needed for Wheezing or Shortness of Air (cough)., Disp: 18 g, Rfl: 0    busPIRone (BUSPAR) 7.5 MG tablet, Take 1 tablet by mouth 3 (Three) Times a Day., Disp: 270 tablet, Rfl: 0    insulin detemir (Levemir FlexPen) 100 UNIT/ML injection, Inject 28 Units under the skin into the appropriate area as directed Every Night., Disp: 26 mL, Rfl: 1    Lancets (onetouch ultrasoft) lancets, Use to check BS QAC/ QHS, Disp: 360 each, Rfl: 1    metFORMIN (GLUCOPHAGE) 500 MG tablet, Take 1 tablet by mouth 2 (Two) Times a Day With Meals., Disp: 180 tablet, Rfl:  1    nystatin (MYCOSTATIN) 796449 UNIT/GM cream, Apply  topically to the appropriate area as directed 2 (Two) Times a Day As Needed (groin rash)., Disp: 90 g, Rfl: 0    rosuvastatin (CRESTOR) 5 MG tablet, Take 1 tablet by mouth Daily., Disp: 90 tablet, Rfl: 3    verapamil (VERELAN) 100 MG 24 hr capsule, Take 1 capsule by mouth Every Night., Disp: 90 capsule, Rfl: 0    Past Medical History:   Diagnosis Date    Anemia     Anxiety     Childhood Asthma     Colon polyp     TA    DDD  lumbar     DEXA     DMII     Fibromyalgia     Hyperlipidemia     Hypertension     IBS     Insomnia     Lumbar spinal stenosis     Macular degeneration, left eye     MAMMO     NEG= 2020/ 2022    Meniere's disease     Nontoxic thyroid nodule     Ocular histoplasmosis     Osteoarthritis     Peripheral neuropathy     PVC     Spondylosis     Tinnitus     Tremor     Trigeminal neuralgia     Vitamin D deficiency        Past Surgical History:   Procedure Laterality Date    AMPUTATION DIGIT Left     Digit #1    APPENDECTOMY  1962    BUNIONECTOMY Bilateral     CATARACT EXTRACTION      left    CATARACT EXTRACTION      x2    COLONOSCOPY      NEG = 2012/ 2021= TA, rech 2026    CYST REMOVAL Bilateral     WRIST    HYSTERECTOMY  1980    JOINT REPLACEMENT      Rt TKR x 2    JOINT REPLACEMENT      Left THR    LAPAROSCOPIC CHOLECYSTECTOMY      DR. LOBATO    SPINE SURGERY      Lumbar Fusion    SUBTOTAL HYSTERECTOMY         Family History   Problem Relation Age of Onset    Asthma Mother     COPD Mother     Heart disease Father     Alcohol abuse Father     Other Father         WWII Gangrene/ Malaria    Arthritis Sister     Cancer Sister     Leukemia Sister     Heart attack Brother     Cancer Brother     Alcohol abuse Brother     Kidney disease Brother     Alzheimer's disease Brother     Heart disease Brother     Diabetes Brother     Hyperlipidemia Brother     Diabetes Son        Social History     Socioeconomic History    Marital status:    Tobacco Use     Smoking status: Never     Passive exposure: Never    Smokeless tobacco: Never   Vaping Use    Vaping status: Never Used   Substance and Sexual Activity    Alcohol use: Yes     Alcohol/week: 1.0 standard drink of alcohol     Types: 1 Glasses of wine per week     Comment: Rare    Drug use: No    Sexual activity: Never       History of Present Illness  The patient is an 83-year-old female who is here for follow-up on diabetes/ HTN and needing fasting labs.    The patient has been monitoring her blood pressure at home, which has consistently remained above 134/80.   She has not consulted with Dr. Ring for an extended period, but intends to have her nails trimmed by Dr. Ring. Her last ophthalmology appointment was in 06/2024, and she has an upcoming appointment in 12/2024. She no longer undergoes mammograms. Her physical activity is limited to household chores.     The patient expresses concern about the efficacy of her vitamin D supplements in managing her arthritic symptoms. She manages her pain with Tylenol.  Aspirin has been contraindicated for her due to gastrointestinal upset. Topical arthritis medications have been tried.     She experiences right-sided back pain, hip clicking, and thigh pain during weather changes, but does not believe she requires emergency pain medication for these episodes. Heating pads and an electric blanket have been beneficial. She is a non-smoker, but was exposed to secondhand smoke during her childhood ------due to a severe asthma, she required hospitalization. She occasionally experiences difficulty breathing.   Her sister had leukemia.        The following portions of the patient's history were reviewed and updated as appropriate: allergies, current medications, past family history, past medical history, past social history, past surgical history and problem list.    Review of Systems   Constitutional:  Negative for activity change, appetite change, fatigue, unexpected weight gain  and unexpected weight loss.   Respiratory:  Positive for shortness of breath. Negative for cough, chest tightness and wheezing.    Cardiovascular:  Negative for chest pain, palpitations and leg swelling.   Genitourinary:  Negative for frequency, urgency and urinary incontinence.   Musculoskeletal:  Positive for arthralgias and back pain. Negative for myalgias.   Neurological:  Negative for dizziness, facial asymmetry, speech difficulty, weakness, light-headedness, headache, memory problem and confusion.       Vitals:    05/14/24 0921   BP: 134/80   Pulse: 85   Resp: 14   Temp: 97.5 °F (36.4 °C)   SpO2: 93%       Objective   Physical Exam  Physical Exam  Vital Signs  The patient's blood pressure is 134/80.     Assessment & Plan   Diagnoses and all orders for this visit:    1. Type 2 diabetes mellitus with peripheral neuropathy (Primary)  -     POC Glycosylated Hemoglobin (Hb A1C)  -     MicroAlbumin, Urine, Random - Urine, Clean Catch  -     Cancel: Hemoglobin A1c    2. Essential hypertension  -     Comprehensive Metabolic Panel    3. Vitamin D deficiency  -     Vitamin D,25-Hydroxy    4. Mixed hyperlipidemia  -     Lipid Panel    5. Mild intermittent asthma without complication    Other orders  -     Discontinue: verapamil (VERELAN) 100 MG 24 hr capsule; Take 1 capsule by mouth Every Night.  Dispense: 90 capsule; Refill: 0  -     Discontinue: albuterol sulfate  (90 Base) MCG/ACT inhaler; Inhale 2 puffs Every 6 (Six) Hours As Needed for Wheezing or Shortness of Air (cough).      Assessment & Plan  1. Diabetes Mellitus.  Laboratory tests will be conducted today.    2. Arthritic Pain.  The patient's vitamin D and B12 levels have been within the normal range. The patient will maintain her current regimen of Tylenol, turmeric, and topical arthritis medications. A compounded cream or pain medication may be considered if necessary.    3. Asthma.  Trelegy will be discontinued, and the patient will continue using her  rescue inhaler as needed.    Follow-up  The patient is scheduled for a follow-up visit in 6 months.     Results            Patient or patient representative verbalized consent for the use of Ambient Listening during the visit with  Henny Long DO for chart documentation. 5/20/2024  17:18 EDT

## 2024-05-14 NOTE — PROGRESS NOTES
Fingerstick performed in right middle finger by Paula James CMA  with good hemostasis. Patient tolerated well. 05/14/24 Paula James CMA      Venipuncture performed in right arm by Paula James CMA  with good hemostasis. Patient tolerated well. 05/14/24 Paula James CMA

## 2024-05-15 DIAGNOSIS — E11.42 TYPE 2 DIABETES MELLITUS WITH PERIPHERAL NEUROPATHY: ICD-10-CM

## 2024-05-15 LAB
25(OH)D3 SERPL-MCNC: 38.8 NG/ML (ref 30–100)
ALBUMIN SERPL-MCNC: 4.1 G/DL (ref 3.5–5.2)
ALBUMIN UR-MCNC: <1.2 MG/DL
ALBUMIN/GLOB SERPL: 1.6 G/DL
ALP SERPL-CCNC: 85 U/L (ref 39–117)
ALT SERPL W P-5'-P-CCNC: 12 U/L (ref 1–33)
ANION GAP SERPL CALCULATED.3IONS-SCNC: 11.1 MMOL/L (ref 5–15)
AST SERPL-CCNC: 16 U/L (ref 1–32)
BILIRUB SERPL-MCNC: 0.3 MG/DL (ref 0–1.2)
BUN SERPL-MCNC: 16 MG/DL (ref 8–23)
BUN/CREAT SERPL: 16 (ref 7–25)
CALCIUM SPEC-SCNC: 10.5 MG/DL (ref 8.6–10.5)
CHLORIDE SERPL-SCNC: 103 MMOL/L (ref 98–107)
CHOLEST SERPL-MCNC: 132 MG/DL (ref 0–200)
CO2 SERPL-SCNC: 22.9 MMOL/L (ref 22–29)
CREAT SERPL-MCNC: 1 MG/DL (ref 0.57–1)
EGFRCR SERPLBLD CKD-EPI 2021: 56.7 ML/MIN/1.73
GLOBULIN UR ELPH-MCNC: 2.6 GM/DL
GLUCOSE SERPL-MCNC: 160 MG/DL (ref 65–99)
HDLC SERPL-MCNC: 53 MG/DL (ref 40–60)
LDLC SERPL CALC-MCNC: 59 MG/DL (ref 0–100)
LDLC/HDLC SERPL: 1.06 {RATIO}
POTASSIUM SERPL-SCNC: 4.4 MMOL/L (ref 3.5–5.2)
PROT SERPL-MCNC: 6.7 G/DL (ref 6–8.5)
SODIUM SERPL-SCNC: 137 MMOL/L (ref 136–145)
TRIGL SERPL-MCNC: 113 MG/DL (ref 0–150)
VLDLC SERPL-MCNC: 20 MG/DL (ref 5–40)

## 2024-05-15 RX ORDER — NYSTATIN 100000 U/G
CREAM TOPICAL AS NEEDED
Qty: 90 G | Refills: 0 | Status: SHIPPED | OUTPATIENT
Start: 2024-05-15 | End: 2024-05-17 | Stop reason: SDUPTHER

## 2024-05-15 RX ORDER — BUSPIRONE HYDROCHLORIDE 7.5 MG/1
7.5 TABLET ORAL 3 TIMES DAILY
Qty: 270 TABLET | Refills: 0 | Status: SHIPPED | OUTPATIENT
Start: 2024-05-15

## 2024-05-15 RX ORDER — INSULIN DETEMIR 100 [IU]/ML
28 INJECTION, SOLUTION SUBCUTANEOUS NIGHTLY
Qty: 26 ML | Refills: 1 | Status: SHIPPED | OUTPATIENT
Start: 2024-05-15

## 2024-05-15 RX ORDER — LANCETS
EACH MISCELLANEOUS
Qty: 360 EACH | Refills: 1 | Status: SHIPPED | OUTPATIENT
Start: 2024-05-15

## 2024-05-15 RX ORDER — ROSUVASTATIN CALCIUM 5 MG/1
5 TABLET, COATED ORAL DAILY
Qty: 90 TABLET | Refills: 3 | Status: SHIPPED | OUTPATIENT
Start: 2024-05-15

## 2024-05-15 RX ORDER — ALBUTEROL SULFATE 90 UG/1
2 AEROSOL, METERED RESPIRATORY (INHALATION) EVERY 6 HOURS PRN
Qty: 18 G | Refills: 0 | Status: SHIPPED | OUTPATIENT
Start: 2024-05-15

## 2024-05-15 RX ORDER — VERAPAMIL HYDROCHLORIDE 100 MG/1
100 CAPSULE, EXTENDED RELEASE ORAL NIGHTLY
Qty: 90 CAPSULE | Refills: 0 | Status: SHIPPED | OUTPATIENT
Start: 2024-05-15

## 2024-05-16 RX ORDER — ROSUVASTATIN CALCIUM 5 MG/1
5 TABLET, COATED ORAL DAILY
Qty: 90 TABLET | Refills: 3 | OUTPATIENT
Start: 2024-05-16

## 2024-05-17 RX ORDER — NYSTATIN 100000 U/G
CREAM TOPICAL 2 TIMES DAILY PRN
Qty: 90 G | Refills: 0 | Status: SHIPPED | OUTPATIENT
Start: 2024-05-17

## 2024-05-28 RX ORDER — VERAPAMIL HYDROCHLORIDE 100 MG/1
100 CAPSULE, EXTENDED RELEASE ORAL NIGHTLY
Qty: 90 CAPSULE | Refills: 0 | OUTPATIENT
Start: 2024-05-28

## 2024-06-14 ENCOUNTER — OFFICE VISIT (OUTPATIENT)
Dept: FAMILY MEDICINE CLINIC | Facility: CLINIC | Age: 84
End: 2024-06-14
Payer: MEDICARE

## 2024-06-14 VITALS
HEART RATE: 80 BPM | OXYGEN SATURATION: 96 % | SYSTOLIC BLOOD PRESSURE: 154 MMHG | TEMPERATURE: 97.7 F | BODY MASS INDEX: 29.71 KG/M2 | HEIGHT: 64 IN | DIASTOLIC BLOOD PRESSURE: 77 MMHG | RESPIRATION RATE: 16 BRPM | WEIGHT: 174 LBS

## 2024-06-14 DIAGNOSIS — N39.0 URINARY TRACT INFECTION WITHOUT HEMATURIA, SITE UNSPECIFIED: Primary | ICD-10-CM

## 2024-06-14 DIAGNOSIS — R82.90 ABNORMAL URINALYSIS: ICD-10-CM

## 2024-06-14 LAB
BILIRUB BLD-MCNC: NEGATIVE MG/DL
CLARITY, POC: CLEAR
COLOR UR: YELLOW
EXPIRATION DATE: ABNORMAL
GLUCOSE UR STRIP-MCNC: NEGATIVE MG/DL
KETONES UR QL: NEGATIVE
LEUKOCYTE EST, POC: ABNORMAL
Lab: ABNORMAL
NITRITE UR-MCNC: NEGATIVE MG/ML
PH UR: 7 [PH] (ref 5–8)
PROT UR STRIP-MCNC: NEGATIVE MG/DL
RBC # UR STRIP: NEGATIVE /UL
SP GR UR: 1.01 (ref 1–1.03)
UROBILINOGEN UR QL: NORMAL

## 2024-06-14 PROCEDURE — 99213 OFFICE O/P EST LOW 20 MIN: CPT | Performed by: FAMILY MEDICINE

## 2024-06-14 PROCEDURE — 81003 URINALYSIS AUTO W/O SCOPE: CPT | Performed by: FAMILY MEDICINE

## 2024-06-14 PROCEDURE — 3078F DIAST BP <80 MM HG: CPT | Performed by: FAMILY MEDICINE

## 2024-06-14 PROCEDURE — 87086 URINE CULTURE/COLONY COUNT: CPT | Performed by: FAMILY MEDICINE

## 2024-06-14 PROCEDURE — 1126F AMNT PAIN NOTED NONE PRSNT: CPT | Performed by: FAMILY MEDICINE

## 2024-06-14 PROCEDURE — 3077F SYST BP >= 140 MM HG: CPT | Performed by: FAMILY MEDICINE

## 2024-06-14 RX ORDER — BUSPIRONE HYDROCHLORIDE 7.5 MG/1
TABLET ORAL
Qty: 270 TABLET | Refills: 0 | Status: SHIPPED | OUTPATIENT
Start: 2024-06-14

## 2024-06-14 RX ORDER — SENNOSIDES 8.6 MG
650 CAPSULE ORAL EVERY 8 HOURS PRN
Start: 2024-06-14

## 2024-06-14 RX ORDER — NITROFURANTOIN 25; 75 MG/1; MG/1
100 CAPSULE ORAL 2 TIMES DAILY
Qty: 6 CAPSULE | Refills: 0 | Status: SHIPPED | OUTPATIENT
Start: 2024-06-14

## 2024-06-15 LAB — BACTERIA SPEC AEROBE CULT: NORMAL

## 2024-06-23 NOTE — PROGRESS NOTES
Subjective   Monica Pagan is a 83 y.o. female.     Chief Complaint   Patient presents with    Urinary Tract Infection         Current Outpatient Medications:     albuterol sulfate  (90 Base) MCG/ACT inhaler, Inhale 2 puffs Every 6 (Six) Hours As Needed for Wheezing or Shortness of Air (cough)., Disp: 18 g, Rfl: 0    Blood Glucose Monitoring Suppl (ONE TOUCH ULTRA MINI) w/Device kit, 1 kit Daily., Disp: 1 each, Rfl: 0    busPIRone (BUSPAR) 7.5 MG tablet, 1.5 tabs TID, Disp: 270 tablet, Rfl: 0    cyanocobalamin (VITAMIN B-12) 2000 MCG tablet tablet, Take 1 tablet by mouth Daily., Disp: , Rfl:     DULoxetine (CYMBALTA) 60 MG capsule, TAKE 1 CAPSULE BY MOUTH DAILY, Disp: 90 capsule, Rfl: 0    eszopiclone (LUNESTA) 3 MG tablet, TAKE 1 TABLET BY MOUTH IMMEDIATELY BEFORE BEDTIME AS NEEDED FOR SLEEP, Disp: 90 tablet, Rfl: 0    glucose blood (True Metrix Blood Glucose Test) test strip, 1 each by Other route 3 (Three) Times a Day Before Meals. Use as instructed, Disp: 100 each, Rfl: 1    guaiFENesin (MUCINEX) 600 MG 12 hr tablet, Take 1 tablet by mouth 2 (Two) Times a Day As Needed for Cough or Congestion., Disp: 60 tablet, Rfl: 2    insulin detemir (Levemir FlexPen) 100 UNIT/ML injection, Inject 28 Units under the skin into the appropriate area as directed Every Night., Disp: 26 mL, Rfl: 1    Lancets (onetouch ultrasoft) lancets, Use to check BS QAC/ QHS, Disp: 360 each, Rfl: 1    loperamide (IMODIUM) 2 MG capsule, Take 1 capsule by mouth 4 (Four) Times a Day As Needed for Diarrhea., Disp: , Rfl:     metFORMIN (GLUCOPHAGE) 500 MG tablet, Take 1 tablet by mouth 2 (Two) Times a Day With Meals., Disp: 180 tablet, Rfl: 1    nystatin (MYCOSTATIN) 900947 UNIT/GM cream, Apply  topically to the appropriate area as directed 2 (Two) Times a Day As Needed (groin rash)., Disp: 90 g, Rfl: 0    rosuvastatin (CRESTOR) 5 MG tablet, Take 1 tablet by mouth Daily., Disp: 90 tablet, Rfl: 3    triamcinolone (KENALOG) 0.1 % cream, Apply   topically to the appropriate area as directed 2 (Two) Times a Day., Disp: 45 g, Rfl: 0    verapamil (VERELAN) 100 MG 24 hr capsule, Take 1 capsule by mouth Every Night., Disp: 90 capsule, Rfl: 0    vitamin D3 125 MCG (5000 UT) capsule capsule, 1 po once a month, Disp: , Rfl:     acetaminophen (Tylenol 8 Hour) 650 MG 8 hr tablet, Take 1 tablet by mouth Every 8 (Eight) Hours As Needed for Moderate Pain., Disp: , Rfl:     nitrofurantoin, macrocrystal-monohydrate, (Macrobid) 100 MG capsule, Take 1 capsule by mouth 2 (Two) Times a Day., Disp: 6 capsule, Rfl: 0    Past Medical History:   Diagnosis Date    Anemia     Anxiety     Childhood Asthma     Colon polyp     TA    DDD  lumbar     DEXA     DMII     Fibromyalgia     Hyperlipidemia     Hypertension     IBS     Insomnia     Lumbar spinal stenosis     Macular degeneration, left eye     MAMMO     NEG= 2020/ 2022    Meniere's disease     Nontoxic thyroid nodule     Ocular histoplasmosis     Osteoarthritis     Peripheral neuropathy     PVC     Spondylosis     Tinnitus     Tremor     Trigeminal neuralgia     Vitamin D deficiency        Past Surgical History:   Procedure Laterality Date    AMPUTATION DIGIT Left     Digit #1    APPENDECTOMY  1962    BUNIONECTOMY Bilateral     CATARACT EXTRACTION      left    CATARACT EXTRACTION      x2    COLONOSCOPY      NEG = 2012/ 2021= TA, rech 2026    CYST REMOVAL Bilateral     WRIST    HYSTERECTOMY  1980    JOINT REPLACEMENT      Rt TKR x 2    JOINT REPLACEMENT      Left THR    LAPAROSCOPIC CHOLECYSTECTOMY      DR. LOBATO    SPINE SURGERY      Lumbar Fusion    SUBTOTAL HYSTERECTOMY         Family History   Problem Relation Age of Onset    Asthma Mother     COPD Mother     Heart disease Father     Alcohol abuse Father     Other Father         WWII Gangrene/ Malaria    Arthritis Sister     Cancer Sister     Leukemia Sister     Heart attack Brother     Cancer Brother     Alcohol abuse Brother     Kidney disease Brother     Alzheimer's  disease Brother     Heart disease Brother     Diabetes Brother     Hyperlipidemia Brother     Diabetes Son        Social History     Socioeconomic History    Marital status:    Tobacco Use    Smoking status: Never     Passive exposure: Never    Smokeless tobacco: Never   Vaping Use    Vaping status: Never Used   Substance and Sexual Activity    Alcohol use: Yes     Alcohol/week: 1.0 standard drink of alcohol     Types: 1 Glasses of wine per week     Comment: Rare    Drug use: No    Sexual activity: Never       History of Present Illness  The patient presents for evaluation of poss UTI    The patient reports experiencing right-sided low back pain accompanied by swelling. She denies engaging in any heavy lifting. Additionally, she notes a deterioration in her balance, preventing her from engaging in yard work due to fear of falling. She has been managing her pain with Tylenol 650 mg, which she reports as beneficial.    During a previous hospital visit, the patient was informed of a potential kidney injury.    The patient is currently on a small dose of BuSpar for anxiety management.   She is allergic to PENICILLIN, LEVAQUIN, and SULFA.        The following portions of the patient's history were reviewed and updated as appropriate: allergies, current medications, past family history, past medical history, past social history, past surgical history and problem list.    Review of Systems   Constitutional:  Negative for activity change, appetite change, fever, unexpected weight gain and unexpected weight loss.   Genitourinary:  Positive for flank pain, frequency and urinary incontinence. Negative for dysuria, hematuria and urgency.   Musculoskeletal:  Positive for back pain.       Vitals:    06/14/24 1040   BP: 154/77   Pulse: 80   Resp: 16   Temp: 97.7 °F (36.5 °C)   SpO2: 96%       Objective   Physical Exam  Vitals and nursing note reviewed.   Constitutional:       General: She is not in acute distress.      Appearance: She is not ill-appearing or toxic-appearing.   HENT:      Head: Normocephalic and atraumatic.   Pulmonary:      Effort: Pulmonary effort is normal.   Neurological:      Mental Status: She is alert and oriented to person, place, and time.      Cranial Nerves: No cranial nerve deficit.   Psychiatric:         Attention and Perception: Attention and perception normal.         Mood and Affect: Mood and affect normal.         Speech: Speech normal.         Behavior: Behavior normal. Behavior is cooperative.         Thought Content: Thought content normal.         Cognition and Memory: Cognition and memory normal.         Judgment: Judgment normal.       Physical Exam       Assessment & Plan   Diagnoses and all orders for this visit:    1. Urinary tract infection without hematuria, site unspecified (Primary)  -     POC Urinalysis Dipstick, Automated  -     nitrofurantoin, macrocrystal-monohydrate, (Macrobid) 100 MG capsule; Take 1 capsule by mouth 2 (Two) Times a Day.  Dispense: 6 capsule; Refill: 0    2. Abnormal urinalysis  -     Urine Culture - Urine, Urine, Clean Catch  -     nitrofurantoin, macrocrystal-monohydrate, (Macrobid) 100 MG capsule; Take 1 capsule by mouth 2 (Two) Times a Day.  Dispense: 6 capsule; Refill: 0    Other orders  -     acetaminophen (Tylenol 8 Hour) 650 MG 8 hr tablet; Take 1 tablet by mouth Every 8 (Eight) Hours As Needed for Moderate Pain.  -     busPIRone (BUSPAR) 7.5 MG tablet; 1.5 tabs TID  Dispense: 270 tablet; Refill: 0      Assessment & Plan  1. Right low back pain.  A prescription for Macrobid has been issued for a duration of 3 days. Additionally, Tylenol Arthritis 650 mg, to be taken thrice daily, has been recommended.    2. Anxiety.  The patient has been advised to take BuSpar 1.5 tablets thrice daily.     Results  Laboratory Studies  Urine culture in February did not grow anything          Patient or patient representative verbalized consent for the use of Ambient  Listening during the visit with  Henny Long DO for chart documentation. 6/23/2024  19:19 EDT   You can access the FollowMyHealth Patient Portal offered by [facility] by registering at the following website: [email or web address]. By joining [employer]’s FollowMyHealth portal, you will also be able to view your health information using other applications (apps) compatible with our system.

## 2024-06-27 RX ORDER — BUSPIRONE HYDROCHLORIDE 7.5 MG/1
TABLET ORAL
Qty: 270 TABLET | Refills: 0 | Status: SHIPPED | OUTPATIENT
Start: 2024-06-27

## 2024-07-23 RX ORDER — VERAPAMIL HYDROCHLORIDE 100 MG/1
100 CAPSULE, EXTENDED RELEASE ORAL NIGHTLY
Qty: 90 CAPSULE | Refills: 3 | Status: SHIPPED | OUTPATIENT
Start: 2024-07-23

## 2024-07-29 DIAGNOSIS — G47.00 INSOMNIA, UNSPECIFIED TYPE: ICD-10-CM

## 2024-07-29 NOTE — TELEPHONE ENCOUNTER
INSPECT RAN    Rx Refill Note  Requested Prescriptions     Pending Prescriptions Disp Refills    eszopiclone (LUNESTA) 3 MG tablet [Pharmacy Med Name: Eszopiclone Oral Tablet 3 MG] 90 tablet 0     Sig: TAKE 1 TABLET BY MOUTH IMMEDIATELY BEFORE BEDTIME AS NEEDED FOR SLEEP      Last office visit with prescribing clinician: 6/14/2024   Last telemedicine visit with prescribing clinician: Visit date not found   Next office visit with prescribing clinician: 11/13/2024                         Would you like a call back once the refill request has been completed: [] Yes [] No    If the office needs to give you a call back, can they leave a voicemail: [] Yes [] No    Miriam Mello, RT  07/29/24, 13:58 EDT

## 2024-07-30 RX ORDER — DULOXETIN HYDROCHLORIDE 60 MG/1
CAPSULE, DELAYED RELEASE ORAL
Qty: 90 CAPSULE | Refills: 0 | Status: SHIPPED | OUTPATIENT
Start: 2024-07-30

## 2024-07-30 RX ORDER — ESZOPICLONE 3 MG/1
TABLET, FILM COATED ORAL
Qty: 90 TABLET | Refills: 0 | Status: SHIPPED | OUTPATIENT
Start: 2024-07-30

## 2024-08-05 ENCOUNTER — OFFICE VISIT (OUTPATIENT)
Dept: FAMILY MEDICINE CLINIC | Facility: CLINIC | Age: 84
End: 2024-08-05
Payer: MEDICARE

## 2024-08-05 VITALS
HEIGHT: 64 IN | TEMPERATURE: 98 F | DIASTOLIC BLOOD PRESSURE: 75 MMHG | WEIGHT: 173 LBS | RESPIRATION RATE: 16 BRPM | HEART RATE: 97 BPM | SYSTOLIC BLOOD PRESSURE: 135 MMHG | BODY MASS INDEX: 29.53 KG/M2 | OXYGEN SATURATION: 98 %

## 2024-08-05 DIAGNOSIS — M25.551 PAIN OF RIGHT HIP: ICD-10-CM

## 2024-08-05 DIAGNOSIS — R29.898 COMPLAINTS OF LEG WEAKNESS: ICD-10-CM

## 2024-08-05 DIAGNOSIS — R29.898 LEFT LEG WEAKNESS: Primary | ICD-10-CM

## 2024-08-05 DIAGNOSIS — E11.42 TYPE 2 DIABETES MELLITUS WITH PERIPHERAL NEUROPATHY: ICD-10-CM

## 2024-08-05 LAB
EXPIRATION DATE: ABNORMAL
HBA1C MFR BLD: 6.8 % (ref 4.5–5.7)
Lab: ABNORMAL

## 2024-08-05 PROCEDURE — 1159F MED LIST DOCD IN RCRD: CPT | Performed by: FAMILY MEDICINE

## 2024-08-05 PROCEDURE — 1160F RVW MEDS BY RX/DR IN RCRD: CPT | Performed by: FAMILY MEDICINE

## 2024-08-05 PROCEDURE — 83036 HEMOGLOBIN GLYCOSYLATED A1C: CPT | Performed by: FAMILY MEDICINE

## 2024-08-05 PROCEDURE — 3044F HG A1C LEVEL LT 7.0%: CPT | Performed by: FAMILY MEDICINE

## 2024-08-05 PROCEDURE — 3075F SYST BP GE 130 - 139MM HG: CPT | Performed by: FAMILY MEDICINE

## 2024-08-05 PROCEDURE — 99213 OFFICE O/P EST LOW 20 MIN: CPT | Performed by: FAMILY MEDICINE

## 2024-08-05 PROCEDURE — 3078F DIAST BP <80 MM HG: CPT | Performed by: FAMILY MEDICINE

## 2024-08-05 PROCEDURE — 1125F AMNT PAIN NOTED PAIN PRSNT: CPT | Performed by: FAMILY MEDICINE

## 2024-08-05 RX ORDER — CALCIUM CITRATE/VITAMIN D3 200MG-6.25
1 TABLET ORAL
Qty: 180 EACH | Refills: 1 | Status: SHIPPED | OUTPATIENT
Start: 2024-08-05

## 2024-08-05 NOTE — PROGRESS NOTES
Fingerstick performed in right middle finger by Paula James CMA  with good hemostasis. Patient tolerated well. 08/05/24 Paula James CMA

## 2024-08-05 NOTE — PROGRESS NOTES
Subjective   Monica Pagan is a 84 y.o. female.     Chief Complaint   Patient presents with    Leg Pain     Rt Leg and hip pain     Diabetes     Patients HgbA1c while in the office today is 6.8%         Current Outpatient Medications:     acetaminophen (Tylenol 8 Hour) 650 MG 8 hr tablet, Take 1 tablet by mouth Every 8 (Eight) Hours As Needed for Moderate Pain., Disp: , Rfl:     albuterol sulfate  (90 Base) MCG/ACT inhaler, Inhale 2 puffs Every 6 (Six) Hours As Needed for Wheezing or Shortness of Air (cough)., Disp: 18 g, Rfl: 0    Blood Glucose Monitoring Suppl (ONE TOUCH ULTRA MINI) w/Device kit, 1 kit Daily., Disp: 1 each, Rfl: 0    busPIRone (BUSPAR) 7.5 MG tablet, TAKE 1 TABLET BY MOUTH 3 TIMES A DAY, Disp: 270 tablet, Rfl: 0    cyanocobalamin (VITAMIN B-12) 2000 MCG tablet tablet, Take 1 tablet by mouth Daily., Disp: , Rfl:     DULoxetine (CYMBALTA) 60 MG capsule, TAKE 1 CAPSULE BY MOUTH DAILY, Disp: 90 capsule, Rfl: 0    eszopiclone (LUNESTA) 3 MG tablet, TAKE 1 TABLET BY MOUTH IMMEDIATELY BEFORE BEDTIME AS NEEDED FOR SLEEP, Disp: 90 tablet, Rfl: 0    glucose blood (True Metrix Blood Glucose Test) test strip, 1 each by Other route 3 (Three) Times a Day Before Meals. Use as instructed, Disp: 180 each, Rfl: 1    guaiFENesin (MUCINEX) 600 MG 12 hr tablet, Take 1 tablet by mouth 2 (Two) Times a Day As Needed for Cough or Congestion., Disp: 60 tablet, Rfl: 2    Lancets (onetouch ultrasoft) lancets, Use to check BS QAC/ QHS, Disp: 360 each, Rfl: 1    loperamide (IMODIUM) 2 MG capsule, Take 1 capsule by mouth 4 (Four) Times a Day As Needed for Diarrhea., Disp: , Rfl:     metFORMIN (GLUCOPHAGE) 500 MG tablet, Take 1 tablet by mouth 2 (Two) Times a Day With Meals., Disp: 180 tablet, Rfl: 1    nystatin (MYCOSTATIN) 072578 UNIT/GM cream, Apply  topically to the appropriate area as directed 2 (Two) Times a Day As Needed (groin rash)., Disp: 90 g, Rfl: 0    rosuvastatin (CRESTOR) 5 MG tablet, Take 1 tablet by  mouth Daily., Disp: 90 tablet, Rfl: 3    triamcinolone (KENALOG) 0.1 % cream, Apply  topically to the appropriate area as directed 2 (Two) Times a Day., Disp: 45 g, Rfl: 0    vitamin D3 125 MCG (5000 UT) capsule capsule, 1 po once a month, Disp: , Rfl:     Levemir FlexPen 100 UNIT/ML injection, INJECT 28 UNITS SUBCUTANEOUSLY  INTO THE APPROPRIATE AREA AS  DIRECTED AT NIGHT, Disp: 30 mL, Rfl: 3    verapamil (VERELAN) 100 MG 24 hr capsule, TAKE ONE CAPSULE BY MOUTH ONCE NIGHTLY, Disp: 90 capsule, Rfl: 3    Past Medical History:   Diagnosis Date    Anemia     Anxiety     Childhood Asthma     Colon polyp     TA    DDD  lumbar     DEXA     2022= (0.0/-0.3)    DMII     Fibromyalgia     Hyperlipidemia     Hypertension     IBS     Insomnia     Lumbar spinal stenosis     Macular degeneration, left eye     MAMMO     NEG= 2020/ 2022    Meniere's disease     Nontoxic thyroid nodule     Ocular histoplasmosis     Osteoarthritis     Peripheral neuropathy     PVC     Spondylosis     Tinnitus     Tremor     Trigeminal neuralgia     Vitamin D deficiency        Past Surgical History:   Procedure Laterality Date    AMPUTATION DIGIT Left     Digit #1    APPENDECTOMY  1962    BUNIONECTOMY Bilateral     CATARACT EXTRACTION      left    CATARACT EXTRACTION      x2    COLONOSCOPY      NEG = 2012/ 2021= TA,  NO Recall    CYST REMOVAL Bilateral     WRIST    HYSTERECTOMY  1980    JOINT REPLACEMENT      Rt TKR x 2    JOINT REPLACEMENT      Left THR    LAPAROSCOPIC CHOLECYSTECTOMY      DR. LOBATO    SPINE SURGERY      Lumbar Fusion    SUBTOTAL HYSTERECTOMY         Family History   Problem Relation Age of Onset    Asthma Mother     COPD Mother     Heart disease Father     Alcohol abuse Father     Other Father         WWII Gangrene/ Malaria    Arthritis Sister     Cancer Sister     Leukemia Sister     Heart attack Brother     Cancer Brother     Alcohol abuse Brother     Kidney disease Brother     Alzheimer's disease Brother     Heart disease Brother      Diabetes Brother     Hyperlipidemia Brother     Diabetes Son        Social History     Socioeconomic History    Marital status:    Tobacco Use    Smoking status: Never     Passive exposure: Never    Smokeless tobacco: Never   Vaping Use    Vaping status: Never Used   Substance and Sexual Activity    Alcohol use: Yes     Alcohol/week: 1.0 standard drink of alcohol     Types: 1 Glasses of wine per week     Comment: Rare    Drug use: No    Sexual activity: Never       History of Present Illness  The patient is an 84-year-old  female here for diabetes follow-up and complains of right leg and hip pain.      Her current medications include Tylenol as needed, a rescue inhaler as needed, BuSpar 7.5 mg three times a day, B12 once a day, Cymbalta 60 mg once a day, Lunesta 3 mg at night as needed, Mucinex twice a day as needed, Levemir 28 units at night, Imodium as needed, metformin 500 mg twice a day with meals, nystatin cream as needed, Crestor 5 mg for cholesterol, steroid cream as needed, verapamil 100 mg once a day, and over-the-counter vitamin D 5000 once a month.     She underwent a bone density test in 2022 and has screws and rods in her back following back surgery. She attributes her hip and leg pain to her gait. She has been experiencing pain and weakness in her right leg for the past 3 to 4 months, which has not been evaluated. She denies any injury. The pain intensifies with prolonged standing. She is unsure if the pain is originating from her back, as she experiences pain in her right groin, likening it to a knife. Her lower back pain persists post-surgery. She has tried physical therapy, which proved ineffective. Her mobility is limited due to leg weakness and pain in her knees when attempting to get up. She has a history of Rt knee replacement x2.     She has tried various topical treatments, including Biofreeze, Tiger Balm, and Voltaren, but none have provided relief. Tylenol provides relief  for her knees and hip pain. She continues to drive. She had a pedicure where a toe was cut, which became infected. She has never had a stroke. She is right-handed. She drags her left leg and has peripheral neuropathy. She experiences tingling between her legs that extends to her back at night when her blood sugar levels fluctuate.    She checks her blood sugar twice daily, but needs testing strips.    ALLERGIES  She denies any new allergies.    IMMUNIZATIONS  She is up-to-date on her RSV and shingles vaccines.        The following portions of the patient's history were reviewed and updated as appropriate: allergies, current medications, past family history, past medical history, past social history, past surgical history and problem list.    Review of Systems   Constitutional:  Negative for activity change, appetite change, unexpected weight gain and unexpected weight loss.   Gastrointestinal:  Negative for abdominal pain, constipation and diarrhea.   Genitourinary:  Positive for urinary incontinence.   Musculoskeletal:  Positive for arthralgias, back pain and gait problem.   Neurological:  Positive for weakness.       Vitals:    08/05/24 0942   BP: 135/75   Pulse: 97   Resp: 16   Temp: 98 °F (36.7 °C)   SpO2: 98%       Objective   Physical Exam  Vitals and nursing note reviewed.   Constitutional:       General: She is not in acute distress.     Appearance: She is not ill-appearing or toxic-appearing.   HENT:      Head: Normocephalic and atraumatic.   Pulmonary:      Effort: Pulmonary effort is normal.   Musculoskeletal:      Comments: Ms Str= 5/5 b/l distal LE except Lt hip flexor = 3-4/ 5   Neurological:      Mental Status: She is alert and oriented to person, place, and time.      Cranial Nerves: No cranial nerve deficit.   Psychiatric:         Attention and Perception: Attention and perception normal.         Mood and Affect: Mood and affect normal.         Speech: Speech normal.         Behavior: Behavior  normal. Behavior is cooperative.         Thought Content: Thought content normal.         Cognition and Memory: Cognition and memory normal.         Judgment: Judgment normal.       Physical Exam  Vital Signs  BP= 135/75.     Assessment & Plan   Diagnoses and all orders for this visit:    1. Left leg weakness (Primary)  -     Ambulatory Referral to Physical Therapy    2. Complaints of leg weakness  -     Ambulatory Referral to Physical Therapy    3. Pain of right hip    4. Type 2 diabetes mellitus with peripheral neuropathy  -     POC Glycosylated Hemoglobin (Hb A1C)  -     Ambulatory Referral to Physical Therapy  -     Ambulatory Referral to Podiatry    Other orders  -     glucose blood (True Metrix Blood Glucose Test) test strip; 1 each by Other route 3 (Three) Times a Day Before Meals. Use as instructed  Dispense: 180 each; Refill: 1      Assessment & Plan  1. Right leg and hip pain.  Bone density scan in 2022 was normal. She was advised to take Tylenol 2 to 3 times daily. A referral to physical therapy was made. X-rays of the knees and hips were ordered. A referral to a podiatrist was made.    2. Diabetes.  Her A1c from 05/2024 was 6.8. True Metrix test strips were ordered. A referral to a podiatrist was made.     Results  Laboratory Studies  Blood sugar was 6.8.    Imaging  Bone density test in 2022 was normal. Bone scan in 2022 was -0.3.          Patient or patient representative verbalized consent for the use of Ambient Listening during the visit with  Henny Long DO for chart documentation. 8/22/2024  10:08 EDT  Answers submitted by the patient for this visit:  Primary Reason for Visit (Submitted on 8/3/2024)  What is the primary reason for your visit?: Back Pain  Back Pain Questionnaire (Submitted on 8/3/2024)  Chief Complaint: Back pain  Onset: more than 1 month ago  Frequency: constantly  Pain location: sacro-iliac  Pain quality: aching  Radiates to: left buttock, left thigh  Pain - numeric:  6/10  Pain is: worse during the day  Aggravated by: bending, standing  Stiffness is present: all day  leg pain: Yes  tingling: Yes  Risk factors: obesity  Additional Information: hip pain as the days go on

## 2024-08-06 ENCOUNTER — TREATMENT (OUTPATIENT)
Dept: PHYSICAL THERAPY | Facility: CLINIC | Age: 84
End: 2024-08-06
Payer: MEDICARE

## 2024-08-06 DIAGNOSIS — R29.898 LEFT LEG WEAKNESS: ICD-10-CM

## 2024-08-06 DIAGNOSIS — E11.42 TYPE 2 DIABETES MELLITUS WITH PERIPHERAL NEUROPATHY: ICD-10-CM

## 2024-08-06 DIAGNOSIS — R29.898 COMPLAINTS OF LEG WEAKNESS: Primary | ICD-10-CM

## 2024-08-06 NOTE — PROGRESS NOTES
Physical Therapy Initial Evaluation and Plan of Care    Patient: Monica Pagan   : 1940  Diagnosis/ICD-10 Code:  Complaints of leg weakness [R29.898]  Referring practitioner: Henny Long DO  Date of Initial Visit: 2024  Today's Date: 2024  Patient seen for 1 sessions  PT Clinic location:  [place] [address]         Subjective Questionnaire: LEFS:     Subjective Evaluation    History of Present Illness  Mechanism of injury: History of current condition: Presents with reports of BLE weakness, left leg is weaker. Also right leg hurts from her hip all the way down which has been present for 2-3 months. Legs have gradually gotten weaker over the last year and now she is more reliant on her rollator walker (uses it some in the home but always when leaving home). Left leg has been weaker for years, but just feels like she is generally deconditioned. Denies falls in the last 6 months, but a few in the last year. She does have bowel and bladder issues but these are underlying due to IBS & long standing bladder issues (has an appointment with urologist.     Reported functional limitation: limited walking distance, difficulty getting up out of chair, can't clean home, difficulty walking distance to visit her daughter in the nursing home.     Quality of life: good    Pain  Current pain ratin  At worst pain ratin  Location: low back and right leg    Patient Goals  Patient goals for therapy: decreased pain, improved balance, increased strength and independence with ADLs/IADLs  Patient goal: be able to get around easier, feel stronger, walk better, get out of chair easier         Medical history: anemia, anxiety, lumbar DDD s/p L3-S1 fusion >10 years ago, left hip replacement , right knee replacement in the late , type II diabetes, bladder stimulator, fibromyalgia, HTN, IBS, Meniere's disease, OA, BLE peripheral neuropathy, trigeminal neuralgia.  See chart for further detail.   Therapy Precautions: Bladder stimulator **NO ESTIM**, L3-S1 fusion >10 years ago, L hip replacement in 2008, R hip replacement, BLE neuropathy    Objective          Strength/Myotome Testing     Left Hip   Planes of Motion   Flexion: 4-    Right Hip   Planes of Motion   Flexion: 4-    Left Knee   Flexion: 4-  Extension: 4-    Right Knee   Flexion: 4-  Extension: 4-    Left Ankle/Foot   Dorsiflexion: 4  Plantar flexion: 2    Right Ankle/Foot   Dorsiflexion: 4  Plantar flexion: 2    Functional Assessment     Comments  TRANSFERS: UE assist required, slow movement  SENSATION: intact to light touch - reports tingling BLE below knees and says her feet sometimes go numb   GAIT: uses rollator walker for all ambulation. Demonstrates slow janis, very short & shuffling step length, wide KEVIN   ROM: lumbar ROM not assessed, BLE WFL for her activity level but not formally measured.   BALANCE:     30 second sit to stand: 7 reps w/ BUE assist     TU sec using rollator walker, SBA     CTSIB:  Firm surface eyes open: 30 sec                   Firm surface eye closed: 8 sec                   Foam surface eyes open: NT                   Foam surface eyes closed: NT           Assessment & Plan       Assessment  Impairments: abnormal gait, abnormal or restricted ROM, impaired balance, impaired physical strength, lacks appropriate home exercise program and safety issue   Functional limitations: walking and standing   Assessment details: The patient is a 84 y.o. female who presents to physical therapy today for BLE weakness and RLE pain which has slowly worsened over the last year. Upon initial evaluation, the patient demonstrates the following impairments: BLE functional weakness, difficulty getting up out of a chair, pain in right leg, impaired balance, impaired gait mechanics. Examination findings indicate that her weakness is likely due to lower activity level - however I have sent a message to her  PCP to request possible lumbar xray to ensure her previous fusion is stable.     Due to these impairments, the patient is unable to perform or has difficulty with the following functional tasks: getting up out of chair, walking more than a few minutes, taking care of herself and her home, caring for her daughter who is total care. The patient would benefit from skilled PT services to address functional limitations and impairments and to improve patient quality of life.      Prognosis: good    Goals  Plan Goals: ST. Pt will be independent and compliant with initial HEP in 4 weeks.  2. Pt will improve TUG time to <25 seconds in 4 weeks.  3. Pt will improve 30 sec sit to stand test to >8 reps in 4 weeks.   4. Pt will demonstrate good safety awareness & decision making to reduce falls risk in 3 weeks.    LT. Pt will be independent with final HEP for self-management of condition by DC.  2. Pt will improve LEFS score to >35/80 to indicate improved LE function by DC  3. Pt will improve TUG time to <20 seconds to indicate decreased falls risk by DC.   4. Pt will improve 30 second sit to stand test to >10 reps with or without UE assist by DC in order to indicate improved functional BLE strength.   5. Pt will be able to complete 1 sit to stand without UE assist by DC.  6. Pt will improve balance w/ feet together and eyes closed to 20 seconds by DC.       Plan  Therapy options: will be seen for skilled therapy services  Planned modality interventions: cryotherapy and thermotherapy (hydrocollator packs)  Planned therapy interventions: abdominal trunk stabilization, ADL retraining, balance/weight-bearing training, body mechanics training, flexibility, functional ROM exercises, gait training, home exercise program, joint mobilization, manual therapy, motor coordination training, neuromuscular re-education, postural training, soft tissue mobilization, spinal/joint mobilization, strengthening, stretching, therapeutic  activities and transfer training  Frequency: 2x week  Duration in weeks: 12  Treatment plan discussed with: patient        See flowsheets for treatment detail.    History # of Personal Factors and/or Comorbidities: HIGH (3+)  Examination of Body System(s): # of elements: HIGH (4+)  Clinical Presentation: UNSTABLE   Clinical Decision Making: HIGH      Timed:         Manual Therapy:         mins  57327;     Therapeutic Exercise:         mins  55788;     Neuromuscular Clemencia:        mins  75113;    Therapeutic Activity:     15     mins  50203;     Gait Training:           mins  86260;     Ultrasound:          mins  67393;    Ionto                                   mins   85400  Self Care                            mins   19050      Un-Timed:  Electrical Stimulation:         mins  57141 ( );  Dry Needling          mins self-pay  Traction          mins 09587  Low Eval          Mins  76384  Mod Eval          Mins  26497  High Eval                       40     Mins  90238  Re-Eval                               mins  09997      Timed Treatment:   15   mins   Total Treatment:     55   mins    PT SIGNATURE: Dotty Rascon PT   [place] PT license #: [id number]  [place] PT license #: [id number]  DATE TREATMENT INITIATED: 8/6/2024    Initial Certification  Certification Period: 11/3/2024  I certify that the therapy services are furnished while this patient is under my care.  The services outlined above are required by this patient, and will be reviewed every 90 days.    PHYSICIAN: Henny Long DO  NPI: [id number]                                DATE:     Please sign and return via fax to  [address] [address] [address]  [place] [address] . Thank you, [facility].

## 2024-08-07 ENCOUNTER — TELEPHONE (OUTPATIENT)
Dept: FAMILY MEDICINE CLINIC | Facility: CLINIC | Age: 84
End: 2024-08-07
Payer: MEDICARE

## 2024-08-07 NOTE — TELEPHONE ENCOUNTER
----- Message from Miriam CUEVAS sent at 8/7/2024  9:08 AM EDT -----  Regarding: FW: lumbar xrays    ----- Message -----  From: Henny Long DO  Sent: 8/6/2024   6:58 PM EDT  To: Miriam Mello, RT  Subject: RE: lumbar xrays                                 Call pt and see if she can come into office for lumbar xray  ----- Message -----  From: Dotty Rascon, PT  Sent: 8/6/2024   5:01 PM EDT  To: Henny Long DO  Subject: lumbar xrays                                     Ozy Dr. Long,    I evaluated Mnoica today for physical therapy. I am curious if we could order a lumbar xray? She says you mentioned it but she declined at that time. I talked to her about it today and she is agreeable to it. I think given her LE symptoms and history of lumbar fusion it would make me feel a little more confident treating her knowing that the fusion is stable.    Let me know what you think, thanks!  Dotty Rascon, PT

## 2024-08-07 NOTE — TELEPHONE ENCOUNTER
RELAY.     DR BAUTISTA WANTS TO KNOW IF PATIENT WOULD BE WILLING TO COME INTO OFFICE FOR A LUMBAR XRAY. PLEASE TRANSFER TO OFFICE FOR SCHEDULING.

## 2024-08-08 ENCOUNTER — CLINICAL SUPPORT (OUTPATIENT)
Dept: FAMILY MEDICINE CLINIC | Facility: CLINIC | Age: 84
End: 2024-08-08
Payer: MEDICARE

## 2024-08-09 ENCOUNTER — TREATMENT (OUTPATIENT)
Dept: PHYSICAL THERAPY | Facility: CLINIC | Age: 84
End: 2024-08-09
Payer: MEDICARE

## 2024-08-09 DIAGNOSIS — R29.898 COMPLAINTS OF LEG WEAKNESS: Primary | ICD-10-CM

## 2024-08-09 DIAGNOSIS — E11.42 TYPE 2 DIABETES MELLITUS WITH PERIPHERAL NEUROPATHY: ICD-10-CM

## 2024-08-09 RX ORDER — VERAPAMIL HYDROCHLORIDE 100 MG/1
100 CAPSULE, EXTENDED RELEASE ORAL NIGHTLY
Qty: 90 CAPSULE | Refills: 3 | Status: SHIPPED | OUTPATIENT
Start: 2024-08-09

## 2024-08-09 NOTE — PROGRESS NOTES
Physical Therapy Daily Treatment Note      [facility] [place]              [address], [address]                [facility] [place]  [address]        Patient: Monica Pagan   : 1940  Diagnosis/ICD-10 Code:  Complaints of leg weakness [R29.898]  Referring practitioner: Henny Long DO  Date of Initial Visit: Type: THERAPY  Noted: 2024  Today's Date: 2024  Patient seen for 2 sessions         Subjective    Monica Pagan reports: she is having some bowel issues today but wants to try.  She had an xray yesterday.             Objective   See Exercise, Manual, and Modality Logs for complete treatment.       Assessment/Plan  Progressed strengthening and balance exercises however pt required intermittent therapeutic rest breaks.  She tolerated exercises well overall with main report of fatigue however intermittent hip pain but with no specific movement or exercise.  Discussed continuing with exercises at home but no formal handouts given.  Will progress exercises as tolerated.     Progress per Plan of Care           Timed:  Manual Therapy:         mins  35766;  Therapeutic Exercise:    20     mins  48807;     Neuromuscular Clemencia:    8    mins  64200;    Therapeutic Activity:     10     mins  82080;     Gait Training:           mins  01040;     Ultrasound:          mins  58634;     Work Conditioning/Hardening (initial 2 hours)        mins  17498  Work Conditioning/Hardening (each add'l hour)        mins  21993    Untimed:   Electrical Stimulation:         mins  33918 ( );  Traction          mins 13616    Timed Treatment:   38   mins   Total Treatment:     38   mins    Jenny Bains PTA  Physical Therapist Assistant

## 2024-08-13 ENCOUNTER — TREATMENT (OUTPATIENT)
Dept: PHYSICAL THERAPY | Facility: CLINIC | Age: 84
End: 2024-08-13
Payer: MEDICARE

## 2024-08-13 DIAGNOSIS — R29.898 COMPLAINTS OF LEG WEAKNESS: Primary | ICD-10-CM

## 2024-08-13 DIAGNOSIS — E11.42 TYPE 2 DIABETES MELLITUS WITH PERIPHERAL NEUROPATHY: ICD-10-CM

## 2024-08-13 NOTE — PROGRESS NOTES
Physical Therapy Daily Treatment Note      [facility] [place]              [address], [address]                [facility] [place]  [address]        Patient: Monica Pagan   : 1940  Diagnosis/ICD-10 Code:  Complaints of leg weakness [R29.898]  Referring practitioner: Henny Long DO  Date of Initial Visit: Type: THERAPY  Noted: 2024  Today's Date: 2024  Patient seen for 3 sessions         Subjective    Monica Pagan reports: she is the same as the other day.  Sitting here it is 5/10 but moving it is more like a 9/10.  She said she put some heat on it yesterday and that helped.            Objective   See Exercise, Manual, and Modality Logs for complete treatment.     Standing balance, feet together, eyes closed X30 sec; Tandem stance=unable     Assessment/Plan  Gentle progression of strengthening and balance today with some fatigue and hip reported.  She required intermittent therapeutic rest breaks d/t fatigue and pain.  She was able to return demonstrate good technique with all exercises.  Modifications made as necessary through decreased range or form as appropriate.  Will monitor and progress as able.     Progress per Plan of Care       PT consulted with pt about xrays and plan. Ok to continue with therapy but should follow up with PCP regarding results and may benefit from referral to spine.    Timed:  Manual Therapy:         mins  21629;  Therapeutic Exercise:    16     mins  29570;     Neuromuscular Clemencia:    8    mins  90024;    Therapeutic Activity:     8     mins  06904;     Gait Training:           mins  13020;     Ultrasound:          mins  82900;     Work Conditioning/Hardening (initial 2 hours)        mins  94420  Work Conditioning/Hardening (each add'l hour)        mins  61969    Untimed:   Electrical Stimulation:         mins  77757 ( );  Traction          mins 79063    Timed Treatment:   32   mins   Total Treatment:     32   mins    Jenny Bains PTA  Physical Therapist  Assistant

## 2024-08-14 ENCOUNTER — TELEPHONE (OUTPATIENT)
Dept: FAMILY MEDICINE CLINIC | Facility: CLINIC | Age: 84
End: 2024-08-14

## 2024-08-14 DIAGNOSIS — G89.29 CHRONIC MIDLINE LOW BACK PAIN WITHOUT SCIATICA: ICD-10-CM

## 2024-08-14 DIAGNOSIS — R29.898 LEFT LEG WEAKNESS: Primary | ICD-10-CM

## 2024-08-14 DIAGNOSIS — M54.50 CHRONIC MIDLINE LOW BACK PAIN WITHOUT SCIATICA: ICD-10-CM

## 2024-08-14 NOTE — TELEPHONE ENCOUNTER
"    Caller: Monica Pagan \"MONICA PAGAN\"    Relationship: Self    Best call back number: [phone]    What is the medical concern/diagnosis: SPINE, ORTHOPEDIC PHYSICIAN    What specialty or service is being requested: BACK DOCTOR    What is the provider, practice or medical service name:     What is the office location:     What is the office phone number:     Any additional details: SUGGESTED BY THE PHYSICAL THERAPIST       "

## 2024-08-16 ENCOUNTER — TELEPHONE (OUTPATIENT)
Dept: FAMILY MEDICINE CLINIC | Facility: CLINIC | Age: 84
End: 2024-08-16
Payer: MEDICARE

## 2024-08-16 ENCOUNTER — TREATMENT (OUTPATIENT)
Dept: PHYSICAL THERAPY | Facility: CLINIC | Age: 84
End: 2024-08-16
Payer: MEDICARE

## 2024-08-16 DIAGNOSIS — E11.42 TYPE 2 DIABETES MELLITUS WITH PERIPHERAL NEUROPATHY: ICD-10-CM

## 2024-08-16 DIAGNOSIS — R29.898 COMPLAINTS OF LEG WEAKNESS: Primary | ICD-10-CM

## 2024-08-16 NOTE — TELEPHONE ENCOUNTER
Per Dr Long:  No acute spine findings on Lumbar Xray----do you want me to try to get MRI L Spine?         Patient came by office today and stated that she is unable to have an MRI due to a stent in her bladder and she would rather see how PT does for her.

## 2024-08-16 NOTE — PROGRESS NOTES
Physical Therapy Daily Treatment Note      [facility]              [address], [address]                [facility] [place]  [address]        Patient: Monica Pagan   : 1940  Diagnosis/ICD-10 Code:  Complaints of leg weakness [R29.898]  Referring practitioner: Henny Long DO  Date of Initial Visit: Type: THERAPY  Noted: 2024  Today's Date: 2024  Patient seen for 4 sessions         Subjective    Monica Pagan reports: she could walk a little better and further than last session.  She said she got a note from the MD wanting to do an MRI but she doesn't think she can because of a stent.            Objective   See Exercise, Manual, and Modality Logs for complete treatment.       Assessment/Plan  Able to progress exercises and decreased therapeutic rest breaks required during the session.  Max cues required for technique with the addition of minisquats and she demonstrated difficulty correcting.  1 significant LOB with rosie today in which she sat back onto the table behind her.  Will monitor tolerance and progress exercises as able.     Progress per Plan of Care           Timed:  Manual Therapy:         mins  55637;  Therapeutic Exercise:    15     mins  89903;     Neuromuscular Clemencia:    8    mins  20715;    Therapeutic Activity:     10     mins  99719;     Gait Training:           mins  81612;     Ultrasound:          mins  16980;     Work Conditioning/Hardening (initial 2 hours)        mins  98944  Work Conditioning/Hardening (each add'l hour)        mins  93283    Untimed:   Electrical Stimulation:         mins  76039 ( );  Traction          mins 50123    Timed Treatment:   33   mins   Total Treatment:     33   mins    Jenny Bains PTA  Physical Therapist Assistant

## 2024-08-19 DIAGNOSIS — E11.42 TYPE 2 DIABETES MELLITUS WITH PERIPHERAL NEUROPATHY: ICD-10-CM

## 2024-08-19 RX ORDER — INSULIN DETEMIR 100 [IU]/ML
INJECTION, SOLUTION SUBCUTANEOUS
Qty: 30 ML | Refills: 3 | Status: SHIPPED | OUTPATIENT
Start: 2024-08-19

## 2024-08-20 ENCOUNTER — TREATMENT (OUTPATIENT)
Dept: PHYSICAL THERAPY | Facility: CLINIC | Age: 84
End: 2024-08-20
Payer: MEDICARE

## 2024-08-20 DIAGNOSIS — R29.898 COMPLAINTS OF LEG WEAKNESS: Primary | ICD-10-CM

## 2024-08-20 DIAGNOSIS — E11.42 TYPE 2 DIABETES MELLITUS WITH PERIPHERAL NEUROPATHY: ICD-10-CM

## 2024-08-20 NOTE — PROGRESS NOTES
j   Physical Therapy Daily Treatment Note      [facility]              [address], [address]                [place]  [address]        Patient: Monica Pagan   : 1940  Diagnosis/ICD-10 Code:  Complaints of leg weakness [R29.898]  Referring practitioner: Henny Long DO  Date of Initial Visit: Type: THERAPY  Noted: 2024  Today's Date: 2024  Patient seen for 5 sessions         Subjective    Monica Pagan reports: her legs feel stronger but her (R) hip still hurts and she feels like it is nerve pain down into her leg.            Objective   See Exercise, Manual, and Modality Logs for complete treatment.     Unable to balance in tandem    Assessment/Plan  Continued with strengthening and balance with progressions as noted.  She tolerated exercises well including progressions.  No increased complaints of pain or symptoms. Improved technique with minisquats.  Will continue to progress exercises as tolerated.      Progress per Plan of Care           Timed:  Manual Therapy:         mins  85894;  Therapeutic Exercise:    20     mins  59896;     Neuromuscular Clemencia:    8    mins  86702;    Therapeutic Activity:     10     mins  07366;     Gait Training:           mins  06205;     Ultrasound:          mins  53764;     Work Conditioning/Hardening (initial 2 hours)        mins  18246  Work Conditioning/Hardening (each add'l hour)        mins  91125    Untimed:   Electrical Stimulation:         mins  83839 ( );  Traction          mins 00459    Timed Treatment:   38   mins   Total Treatment:     38   mins    Jenny Bains PTA  Physical Therapist Assistant

## 2024-09-03 RX ORDER — ALBUTEROL SULFATE 90 UG/1
AEROSOL, METERED RESPIRATORY (INHALATION)
Qty: 18 G | Refills: 6 | Status: SHIPPED | OUTPATIENT
Start: 2024-09-03

## 2024-09-03 RX ORDER — DULOXETIN HYDROCHLORIDE 60 MG/1
60 CAPSULE, DELAYED RELEASE ORAL DAILY
Qty: 90 CAPSULE | Refills: 0 | Status: SHIPPED | OUTPATIENT
Start: 2024-09-03 | End: 2024-09-03 | Stop reason: SDUPTHER

## 2024-09-03 RX ORDER — DULOXETIN HYDROCHLORIDE 60 MG/1
60 CAPSULE, DELAYED RELEASE ORAL DAILY
Qty: 30 CAPSULE | Refills: 0 | Status: SHIPPED | OUTPATIENT
Start: 2024-09-03 | End: 2024-09-03 | Stop reason: SDUPTHER

## 2024-09-03 RX ORDER — DULOXETIN HYDROCHLORIDE 60 MG/1
60 CAPSULE, DELAYED RELEASE ORAL DAILY
Qty: 90 CAPSULE | Refills: 0 | Status: SHIPPED | OUTPATIENT
Start: 2024-09-03

## 2024-09-03 NOTE — TELEPHONE ENCOUNTER
Incoming Refill Request      Medication requested (name and dose): DULOXETINE    Pharmacy where request should be sent: [facility]    Additional details provided by patient: N/A    Best call back number:     Does the patient have less than a 3 day supply:  [] Yes  [x] No    Gerard Coleman Rep  09/03/24, 08:19 EDT

## 2024-09-16 RX ORDER — BUSPIRONE HYDROCHLORIDE 7.5 MG/1
TABLET ORAL
Qty: 270 TABLET | Refills: 0 | Status: SHIPPED | OUTPATIENT
Start: 2024-09-16

## 2024-09-19 ENCOUNTER — OFFICE VISIT (OUTPATIENT)
Dept: NEUROSURGERY | Facility: CLINIC | Age: 84
End: 2024-09-19
Payer: MEDICARE

## 2024-09-19 ENCOUNTER — HOSPITAL ENCOUNTER (OUTPATIENT)
Dept: GENERAL RADIOLOGY | Facility: HOSPITAL | Age: 84
Discharge: HOME | End: 2024-09-19
Admitting: NURSE PRACTITIONER
Payer: MEDICARE

## 2024-09-19 VITALS
OXYGEN SATURATION: 97 % | WEIGHT: 173 LBS | BODY MASS INDEX: 29.53 KG/M2 | DIASTOLIC BLOOD PRESSURE: 84 MMHG | HEART RATE: 101 BPM | SYSTOLIC BLOOD PRESSURE: 162 MMHG | RESPIRATION RATE: 18 BRPM | HEIGHT: 64 IN

## 2024-09-19 DIAGNOSIS — Z98.1 S/P LUMBAR FUSION: ICD-10-CM

## 2024-09-19 DIAGNOSIS — M96.1 LUMBAR POST-LAMINECTOMY SYNDROME: ICD-10-CM

## 2024-09-19 DIAGNOSIS — M46.1 SACROILIITIS: Primary | ICD-10-CM

## 2024-09-19 PROCEDURE — 72114 X-RAY EXAM L-S SPINE BENDING: CPT

## 2024-09-20 ENCOUNTER — PATIENT ROUNDING (BHMG ONLY) (OUTPATIENT)
Dept: NEUROSURGERY | Facility: CLINIC | Age: 84
End: 2024-09-20
Payer: MEDICARE

## 2024-09-23 ENCOUNTER — TELEPHONE (OUTPATIENT)
Dept: NEUROSURGERY | Facility: CLINIC | Age: 84
End: 2024-09-23
Payer: MEDICARE

## 2024-10-07 ENCOUNTER — DOCUMENTATION (OUTPATIENT)
Dept: PHYSICAL THERAPY | Facility: CLINIC | Age: 84
End: 2024-10-07
Payer: MEDICARE

## 2024-10-11 RX ORDER — CALCIUM CITRATE/VITAMIN D3 200MG-6.25
TABLET ORAL
Qty: 300 EACH | Refills: 3 | Status: SHIPPED | OUTPATIENT
Start: 2024-10-11

## 2024-10-12 RX ORDER — LANCETS 30 GAUGE
EACH MISCELLANEOUS
Qty: 400 EACH | Refills: 3 | Status: SHIPPED | OUTPATIENT
Start: 2024-10-12

## 2024-10-20 NOTE — PROGRESS NOTES
CHIEF COMPLAINT  Lower back pain      Subjective   Monica Pagan is a 84 y.o. female who was referred by  Roix Pike APRN to our pain management clinic for consultation, evaluation and treatment of lower back pain with diagnosis of sacroiliitis versus postlaminectomy syndrome.  Chronic history of lower back pain with history of L3 S1 PSF with good resolution of her back and leg symptoms but continued to have residual back pain that she has dealt with over the years.  She has significantly increased left leg pain and groin pain since last 2 months that started without any significant injuries or inciting events. She had R hip pain with R leg radiculopathy. R sided radiculopathy has somewhat improved.  She had previously seen local pain management but has not seen anyone recently.  Not a surgical candidate as patient has refused any surgical discussion and also refused physical therapy. Left leg weakness - working with PT to get AFO.  Chronic history of lower back pain and she started receiving injections in [place] and late 80s.  Previously seen at [facility] pain management as well.  Unfortunately her  passed away with cancer and she has been helping her daughter who has MS.  Lives by herself.  Does have sister in [facility].    Lower back pain-mainly right-sided is 5/10 on VAS, at maximum is 6/10. Pain is burning, stabbing in nature. Pain is referred right anterior leg, right lateral leg and R anterior shin. +numbness and tingling. The pain is constant. The pain is improved by rest. The pain is worse with crossing her R leg over left, sitting for long time. Walking seem to relive some pain, but can't walk more than 5 houses. Standing for longer than 10 minutes will make her lower back pain worse.  Her main complaint is right-sided buttock pain below L5 vertebral level.    PHQ-9- 8    SOAPP- 3   [place] back disability scale -     PMH:   DM-2-last A1c 6.8, fibromyalgia, history of panic attacks, diabetic  neuropathy, hypertension, anxiety L3-S1 PSF, bladder stimulator, bilateral knee TKR, hx of meniere's disease with chronic balance issues - uses walker    Current Medications:   Tylenol  Cymbalta        Past Medications:    Past Modalities:  TENS:       no          Physical Therapy Within The Last 6 Months     Yes - many times - not sure if it helped.   Psychotherapy     no  Massage Therapy      no    Patient Complains Of:  Uro-Fecal Incontinence Yes - chronic issue s/p bladder stimulator - changes 3 pades/night   Weight Gain/Loss  no  Fever/Chills   no  Weakness   Yes - chronic left leg weakness.       PEG Assessment   What number best describes your pain on average in the past week?6  What number best describes how, during the past week, pain has interfered with your enjoyment of life?6  What number best describes how, during the past week, pain has interfered with your general activity?  6    PHQ-9 Depression Screening  Little interest or pleasure in doing things? Several days   Feeling down, depressed, or hopeless? Several days   PHQ-2 Total Score 2   Trouble falling or staying asleep, or sleeping too much? Several days   Feeling tired or having little energy? Over half   Poor appetite or overeating? Over half   Feeling bad about yourself - or that you are a failure or have let yourself or your family down? Not at all   Trouble concentrating on things, such as reading the newspaper or watching television? Several days   Moving or speaking so slowly that other people could have noticed? Or the opposite - being so fidgety or restless that you have been moving around a lot more than usual? Not at all   Thoughts that you would be better off dead, or of hurting yourself in some way? Not at all   PHQ-9 Total Score 8   If you checked off any problems, how difficult have these problems made it for you to do your work, take care of things at home, or get along with other people? Very difficult        Patient screened  positive for depression based on a PHQ-9 score of 8 on 10/21/2024. Follow-up recommendations include: PCP managing depression.        Current Outpatient Medications:     acetaminophen (Tylenol 8 Hour) 650 MG 8 hr tablet, Take 1 tablet by mouth Every 8 (Eight) Hours As Needed for Moderate Pain., Disp: , Rfl:     albuterol sulfate  (90 Base) MCG/ACT inhaler, USE 2 INHALATIONS BY MOUTH EVERY 6 HOURS AS NEEDED FOR WHEEZING  OR SHORTNESS OF BREATH (COUGH), Disp: 18 g, Rfl: 6    Blood Glucose Monitoring Suppl (ONE TOUCH ULTRA MINI) w/Device kit, 1 kit Daily., Disp: 1 each, Rfl: 0    busPIRone (BUSPAR) 7.5 MG tablet, TAKE 1 TABLET BY MOUTH 3 TIMES A DAY, Disp: 270 tablet, Rfl: 0    cyanocobalamin (VITAMIN B-12) 2000 MCG tablet tablet, Take 1 tablet by mouth Daily., Disp: , Rfl:     DULoxetine (CYMBALTA) 60 MG capsule, Take 1 capsule by mouth Daily., Disp: 90 capsule, Rfl: 0    eszopiclone (LUNESTA) 3 MG tablet, TAKE 1 TABLET BY MOUTH IMMEDIATELY BEFORE BEDTIME AS NEEDED FOR SLEEP, Disp: 90 tablet, Rfl: 0    glucose blood (True Metrix Blood Glucose Test) test strip, USE 1 STRIP TO CHECK BLOOD  GLUCOSE 3 TIMES DAILY BEFORE  MEALS. USE AS INSTRUCTED., Disp: 300 each, Rfl: 3    guaiFENesin (MUCINEX) 600 MG 12 hr tablet, Take 1 tablet by mouth 2 (Two) Times a Day As Needed for Cough or Congestion., Disp: 60 tablet, Rfl: 2    Levemir FlexPen 100 UNIT/ML injection, INJECT 28 UNITS SUBCUTANEOUSLY  INTO THE APPROPRIATE AREA AS  DIRECTED AT NIGHT, Disp: 30 mL, Rfl: 3    loperamide (IMODIUM) 2 MG capsule, Take 1 capsule by mouth 4 (Four) Times a Day As Needed for Diarrhea., Disp: , Rfl:     metFORMIN (GLUCOPHAGE) 500 MG tablet, Take 1 tablet by mouth 2 (Two) Times a Day With Meals., Disp: 180 tablet, Rfl: 1    nystatin (MYCOSTATIN) 060129 UNIT/GM cream, Apply  topically to the appropriate area as directed 2 (Two) Times a Day As Needed (groin rash)., Disp: 90 g, Rfl: 0    OneTouch UltraSoft 2 Lancets misc, USE TO CHECK BLOOD  SUGAR BEFORE  EACH MEAL AND EVERY NIGHT AT  BEDTIME, Disp: 400 each, Rfl: 3    rosuvastatin (CRESTOR) 5 MG tablet, Take 1 tablet by mouth Daily., Disp: 90 tablet, Rfl: 3    triamcinolone (KENALOG) 0.1 % cream, Apply  topically to the appropriate area as directed 2 (Two) Times a Day., Disp: 45 g, Rfl: 0    verapamil (VERELAN) 100 MG 24 hr capsule, TAKE ONE CAPSULE BY MOUTH ONCE NIGHTLY, Disp: 90 capsule, Rfl: 3    vitamin D3 125 MCG (5000 UT) capsule capsule, 1 po once a month, Disp: , Rfl:     The following portions of the patient's history were reviewed and updated as appropriate: allergies, current medications, past family history, past medical history, past social history, past surgical history, and problem list.      REVIEW OF PERTINENT MEDICAL DATA    Past Medical History:   Diagnosis Date    Anemia     Ankylosing spondylitis of site in spine     Anxiety     Back pain     Cervical disc disorder     Childhood Asthma     Colon polyp     TA    DDD  lumbar     Depression     DEXA     2022= (0.0/-0.3)    Diabetes     DMII     Fibromyalgia     Hyperlipidemia     Hypertension     IBS     Insomnia     Joint pain     Leg pain, right     Low back pain     Lumbar spinal stenosis     Lumbosacral disc disease     Macular degeneration, left eye     MAMMO     NEG= 2020/ 2022    Meniere's disease     Neuropathy in diabetes     Nontoxic thyroid nodule     Ocular histoplasmosis     Osteoarthritis     Peripheral neuropathy     PVC     Spondylosis     Tinnitus     Tremor     Trigeminal neuralgia     Vitamin D deficiency      Past Surgical History:   Procedure Laterality Date    AMPUTATION DIGIT Left     Digit #1    APPENDECTOMY  1962    BACK SURGERY  3 times    BUNIONECTOMY Bilateral     CATARACT EXTRACTION      left    CATARACT EXTRACTION      x2    COLONOSCOPY      NEG = 2012/ 2021= TA,  NO Recall    CYST REMOVAL Bilateral     WRIST    EPIDURAL BLOCK  many    HYSTERECTOMY  1980    JOINT REPLACEMENT      Rt TKR x 2    JOINT  REPLACEMENT      Left THR    LAMINECTOMY      LAPAROSCOPIC CHOLECYSTECTOMY      DR. LOBATO    SPINAL FUSION      SPINE SURGERY      Lumbar Fusion    SUBTOTAL HYSTERECTOMY      TRIGGER POINT INJECTION       Family History   Problem Relation Age of Onset    Asthma Mother     COPD Mother     Heart disease Father     Alcohol abuse Father     Arthritis Sister     Cancer Sister     Leukemia Sister     Heart attack Brother     Cancer Brother     Alcohol abuse Brother     Kidney disease Brother     Alzheimer's disease Brother     Heart disease Brother     Diabetes Brother     Hyperlipidemia Brother     Diabetes Son         she is  my daughter    Diabetes Son      Social History     Socioeconomic History    Marital status:    Tobacco Use    Smoking status: Never     Passive exposure: Never    Smokeless tobacco: Never   Vaping Use    Vaping status: Never Used   Substance and Sexual Activity    Alcohol use: Yes     Alcohol/week: 1.0 standard drink of alcohol     Types: 1 Glasses of wine per week     Comment: Rare    Drug use: No    Sexual activity: Never         Review of Systems   Musculoskeletal:  Positive for arthralgias and back pain.         Vitals:    10/21/24 1113   BP: 139/82   Pulse: 89   Resp: 16   SpO2: 98%   Weight: 77.6 kg (171 lb)   PainSc:   6         Objective   Physical Exam  Musculoskeletal:         General: Tenderness present.        Legs:            Imaging Reviewed:  Lumbar x-ray-9/19/2024  - Stable grade 1 anterolisthesis L4 upon L5  - Old bilateral S1 pedicle screw fractures  - L3-S1 bilateral pedicle screw and cervical fusion, fina fixation hardware is again noted.  - Fractures of bilateral S1 pedicle screws which has been previously demonstrated.  - Sacral stimulator in place    CT abdomen pelvis-2/1/2024  - Postsurgical changes noted date with rods and screws from L3 S1  - Facet joints appears to be fused from L3-S1  - Chronic fractures of S1 pedicle screws  - Mild bilateral SI joint  degeneration.    Assessment:    1. Sacroiliac joint dysfunction    2. Right hip pain    3. Post laminectomy syndrome    4. History of lumbar fusion         Plan:   1.  Defer UDS for now.  2. We discussed trying a course of formal physical therapy.  Physical therapy can help strengthen and stretch the muscles around the joints. Continue to be as active as possible.  Patient has done more than 12 weeks of physical therapy.  3.  Lumbar x-ray was reviewed with patient and a also independently.  I believe patient's pain is multifactorial with right-sided buttock pain originating from the SI joint arthritis and radiculopathy most likely secondary to lumbar stenosis/postlaminectomy.  I believe there is also right hip pathology causing anterior thigh pain.  4.  Obtain right hip x-ray to rule out any hip pathology.  5.  Patient has pain in the lower back, right SI joint tenderness was positive. Haritha test, SI joint compression and thigh thurst test were performed and were positive for SI joint pathology.  Pain is below L5 vertebral level.  Main complaint is buttock pain.  Discussed diagnostic right SI joint injection. Risk includes infection, bleeding, nerve damage, headache, paraplegia. Patient understands and agrees with the plan.  6.  Patient may benefit from lumbar MRI in future.  Patient states that she was under the impression that she cannot get MRI due to sacral stimulator from [employer].  Discussed with patient that I recommend calling the rep as most of the [employer] stimulators nowadays are MRI compatible.  She will give them a call.      RTC for injection and then 3 week follow up.     Lance Young DO  Pain Management   [facility]         INSPECT REPORT    As part of the patient's treatment plan, I may be prescribing controlled substances. The patient has been made aware of appropriate use of such medications, including potential risk of somnolence, limited ability to drive and/or work safely, and the  potential for dependence or overdose. It has also been made clear that these medications are for use by this patient only, without concomitant use of alcohol or other substances unless prescribed.     Patient has completed prescribing agreement detailing terms of continued prescribing of controlled substances, including monitoring INSPECT reports, urine drug screening, and pill counts if necessary. The patient is aware that inappropriate use will results in cessation of prescribing such medications.    INSPECT report has been reviewed and scanned into the patient's chart.      EMR Dragon/Transcription Disclaimer:   Much of this encounter note is an electronic transcription/translation of spoken language to printed text. The electronic translation of spoken language may permit erroneous, or at times, nonsensical words or phrases to be inadvertently transcribed; Although I have reviewed the note for such errors, some may still exist.

## 2024-10-21 ENCOUNTER — OFFICE VISIT (OUTPATIENT)
Dept: PAIN MEDICINE | Facility: CLINIC | Age: 84
End: 2024-10-21
Payer: MEDICARE

## 2024-10-21 ENCOUNTER — PATIENT ROUNDING (BHMG ONLY) (OUTPATIENT)
Dept: PAIN MEDICINE | Facility: CLINIC | Age: 84
End: 2024-10-21

## 2024-10-21 VITALS
RESPIRATION RATE: 16 BRPM | DIASTOLIC BLOOD PRESSURE: 82 MMHG | HEART RATE: 89 BPM | OXYGEN SATURATION: 98 % | SYSTOLIC BLOOD PRESSURE: 139 MMHG | WEIGHT: 171 LBS | BODY MASS INDEX: 29.35 KG/M2

## 2024-10-21 DIAGNOSIS — Z98.1 HISTORY OF LUMBAR FUSION: ICD-10-CM

## 2024-10-21 DIAGNOSIS — M25.551 RIGHT HIP PAIN: ICD-10-CM

## 2024-10-21 DIAGNOSIS — M96.1 POST LAMINECTOMY SYNDROME: ICD-10-CM

## 2024-10-21 DIAGNOSIS — M53.3 SACROILIAC JOINT DYSFUNCTION: Primary | ICD-10-CM

## 2024-10-21 PROCEDURE — 1159F MED LIST DOCD IN RCRD: CPT | Performed by: STUDENT IN AN ORGANIZED HEALTH CARE EDUCATION/TRAINING PROGRAM

## 2024-10-21 PROCEDURE — 1160F RVW MEDS BY RX/DR IN RCRD: CPT | Performed by: STUDENT IN AN ORGANIZED HEALTH CARE EDUCATION/TRAINING PROGRAM

## 2024-10-21 PROCEDURE — 3079F DIAST BP 80-89 MM HG: CPT | Performed by: STUDENT IN AN ORGANIZED HEALTH CARE EDUCATION/TRAINING PROGRAM

## 2024-10-21 PROCEDURE — 99204 OFFICE O/P NEW MOD 45 MIN: CPT | Performed by: STUDENT IN AN ORGANIZED HEALTH CARE EDUCATION/TRAINING PROGRAM

## 2024-10-21 PROCEDURE — 3075F SYST BP GE 130 - 139MM HG: CPT | Performed by: STUDENT IN AN ORGANIZED HEALTH CARE EDUCATION/TRAINING PROGRAM

## 2024-10-21 PROCEDURE — 1125F AMNT PAIN NOTED PAIN PRSNT: CPT | Performed by: STUDENT IN AN ORGANIZED HEALTH CARE EDUCATION/TRAINING PROGRAM

## 2024-10-21 NOTE — PROGRESS NOTES
October 21, 2024    Hello, may I speak with Monica Pagan?    My name is Flori Espinal      I am  with [facility]  [address] [place] [address].    Before we get started may I verify your date of birth? 1940    I am calling to officially welcome you to our practice and ask about your recent visit. Is this a good time to talk? yes    Tell me about your visit with us. What things went well?  Liked him so much       We're always looking for ways to make our patients' experiences even better. Do you have recommendations on ways we may improve?  no    Overall were you satisfied with your first visit to our practice? yes       I appreciate you taking the time to speak with me today. Is there anything else I can do for you? no      Thank you, and have a great day.

## 2024-10-23 DIAGNOSIS — G47.00 INSOMNIA, UNSPECIFIED TYPE: ICD-10-CM

## 2024-10-23 RX ORDER — ESZOPICLONE 3 MG/1
TABLET, FILM COATED ORAL
Qty: 90 TABLET | Refills: 0 | Status: SHIPPED | OUTPATIENT
Start: 2024-10-23

## 2024-10-23 NOTE — TELEPHONE ENCOUNTER
INSPECT RAN    Rx Refill Note  Requested Prescriptions     Pending Prescriptions Disp Refills    eszopiclone (LUNESTA) 3 MG tablet [Pharmacy Med Name: Eszopiclone Oral Tablet 3 MG] 90 tablet 0     Sig: TAKE 1 TABLET BY MOUTH IMMEDIATELY BEFORE BEDTIME AS NEEDED FOR SLEEP      Last office visit with prescribing clinician: 8/5/2024   Last telemedicine visit with prescribing clinician: Visit date not found   Next office visit with prescribing clinician: 11/13/2024                         Would you like a call back once the refill request has been completed: [] Yes [] No    If the office needs to give you a call back, can they leave a voicemail: [] Yes [] No    Miriam Mello, RT  10/23/24, 10:25 EDT

## 2024-11-05 ENCOUNTER — HOSPITAL ENCOUNTER (OUTPATIENT)
Dept: GENERAL RADIOLOGY | Facility: HOSPITAL | Age: 84
Discharge: HOME | End: 2024-11-05
Payer: MEDICARE

## 2024-11-05 ENCOUNTER — HOSPITAL ENCOUNTER (OUTPATIENT)
Dept: PAIN MEDICINE | Facility: HOSPITAL | Age: 84
Discharge: HOME | End: 2024-11-05
Payer: MEDICARE

## 2024-11-05 VITALS
HEIGHT: 64 IN | DIASTOLIC BLOOD PRESSURE: 92 MMHG | TEMPERATURE: 97.1 F | OXYGEN SATURATION: 96 % | HEART RATE: 86 BPM | SYSTOLIC BLOOD PRESSURE: 145 MMHG | WEIGHT: 171 LBS | BODY MASS INDEX: 29.19 KG/M2 | RESPIRATION RATE: 16 BRPM

## 2024-11-05 DIAGNOSIS — R52 PAIN: ICD-10-CM

## 2024-11-05 DIAGNOSIS — M53.3 SACROILIAC JOINT DYSFUNCTION: ICD-10-CM

## 2024-11-05 DIAGNOSIS — M53.3 SACROILIAC JOINT DYSFUNCTION: Primary | ICD-10-CM

## 2024-11-05 DIAGNOSIS — M25.551 RIGHT HIP PAIN: ICD-10-CM

## 2024-11-05 PROCEDURE — 25010000002 BUPIVACAINE (PF) 0.25 % SOLUTION: Performed by: STUDENT IN AN ORGANIZED HEALTH CARE EDUCATION/TRAINING PROGRAM

## 2024-11-05 PROCEDURE — 25010000002 METHYLPREDNISOLONE PER 40 MG: Performed by: STUDENT IN AN ORGANIZED HEALTH CARE EDUCATION/TRAINING PROGRAM

## 2024-11-05 PROCEDURE — 77003 FLUOROGUIDE FOR SPINE INJECT: CPT

## 2024-11-05 PROCEDURE — 25010000002 LIDOCAINE PF 1% 1 % SOLUTION: Performed by: STUDENT IN AN ORGANIZED HEALTH CARE EDUCATION/TRAINING PROGRAM

## 2024-11-05 PROCEDURE — 73502 X-RAY EXAM HIP UNI 2-3 VIEWS: CPT

## 2024-11-05 PROCEDURE — 27096 INJECT SACROILIAC JOINT: CPT | Performed by: STUDENT IN AN ORGANIZED HEALTH CARE EDUCATION/TRAINING PROGRAM

## 2024-11-05 RX ORDER — BUPIVACAINE HYDROCHLORIDE 2.5 MG/ML
10 INJECTION, SOLUTION EPIDURAL; INFILTRATION; INTRACAUDAL ONCE
Status: COMPLETED | OUTPATIENT
Start: 2024-11-05 | End: 2024-11-05

## 2024-11-05 RX ORDER — METHYLPREDNISOLONE ACETATE 40 MG/ML
40 INJECTION, SUSPENSION INTRA-ARTICULAR; INTRALESIONAL; INTRAMUSCULAR; SOFT TISSUE ONCE
Status: COMPLETED | OUTPATIENT
Start: 2024-11-05 | End: 2024-11-05

## 2024-11-05 RX ORDER — LIDOCAINE HYDROCHLORIDE 10 MG/ML
5 INJECTION, SOLUTION EPIDURAL; INFILTRATION; INTRACAUDAL; PERINEURAL ONCE
Status: COMPLETED | OUTPATIENT
Start: 2024-11-05 | End: 2024-11-05

## 2024-11-05 RX ADMIN — METHYLPREDNISOLONE ACETATE 40 MG: 40 INJECTION, SUSPENSION INTRA-ARTICULAR; INTRALESIONAL; INTRAMUSCULAR; INTRASYNOVIAL; SOFT TISSUE at 13:17

## 2024-11-05 RX ADMIN — BUPIVACAINE HYDROCHLORIDE 10 ML: 2.5 INJECTION, SOLUTION EPIDURAL; INFILTRATION; INTRACAUDAL; PERINEURAL at 13:17

## 2024-11-05 RX ADMIN — LIDOCAINE HYDROCHLORIDE 5 ML: 10 INJECTION, SOLUTION EPIDURAL; INFILTRATION; INTRACAUDAL; PERINEURAL at 13:11

## 2024-11-05 NOTE — H&P
H and P reviewed from previous visit and no changes to patient's clinical presentation. Will proceed with procedure as planned. Hx of DM-2.    Lance Young DO  Pain Management   [facility]     
0

## 2024-11-05 NOTE — PROCEDURES
RIGHT SI Joint Arthropathy      POSTOPERATIVE DIAGNOSIS:  Same.     PROCEDURE: RIGHT sacroiliac joint steroid injection     PROCEDURE IN DETAIL:  The patient was placed in a prone position and the lower back was prepped with chloraprep and draped in the usual sterile fashion.  The skin overlaying the RIGHT SI joint was infiltrated with 1% lidocaine for local anesthesia.  A 22-gauge 3.5 inch spinal needle was inserted through the skin under fluoroscopic guidance until we got to the lower third of the SI joint. Another needle was placed in the upper third of the joint. 2 cc of 0.25% marcaine with depomedrol was injected at each level. A total of 4 cc of 0.25% marcaine with 40 mg of depo-medrol was injected.     After the procedure the needles were flushed with preservative free local anesthetic and removed. Skin was cleaned and a sterile dressing was applied.    Following the procedure the patient's vital signs were stable. The patient was discharged home in good condition after being given discharge instructions.    COMPLICATIONS: None    Lance Young DO  Pain Management   [facility]

## 2024-11-06 ENCOUNTER — TELEPHONE (OUTPATIENT)
Dept: PAIN MEDICINE | Facility: HOSPITAL | Age: 84
End: 2024-11-06
Payer: MEDICARE

## 2024-11-06 NOTE — PROGRESS NOTES
Discharge Summary  Discharge Summary from Physical/Occupational Therapy Report    Patient: Monica Pagan   : 1940  Today's Date: 2024    Patient seen for 5 visits.  Dates of Service: 24 - 24    Discharge Status of Patient: See 24 treatment note for detail.    Goals: Partially Met    Discharge Plan: Patient to return to referring/providing physician    Comments: Only seen for 5 visits, was planning to continue therapy but she cancelled remaining visits due to her daughter's health issues.       Thank you for this referral to [facility].    SIGNATURE: Dotty Rascon, PT

## 2024-11-06 NOTE — TELEPHONE ENCOUNTER
Attempted Post Procedure Phone Call.  Message left on voicemail to call with any questions or concerns.

## 2024-11-06 NOTE — TELEPHONE ENCOUNTER
Post procedure phone call completed.  Pt states they are doing good and denies questions or concerns.  Patient reports pain level at a #4.

## 2024-11-11 RX ORDER — DULOXETIN HYDROCHLORIDE 60 MG/1
60 CAPSULE, DELAYED RELEASE ORAL DAILY
Qty: 90 CAPSULE | Refills: 3 | Status: SHIPPED | OUTPATIENT
Start: 2024-11-11

## 2024-11-11 NOTE — TELEPHONE ENCOUNTER
Rx Refill Note  Requested Prescriptions     Pending Prescriptions Disp Refills    DULoxetine (CYMBALTA) 60 MG capsule [Pharmacy Med Name: DULoxetine HCl 60 MG Oral Capsule Delayed Release Particles] 90 capsule 3     Sig: TAKE 1 CAPSULE BY MOUTH DAILY      Last office visit with prescribing clinician: 8/5/2024   Last telemedicine visit with prescribing clinician: Visit date not found   Next office visit with prescribing clinician: 11/13/2024                         Would you like a call back once the refill request has been completed: [] Yes [] No    If the office needs to give you a call back, can they leave a voicemail: [] Yes [] No    Paula James, RUPA  11/11/24, 09:00 EST

## 2024-11-13 ENCOUNTER — OFFICE VISIT (OUTPATIENT)
Dept: FAMILY MEDICINE CLINIC | Facility: CLINIC | Age: 84
End: 2024-11-13
Payer: MEDICARE

## 2024-11-13 VITALS
HEART RATE: 101 BPM | TEMPERATURE: 96.4 F | SYSTOLIC BLOOD PRESSURE: 151 MMHG | RESPIRATION RATE: 18 BRPM | DIASTOLIC BLOOD PRESSURE: 88 MMHG | BODY MASS INDEX: 28.68 KG/M2 | HEIGHT: 64 IN | WEIGHT: 168 LBS | OXYGEN SATURATION: 96 %

## 2024-11-13 DIAGNOSIS — R30.0 DYSURIA: ICD-10-CM

## 2024-11-13 DIAGNOSIS — F41.9 ANXIETY: ICD-10-CM

## 2024-11-13 DIAGNOSIS — R82.90 ABNORMAL URINALYSIS: ICD-10-CM

## 2024-11-13 DIAGNOSIS — E11.42 DIABETIC POLYNEUROPATHY ASSOCIATED WITH TYPE 2 DIABETES MELLITUS: ICD-10-CM

## 2024-11-13 DIAGNOSIS — E11.42 TYPE 2 DIABETES MELLITUS WITH PERIPHERAL NEUROPATHY: Primary | ICD-10-CM

## 2024-11-13 DIAGNOSIS — I10 ELEVATED BLOOD PRESSURE READING WITH DIAGNOSIS OF HYPERTENSION: ICD-10-CM

## 2024-11-13 LAB
BILIRUB BLD-MCNC: NEGATIVE MG/DL
CLARITY, POC: CLEAR
COLOR UR: YELLOW
EXPIRATION DATE: ABNORMAL
EXPIRATION DATE: ABNORMAL
GLUCOSE UR STRIP-MCNC: NEGATIVE MG/DL
HBA1C MFR BLD: 7.3 % (ref 4.5–5.7)
KETONES UR QL: NEGATIVE
LEUKOCYTE EST, POC: ABNORMAL
Lab: ABNORMAL
Lab: ABNORMAL
NITRITE UR-MCNC: NEGATIVE MG/ML
PH UR: 7 [PH] (ref 5–8)
PROT UR STRIP-MCNC: NEGATIVE MG/DL
RBC # UR STRIP: NEGATIVE /UL
SP GR UR: 1.01 (ref 1–1.03)
UROBILINOGEN UR QL: NORMAL

## 2024-11-13 PROCEDURE — 83036 HEMOGLOBIN GLYCOSYLATED A1C: CPT | Performed by: FAMILY MEDICINE

## 2024-11-13 PROCEDURE — 3077F SYST BP >= 140 MM HG: CPT | Performed by: FAMILY MEDICINE

## 2024-11-13 PROCEDURE — 1125F AMNT PAIN NOTED PAIN PRSNT: CPT | Performed by: FAMILY MEDICINE

## 2024-11-13 PROCEDURE — 3079F DIAST BP 80-89 MM HG: CPT | Performed by: FAMILY MEDICINE

## 2024-11-13 PROCEDURE — 81003 URINALYSIS AUTO W/O SCOPE: CPT | Performed by: FAMILY MEDICINE

## 2024-11-13 PROCEDURE — 3051F HG A1C>EQUAL 7.0%<8.0%: CPT | Performed by: FAMILY MEDICINE

## 2024-11-13 PROCEDURE — 99214 OFFICE O/P EST MOD 30 MIN: CPT | Performed by: FAMILY MEDICINE

## 2024-11-13 PROCEDURE — 87086 URINE CULTURE/COLONY COUNT: CPT | Performed by: FAMILY MEDICINE

## 2024-11-13 RX ORDER — BUSPIRONE HYDROCHLORIDE 7.5 MG/1
TABLET ORAL
Status: SHIPPED
Start: 2024-11-13

## 2024-11-13 RX ORDER — VERAPAMIL HYDROCHLORIDE 100 MG/1
200 CAPSULE, EXTENDED RELEASE ORAL NIGHTLY
Status: SHIPPED
Start: 2024-11-13

## 2024-11-13 NOTE — PROGRESS NOTES
Subjective   Monica Pagan is a 84 y.o. female.     Chief Complaint   Patient presents with    Diabetes     Patients HgbA1c while in the office today is 7.3%    Urinary Tract Infection     Urinary urgency,dysuria x 2 weeks. Patient states that she hasn't done the UTI kit from her insurance.     Stress    Hypertension         Current Outpatient Medications:     acetaminophen (Tylenol 8 Hour) 650 MG 8 hr tablet, Take 1 tablet by mouth Every 8 (Eight) Hours As Needed for Moderate Pain., Disp: , Rfl:     albuterol sulfate  (90 Base) MCG/ACT inhaler, USE 2 INHALATIONS BY MOUTH EVERY 6 HOURS AS NEEDED FOR WHEEZING  OR SHORTNESS OF BREATH (COUGH), Disp: 18 g, Rfl: 6    Blood Glucose Monitoring Suppl (ONE TOUCH ULTRA MINI) w/Device kit, 1 kit Daily., Disp: 1 each, Rfl: 0    busPIRone (BUSPAR) 7.5 MG tablet, TAKE 1 - 1.5 TABLET BY MOUTH 3 TIMES A DAY, Disp: , Rfl:     cyanocobalamin (VITAMIN B-12) 2000 MCG tablet tablet, Take 1 tablet by mouth Daily., Disp: , Rfl:     DULoxetine (CYMBALTA) 60 MG capsule, TAKE 1 CAPSULE BY MOUTH DAILY, Disp: 90 capsule, Rfl: 3    eszopiclone (LUNESTA) 3 MG tablet, TAKE 1 TABLET BY MOUTH IMMEDIATELY BEFORE BEDTIME AS NEEDED FOR SLEEP, Disp: 90 tablet, Rfl: 0    glucose blood (True Metrix Blood Glucose Test) test strip, USE 1 STRIP TO CHECK BLOOD  GLUCOSE 3 TIMES DAILY BEFORE  MEALS. USE AS INSTRUCTED., Disp: 300 each, Rfl: 3    guaiFENesin (MUCINEX) 600 MG 12 hr tablet, Take 1 tablet by mouth 2 (Two) Times a Day As Needed for Cough or Congestion., Disp: 60 tablet, Rfl: 2    Insulin Glargine (LANTUS SOLOSTAR) 100 UNIT/ML injection pen, Inject 28 Units under the skin into the appropriate area as directed Every Night., Disp: 26 mL, Rfl: 0    loperamide (IMODIUM) 2 MG capsule, Take 1 capsule by mouth 4 (Four) Times a Day As Needed for Diarrhea., Disp: , Rfl:     metFORMIN (GLUCOPHAGE) 500 MG tablet, Take 1 tablet by mouth 2 (Two) Times a Day With Meals., Disp: 180 tablet, Rfl: 1     nystatin (MYCOSTATIN) 317009 UNIT/GM cream, Apply  topically to the appropriate area as directed 2 (Two) Times a Day As Needed (groin rash)., Disp: 90 g, Rfl: 0    OneTouch UltraSoft 2 Lancets misc, USE TO CHECK BLOOD SUGAR BEFORE  EACH MEAL AND EVERY NIGHT AT  BEDTIME, Disp: 400 each, Rfl: 3    rosuvastatin (CRESTOR) 5 MG tablet, Take 1 tablet by mouth Daily., Disp: 90 tablet, Rfl: 3    triamcinolone (KENALOG) 0.1 % cream, Apply  topically to the appropriate area as directed 2 (Two) Times a Day., Disp: 45 g, Rfl: 0    verapamil (VERELAN) 100 MG 24 hr capsule, Take 2 capsules by mouth Every Night., Disp: , Rfl:     vitamin D3 125 MCG (5000 UT) capsule capsule, 1 po once a month, Disp: , Rfl:     Past Medical History:   Diagnosis Date    Anemia     Ankylosing spondylitis of site in spine     Anxiety     Back pain     Cervical disc disorder     Childhood Asthma     Colon polyp     TA    DDD  lumbar     Depression     DEXA     2022= (0.0/-0.3)    Diabetes     DMII     Fibromyalgia     Fractures     Hyperlipidemia     Hypertension     IBS     Insomnia     Joint pain     Leg pain, right     Low back pain     Lumbar spinal stenosis     Lumbosacral disc disease     Macular degeneration, left eye     MAMMO     NEG= 2020/ 2022    Meniere's disease     Neck pain     Neuropathy in diabetes     Nontoxic thyroid nodule     Ocular histoplasmosis     Osteoarthritis     Peripheral neuropathy     PVC     Shingles     Spondylosis     Tinnitus     Tremor     Trigeminal neuralgia     Vitamin D deficiency        Past Surgical History:   Procedure Laterality Date    AMPUTATION DIGIT Left     Digit #1    APPENDECTOMY  1962    BACK SURGERY  3 times    BUNIONECTOMY Bilateral     CATARACT EXTRACTION      left    CATARACT EXTRACTION      x2    COLONOSCOPY      NEG = 2012/ 2021= TA,  NO Recall    CYST REMOVAL Bilateral     WRIST    EPIDURAL BLOCK  many    HYSTERECTOMY  1980    JOINT REPLACEMENT      Rt TKR x 2    JOINT REPLACEMENT      Left  THR    LAMINECTOMY      LAPAROSCOPIC CHOLECYSTECTOMY      DR. LOBATO    SPINAL FUSION      SPINE SURGERY      Lumbar Fusion    SUBTOTAL HYSTERECTOMY      TRIGGER POINT INJECTION         Family History   Problem Relation Age of Onset    Asthma Mother     COPD Mother     Heart disease Father     Alcohol abuse Father     Arthritis Sister     Cancer Sister     Leukemia Sister     Heart attack Brother     Cancer Brother     Alcohol abuse Brother     Kidney disease Brother     Alzheimer's disease Brother     Heart disease Brother     Diabetes Brother     Hyperlipidemia Brother     Diabetes Son         she is  my daughter    Diabetes Son        Social History     Socioeconomic History    Marital status:    Tobacco Use    Smoking status: Never     Passive exposure: Never    Smokeless tobacco: Never   Vaping Use    Vaping status: Never Used   Substance and Sexual Activity    Alcohol use: Yes     Alcohol/week: 1.0 standard drink of alcohol     Types: 1 Glasses of wine per week     Comment: Rare    Drug use: No    Sexual activity: Never       History of Present Illness  The patient is an 84-year-old female who presents for follow-up on diabetes, stress, and a possible urinary tract infection (UTI).    She reports that her blood sugar levels are generally well-controlled, except when she consumes certain foods. She is on Lantus insulin, 28 units qday. She has a podiatry appointment scheduled for 11/20/2023 and an eye exam in 12/2024.    She has been monitoring her blood pressure at home, which has been elevated for a few days, typically around 150 systolic. She is currently taking verapamil 100 mg at night.    She has been experiencing urinary frequency and foul-smelling urine, with occasional pain, suggesting a possible UTI.    She reports feeling unwell and is unsure if it's due to depression or another cause. She has been taking BuSpar for anxiety, but it doesn't seem to be effective. She experiences excessive sweating,  to the point of soaking her clothes, and clammy hands.    Supplemental Information:  She has seen Dr. Young for epidurals, which helped her, but did not help with her balance. Lunesta is working for her sleep.    SOCIAL HISTORY  She does not drink alcohol.    IMMUNIZATIONS  She is up to date on her COVID-19, influenza, pneumonia booster, RSV, and shingles vaccines.        The following portions of the patient's history were reviewed and updated as appropriate: allergies, current medications, past family history, past medical history, past social history, past surgical history and problem list.    Review of Systems   Constitutional:  Negative for activity change, appetite change, fatigue, fever, unexpected weight gain and unexpected weight loss.   Respiratory:  Negative for cough, chest tightness and shortness of breath.    Cardiovascular:  Negative for chest pain, palpitations and leg swelling.   Genitourinary:  Positive for dysuria, frequency and urgency. Negative for hematuria and urinary incontinence.   Musculoskeletal:  Negative for arthralgias and myalgias.   Neurological:  Negative for dizziness, facial asymmetry, speech difficulty, weakness, light-headedness, headache, memory problem and confusion.   Psychiatric/Behavioral:  Positive for dysphoric mood and stress. Negative for sleep disturbance and depressed mood.        Vitals:    11/13/24 0944   BP: 151/88   Pulse: 101   Resp: 18   Temp: 96.4 °F (35.8 °C)   SpO2: 96%       Objective   Physical Exam  Vitals and nursing note reviewed.   Constitutional:       General: She is not in acute distress.     Appearance: She is well-developed and normal weight. She is not ill-appearing or toxic-appearing.   HENT:      Head: Normocephalic and atraumatic.   Cardiovascular:      Rate and Rhythm: Normal rate and regular rhythm.      Heart sounds: Normal heart sounds. No murmur heard.  Pulmonary:      Effort: Pulmonary effort is normal. No respiratory distress.      Breath  sounds: Normal breath sounds. No wheezing, rhonchi or rales.   Skin:     General: Skin is warm and dry.      Findings: No rash.   Neurological:      Mental Status: She is alert and oriented to person, place, and time.      Cranial Nerves: No cranial nerve deficit.   Psychiatric:         Attention and Perception: Attention and perception normal.         Mood and Affect: Mood and affect normal.         Speech: Speech normal.         Behavior: Behavior normal. Behavior is cooperative.         Thought Content: Thought content normal.         Cognition and Memory: Cognition and memory normal.         Judgment: Judgment normal.       Physical Exam       Assessment & Plan   Diagnoses and all orders for this visit:    1. Type 2 diabetes mellitus with peripheral neuropathy (Primary)  -     POC Glycosylated Hemoglobin (Hb A1C)  -     Hemoglobin A1c; Future    2. Dysuria  -     POC Urinalysis Dipstick, Automated    3. Elevated blood pressure reading with diagnosis of hypertension    4. Anxiety    5. Diabetic polyneuropathy associated with type 2 diabetes mellitus    6. Abnormal urinalysis  -     Urine Culture - Urine, Urine, Clean Catch    Other orders  -     verapamil (VERELAN) 100 MG 24 hr capsule; Take 2 capsules by mouth Every Night.  -     busPIRone (BUSPAR) 7.5 MG tablet; TAKE 1 - 1.5 TABLET BY MOUTH 3 TIMES A DAY      Assessment & Plan  1. Hypertension.  Her blood pressure readings are elevated, typically around 150 systolic. She is currently taking verapamil 100 mg at night. The dosage will be increased to 200 mg, to be taken as two 100 mg tablets at night. If this adjustment effectively lowers her blood pressure, a switch to a single 200 mg tablet will be considered. She will monitor her blood pressure and report any changes.    2. Anxiety.  She reports persistent anxiety symptoms, particularly in the morning. The current dose of BuSpar will be increased from 7.5 mg to 10 mg. She will take 1.5 tablets three times a  day temporarily to see if it helps improve her symptoms.    3. Urinary Tract Infection (UTI).  She reports urinary frequency, pain, and foul-smelling urine. A urine culture will be ordered to determine if there is an infection. If the culture grows bacteria, appropriate antibiotics will be prescribed.    4. Depression.  She reports feeling melancholy and experiencing physical symptoms of depression. She is advised to continue her current medications and consider increasing physical activities and engaging in mindfulness meditation.    5. Diabetes Mellitus.  Her blood sugar levels are stable. She will continue her current medication regimen, including Lantus insulin 28 units. A future order for a fingerstick blood sugar test will be placed for late January 2024.    6. Health Maintenance.  She is up to date on her vaccines, including COVID-19, flu, pneumonia booster, RSV, and shingles. She has an eye exam scheduled for December 2024.    Follow-up  Return in 3 months for follow-up on blood sugar levels and blood pressure management.     Results            Patient or patient representative verbalized consent for the use of Ambient Listening during the visit with  Henny Long DO for chart documentation. 11/19/2024  17:03 EST

## 2024-11-13 NOTE — PROGRESS NOTES
Subjective   Monica Pagan is a 84 y.o. female is here for follow up for lower back pain. Patient was last seen on 11/5/2024 for right SIJ injection with excellent improvement in pain.  She still has intermittent right-sided groin pain especially if she moves wrong way while sleeping but overall her pain is very tolerable..    On last visit:     Lower back pain-mainly right-sided is 3/10 on VAS, at maximum is 3/10. Pain is burning, stabbing in nature. Pain is referred right anterior leg, right lateral leg and R anterior shin. +numbness and tingling. The pain is constant. The pain is improved by rest and right SI joint junction. The pain is worse with crossing her R leg over left, sitting for long time. Walking seem to relive some pain, but can't walk more than 5 houses. Standing for longer than 10 minutes will make her lower back pain worse.  Her main complaint is right-sided buttock pain below L5 vertebral level.    Previous Injection:   11/5/2024-right SI joint injection-95% pain relief with pain with functional improvement.     Hx: Referred by  Roxi Pike APRN for lower back pain with diagnosis of sacroiliitis versus postlaminectomy syndrome.  Chronic history of lower back pain with history of L3 S1 PSF with good resolution of her back and leg symptoms but continued to have residual back pain that she has dealt with over the years.  She has significantly increased left leg pain and groin pain since last 2 months that started without any significant injuries or inciting events. She had R hip pain with R leg radiculopathy. R sided radiculopathy has somewhat improved.  She had previously seen local pain management but has not seen anyone recently.  Not a surgical candidate as patient has refused any surgical discussion and also refused physical therapy. Left leg weakness - working with PT to get AFO.  Chronic history of lower back pain and she started receiving injections in [place] and late 80s.   Previously seen at [facility] pain management as well.  Unfortunately her  passed away with cancer and she has been helping her daughter who has MS.  Lives by herself.  Does have sister in town.        PHQ-9- 8                       SOAPP- 3   [place] back disability scale -      PMH:   DM-2-last A1c 6.8, fibromyalgia, history of panic attacks, diabetic neuropathy, hypertension, anxiety L3-S1 PSF, bladder stimulator, bilateral knee TKR, hx of meniere's disease with chronic balance issues - uses walker     Current Medications:   Tylenol  Cymbalta           Past Medications:     Past Modalities:  TENS:                                                                          no                                                  Physical Therapy Within The Last 6 Months              Yes - many times - not sure if it helped.   Psychotherapy                                                            no  Massage Therapy                                                       no     Patient Complains Of:  Uro-Fecal Incontinence          Yes - chronic issue s/p bladder stimulator - changes 3 pades/night   Weight Gain/Loss                   no  Fever/Chills                             no  Weakness                               Yes - chronic left leg weakness.         PEG Assessment   What number best describes your pain on average in the past week?6  What number best describes how, during the past week, pain has interfered with your enjoyment of life?6  What number best describes how, during the past week, pain has interfered with your general activity?  6         Current Outpatient Medications:     acetaminophen (Tylenol 8 Hour) 650 MG 8 hr tablet, Take 1 tablet by mouth Every 8 (Eight) Hours As Needed for Moderate Pain., Disp: , Rfl:     albuterol sulfate  (90 Base) MCG/ACT inhaler, USE 2 INHALATIONS BY MOUTH EVERY 6 HOURS AS NEEDED FOR WHEEZING  OR SHORTNESS OF BREATH (COUGH), Disp: 18 g, Rfl: 6    Blood Glucose  Monitoring Suppl (ONE TOUCH ULTRA MINI) w/Device kit, 1 kit Daily., Disp: 1 each, Rfl: 0    busPIRone (BUSPAR) 7.5 MG tablet, TAKE 1 - 1.5 TABLET BY MOUTH 3 TIMES A DAY, Disp: , Rfl:     cyanocobalamin (VITAMIN B-12) 2000 MCG tablet tablet, Take 1 tablet by mouth Daily., Disp: , Rfl:     DULoxetine (CYMBALTA) 60 MG capsule, TAKE 1 CAPSULE BY MOUTH DAILY, Disp: 90 capsule, Rfl: 3    eszopiclone (LUNESTA) 3 MG tablet, TAKE 1 TABLET BY MOUTH IMMEDIATELY BEFORE BEDTIME AS NEEDED FOR SLEEP, Disp: 90 tablet, Rfl: 0    glucose blood (True Metrix Blood Glucose Test) test strip, USE 1 STRIP TO CHECK BLOOD  GLUCOSE 3 TIMES DAILY BEFORE  MEALS. USE AS INSTRUCTED., Disp: 300 each, Rfl: 3    guaiFENesin (MUCINEX) 600 MG 12 hr tablet, Take 1 tablet by mouth 2 (Two) Times a Day As Needed for Cough or Congestion., Disp: 60 tablet, Rfl: 2    Insulin Glargine (LANTUS SOLOSTAR) 100 UNIT/ML injection pen, Inject 28 Units under the skin into the appropriate area as directed Every Night., Disp: 26 mL, Rfl: 0    loperamide (IMODIUM) 2 MG capsule, Take 1 capsule by mouth 4 (Four) Times a Day As Needed for Diarrhea., Disp: , Rfl:     metFORMIN (GLUCOPHAGE) 500 MG tablet, Take 1 tablet by mouth 2 (Two) Times a Day With Meals., Disp: 180 tablet, Rfl: 1    nystatin (MYCOSTATIN) 482441 UNIT/GM cream, Apply  topically to the appropriate area as directed 2 (Two) Times a Day As Needed (groin rash)., Disp: 90 g, Rfl: 0    OneTouch UltraSoft 2 Lancets misc, USE TO CHECK BLOOD SUGAR BEFORE  EACH MEAL AND EVERY NIGHT AT  BEDTIME, Disp: 400 each, Rfl: 3    rosuvastatin (CRESTOR) 5 MG tablet, Take 1 tablet by mouth Daily., Disp: 90 tablet, Rfl: 3    triamcinolone (KENALOG) 0.1 % cream, Apply  topically to the appropriate area as directed 2 (Two) Times a Day., Disp: 45 g, Rfl: 0    verapamil (VERELAN) 100 MG 24 hr capsule, Take 2 capsules by mouth Every Night., Disp: , Rfl:     vitamin D3 125 MCG (5000 UT) capsule capsule, 1 po once a month, Disp: , Rfl:      The following portions of the patient's history were reviewed and updated as appropriate: allergies, current medications, past family history, past medical history, past social history, past surgical history, and problem list.      REVIEW OF PERTINENT MEDICAL DATA    Past Medical History:   Diagnosis Date    Anemia     Ankylosing spondylitis of site in spine     Anxiety     Back pain     Cervical disc disorder     Childhood Asthma     Colon polyp     TA    DDD  lumbar     Depression     DEXA     2022= (0.0/-0.3)    Diabetes     DMII     Fibromyalgia     Fractures     Hyperlipidemia     Hypertension     IBS     Insomnia     Joint pain     Leg pain, right     Low back pain     Lumbar spinal stenosis     Lumbosacral disc disease     Macular degeneration, left eye     MAMMO     NEG= 2020/ 2022    Meniere's disease     Neck pain     Neuropathy in diabetes     Nontoxic thyroid nodule     Ocular histoplasmosis     Osteoarthritis     Peripheral neuropathy     PVC     Shingles     Spondylosis     Tinnitus     Tremor     Trigeminal neuralgia     Vitamin D deficiency      Past Surgical History:   Procedure Laterality Date    AMPUTATION DIGIT Left     Digit #1    APPENDECTOMY  1962    BACK SURGERY  3 times    BUNIONECTOMY Bilateral     CATARACT EXTRACTION      left    CATARACT EXTRACTION      x2    COLONOSCOPY      NEG = 2012/ 2021= TA,  NO Recall    CYST REMOVAL Bilateral     WRIST    EPIDURAL BLOCK  many    HYSTERECTOMY  1980    JOINT REPLACEMENT      Rt TKR x 2    JOINT REPLACEMENT      Left THR    LAMINECTOMY      LAPAROSCOPIC CHOLECYSTECTOMY      DR. LOBATO    SPINAL FUSION      SPINE SURGERY      Lumbar Fusion    SUBTOTAL HYSTERECTOMY      TRIGGER POINT INJECTION       Family History   Problem Relation Age of Onset    Asthma Mother     COPD Mother     Heart disease Father     Alcohol abuse Father     Arthritis Sister     Cancer Sister     Leukemia Sister     Heart attack Brother     Cancer Brother     Alcohol abuse  Brother     Kidney disease Brother     Alzheimer's disease Brother     Heart disease Brother     Diabetes Brother     Hyperlipidemia Brother     Diabetes Son         she is  my daughter    Diabetes Son      Social History     Socioeconomic History    Marital status:    Tobacco Use    Smoking status: Never     Passive exposure: Never    Smokeless tobacco: Never   Vaping Use    Vaping status: Never Used   Substance and Sexual Activity    Alcohol use: Yes     Alcohol/week: 1.0 standard drink of alcohol     Types: 1 Glasses of wine per week     Comment: Rare    Drug use: No    Sexual activity: Never         Review of Systems   Musculoskeletal:  Positive for arthralgias and back pain.         Vitals:    11/18/24 1059   BP: 143/74   Pulse: 95   Resp: 16   SpO2: 97%   Weight: 76.2 kg (168 lb)   PainSc:   3         Objective   Physical Exam  Musculoskeletal:         General: Tenderness present.        Legs:            Imaging Reviewed:  Right hip x-ray-10/21/2024  - Moderate degenerative changes of right hip  - Degenerative changes of SI joints and pubic symphysis  - Osteopenia    Lumbar x-ray-9/19/2024  - Stable grade 1 anterolisthesis L4 upon L5  - Old bilateral S1 pedicle screw fractures  - L3-S1 bilateral pedicle screw and cervical fusion, fina fixation hardware is again noted.  - Fractures of bilateral S1 pedicle screws which has been previously demonstrated.  - Sacral stimulator in place     CT abdomen pelvis-2/1/2024  - Postsurgical changes noted date with rods and screws from L3 S1  - Facet joints appears to be fused from L3-S1  - Chronic fractures of S1 pedicle screws  - Mild bilateral SI joint degeneration.       Assessment:    1. Sacroiliac joint dysfunction    2. Right hip pain    3. Post laminectomy syndrome           Plan:   1.  Defer UDS for now.  2. We discussed trying a course of formal physical therapy.  Physical therapy can help strengthen and stretch the muscles around the joints. Continue to be as  active as possible.  Patient has done more than 12 weeks of physical therapy.  3.  Lumbar x-ray was reviewed with patient and a also independently.  I believe patient's pain is multifactorial with right-sided buttock pain originating from the SI joint arthritis and radiculopathy most likely secondary to lumbar stenosis/postlaminectomy.  I believe there is also right hip pathology causing anterior thigh pain.  4.  Significant improvement in pain with right SI joint injection.  Intermittent right groin pain which is most likely secondary to right hip arthritis.  Discussed possibility of right hip injection but she would like to hold off as pain is mostly tolerable and significantly improved SI joint junction.  5.  Patient may benefit from lumbar MRI in future.  Patient states that she was under the impression that she cannot get MRI due to sacral stimulator from [employer].  Discussed with patient that I recommend calling the rep as most of the [employer] stimulators nowadays are MRI compatible.  She will give them a call.     RTC as needed.     Lance Young DO  Pain Management   [facility]     INSPECT REPORT    As part of the patient's treatment plan, I may be prescribing controlled substances. The patient has been made aware of appropriate use of such medications, including potential risk of somnolence, limited ability to drive and/or work safely, and the potential for dependence or overdose. It has also been made clear that these medications are for use by this patient only, without concomitant use of alcohol or other substances unless prescribed.     Patient has completed prescribing agreement detailing terms of continued prescribing of controlled substances, including monitoring INSPECT reports, urine drug screening, and pill counts if necessary. The patient is aware that inappropriate use will results in cessation of prescribing such medications.    INSPECT report has been reviewed and scanned into the  patient's chart.

## 2024-11-13 NOTE — PROGRESS NOTES
Fingerstick performed in right middle finger by Paula James CMA  with good hemostasis. Patient tolerated well. 11/13/24 Paula James CMA

## 2024-11-15 ENCOUNTER — TELEPHONE (OUTPATIENT)
Dept: FAMILY MEDICINE CLINIC | Facility: CLINIC | Age: 84
End: 2024-11-15

## 2024-11-15 LAB — BACTERIA SPEC AEROBE CULT: NORMAL

## 2024-11-15 NOTE — TELEPHONE ENCOUNTER
"    Caller: Monica Pagan \"MONICA PAGAN\"    Relationship to patient: Self    Best call back number: [phone]    Patient is needing:   WANTING TO LET DR. BAUTISTA KNOW THAT SHE RECEIVED A LETTER STATING THAT THEY WILL NO LONGER BE MAKING LEVEMIR.     PLEASE CALL TO ADVISE.         "

## 2024-11-18 ENCOUNTER — OFFICE VISIT (OUTPATIENT)
Dept: PAIN MEDICINE | Facility: CLINIC | Age: 84
End: 2024-11-18
Payer: MEDICARE

## 2024-11-18 VITALS
OXYGEN SATURATION: 97 % | RESPIRATION RATE: 16 BRPM | BODY MASS INDEX: 28.84 KG/M2 | DIASTOLIC BLOOD PRESSURE: 74 MMHG | SYSTOLIC BLOOD PRESSURE: 143 MMHG | WEIGHT: 168 LBS | HEART RATE: 95 BPM

## 2024-11-18 DIAGNOSIS — M96.1 POST LAMINECTOMY SYNDROME: ICD-10-CM

## 2024-11-18 DIAGNOSIS — M25.551 RIGHT HIP PAIN: ICD-10-CM

## 2024-11-18 DIAGNOSIS — M53.3 SACROILIAC JOINT DYSFUNCTION: Primary | ICD-10-CM

## 2024-11-18 PROCEDURE — 1160F RVW MEDS BY RX/DR IN RCRD: CPT | Performed by: STUDENT IN AN ORGANIZED HEALTH CARE EDUCATION/TRAINING PROGRAM

## 2024-11-18 PROCEDURE — 99213 OFFICE O/P EST LOW 20 MIN: CPT | Performed by: STUDENT IN AN ORGANIZED HEALTH CARE EDUCATION/TRAINING PROGRAM

## 2024-11-18 PROCEDURE — 3077F SYST BP >= 140 MM HG: CPT | Performed by: STUDENT IN AN ORGANIZED HEALTH CARE EDUCATION/TRAINING PROGRAM

## 2024-11-18 PROCEDURE — 1159F MED LIST DOCD IN RCRD: CPT | Performed by: STUDENT IN AN ORGANIZED HEALTH CARE EDUCATION/TRAINING PROGRAM

## 2024-11-18 PROCEDURE — 3078F DIAST BP <80 MM HG: CPT | Performed by: STUDENT IN AN ORGANIZED HEALTH CARE EDUCATION/TRAINING PROGRAM

## 2024-11-18 PROCEDURE — 1125F AMNT PAIN NOTED PAIN PRSNT: CPT | Performed by: STUDENT IN AN ORGANIZED HEALTH CARE EDUCATION/TRAINING PROGRAM

## 2024-11-19 PROBLEM — I10 ESSENTIAL (PRIMARY) HYPERTENSION: Status: RESOLVED | Noted: 2021-05-29 | Resolved: 2024-11-19

## 2024-11-19 PROBLEM — Z79.4 LONG TERM (CURRENT) USE OF INSULIN: Status: RESOLVED | Noted: 2021-05-29 | Resolved: 2024-11-19

## 2024-11-19 PROBLEM — Z79.4 ENCOUNTER FOR LONG-TERM (CURRENT) USE OF INSULIN: Status: RESOLVED | Noted: 2021-11-24 | Resolved: 2024-11-19

## 2024-11-19 PROBLEM — A07.1 GIARDIA: Status: RESOLVED | Noted: 2021-03-22 | Resolved: 2024-11-19

## 2024-12-02 RX ORDER — BUSPIRONE HYDROCHLORIDE 7.5 MG/1
7.5 TABLET ORAL 3 TIMES DAILY
Qty: 270 TABLET | Refills: 1 | Status: SHIPPED | OUTPATIENT
Start: 2024-12-02

## 2024-12-10 ENCOUNTER — TELEPHONE (OUTPATIENT)
Dept: FAMILY MEDICINE CLINIC | Facility: CLINIC | Age: 84
End: 2024-12-10

## 2024-12-10 NOTE — TELEPHONE ENCOUNTER
Caller: RYLAND    Relationship: Other    Best call back number:     [phone]  EXT. [phone]       What was the call regarding:     CALLING TO FOLLOW UP ON THE FAXES THAT HAVE BEEN SENT TO THE OFFICE IN REGARDS TO DIABETIC SHOES FOR THE PATIENT    PLEASE RETURN HER CALL.  THEY STARTED THE FAXES IN EARLY NOVEMBER.

## 2024-12-10 NOTE — TELEPHONE ENCOUNTER
RETURNED RYLAND'S CALL, LETTING HER KNOW THEY WERE FAXED ON 11/20 AND 12/6. SHE REQUESTED TO BE FAXED DIRECTLY TO HER AND GAVE A FAX -[phone]. IT IS BEING FAXED OVER TODAY.

## 2024-12-12 ENCOUNTER — TELEPHONE (OUTPATIENT)
Dept: FAMILY MEDICINE CLINIC | Facility: CLINIC | Age: 84
End: 2024-12-12

## 2024-12-12 RX ORDER — CALCIUM CITRATE/VITAMIN D3 200MG-6.25
TABLET ORAL
Qty: 400 EACH | Refills: 3 | Status: SHIPPED | OUTPATIENT
Start: 2024-12-12

## 2024-12-12 RX ORDER — LANCETS 30 GAUGE
1 EACH MISCELLANEOUS
Qty: 400 EACH | Refills: 3 | Status: SHIPPED | OUTPATIENT
Start: 2024-12-12

## 2024-12-12 NOTE — TELEPHONE ENCOUNTER
REFERENCE [id number]     Caller: FRANKO BENSON    Relationship:     Best call back number:     FRANKO / KENDRICK [phone]     FAX NUMBER [phone]         What was the call regarding: THEY ARE NEEDING TO CLARIFY THE DIRECTIONS FOR THE PATIENT'S DIABETIC SUPPLIES     THE DIRECTIONS ARE DIFFERENT THAT THE PREVIOUS ORDER IN OCTOBER     CAN YOU ADVISE     Is it okay if the provider responds through MyChart: CALL

## 2024-12-13 NOTE — TELEPHONE ENCOUNTER
"    Caller: Monica Pagan \"MONICA PAGAN\"    Relationship to patient: Self    Best call back number: [phone]    Patient is needing: PATIENT RECEIVED ANOTHER LETTER ABOUT THIS YESTERDAY, JELANI NO LONGER SHOWS IN HER CHART. WAS SOMETHING NEW SENT IN FOR HER? PLEASE REACH OUT TO PATIENT AND ADVISE.           "

## 2025-01-22 ENCOUNTER — TELEPHONE (OUTPATIENT)
Dept: FAMILY MEDICINE CLINIC | Facility: CLINIC | Age: 85
End: 2025-01-22

## 2025-01-22 RX ORDER — PEN NEEDLE, DIABETIC 30 GX3/16"
1 NEEDLE, DISPOSABLE MISCELLANEOUS DAILY
Qty: 100 EACH | Refills: 3 | Status: SHIPPED | OUTPATIENT
Start: 2025-01-22

## 2025-01-22 NOTE — TELEPHONE ENCOUNTER
"  Caller: Monica Pagan \"MONICA PAGAN\"    Relationship: Self    Best call back number: [phone]      What medication are you requesting: NEEDLES FOR LANTUS     If a prescription is needed, what is your preferred pharmacy and phone number:  [facility] - [place], [place] - [address] - [phone] [facility] [phone]  [phone]     Additional notes: PATIENT HAS A WEEKS WORTH OF NEEDLES. PLEASE ADVISE IF THIS CAN BE SENT.   "

## 2025-01-28 RX ORDER — INSULIN GLARGINE 100 [IU]/ML
INJECTION, SOLUTION SUBCUTANEOUS
Qty: 15 ML | Refills: 5 | Status: SHIPPED | OUTPATIENT
Start: 2025-01-28

## 2025-02-03 DIAGNOSIS — G47.00 INSOMNIA, UNSPECIFIED TYPE: ICD-10-CM

## 2025-02-03 RX ORDER — ESZOPICLONE 3 MG/1
3 TABLET, FILM COATED ORAL NIGHTLY PRN
Qty: 90 TABLET | Refills: 0 | Status: SHIPPED | OUTPATIENT
Start: 2025-02-03

## 2025-02-13 ENCOUNTER — CLINICAL SUPPORT (OUTPATIENT)
Dept: FAMILY MEDICINE CLINIC | Facility: CLINIC | Age: 85
End: 2025-02-13
Payer: MEDICARE

## 2025-02-13 DIAGNOSIS — E11.42 TYPE 2 DIABETES MELLITUS WITH PERIPHERAL NEUROPATHY: Primary | ICD-10-CM

## 2025-02-13 LAB
EXPIRATION DATE: ABNORMAL
HBA1C MFR BLD: 7 % (ref 4.5–5.7)
Lab: ABNORMAL

## 2025-02-13 PROCEDURE — 83036 HEMOGLOBIN GLYCOSYLATED A1C: CPT | Performed by: FAMILY MEDICINE

## 2025-02-13 PROCEDURE — 3051F HG A1C>EQUAL 7.0%<8.0%: CPT | Performed by: FAMILY MEDICINE

## 2025-02-13 NOTE — PROGRESS NOTES
Fingerstick performed in L -3rd finger by RT Arleen  with good hemostasis. Patient tolerated well. 02/13/25 Miriam Mello, RT

## 2025-02-25 ENCOUNTER — OFFICE VISIT (OUTPATIENT)
Dept: FAMILY MEDICINE CLINIC | Facility: CLINIC | Age: 85
End: 2025-02-25
Payer: MEDICARE

## 2025-02-25 VITALS
TEMPERATURE: 96.9 F | RESPIRATION RATE: 20 BRPM | BODY MASS INDEX: 29.88 KG/M2 | WEIGHT: 175 LBS | SYSTOLIC BLOOD PRESSURE: 143 MMHG | HEART RATE: 102 BPM | HEIGHT: 64 IN | OXYGEN SATURATION: 95 % | DIASTOLIC BLOOD PRESSURE: 81 MMHG

## 2025-02-25 DIAGNOSIS — I10 PRIMARY HYPERTENSION: ICD-10-CM

## 2025-02-25 DIAGNOSIS — E11.42 TYPE 2 DIABETES MELLITUS WITH PERIPHERAL NEUROPATHY: ICD-10-CM

## 2025-02-25 DIAGNOSIS — Z00.00 MEDICARE ANNUAL WELLNESS VISIT, SUBSEQUENT: Primary | ICD-10-CM

## 2025-02-25 DIAGNOSIS — E55.9 VITAMIN D DEFICIENCY: ICD-10-CM

## 2025-02-25 DIAGNOSIS — E78.2 MIXED HYPERLIPIDEMIA: ICD-10-CM

## 2025-02-25 PROCEDURE — 3079F DIAST BP 80-89 MM HG: CPT | Performed by: FAMILY MEDICINE

## 2025-02-25 PROCEDURE — 1160F RVW MEDS BY RX/DR IN RCRD: CPT | Performed by: FAMILY MEDICINE

## 2025-02-25 PROCEDURE — 1159F MED LIST DOCD IN RCRD: CPT | Performed by: FAMILY MEDICINE

## 2025-02-25 PROCEDURE — G0439 PPPS, SUBSEQ VISIT: HCPCS | Performed by: FAMILY MEDICINE

## 2025-02-25 PROCEDURE — 1125F AMNT PAIN NOTED PAIN PRSNT: CPT | Performed by: FAMILY MEDICINE

## 2025-02-25 PROCEDURE — 3077F SYST BP >= 140 MM HG: CPT | Performed by: FAMILY MEDICINE

## 2025-02-25 RX ORDER — VERAPAMIL HYDROCHLORIDE 180 MG/1
180 CAPSULE, DELAYED RELEASE ORAL NIGHTLY
Qty: 90 CAPSULE | Refills: 0 | Status: SHIPPED | OUTPATIENT
Start: 2025-02-25

## 2025-02-25 NOTE — PROGRESS NOTES
The ABCs of the Annual Wellness Visit  Medicare Visit    Subjective     Monica Pagan is a 84 y.o. female who presents for an Annual Medicare Visit.    The following portions of the patient's history were reviewed and   updated as appropriate: allergies, current medications, past family history, past medical history, past social history, past surgical history, and problem list.     Compared to one year ago, the patient feels her physical   health is worse.    Compared to one year ago, the patient feels her mental   health is the same.    Recent Hospitalizations:  She was not admitted to the hospital during the last year.       Current Medical Providers:  Patient Care Team:  Henny Long DO as PCP - General (Family Medicine)  Cristofer Graham OD (Optometry)  Tray Chang MD as Consulting Physician (Otolaryngology)  Robbin Oviedo MD as Consulting Physician (Urology)  Tonya Rosenthal DPM as Consulting Physician (Podiatry)  Lance Young DO as Anesthesiologist (Pain Medicine)  Riley Ramirez DPM as Consulting Physician (Podiatry)    Outpatient Medications Prior to Visit   Medication Sig Dispense Refill    acetaminophen (Tylenol 8 Hour) 650 MG 8 hr tablet Take 1 tablet by mouth Every 8 (Eight) Hours As Needed for Moderate Pain.      albuterol sulfate  (90 Base) MCG/ACT inhaler USE 2 INHALATIONS BY MOUTH EVERY 6 HOURS AS NEEDED FOR WHEEZING  OR SHORTNESS OF BREATH (COUGH) 18 g 6    Blood Glucose Monitoring Suppl (ONE TOUCH ULTRA MINI) w/Device kit 1 kit Daily. 1 each 0    busPIRone (BUSPAR) 7.5 MG tablet TAKE 1 TABLET BY MOUTH 3 TIMES  DAILY 270 tablet 1    cyanocobalamin (VITAMIN B-12) 2000 MCG tablet tablet Take 1 tablet by mouth Daily.      DULoxetine (CYMBALTA) 60 MG capsule TAKE 1 CAPSULE BY MOUTH DAILY 90 capsule 3    eszopiclone (LUNESTA) 3 MG tablet Take 1 tablet by mouth At Night As Needed for Sleep. Take immediately before bedtime 90 tablet 0    glucose blood (True Metrix Blood  Glucose Test) test strip Use as instructed 400 each 3    guaiFENesin (MUCINEX) 600 MG 12 hr tablet Take 1 tablet by mouth 2 (Two) Times a Day As Needed for Cough or Congestion. 60 tablet 2    Insulin Pen Needle (Pen Needles) 32G X 4 MM misc Use 1 package Daily. 100 each 3    Lantus SoloStar 100 UNIT/ML injection pen INJECT SUBCUTANEOUSLY 28 UNITS  INTO THE APPROPRIATE AREA EVERY  NIGHT AS DIRECTED 15 mL 5    loperamide (IMODIUM) 2 MG capsule Take 1 capsule by mouth 4 (Four) Times a Day As Needed for Diarrhea.      metFORMIN (GLUCOPHAGE) 500 MG tablet TAKE 1 TABLET BY MOUTH TWICE  DAILY WITH MEALS 180 tablet 3    nystatin (MYCOSTATIN) 732367 UNIT/GM cream Apply  topically to the appropriate area as directed 2 (Two) Times a Day As Needed (groin rash). 90 g 0    OneTouch UltraSoft 2 Lancets misc 1 package by Other route 4 (Four) Times a Day Before Meals & at Bedtime. 400 each 3    triamcinolone (KENALOG) 0.1 % cream Apply  topically to the appropriate area as directed 2 (Two) Times a Day. 45 g 0    vitamin D3 125 MCG (5000 UT) capsule capsule 1 po once a month      rosuvastatin (CRESTOR) 5 MG tablet Take 1 tablet by mouth Daily. 90 tablet 3    verapamil (VERELAN) 100 MG 24 hr capsule Take 2 capsules by mouth Every Night.       No facility-administered medications prior to visit.       No opioid medication identified on active medication list. I have reviewed chart for other potential  high risk medication/s and harmful drug interactions in the elderly.        Aspirin is not on active medication list.  Aspirin use is not indicated based on review of current medical condition/s. Risk of harm outweighs potential benefits.  .    Patient Active Problem List   Diagnosis    Anemia    Anxiety    Pain in the coccyx    Degeneration of intervertebral disc of lumbar region    Type 2 diabetes mellitus with peripheral neuropathy    Fibromyalgia    Hyperlipidemia    Insomnia    Spinal stenosis of lumbar region    Tremor    Vitamin D  "deficiency    Nontoxic multinodular goiter    Panic attack    Multilevel degenerative disc disease    Vitamin B12 deficiency    Meniere disease    PVC's (premature ventricular contractions)    Diabetic neuropathy    Cervical spinal stenosis    Low back pain    Cervical spondylosis    DDD (degenerative disc disease), cervical    Degeneration of lumbar intervertebral disc    Lumbar spondylosis    Neck pain    Vitreomacular traction syndrome    Retinal pigmentation    Bursitis    Colon polyp    Generalized OA    Palpitations    OA (osteoarthritis)    Spondylosis    HTN (hypertension)    Trigeminal neuralgia    Pain of right sacroiliac joint    Epiretinal membrane (ERM) of both eyes    Macular hole of right eye    Osteoarthritis of right hip    Repeated falls    Problems related to living alone    Unsteadiness on feet    Anxiety disorder, unspecified    Dorsalgia, unspecified    Other chronic pain     Advance Care Planning   Advance Care Planning     Advance Directive is on file.  ACP discussion was held with the patient during this visit. Patient has an advance directive in EMR which is still valid.        Objective   Vitals:    02/25/25 1001   BP: 143/81   BP Location: Right arm   Patient Position: Sitting   Cuff Size: Adult   Pulse: 102   Resp: 20   Temp: 96.9 °F (36.1 °C)   TempSrc: Infrared   SpO2: 95%   Weight: 79.4 kg (175 lb)   Height: 162.6 cm (64\")   PainSc: 5    PainLoc: Back     Estimated body mass index is 30.04 kg/m² as calculated from the following:    Height as of this encounter: 162.6 cm (64\").    Weight as of this encounter: 79.4 kg (175 lb).           Does the patient have evidence of cognitive impairment?   No    Lab Results   Component Value Date    HGBA1C 7.0 (A) 02/13/2025       Procedures       HEALTH RISK ASSESSMENT    Smoking Status:  Social History     Tobacco Use   Smoking Status Never    Passive exposure: Never   Smokeless Tobacco Never     Alcohol Consumption:  Social History     Substance " and Sexual Activity   Alcohol Use Yes    Alcohol/week: 1.0 standard drink of alcohol    Types: 1 Glasses of wine per week    Comment: Rare       Fall Risk Screen:    REYNOLD Fall Risk Assessment was completed, and patient is at LOW risk for falls.Assessment completed on:2025    Depression Screen:       2025    10:04 AM   PHQ-2/PHQ-9 Depression Screening   Little interest or pleasure in doing things Not at all   Feeling down, depressed, or hopeless Not at all   How difficult have these problems made it for you to do your work, take care of things at home, or get along with other people? Not difficult at all       Health Habits and Functional and Cognitive Screenin/25/2025    10:00 AM   Functional & Cognitive Status   Do you have difficulty preparing food and eating? No   Do you have difficulty bathing yourself, getting dressed or grooming yourself? No   Do you have difficulty using the toilet? No   Do you have difficulty moving around from place to place? No   Do you have trouble with steps or getting out of a bed or a chair? No   Current Diet Diabetic Diet   Dental Exam Not up to date   Eye Exam Up to date   Exercise (times per week) 0 times per week   Current Exercises Include No Regular Exercise   Do you need help using the phone?  No   Are you deaf or do you have serious difficulty hearing?  Yes   Do you need help to go to places out of walking distance? No   Do you need help shopping? Yes   Do you need help preparing meals?  No   Do you need help with housework?  Yes   Do you need help with laundry? No   Do you need help taking your medications? No   Do you need help managing money? No   Do you ever drive or ride in a car without wearing a seat belt? No   Have you felt unusual stress, anger or loneliness in the last month? No   Who do you live with? Alone   If you need help, do you have trouble finding someone available to you? No   Have you been bothered in the last four weeks by sexual  problems? No   Do you have difficulty concentrating, remembering or making decisions? No       Visual Acuity:    No results found.    Age-appropriate Screening Schedule:  Refer to the list below for future screening recommendations based on patient's age, sex and/or medical conditions. Orders for these recommended tests are listed in the plan section. The patient has been provided with a written plan.    Health Maintenance   Topic Date Due    URINE MICROALBUMIN-CREATININE RATIO (uACR)  03/11/2020    DIABETIC FOOT EXAM  10/16/2020    DXA SCAN  05/24/2024    COVID-19 Vaccine (8 - 2024-25 season) 03/16/2025    LIPID PANEL  05/14/2025    BMI FOLLOWUP  08/05/2025    HEMOGLOBIN A1C  08/13/2025    DIABETIC EYE EXAM  02/14/2026    ANNUAL WELLNESS VISIT  02/25/2026    TDAP/TD VACCINES (2 - Td or Tdap) 08/13/2027    COLORECTAL CANCER SCREENING  03/25/2031    RSV Vaccine - Adults  Completed    INFLUENZA VACCINE  Completed    Pneumococcal Vaccine 50+  Completed    ZOSTER VACCINE  Completed    MAMMOGRAM  Discontinued        CMS Preventative Services Quick Reference  Risk Factors Identified During Encounter    Immunizations Discussed/Encouraged: Influenza, Prevnar 20 (Pneumococcal 20-valent conjugate), Shingrix, COVID19, and RSV (Respiratory Syncytial Virus)  Dental Screening Recommended  Vision Screening Recommended  The above risks/problems have been discussed with the patient.  Pertinent information has been shared with the patient in the After Visit Summary.    Follow Up:   Initial Medicare Visit in one year    An After Visit Summary and PPPS were made available to the patient.      Additional E&M Note during same encounter follows:  Patient has multiple medical problems which are significant and separately identifiable that require additional work above and beyond the Medicare Wellness Visit.      Chief Complaint  Medicare Wellness-subsequent    Subjective        History of Present Illness  The patient presents for [facility] and  f/u on hypertension, diabetes mellitus, Meniere's disease, and health maintenance.    She has been experiencing frequent illnesses, which have been a source of stress. She reports a decline in her physical health compared to the previous year, characterized by increased weakness and a general feeling of malaise. She has not required hospitalization within the past year. She continues to drive but avoids driving at night due to back pain. She has not had a recent dental check-up and has both natural teeth and dentures, with 11 natural teeth remaining. She recently had an eye examination, which did not reveal any significant changes.     She has been adhering to her diet but acknowledges a lack of physical activity due to persistent feelings of weakness and fatigue. She engages in seated leg exercises and upper body strengthening exercises using rubber bands. She has a living will in place.    She has been managing a chronic back sore for the past 3 years, under the care of a plastic surgeon. She has not been taking verapamil 100 mg twice daily as previously prescribed. She takes vitamin D 5000 IU once a month. She has discontinued mammograms and had a bone density test in 2022, which yielded normal results. She has completed her colonoscopy screenings. She has been using diabetic shoes but reports no improvement as she tends to walk on the side of her foot. She plans to switch to a new podiatrist, Dr. Riley Ramirez, in [place]. She has a history of gangrene, which necessitated a toe amputation.    She has a history of Ménière's disease and is considering resuming diuretic therapy but expresses concern about potential kidney damage.    SOCIAL HISTORY  She is a non-smoker, consumes alcohol rarely, and does not use drugs.    FAMILY HISTORY  Her sister passed away from leukemia. She has three brothers who also passed away. There is no family history of glaucoma or eye pressure problems.    ALLERGIES  She has no  "known drug allergies.    MEDICATIONS  Current: Tylenol, albuterol (rescue inhaler), BuSpar 7.5 mg (3 times a day), B12, duloxetine 60 mg, Lunesta 3 mg (at bedtime), Mucinex (as needed), Lantus insulin 28 units (at night), Imodium (as needed), metformin 500 mg (twice a day with meals), rosuvastatin 5 mg, steroid cream (as needed), verapamil 200 mg (at night), vitamin D 5000 IU (once a month).    IMMUNIZATIONS  She has received her COVID-19 booster, flu shot, pneumonia booster, and shingles vaccine.           Objective   Vital Signs:  /81 (BP Location: Right arm, Patient Position: Sitting, Cuff Size: Adult)   Pulse 102   Temp 96.9 °F (36.1 °C) (Infrared)   Resp 20   Ht 162.6 cm (64\")   Wt 79.4 kg (175 lb)   SpO2 95%   BMI 30.04 kg/m²     Physical Exam  Vitals and nursing note reviewed.   Constitutional:       General: She is not in acute distress.     Appearance: She is well-developed. She is not ill-appearing or toxic-appearing.   HENT:      Head: Normocephalic and atraumatic.   Cardiovascular:      Rate and Rhythm: Normal rate and regular rhythm.      Heart sounds: Normal heart sounds. No murmur heard.  Pulmonary:      Effort: Pulmonary effort is normal. No respiratory distress.      Breath sounds: No wheezing, rhonchi or rales.   Skin:     General: Skin is warm and dry.      Findings: No rash.   Neurological:      Mental Status: She is alert and oriented to person, place, and time. Mental status is at baseline.   Psychiatric:         Attention and Perception: Attention and perception normal.         Mood and Affect: Mood and affect normal.         Speech: Speech normal.         Behavior: Behavior normal. Behavior is cooperative.         Thought Content: Thought content normal.         Cognition and Memory: Cognition and memory normal.         Judgment: Judgment normal.        Physical Exam  Vital Signs  Blood pressure = 143/81.        Assessment and Plan   Diagnoses and all orders for this visit:    1. " Medicare annual wellness visit, subsequent (Primary)    2. Type 2 diabetes mellitus with peripheral neuropathy  -     Microalbumin / Creatinine Urine Ratio - Urine, Clean Catch; Future  -     MicroAlbumin, Urine, Random - Urine, Clean Catch; Future  -     Hemoglobin A1c; Future    3. Primary hypertension    4. Vitamin D deficiency  -     Vitamin D,25-Hydroxy; Future    5. Mixed hyperlipidemia  -     Lipid Panel; Future  -     Comprehensive Metabolic Panel; Future    Other orders  -     verapamil (VERELAN) 180 MG 24 hr capsule; Take 1 capsule by mouth Every Night.  Dispense: 90 capsule; Refill: 0      Assessment & Plan  1. Hypertension.  Her blood pressure is elevated at 143/81, likely due to stress. She will resume taking verapamil 180 mg once daily at night. A prescription for a 90-day supply will be sent to [facility].    2. Diabetes Mellitus.  Her A1c level has improved from 7.3 in November to 7.0 currently. She is encouraged to increase physical activity to help manage her diabetes. She will continue her current medication regimen, including Lantus insulin 28 units at night and metformin 500 mg twice a day with meals.    3. Meniere's Disease.  She reports issues with ear fullness, which she attributes to Meniere's disease rather than earwax. No immediate changes to her treatment plan were discussed, but she is considering going back on a diuretic.    4. Health Maintenance.  She is up-to-date with her COVID-19 booster, influenza vaccine, pneumonia booster, and shingles vaccine. Her bone density test from 2022 was normal. She has completed her colonoscopy screenings. She will revisit the need for an RSV booster in the fall. She will undergo comprehensive laboratory tests in May 2025. She will continue her monthly intake of vitamin D 5000 IU.    Follow-up  The patient is scheduled for a follow-up visit on 05/15/2025 at 10:45 AM.    PROCEDURE  The patient has a history of gangrene, which necessitated a toe amputation.      Results  Laboratory Studies  A1c is 7.          Follow Up   No follow-ups on file.  Patient was given instructions and counseling regarding her condition or for health maintenance advice. Please see specific information pulled into the AVS if appropriate.   Patient or patient representative verbalized consent for the use of Ambient Listening during the visit with  Henny Long DO for chart documentation. 3/22/2025  13:43 EDT

## 2025-03-10 RX ORDER — ROSUVASTATIN CALCIUM 5 MG/1
5 TABLET, COATED ORAL DAILY
Qty: 90 TABLET | Refills: 3 | Status: SHIPPED | OUTPATIENT
Start: 2025-03-10

## 2025-04-15 ENCOUNTER — OFFICE VISIT (OUTPATIENT)
Dept: FAMILY MEDICINE CLINIC | Facility: CLINIC | Age: 85
End: 2025-04-15
Payer: MEDICARE

## 2025-04-15 VITALS
OXYGEN SATURATION: 95 % | WEIGHT: 176 LBS | SYSTOLIC BLOOD PRESSURE: 137 MMHG | RESPIRATION RATE: 20 BRPM | HEART RATE: 94 BPM | TEMPERATURE: 98.2 F | HEIGHT: 64 IN | DIASTOLIC BLOOD PRESSURE: 72 MMHG | BODY MASS INDEX: 30.05 KG/M2

## 2025-04-15 DIAGNOSIS — N39.0 URINARY TRACT INFECTION WITHOUT HEMATURIA, SITE UNSPECIFIED: Primary | ICD-10-CM

## 2025-04-15 DIAGNOSIS — R82.90 ABNORMAL URINE: ICD-10-CM

## 2025-04-15 DIAGNOSIS — Z86.2 HISTORY OF ANEMIA: ICD-10-CM

## 2025-04-15 DIAGNOSIS — G47.00 INSOMNIA, UNSPECIFIED TYPE: ICD-10-CM

## 2025-04-15 DIAGNOSIS — R53.83 OTHER FATIGUE: ICD-10-CM

## 2025-04-15 LAB
BILIRUB BLD-MCNC: NEGATIVE MG/DL
CLARITY, POC: ABNORMAL
COLOR UR: YELLOW
EXPIRATION DATE: ABNORMAL
GLUCOSE UR STRIP-MCNC: NEGATIVE MG/DL
KETONES UR QL: NEGATIVE
LEUKOCYTE EST, POC: ABNORMAL
Lab: ABNORMAL
NITRITE UR-MCNC: NEGATIVE MG/ML
PH UR: 5.5 [PH] (ref 5–8)
PROT UR STRIP-MCNC: NEGATIVE MG/DL
RBC # UR STRIP: ABNORMAL /UL
SP GR UR: 1.02 (ref 1–1.03)
UROBILINOGEN UR QL: NORMAL

## 2025-04-15 PROCEDURE — 83540 ASSAY OF IRON: CPT | Performed by: FAMILY MEDICINE

## 2025-04-15 PROCEDURE — 82728 ASSAY OF FERRITIN: CPT | Performed by: FAMILY MEDICINE

## 2025-04-15 PROCEDURE — 1160F RVW MEDS BY RX/DR IN RCRD: CPT | Performed by: FAMILY MEDICINE

## 2025-04-15 PROCEDURE — 80053 COMPREHEN METABOLIC PANEL: CPT | Performed by: FAMILY MEDICINE

## 2025-04-15 PROCEDURE — 84443 ASSAY THYROID STIM HORMONE: CPT | Performed by: FAMILY MEDICINE

## 2025-04-15 PROCEDURE — 1159F MED LIST DOCD IN RCRD: CPT | Performed by: FAMILY MEDICINE

## 2025-04-15 PROCEDURE — 81003 URINALYSIS AUTO W/O SCOPE: CPT | Performed by: FAMILY MEDICINE

## 2025-04-15 PROCEDURE — 3078F DIAST BP <80 MM HG: CPT | Performed by: FAMILY MEDICINE

## 2025-04-15 PROCEDURE — 1125F AMNT PAIN NOTED PAIN PRSNT: CPT | Performed by: FAMILY MEDICINE

## 2025-04-15 PROCEDURE — 84466 ASSAY OF TRANSFERRIN: CPT | Performed by: FAMILY MEDICINE

## 2025-04-15 PROCEDURE — 36415 COLL VENOUS BLD VENIPUNCTURE: CPT | Performed by: FAMILY MEDICINE

## 2025-04-15 PROCEDURE — 3075F SYST BP GE 130 - 139MM HG: CPT | Performed by: FAMILY MEDICINE

## 2025-04-15 PROCEDURE — 99214 OFFICE O/P EST MOD 30 MIN: CPT | Performed by: FAMILY MEDICINE

## 2025-04-15 PROCEDURE — 87086 URINE CULTURE/COLONY COUNT: CPT | Performed by: FAMILY MEDICINE

## 2025-04-15 PROCEDURE — 84439 ASSAY OF FREE THYROXINE: CPT | Performed by: FAMILY MEDICINE

## 2025-04-15 PROCEDURE — 85025 COMPLETE CBC W/AUTO DIFF WBC: CPT | Performed by: FAMILY MEDICINE

## 2025-04-15 RX ORDER — ESZOPICLONE 3 MG/1
3 TABLET, FILM COATED ORAL NIGHTLY PRN
Qty: 90 TABLET | Refills: 0 | Status: SHIPPED | OUTPATIENT
Start: 2025-04-15

## 2025-04-15 RX ORDER — PEN NEEDLE, DIABETIC 30 GX3/16"
1 NEEDLE, DISPOSABLE MISCELLANEOUS DAILY
Qty: 100 EACH | Refills: 3 | Status: SHIPPED | OUTPATIENT
Start: 2025-04-15

## 2025-04-15 NOTE — PROGRESS NOTES
Subjective   Monica Pagan is a 84 y.o. female.     Chief Complaint   Patient presents with    Fatigue     Fatigue,feels drained,weakness    Med Refill     Patient needs a refill on her Metformin and pen needles    Urinary Tract Infection     Dark urine          Current Outpatient Medications:     acetaminophen (Tylenol 8 Hour) 650 MG 8 hr tablet, Take 1 tablet by mouth Every 8 (Eight) Hours As Needed for Moderate Pain., Disp: , Rfl:     albuterol sulfate  (90 Base) MCG/ACT inhaler, USE 2 INHALATIONS BY MOUTH EVERY 6 HOURS AS NEEDED FOR WHEEZING  OR SHORTNESS OF BREATH (COUGH), Disp: 18 g, Rfl: 6    Blood Glucose Monitoring Suppl (ONE TOUCH ULTRA MINI) w/Device kit, 1 kit Daily., Disp: 1 each, Rfl: 0    busPIRone (BUSPAR) 7.5 MG tablet, TAKE 1 TABLET BY MOUTH 3 TIMES  DAILY, Disp: 270 tablet, Rfl: 1    cyanocobalamin (VITAMIN B-12) 2000 MCG tablet tablet, Take 1 tablet by mouth Daily., Disp: , Rfl:     DULoxetine (CYMBALTA) 60 MG capsule, TAKE 1 CAPSULE BY MOUTH DAILY, Disp: 90 capsule, Rfl: 3    eszopiclone (LUNESTA) 3 MG tablet, Take 1 tablet by mouth At Night As Needed for Sleep. Take immediately before bedtime, Disp: 90 tablet, Rfl: 0    glucose blood (True Metrix Blood Glucose Test) test strip, Use as instructed, Disp: 400 each, Rfl: 3    guaiFENesin (MUCINEX) 600 MG 12 hr tablet, Take 1 tablet by mouth 2 (Two) Times a Day As Needed for Cough or Congestion., Disp: 60 tablet, Rfl: 2    Insulin Pen Needle (Pen Needles) 32G X 4 MM misc, Use 1 package Daily., Disp: 100 each, Rfl: 3    Lantus SoloStar 100 UNIT/ML injection pen, INJECT SUBCUTANEOUSLY 28 UNITS  INTO THE APPROPRIATE AREA EVERY  NIGHT AS DIRECTED, Disp: 15 mL, Rfl: 5    loperamide (IMODIUM) 2 MG capsule, Take 1 capsule by mouth 4 (Four) Times a Day As Needed for Diarrhea., Disp: , Rfl:     metFORMIN (GLUCOPHAGE) 500 MG tablet, Take 1 tablet by mouth 2 (Two) Times a Day With Meals., Disp: 180 tablet, Rfl: 3    nystatin (MYCOSTATIN) 830885  UNIT/GM cream, Apply  topically to the appropriate area as directed 2 (Two) Times a Day As Needed (groin rash)., Disp: 90 g, Rfl: 0    OneTouch UltraSoft 2 Lancets misc, 1 package by Other route 4 (Four) Times a Day Before Meals & at Bedtime., Disp: 400 each, Rfl: 3    rosuvastatin (CRESTOR) 5 MG tablet, TAKE 1 TABLET BY MOUTH DAILY, Disp: 90 tablet, Rfl: 3    triamcinolone (KENALOG) 0.1 % cream, Apply  topically to the appropriate area as directed 2 (Two) Times a Day., Disp: 45 g, Rfl: 0    verapamil (VERELAN) 180 MG 24 hr capsule, Take 1 capsule by mouth Every Night., Disp: 90 capsule, Rfl: 0    vitamin D3 125 MCG (5000 UT) capsule capsule, 1 po once a month, Disp: , Rfl:     ferrous sulfate 325 (65 FE) MG tablet, Take 1 tablet by mouth Daily With Breakfast., Disp: 90 tablet, Rfl: 1    Past Medical History:   Diagnosis Date    Anemia     Ankylosing spondylitis of site in spine     Anxiety     Back pain     Cervical disc disorder     Childhood Asthma     Colon polyp     TA    DDD  lumbar     Depression     DEXA     2022= (0.0/-0.3)    Diabetes     DMII     Fibromyalgia     Fractures     Hyperlipidemia     Hypertension     IBS     Insomnia     Joint pain     Leg pain, right     Low back pain     Lumbar spinal stenosis     Lumbosacral disc disease     Macular degeneration, left eye     MAMMO     NEG= 2020/ 2022----------DECLINES    Meniere's disease     Neck pain     Neuropathy in diabetes     Nontoxic thyroid nodule     Ocular histoplasmosis     Osteoarthritis     Peripheral neuropathy     PVC     Shingles     Spondylosis     Tinnitus     Tremor     Trigeminal neuralgia     Vitamin D deficiency        Past Surgical History:   Procedure Laterality Date    AMPUTATION DIGIT Left     Digit #1    APPENDECTOMY  1962    BACK SURGERY  3 times    BUNIONECTOMY Bilateral     CATARACT EXTRACTION      left    CATARACT EXTRACTION      x2    COLONOSCOPY      NEG = 2012/ 2021= TA,  NO Recall    CYST REMOVAL Bilateral     WRIST     EPIDURAL BLOCK  many    HYSTERECTOMY  1980    JOINT REPLACEMENT      Rt TKR x 2    JOINT REPLACEMENT      Left THR    LAMINECTOMY      LAPAROSCOPIC CHOLECYSTECTOMY      DR. LOBATO    SPINAL FUSION      SPINE SURGERY      Lumbar Fusion    SUBTOTAL HYSTERECTOMY      TRIGGER POINT INJECTION         Family History   Problem Relation Age of Onset    Asthma Mother     COPD Mother     Heart disease Father     Alcohol abuse Father     Arthritis Sister     Cancer Sister     Leukemia Sister     Heart attack Brother     Cancer Brother     Alcohol abuse Brother     Kidney disease Brother     Alzheimer's disease Brother     Heart disease Brother     Diabetes Brother     Hyperlipidemia Brother     Diabetes Son         she is  my daughter    Diabetes Son        Social History     Socioeconomic History    Marital status:    Tobacco Use    Smoking status: Never     Passive exposure: Never    Smokeless tobacco: Never   Vaping Use    Vaping status: Never Used   Substance and Sexual Activity    Alcohol use: Yes     Alcohol/week: 1.0 standard drink of alcohol     Types: 1 Glasses of wine per week     Comment: Rare    Drug use: No    Sexual activity: Never       History of Present Illness  The patient is an 84-year-old female who presents with complaints of generalized weakness and fatigue for about 2 weeks and some evidence of dark-colored urine.    She reports a rapid escalation in her back pain, which has become so severe that it impedes her ability to sit or stand for extended periods. She experiences difficulty in standing up and walking due to leg pain and weakness. She has been diagnosed with spinal stenosis and facet disease and has undergone back surgery involving the insertion of metal components. She has fallen twice this month. She has been managing her financial affairs on the computer all morning. She has been taking Tylenol 650 mg, which provides significant relief.    She also reports a sensation of something being  mckay, although she is unable to articulate the exact nature of this feeling. She does not take thyroid medication. She reports no presence of black or bloody stools. Her bowel movements are irregular, with constipation being a common issue. She takes a stool softener when necessary but notes that daily use results in loose stools. She has tried using the stool softener three times a week, but this does not alleviate her constipation. She has a lifelong history of irritable bowel syndrome (IBS) and has been dealing with constipation since childhood. She attempts to stimulate her brain through reading and jefry and visits her daughter several times a week. She sleeps from 11:00 PM to 8:30 AM and takes a nap from 3:00 PM to 5:30 or 6:00 PM. She was anemic as a child and received iron injections following her back surgery.    She has been monitoring her blood sugar levels at home, with readings ranging from 108 to 257. She is scheduled for fasting labs in May 2025.    PAST SURGICAL HISTORY:  Back surgery involving the insertion of metal components.    FAMILY HISTORY  Her mother  at 73 with end-stage COPD.        The following portions of the patient's history were reviewed and updated as appropriate: allergies, current medications, past family history, past medical history, past social history, past surgical history and problem list.    Review of Systems   Constitutional:  Positive for fatigue.   Respiratory:  Negative for cough, shortness of breath and wheezing.    Cardiovascular:  Negative for chest pain and palpitations.   Gastrointestinal:  Positive for constipation. Negative for anal bleeding, blood in stool and diarrhea.   Genitourinary:  Negative for decreased libido, difficulty urinating, dysuria, frequency and hematuria.   Musculoskeletal:  Positive for arthralgias, back pain and myalgias.   Skin:  Negative for color change and rash.   Neurological:  Positive for weakness.   Psychiatric/Behavioral:   Positive for sleep disturbance.        Vitals:    04/15/25 1413   BP: 137/72   Pulse: 94   Resp: 20   Temp: 98.2 °F (36.8 °C)   SpO2: 95%       Objective   Physical Exam  Vitals and nursing note reviewed.   Constitutional:       General: She is not in acute distress.     Appearance: She is well-developed. She is not ill-appearing or toxic-appearing.   HENT:      Head: Normocephalic and atraumatic.   Cardiovascular:      Rate and Rhythm: Normal rate and regular rhythm.      Heart sounds: Normal heart sounds. No murmur heard.  Pulmonary:      Effort: Pulmonary effort is normal. No respiratory distress.      Breath sounds: Normal breath sounds. No wheezing, rhonchi or rales.   Skin:     General: Skin is warm and dry.      Findings: No rash.   Neurological:      Mental Status: She is alert and oriented to person, place, and time. Mental status is at baseline.   Psychiatric:         Attention and Perception: Attention and perception normal.         Mood and Affect: Mood and affect normal.         Speech: Speech normal.         Behavior: Behavior normal. Behavior is cooperative.         Thought Content: Thought content normal.         Cognition and Memory: Cognition and memory normal.         Judgment: Judgment normal.       Physical Exam      Assessment & Plan   Diagnoses and all orders for this visit:    1. Urinary tract infection without hematuria, site unspecified (Primary)  -     Urine Culture - Urine, Urine, Clean Catch    2. Other fatigue  -     TSH  -     T4, Free  -     Iron Profile  -     Ferritin  -     Comprehensive Metabolic Panel  -     CBC & Differential    3. Abnormal urine  -     Comprehensive Metabolic Panel  -     POCT urinalysis dipstick, automated; Future  -     POCT urinalysis dipstick, automated    4. History of anemia  -     Iron Profile  -     Ferritin  -     CBC & Differential    5. Insomnia, unspecified type  -     eszopiclone (LUNESTA) 3 MG tablet; Take 1 tablet by mouth At Night As Needed for  Sleep. Take immediately before bedtime  Dispense: 90 tablet; Refill: 0    Other orders  -     Insulin Pen Needle (Pen Needles) 32G X 4 MM misc; Use 1 package Daily.  Dispense: 100 each; Refill: 3  -     metFORMIN (GLUCOPHAGE) 500 MG tablet; Take 1 tablet by mouth 2 (Two) Times a Day With Meals.  Dispense: 180 tablet; Refill: 3      Assessment & Plan  1. Generalized weakness and fatigue.  - Reports generalized weakness and fatigue for about 2 weeks, along with dark-colored urine.  - Blood pressure is 137/72, heart rate is 94, appears mildly anemic.  - Comprehensive metabolic panel (CMP), complete blood count (CBC), platelet count, and thyroid function tests will be conducted. Urine sample will be collected for further analysis.  - If urine sample shows signs of dehydration or liver issues, further interventions will be considered.    2. Back pain.  - History of spinal stenosis and facet disease, contributing to back pain.  - Finds relief with Tylenol 650 mg.  - Advised to continue using Tylenol for pain management.  - If weakness persists, the use of a motorized wheelchair may be considered to prevent falls.    3. Constipation.  - Experiences constipation, using stool softeners and Imodium as needed.  - Advised to reduce the dosage of stool softener by half and take it daily to manage constipation more effectively.  - Should avoid using Imodium as it can lead to prolonged constipation.    4. Diabetes Mellitus.  - Last A1c was 7 in 02/2025, within the target range.  - Due for fasting labs in 05/2025.  - Advised to continue current diabetes management regimen, including insulin 28 units at night, Lantus, and metformin twice a day.  - An A1c test will be conducted today to ensure blood sugar levels are not contributing to symptoms.     Results  Labs   - Blood sugar: 108   - A1c: 02/2025, 7          Patient or patient representative verbalized consent for the use of Ambient Listening during the visit with  Henny  DO Ethan for chart documentation. 4/20/2025  19:30 EDT

## 2025-04-15 NOTE — PROGRESS NOTES
Venipuncture performed in L ARM by RT Arleen  with good hemostasis. Patient tolerated well. 04/15/25 Miriam Mello, RT

## 2025-04-16 LAB
ALBUMIN SERPL-MCNC: 4.2 G/DL (ref 3.5–5.2)
ALBUMIN/GLOB SERPL: 1.6 G/DL
ALP SERPL-CCNC: 93 U/L (ref 39–117)
ALT SERPL W P-5'-P-CCNC: 10 U/L (ref 1–33)
ANION GAP SERPL CALCULATED.3IONS-SCNC: 11.9 MMOL/L (ref 5–15)
AST SERPL-CCNC: 19 U/L (ref 1–32)
BACTERIA SPEC AEROBE CULT: NO GROWTH
BASOPHILS # BLD AUTO: 0.07 10*3/MM3 (ref 0–0.2)
BASOPHILS NFR BLD AUTO: 0.8 % (ref 0–1.5)
BILIRUB SERPL-MCNC: 0.3 MG/DL (ref 0–1.2)
BUN SERPL-MCNC: 19 MG/DL (ref 8–23)
BUN/CREAT SERPL: 14.5 (ref 7–25)
CALCIUM SPEC-SCNC: 10.4 MG/DL (ref 8.6–10.5)
CHLORIDE SERPL-SCNC: 102 MMOL/L (ref 98–107)
CO2 SERPL-SCNC: 25.1 MMOL/L (ref 22–29)
CREAT SERPL-MCNC: 1.31 MG/DL (ref 0.57–1)
DEPRECATED RDW RBC AUTO: 40 FL (ref 37–54)
EGFRCR SERPLBLD CKD-EPI 2021: 40.8 ML/MIN/1.73
EOSINOPHIL # BLD AUTO: 0.15 10*3/MM3 (ref 0–0.4)
EOSINOPHIL NFR BLD AUTO: 1.8 % (ref 0.3–6.2)
ERYTHROCYTE [DISTWIDTH] IN BLOOD BY AUTOMATED COUNT: 12.4 % (ref 12.3–15.4)
FERRITIN SERPL-MCNC: 34.6 NG/ML (ref 13–150)
GLOBULIN UR ELPH-MCNC: 2.6 GM/DL
GLUCOSE SERPL-MCNC: 180 MG/DL (ref 65–99)
HCT VFR BLD AUTO: 38.8 % (ref 34–46.6)
HGB BLD-MCNC: 13.1 G/DL (ref 12–15.9)
IMM GRANULOCYTES # BLD AUTO: 0.02 10*3/MM3 (ref 0–0.05)
IMM GRANULOCYTES NFR BLD AUTO: 0.2 % (ref 0–0.5)
IRON 24H UR-MRATE: 69 MCG/DL (ref 37–145)
IRON SATN MFR SERPL: 17 % (ref 20–50)
LYMPHOCYTES # BLD AUTO: 2.25 10*3/MM3 (ref 0.7–3.1)
LYMPHOCYTES NFR BLD AUTO: 26.9 % (ref 19.6–45.3)
MCH RBC QN AUTO: 29.9 PG (ref 26.6–33)
MCHC RBC AUTO-ENTMCNC: 33.8 G/DL (ref 31.5–35.7)
MCV RBC AUTO: 88.6 FL (ref 79–97)
MONOCYTES # BLD AUTO: 0.44 10*3/MM3 (ref 0.1–0.9)
MONOCYTES NFR BLD AUTO: 5.3 % (ref 5–12)
NEUTROPHILS NFR BLD AUTO: 5.42 10*3/MM3 (ref 1.7–7)
NEUTROPHILS NFR BLD AUTO: 65 % (ref 42.7–76)
NRBC BLD AUTO-RTO: 0 /100 WBC (ref 0–0.2)
PLATELET # BLD AUTO: 220 10*3/MM3 (ref 140–450)
PMV BLD AUTO: 10.2 FL (ref 6–12)
POTASSIUM SERPL-SCNC: 4.3 MMOL/L (ref 3.5–5.2)
PROT SERPL-MCNC: 6.8 G/DL (ref 6–8.5)
RBC # BLD AUTO: 4.38 10*6/MM3 (ref 3.77–5.28)
SODIUM SERPL-SCNC: 139 MMOL/L (ref 136–145)
T4 FREE SERPL-MCNC: 1.05 NG/DL (ref 0.92–1.68)
TIBC SERPL-MCNC: 399 MCG/DL (ref 298–536)
TRANSFERRIN SERPL-MCNC: 268 MG/DL (ref 200–360)
TSH SERPL DL<=0.05 MIU/L-ACNC: 1.37 UIU/ML (ref 0.27–4.2)
WBC NRBC COR # BLD AUTO: 8.35 10*3/MM3 (ref 3.4–10.8)

## 2025-04-17 RX ORDER — FERROUS SULFATE 325(65) MG
325 TABLET ORAL
Qty: 90 TABLET | Refills: 1 | Status: SHIPPED | OUTPATIENT
Start: 2025-04-17

## 2025-04-28 NOTE — TELEPHONE ENCOUNTER
ok   The patient had a well modified grand mal seizure under general anesthesia and muscle relaxant. The patient is alert, responsive, in no acute distress.  Recovery uneventful.    Anesthesia meds: Brevital 60 mg, Succinylcholine: 40 mg

## 2025-05-05 RX ORDER — VERAPAMIL HYDROCHLORIDE 180 MG/1
180 CAPSULE, DELAYED RELEASE ORAL NIGHTLY
Qty: 90 CAPSULE | Refills: 0 | Status: SHIPPED | OUTPATIENT
Start: 2025-05-05

## 2025-05-05 NOTE — TELEPHONE ENCOUNTER
Rx Refill Note  Requested Prescriptions     Pending Prescriptions Disp Refills    verapamil ER (VERELAN) 180 MG 24 hr capsule [Pharmacy Med Name: VERAPAMIL 180MG SR CAPSULE 24H] 90 capsule 3     Sig: TAKE 1 CAPSULE BY MOUTH EVERY  NIGHT      Last office visit with prescribing clinician: 4/15/2025   Last telemedicine visit with prescribing clinician: Visit date not found   Next office visit with prescribing clinician: 5/15/2025        Lipid Panel (05/14/2024 09:59)                  Would you like a call back once the refill request has been completed: [] Yes [] No    If the office needs to give you a call back, can they leave a voicemail: [] Yes [] No    Miriam Mello, RT  05/05/25, 10:29 EDT

## 2025-05-12 RX ORDER — BUSPIRONE HYDROCHLORIDE 7.5 MG/1
7.5 TABLET ORAL 3 TIMES DAILY
Qty: 270 TABLET | Refills: 0 | Status: SHIPPED | OUTPATIENT
Start: 2025-05-12

## 2025-05-15 ENCOUNTER — OFFICE VISIT (OUTPATIENT)
Dept: FAMILY MEDICINE CLINIC | Facility: CLINIC | Age: 85
End: 2025-05-15
Payer: MEDICARE

## 2025-05-15 VITALS
HEIGHT: 64 IN | DIASTOLIC BLOOD PRESSURE: 77 MMHG | BODY MASS INDEX: 30.05 KG/M2 | SYSTOLIC BLOOD PRESSURE: 133 MMHG | TEMPERATURE: 96.6 F | RESPIRATION RATE: 18 BRPM | HEART RATE: 98 BPM | OXYGEN SATURATION: 98 % | WEIGHT: 176 LBS

## 2025-05-15 DIAGNOSIS — N28.9 RENAL INSUFFICIENCY: ICD-10-CM

## 2025-05-15 DIAGNOSIS — M54.41 CHRONIC MIDLINE LOW BACK PAIN WITH RIGHT-SIDED SCIATICA: ICD-10-CM

## 2025-05-15 DIAGNOSIS — E11.42 TYPE 2 DIABETES MELLITUS WITH PERIPHERAL NEUROPATHY: Primary | ICD-10-CM

## 2025-05-15 DIAGNOSIS — G47.00 INSOMNIA, UNSPECIFIED TYPE: ICD-10-CM

## 2025-05-15 DIAGNOSIS — R82.90 ABNORMAL URINE: ICD-10-CM

## 2025-05-15 DIAGNOSIS — G89.29 CHRONIC MIDLINE LOW BACK PAIN WITH RIGHT-SIDED SCIATICA: ICD-10-CM

## 2025-05-15 LAB
EXPIRATION DATE: ABNORMAL
HBA1C MFR BLD: 6.4 % (ref 4.5–5.7)
Lab: ABNORMAL

## 2025-05-15 PROCEDURE — 80048 BASIC METABOLIC PNL TOTAL CA: CPT | Performed by: FAMILY MEDICINE

## 2025-05-15 RX ORDER — KETOROLAC TROMETHAMINE 30 MG/ML
1 INJECTION, SOLUTION INTRAMUSCULAR; INTRAVENOUS DAILY
Qty: 1 EACH | Refills: 0 | Status: SHIPPED | OUTPATIENT
Start: 2025-05-15

## 2025-05-15 RX ORDER — HYDROCHLOROTHIAZIDE 12.5 MG/1
CAPSULE ORAL
Qty: 6 EACH | Refills: 3 | Status: SHIPPED | OUTPATIENT
Start: 2025-05-15

## 2025-05-15 RX ORDER — LUBIPROSTONE 8 UG/1
8 CAPSULE ORAL 2 TIMES DAILY WITH MEALS
Qty: 60 CAPSULE | Refills: 0 | Status: SHIPPED | OUTPATIENT
Start: 2025-05-15

## 2025-05-15 RX ORDER — ESZOPICLONE 3 MG/1
3 TABLET, FILM COATED ORAL NIGHTLY PRN
Qty: 90 TABLET | Refills: 0 | Status: SHIPPED | OUTPATIENT
Start: 2025-05-15

## 2025-05-15 RX ORDER — INSULIN GLARGINE 100 [IU]/ML
26 INJECTION, SOLUTION SUBCUTANEOUS NIGHTLY
Start: 2025-05-15

## 2025-05-15 NOTE — PROGRESS NOTES
Subjective   Monica Pagan is a 84 y.o. female.     Chief Complaint   Patient presents with    Diabetes     Patients HgbA1c while in the office today is 6.4%    Insomnia         Current Outpatient Medications:     acetaminophen (Tylenol 8 Hour) 650 MG 8 hr tablet, Take 1 tablet by mouth Every 8 (Eight) Hours As Needed for Moderate Pain., Disp: , Rfl:     albuterol sulfate  (90 Base) MCG/ACT inhaler, USE 2 INHALATIONS BY MOUTH EVERY 6 HOURS AS NEEDED FOR WHEEZING  OR SHORTNESS OF BREATH (COUGH), Disp: 18 g, Rfl: 6    Blood Glucose Monitoring Suppl (ONE TOUCH ULTRA MINI) w/Device kit, 1 kit Daily., Disp: 1 each, Rfl: 0    busPIRone (BUSPAR) 7.5 MG tablet, TAKE 1 TABLET BY MOUTH 3 TIMES  DAILY, Disp: 270 tablet, Rfl: 0    cyanocobalamin (VITAMIN B-12) 2000 MCG tablet tablet, Take 1 tablet by mouth Daily., Disp: , Rfl:     DULoxetine (CYMBALTA) 60 MG capsule, TAKE 1 CAPSULE BY MOUTH DAILY, Disp: 90 capsule, Rfl: 3    eszopiclone (LUNESTA) 3 MG tablet, Take 1 tablet by mouth At Night As Needed for Sleep. Take immediately before bedtime, Disp: 90 tablet, Rfl: 0    ferrous sulfate 325 (65 FE) MG tablet, Take 1 tablet by mouth Daily With Breakfast., Disp: 90 tablet, Rfl: 1    glucose blood (True Metrix Blood Glucose Test) test strip, Use as instructed, Disp: 400 each, Rfl: 3    guaiFENesin (MUCINEX) 600 MG 12 hr tablet, Take 1 tablet by mouth 2 (Two) Times a Day As Needed for Cough or Congestion., Disp: 60 tablet, Rfl: 2    Insulin Glargine (Lantus SoloStar) 100 UNIT/ML injection pen, Inject 26 Units under the skin into the appropriate area as directed Every Night., Disp: , Rfl:     Insulin Pen Needle (Pen Needles) 32G X 4 MM misc, Use 1 package Daily., Disp: 100 each, Rfl: 3    loperamide (IMODIUM) 2 MG capsule, Take 1 capsule by mouth 4 (Four) Times a Day As Needed for Diarrhea., Disp: , Rfl:     metFORMIN (GLUCOPHAGE) 500 MG tablet, Take 1 tablet by mouth 2 (Two) Times a Day With Meals., Disp: 180 tablet, Rfl:  3    nystatin (MYCOSTATIN) 883003 UNIT/GM cream, Apply  topically to the appropriate area as directed 2 (Two) Times a Day As Needed (groin rash)., Disp: 90 g, Rfl: 0    OneTouch UltraSoft 2 Lancets misc, 1 package by Other route 4 (Four) Times a Day Before Meals & at Bedtime., Disp: 400 each, Rfl: 3    rosuvastatin (CRESTOR) 5 MG tablet, TAKE 1 TABLET BY MOUTH DAILY, Disp: 90 tablet, Rfl: 3    triamcinolone (KENALOG) 0.1 % cream, Apply  topically to the appropriate area as directed 2 (Two) Times a Day., Disp: 45 g, Rfl: 0    verapamil ER (VERELAN) 180 MG 24 hr capsule, TAKE 1 CAPSULE BY MOUTH EVERY  NIGHT, Disp: 90 capsule, Rfl: 0    vitamin D3 125 MCG (5000 UT) capsule capsule, 1 po once a month, Disp: , Rfl:     Continuous Glucose  (FreeStyle Bonifacio 3 Rowan) device, Use 1 package Daily., Disp: 1 each, Rfl: 0    Continuous Glucose Sensor (FreeStyle Bonifacio 3 Plus Sensor), Use Every 15 (Fifteen) Days., Disp: 6 each, Rfl: 3    lubiprostone (Amitiza) 8 MCG capsule, Take 1 capsule by mouth 2 (Two) Times a Day With Meals., Disp: 60 capsule, Rfl: 0    Past Medical History:   Diagnosis Date    Anemia     Ankylosing spondylitis of site in spine     Anxiety     Back pain     Cervical disc disorder     Childhood Asthma     Colon polyp     TA    DDD  lumbar     Depression     DEXA     2022= (0.0/-0.3)    Diabetes     DMII     Fibromyalgia     Fractures     Hyperlipidemia     Hypertension     IBS     Insomnia     Joint pain     Leg pain, right     Low back pain     Lumbar spinal stenosis     Lumbosacral disc disease     Macular degeneration, left eye     MAMMO     NEG= 2020/ 2022----------DECLINES    Meniere's disease     Neck pain     Neuropathy in diabetes     Nontoxic thyroid nodule     Ocular histoplasmosis     Osteoarthritis     Peripheral neuropathy     PVC     Shingles     Spondylosis     Tinnitus     Tremor     Trigeminal neuralgia     Vitamin D deficiency        Past Surgical History:   Procedure Laterality Date     AMPUTATION DIGIT Left     Digit #1    APPENDECTOMY  1962    BACK SURGERY  3 times    BUNIONECTOMY Bilateral     CATARACT EXTRACTION      left    CATARACT EXTRACTION      x2    COLONOSCOPY      NEG = 2012/ 2021= TA,  NO Recall    CYST REMOVAL Bilateral     WRIST    EPIDURAL BLOCK  many    HYSTERECTOMY  1980    JOINT REPLACEMENT      Rt TKR x 2    JOINT REPLACEMENT      Left THR    LAMINECTOMY      LAPAROSCOPIC CHOLECYSTECTOMY      DR. LOBATO    SPINAL FUSION      SPINE SURGERY      Lumbar Fusion    SUBTOTAL HYSTERECTOMY      TRIGGER POINT INJECTION         Family History   Problem Relation Age of Onset    Asthma Mother     COPD Mother     Heart disease Father     Alcohol abuse Father     Arthritis Sister     Cancer Sister     Leukemia Sister     Heart attack Brother     Cancer Brother     Alcohol abuse Brother     Kidney disease Brother     Alzheimer's disease Brother     Heart disease Brother     Diabetes Brother     Hyperlipidemia Brother     Diabetes Son         she is  my daughter    Diabetes Son        Social History     Socioeconomic History    Marital status:    Tobacco Use    Smoking status: Never     Passive exposure: Never    Smokeless tobacco: Never   Vaping Use    Vaping status: Never Used   Substance and Sexual Activity    Alcohol use: Yes     Alcohol/week: 1.0 standard drink of alcohol     Types: 1 Glasses of wine per week     Comment: Rare    Drug use: No    Sexual activity: Never       History of Present Illness  The patient is an 84-year-old female who presents for follow-up on diabetes/ insomnia.    She has been unable to utilize her phone for monitoring her blood glucose levels due to its outdated model. She reports no instances of hypoglycemia, with her blood glucose levels occasionally dropping to the 90s, which she manages by consuming food. Her lowest blood glucose readings are typically recorded before breakfast. She administers her insulin at night and has placed an order for  additional insulin. She does not skip meals and maintains a consistent diet, including a nightly snack. She takes metformin 500 mg twice daily with meals.    She has been experiencing constipation, which she attributes to her lifelong history of the condition. She has tried various stool softeners without success. She was previously on Linzess, but it was discontinued due to insurance issues. She has also tried Amitiza. She reports that her stool softeners induce cramping.    She has been experiencing a persistent cough since her COVID-19 infection. She has considered undergoing a COVID-19 test. She uses a rescue inhaler as needed.    She experiences severe back pain after 5 to 10 minutes of activity, necessitating rest until the pain subsides. She takes Tylenol 650 mg twice daily for pain management, which provides some relief but does not enhance her activity level. She is reluctant to try other pain medications due to fear of withdrawal symptoms. She has difficulty cooking and has declined physical therapy. She has received injections for her sciatic nerve pain, which radiates down to her right foot. She has been active for the past week. She has a bladder stimulator in place and has undergone a CT scan of her back. She had an x-ray of her hip in 11/2024. She had a CT myelogram in 2018 and subsequent back surgery. She plans to consult with Dr. Young regarding her pain management options.    She has been experiencing vision problems, particularly with reading small print on her keyboard. She reports that her thumbs are becoming increasingly problematic.    She has noticed black discoloration on her pad when urinating, which has since resolved. She is unsure if this was due to different pads or a substance in the pads. She does not believe it was due to stool leakage. She has a history of constipation, which she believes may have contributed to the urine issue.    She is currently on Lunesta 3 mg for sleep, which  she takes nightly. She reports that she cannot sleep without it.    She is on BuSpar 7.5 mg 3 times a day, B12, Cymbalta 60 mg, iron, Mucinex as needed, Lantus insulin 28 units at night, Imodium for diarrhea as needed, metformin 500 mg twice a day with meals, rosuvastatin 5 mg for cholesterol, verapamil  mg for blood pressure, vitamin D 5000 IU once a month, and some creams.    PAST SURGICAL HISTORY:  - Postsurgical fusion L3-S1 in 2018  - Back surgery in 2018        The following portions of the patient's history were reviewed and updated as appropriate: allergies, current medications, past family history, past medical history, past social history, past surgical history and problem list.    Review of Systems   Constitutional:  Positive for fatigue. Negative for activity change, appetite change, unexpected weight gain and unexpected weight loss.   Eyes:  Positive for visual disturbance. Negative for blurred vision, double vision and pain.   Respiratory:  Positive for cough. Negative for shortness of breath and wheezing.    Cardiovascular:  Negative for leg swelling.   Gastrointestinal:  Positive for constipation. Negative for abdominal distention, abdominal pain, diarrhea, nausea and vomiting.   Endocrine: Negative for polydipsia, polyphagia and polyuria.   Genitourinary:  Negative for frequency and hematuria.   Musculoskeletal:  Negative for gait problem.   Skin:  Negative for color change, dry skin, rash and skin lesions.   Neurological:  Negative for weakness and numbness.   Psychiatric/Behavioral:  Positive for stress.        Vitals:    05/15/25 1035   BP: 133/77   Pulse: 98   Resp: 18   Temp: 96.6 °F (35.9 °C)   SpO2: 98%       Objective   Physical Exam  Vitals and nursing note reviewed.   Constitutional:       General: She is not in acute distress.     Appearance: Normal appearance. She is well-developed. She is not ill-appearing or toxic-appearing.   Cardiovascular:      Rate and Rhythm: Normal rate and  regular rhythm.      Pulses:           Dorsalis pedis pulses are 1+ on the right side and 1+ on the left side.      Heart sounds: Normal heart sounds. No murmur heard.  Pulmonary:      Effort: Pulmonary effort is normal. No respiratory distress.      Breath sounds: Normal breath sounds. No stridor. No wheezing, rhonchi or rales.   Musculoskeletal:         General: No tenderness or deformity.      Right foot: No bunion, Charcot foot, foot drop or prominent metatarsal heads.      Left foot: No bunion, Charcot foot, foot drop or prominent metatarsal heads.        Feet:    Feet:      Right foot:      Skin integrity: Skin integrity normal. No ulcer, blister, skin breakdown, erythema, warmth, callus, dry skin or fissure.      Toenail Condition: Right toenails are long. ingrown     Left foot:      Skin integrity: Skin integrity normal. No ulcer, blister, skin breakdown, erythema, warmth, callus, dry skin or fissure.      Toenail Condition: Left toenails are long. ingrown     Comments: DM foot exam done  Left foot digit #1 amputated  Skin:     General: Skin is warm and dry.      Capillary Refill: Capillary refill takes less than 2 seconds.      Findings: No erythema or rash.   Neurological:      Mental Status: She is alert and oriented to person, place, and time.      Cranial Nerves: No cranial nerve deficit.   Psychiatric:         Attention and Perception: Attention normal.         Mood and Affect: Mood and affect normal.         Speech: Speech normal.         Behavior: Behavior normal. Behavior is cooperative.         Thought Content: Thought content normal.         Cognition and Memory: Cognition and memory normal.         Judgment: Judgment normal.       Physical Exam      Assessment & Plan   Diagnoses and all orders for this visit:    1. Type 2 diabetes mellitus with peripheral neuropathy (Primary)  -     POC Glycosylated Hemoglobin (Hb A1C)    2. Insomnia, unspecified type  -     eszopiclone (LUNESTA) 3 MG tablet;  Take 1 tablet by mouth At Night As Needed for Sleep. Take immediately before bedtime  Dispense: 90 tablet; Refill: 0    3. Chronic midline low back pain with right-sided sciatica  -     CT Lumbar Spine Without Contrast; Future  -     CT Lumbar Spine Without Contrast    4. Renal insufficiency  -     Basic Metabolic Panel    5. Abnormal urine  -     POCT urinalysis dipstick, automated    Other orders  -     Continuous Glucose Sensor (FreeStyle Bonifacio 3 Plus Sensor); Use Every 15 (Fifteen) Days.  Dispense: 6 each; Refill: 3  -     Continuous Glucose  (FreeStyle Bonifacio 3 Clubb) device; Use 1 package Daily.  Dispense: 1 each; Refill: 0  -     Insulin Glargine (Lantus SoloStar) 100 UNIT/ML injection pen; Inject 26 Units under the skin into the appropriate area as directed Every Night.  -     lubiprostone (Amitiza) 8 MCG capsule; Take 1 capsule by mouth 2 (Two) Times a Day With Meals.  Dispense: 60 capsule; Refill: 0      Assessment & Plan  1. Diabetes Mellitus.  - Current insulin regimen will be adjusted from 28 units to 26 units at night to prevent low blood sugar levels.  - Continues taking metformin 500 mg twice a day with meals.  - Free sensor will be provided for use with her phone while waiting for the new one.  - Reports low blood sugar levels in the morning, sometimes in the 90s.    2. Chronic Constipation.  - Reports long-standing constipation issues.  - Prescription for Amitiza 8 mcg twice daily will be issued to manage chronic constipation.  - If no improvement after 1 to 2 weeks, dosage will be increased to 24 mcg.  - Stool softeners previously used caused significant cramping.    3. Cough.  - Persistent cough since having COVID-19.  - Advised to use rescue inhaler before bedtime to alleviate cough symptoms.  - Reports cough is mostly at night.    4. Back Pain.  - Reports significant back pain, worsened by activity.  - CT scan of the lower spine will be ordered at [facility] to evaluate back  pain.  - Advised to consult with Dr. Young regarding pain management options.  - Previous x-rays and MRI results discussed; no recent CT scan available.    5. Vision Problems.  - Reports difficulty seeing small print on the keyboard.  - Advised to use magnifiers or enlarge screen to assist with reading.    6. Abnormal Urine.  - Reports black discoloration on pads, possibly related to iron supplements.  - Urine sample will be collected today for analysis to rule out blood or other abnormalities.  - Advised to bring the sample to the clinic if unable to provide it today.    7. Insomnia.  - Reports reliance on Lunesta for sleep.  - Prescription for Lunesta 3 mg will be issued.  - Advised to use [facility]Rx to find the cheapest option at [facility].     Results  Labs   - Blood sugar levels: Occasionally dropping to the 90s          Patient or patient representative verbalized consent for the use of Ambient Listening during the visit with  Henny Long DO for chart documentation. 6/8/2025  11:12 EDT

## 2025-05-15 NOTE — PROGRESS NOTES
Fingerstick performed in right middle finger by Paula James CMA  with good hemostasis. Patient tolerated well. 05/15/25 Paula James CMA      Venipuncture performed in right arm by Paula James CMA  with good hemostasis. Patient tolerated well. 05/15/25 Paula James CMA

## 2025-05-16 ENCOUNTER — CLINICAL SUPPORT (OUTPATIENT)
Dept: FAMILY MEDICINE CLINIC | Facility: CLINIC | Age: 85
End: 2025-05-16
Payer: MEDICARE

## 2025-05-16 ENCOUNTER — PATIENT ROUNDING (BHMG ONLY) (OUTPATIENT)
Dept: FAMILY MEDICINE CLINIC | Facility: CLINIC | Age: 85
End: 2025-05-16
Payer: MEDICARE

## 2025-05-16 DIAGNOSIS — R82.90 ABNORMAL URINALYSIS: Primary | ICD-10-CM

## 2025-05-16 LAB
ANION GAP SERPL CALCULATED.3IONS-SCNC: 11.9 MMOL/L (ref 5–15)
BUN SERPL-MCNC: 19 MG/DL (ref 8–23)
BUN/CREAT SERPL: 15 (ref 7–25)
CALCIUM SPEC-SCNC: 10.5 MG/DL (ref 8.6–10.5)
CHLORIDE SERPL-SCNC: 105 MMOL/L (ref 98–107)
CO2 SERPL-SCNC: 23.1 MMOL/L (ref 22–29)
CREAT SERPL-MCNC: 1.27 MG/DL (ref 0.57–1)
EGFRCR SERPLBLD CKD-EPI 2021: 42.3 ML/MIN/1.73
GLUCOSE SERPL-MCNC: 124 MG/DL (ref 65–99)
POTASSIUM SERPL-SCNC: 4.9 MMOL/L (ref 3.5–5.2)
SODIUM SERPL-SCNC: 140 MMOL/L (ref 136–145)

## 2025-05-16 PROCEDURE — 81001 URINALYSIS AUTO W/SCOPE: CPT | Performed by: FAMILY MEDICINE

## 2025-05-16 PROCEDURE — 87086 URINE CULTURE/COLONY COUNT: CPT | Performed by: FAMILY MEDICINE

## 2025-05-17 LAB
BACTERIA UR QL AUTO: NORMAL /HPF
BILIRUB UR QL STRIP: NEGATIVE
CLARITY UR: CLEAR
COLOR UR: YELLOW
GLUCOSE UR STRIP-MCNC: NEGATIVE MG/DL
HGB UR QL STRIP.AUTO: NEGATIVE
HOLD SPECIMEN: NORMAL
HYALINE CASTS UR QL AUTO: NORMAL /LPF
KETONES UR QL STRIP: NEGATIVE
LEUKOCYTE ESTERASE UR QL STRIP.AUTO: ABNORMAL
NITRITE UR QL STRIP: NEGATIVE
PH UR STRIP.AUTO: 6.5 [PH] (ref 5–8)
PROT UR QL STRIP: NEGATIVE
RBC # UR STRIP: NORMAL /HPF
REF LAB TEST METHOD: NORMAL
SP GR UR STRIP: 1.01 (ref 1–1.03)
SQUAMOUS #/AREA URNS HPF: NORMAL /HPF
UROBILINOGEN UR QL STRIP: ABNORMAL
WBC # UR STRIP: NORMAL /HPF

## 2025-05-18 LAB — BACTERIA SPEC AEROBE CULT: NORMAL

## 2025-06-04 ENCOUNTER — TELEPHONE (OUTPATIENT)
Dept: FAMILY MEDICINE CLINIC | Facility: CLINIC | Age: 85
End: 2025-06-04

## 2025-06-04 NOTE — TELEPHONE ENCOUNTER
"Caller: Monica Pagan \"MONICA PAGAN\"    Relationship: Self    Best call back number: [phone]     Who are you requesting to speak with (clinical staff, provider,  specific staff member): DR MICHELE SKELTON MA    What was the call regarding: PATIENT STATES THAT SHE RECEIVED A CALL REGARDING CT RESULTS, AND WAS ADVISED TO SEE A PAIN SPECIALIST, BUT SHE IS NOT SURE SHE IS ABLE TO GET AROUND WELL RIGHT NOW FOR THAT. REQUESTS CALL BACK TO DISCUSS FURTHER ALTERNATIVES FOR TREATMENT.    "

## 2025-06-05 DIAGNOSIS — M54.41 CHRONIC MIDLINE LOW BACK PAIN WITH RIGHT-SIDED SCIATICA: Primary | ICD-10-CM

## 2025-06-05 DIAGNOSIS — G89.29 CHRONIC MIDLINE LOW BACK PAIN WITH RIGHT-SIDED SCIATICA: Primary | ICD-10-CM

## 2025-06-06 ENCOUNTER — TELEPHONE (OUTPATIENT)
Dept: PAIN MEDICINE | Facility: CLINIC | Age: 85
End: 2025-06-06
Payer: MEDICARE

## 2025-06-06 NOTE — TELEPHONE ENCOUNTER
"  Caller: Monica Pagan \"MONICA PAGAN\"    Relationship to patient: Self    Best call back number: [phone]    Chief complaint: LUMBAR SPINE     Type of visit: INJECTION     Requested date: ASAP      If rescheduling, when is the original appointment: N/A      Additional notes:PATIENT IS IN A LOT OF PAIN AND HAVING TROUBLE WALKING. PLEASE ADVISE         DELETE AFTER READING TO PATIENT: “Thank you for sharing this information with me. I will send a message to the scheduling team. Please allow 48 hours for the  staff to follow up on this request.”   "

## 2025-06-17 NOTE — PROGRESS NOTES
Subjective   Monica Pagan is a 84 y.o. female is here for follow up for lower back pain.  Patient was last seen on 11/8/2024.  Returning due to increased pain and trouble walking.  CT lumbar spine was obtained about 1 month ago which was reviewed by me today.  She has seen [facility] ankle specialist.  She is also having significant issues with mobility and she is unsure how long she can stay in her own house and continue driving.    On last visit:     Lower back pain-mainly right-sided is 3/10 on VAS, at maximum is 3/10. Pain is burning, stabbing in nature. Pain is referred right anterior leg, right lateral leg and R anterior shin. +numbness and tingling. The pain is constant. The pain is improved by rest and right SI joint junction. The pain is worse with crossing her R leg over left, sitting for long time. Walking seem to relive some pain, but can't walk more than 5 houses. Standing for longer than 10 minutes will make her lower back pain worse.  Her main complaint is right-sided buttock pain below L5 vertebral level.    B/l feet neuropathy - numbness, burning, tingling. Left foot drop. Balance and mobility issues.     Bilateral mid back pain between shoulder blades is 6/10 on VAS.  Dull and achy pain.  Unable to stand prolonged.  Time due to mid back pain.    Previous Injection:   11/5/2024-right SI joint injection-95% pain relief with pain with functional improvement lasting for greater than 3 months.     Hx: Referred by  Roxi Pike APRN for lower back pain with diagnosis of sacroiliitis versus postlaminectomy syndrome.  Chronic history of lower back pain with history of L3 S1 PSF with good resolution of her back and leg symptoms but continued to have residual back pain that she has dealt with over the years.  She has significantly increased left leg pain and groin pain since last 2 months that started without any significant injuries or inciting events. She had R hip pain with R leg radiculopathy. R  sided radiculopathy has somewhat improved.  She had previously seen local pain management but has not seen anyone recently.  Not a surgical candidate as patient has refused any surgical discussion and also refused physical therapy. Left leg weakness - working with PT to get AFO.  Chronic history of lower back pain and she started receiving injections in [place] and late 80s.  Previously seen at [facility] pain management as well.  Unfortunately her  passed away with cancer and she has been helping her daughter who has MS.  Lives by herself.  Does have sister in town.  Chronic left dropped foot.         PHQ-9- 8                       SOAPP- 3   [place] back disability scale -      PMH:   DM-2-last A1c 6.8, fibromyalgia, history of panic attacks, diabetic neuropathy, hypertension, anxiety L3-S1 PSF, bladder stimulator, bilateral knee TKR, hx of meniere's disease with chronic balance issues - uses walker     Current Medications:   Tylenol  Cymbalta           Past Medications:   Gabapentin - GI side effects     Past Modalities:  TENS:                                                                          no                                                  Physical Therapy Within The Last 6 Months              Yes - many times - not sure if it helped.   Psychotherapy                                                            no  Massage Therapy                                                       no     Patient Complains Of:  Uro-Fecal Incontinence          Yes - chronic issue s/p bladder stimulator - changes 3 pades/night   Weight Gain/Loss                   no  Fever/Chills                             no  Weakness                               Yes - chronic left leg weakness.         PEG Assessment   What number best describes your pain on average in the past week?6  What number best describes how, during the past week, pain has interfered with your enjoyment of life?6  What number best describes how,  during the past week, pain has interfered with your general activity?  6         Current Outpatient Medications:     acetaminophen (Tylenol 8 Hour) 650 MG 8 hr tablet, Take 1 tablet by mouth Every 8 (Eight) Hours As Needed for Moderate Pain., Disp: , Rfl:     albuterol sulfate  (90 Base) MCG/ACT inhaler, USE 2 INHALATIONS BY MOUTH EVERY 6 HOURS AS NEEDED FOR WHEEZING  OR SHORTNESS OF BREATH (COUGH), Disp: 18 g, Rfl: 6    Blood Glucose Monitoring Suppl (ONE TOUCH ULTRA MINI) w/Device kit, 1 kit Daily., Disp: 1 each, Rfl: 0    busPIRone (BUSPAR) 7.5 MG tablet, TAKE 1 TABLET BY MOUTH 3 TIMES  DAILY, Disp: 270 tablet, Rfl: 0    Continuous Glucose  (FreeStyle Bonifacio 3 [place]) device, Use 1 package Daily., Disp: 1 each, Rfl: 0    Continuous Glucose Sensor (FreeStyle Bonifacio 3 Plus Sensor), Use Every 15 (Fifteen) Days., Disp: 6 each, Rfl: 3    cyanocobalamin (VITAMIN B-12) 2000 MCG tablet tablet, Take 1 tablet by mouth Daily., Disp: , Rfl:     DULoxetine (CYMBALTA) 60 MG capsule, TAKE 1 CAPSULE BY MOUTH DAILY, Disp: 90 capsule, Rfl: 3    eszopiclone (LUNESTA) 3 MG tablet, Take 1 tablet by mouth At Night As Needed for Sleep. Take immediately before bedtime, Disp: 90 tablet, Rfl: 0    ferrous sulfate 325 (65 FE) MG tablet, Take 1 tablet by mouth Daily With Breakfast., Disp: 90 tablet, Rfl: 1    glucose blood (True Metrix Blood Glucose Test) test strip, Use as instructed, Disp: 400 each, Rfl: 3    guaiFENesin (MUCINEX) 600 MG 12 hr tablet, Take 1 tablet by mouth 2 (Two) Times a Day As Needed for Cough or Congestion., Disp: 60 tablet, Rfl: 2    Insulin Glargine (Lantus SoloStar) 100 UNIT/ML injection pen, Inject 26 Units under the skin into the appropriate area as directed Every Night., Disp: , Rfl:     Insulin Pen Needle (Pen Needles) 32G X 4 MM misc, Use 1 package Daily., Disp: 100 each, Rfl: 3    loperamide (IMODIUM) 2 MG capsule, Take 1 capsule by mouth 4 (Four) Times a Day As Needed for Diarrhea., Disp: , Rfl:      lubiprostone (Amitiza) 8 MCG capsule, Take 1 capsule by mouth 2 (Two) Times a Day With Meals., Disp: 60 capsule, Rfl: 0    metFORMIN (GLUCOPHAGE) 500 MG tablet, Take 1 tablet by mouth 2 (Two) Times a Day With Meals., Disp: 180 tablet, Rfl: 3    nystatin (MYCOSTATIN) 316732 UNIT/GM cream, Apply  topically to the appropriate area as directed 2 (Two) Times a Day As Needed (groin rash)., Disp: 90 g, Rfl: 0    OneTouch UltraSoft 2 Lancets misc, 1 package by Other route 4 (Four) Times a Day Before Meals & at Bedtime., Disp: 400 each, Rfl: 3    rosuvastatin (CRESTOR) 5 MG tablet, TAKE 1 TABLET BY MOUTH DAILY, Disp: 90 tablet, Rfl: 3    triamcinolone (KENALOG) 0.1 % cream, Apply  topically to the appropriate area as directed 2 (Two) Times a Day., Disp: 45 g, Rfl: 0    verapamil ER (VERELAN) 180 MG 24 hr capsule, TAKE 1 CAPSULE BY MOUTH EVERY  NIGHT, Disp: 90 capsule, Rfl: 0    vitamin D3 125 MCG (5000 UT) capsule capsule, 1 po once a month, Disp: , Rfl:     The following portions of the patient's history were reviewed and updated as appropriate: allergies, current medications, past family history, past medical history, past social history, past surgical history, and problem list.      REVIEW OF PERTINENT MEDICAL DATA    Past Medical History:   Diagnosis Date    Anemia     Ankylosing spondylitis of site in spine     Anxiety     Back pain     Callus     Cervical disc disorder     Childhood Asthma     Chronic constipation     Chronic pain disorder     Colon polyp     TA    DDD  lumbar     Deep vein thrombosis in my 20s    Depression     DEXA     2022= (0.0/-0.3)    Diabetes     DMII     Fibromyalgia     Fractures     Heart murmur     HL (hearing loss)     Hyperlipidemia     Hypertension     IBS     Inflammatory bowel disease     Injury of back     Insomnia     Joint pain     Leg pain, right     Low back pain     Lumbar spinal stenosis     Lumbosacral disc disease     Macular degeneration, left eye     MAMMO     NEG= 2020/  2022----------DECLINES    Meniere's disease     Neck pain     Neuropathy in diabetes     Nontoxic thyroid nodule     Ocular histoplasmosis     Osteoarthritis     Peripheral neuropathy     PVC     Renal insufficiency     Shingles     Spondylosis     Thoracic disc disorder     Tinnitus     Tremor     Trigeminal neuralgia     Visual impairment     Vitamin D deficiency      Past Surgical History:   Procedure Laterality Date    AMPUTATION DIGIT Left     Digit #1    APPENDECTOMY  1962    BACK SURGERY  3 times    BUNIONECTOMY Bilateral     CATARACT EXTRACTION      left    CATARACT EXTRACTION      x2    COLONOSCOPY      NEG = 2012/ 2021= TA,  NO Recall    CYST REMOVAL Bilateral     WRIST    EPIDURAL BLOCK  many    FRACTURE SURGERY  nose    HYSTERECTOMY  1980    JOINT REPLACEMENT      Rt TKR x 2    JOINT REPLACEMENT      Left THR    LAMINECTOMY      LAPAROSCOPIC CHOLECYSTECTOMY      DR. LOBATO    ORTHOPEDIC SURGERY      SPINAL FUSION      SPINE SURGERY      Lumbar Fusion    SUBTOTAL HYSTERECTOMY      TRIGGER POINT INJECTION       Family History   Problem Relation Age of Onset    Asthma Mother     COPD Mother     Heart disease Father     Alcohol abuse Father     Other Father     Arthritis Sister     Cancer Sister     Leukemia Sister     Heart attack Brother     Cancer Brother     Alcohol abuse Brother     Kidney disease Brother     Alzheimer's disease Brother     Heart disease Brother     Diabetes Brother     Hyperlipidemia Brother     Diabetes Son         she is  my daughter    Diabetes Son      Social History     Socioeconomic History    Marital status:    Tobacco Use    Smoking status: Never     Passive exposure: Never    Smokeless tobacco: Never   Vaping Use    Vaping status: Never Used   Substance and Sexual Activity    Alcohol use: Yes     Alcohol/week: 1.0 standard drink of alcohol     Types: 1 Glasses of wine per week     Comment: Rare    Drug use: No    Sexual activity: Not Currently         Review of Systems    Musculoskeletal:  Positive for arthralgias and back pain.         Vitals:    06/18/25 1110   BP: 137/71   Pulse: 99   Resp: 16   SpO2: 97%   Weight: 79.8 kg (176 lb)   PainSc: 6            Objective   Physical Exam  Musculoskeletal:         General: Tenderness present.        Legs:            Imaging Reviewed:  CT lumbar spine without contrast-5/30/2025  - Posterior fusion L3-S1 with screw and fina fixation  - 2 mm anterolisthesis of L3 on L4 and 4 mm anterolisthesis of L4 on L5  - Mild to moderate spondylosis of the upper lumbar spine  - Mild to moderate neuroforaminal stenosis throughout      Right hip x-ray-10/21/2024  - Moderate degenerative changes of right hip  - Degenerative changes of SI joints and pubic symphysis  - Osteopenia    Lumbar x-ray-9/19/2024  - Stable grade 1 anterolisthesis L4 upon L5  - Old bilateral S1 pedicle screw fractures  - L3-S1 bilateral pedicle screw and cervical fusion, fina fixation hardware is again noted.  - Fractures of bilateral S1 pedicle screws which has been previously demonstrated.  - Sacral stimulator in place     CT abdomen pelvis-2/1/2024  - Postsurgical changes noted date with rods and screws from L3 S1  - Facet joints appears to be fused from L3-S1  - Chronic fractures of S1 pedicle screws  - Mild bilateral SI joint degeneration.       Assessment:    1. Sacroiliac joint dysfunction    2. Post laminectomy syndrome    3. History of lumbar fusion    4. Mid back pain    5. Diabetic polyneuropathy associated with other specified diabetes mellitus    6. Peripheral polyneuropathy    7. Myofascial pain             Plan:   1.  Defer UDS for now.  2. We discussed trying a course of formal physical therapy.  Physical therapy can help strengthen and stretch the muscles around the joints. Continue to be as active as possible.  Patient has done more than 12 weeks of physical therapy.  Will restart physical therapy in [place].  Referral sent on 6/18/2025.  3.  Lumbar x-ray was  reviewed with patient and a also independently.  I believe patient's pain is multifactorial with right-sided buttock pain originating from the SI joint arthritis and radiculopathy most likely secondary to lumbar stenosis/postlaminectomy.  I believe there is also right hip pathology causing anterior thigh pain.  4.  Significant improvement in pain with right SI joint injection previously but pain is returning now. Patient has pain in the lower back, right SI joint tenderness was positive. Haritha test, SI joint compression and thigh thurst test were performed and were positive for SI joint pathology.  Pain is below L5 vertebral level.  Main complaint is buttock pain.  Discussed right SI joint injection. Risk includes infection, bleeding, nerve damage, headache, paraplegia. Patient understands and agrees with the plan.  5.  Significant bilateral rhomboid tenderness.  Discussed TPI.  Benefits and risks were discussed with patient.  Will schedule for TPI 3 weeks after SI joint junction.  6.patient has failed gabapentin in the past.  She is currently on Cymbalta.  Still having significant neuropathy pain.  Start Lyrica 25 mg qhs. Side effects discussed with the patient including but not limited to dizziness, blurry vision, weight gain, sleepiness, trouble concentrating, swelling of your hands and feet, dry mouth, feeling high and suicidal thoughts. Patient understands and agrees with the plan.  7.  Patient does have combination of postlaminectomy syndrome, painful diabetic neuropathy and peripheral neuropathy.  If all fails, we may consider SCS as a last option.    RTC for right SI joint injection and then TPI.     Lance Young DO  Pain Management   [facility]     INSPECT REPORT    As part of the patient's treatment plan, I may be prescribing controlled substances. The patient has been made aware of appropriate use of such medications, including potential risk of somnolence, limited ability to drive and/or work  safely, and the potential for dependence or overdose. It has also been made clear that these medications are for use by this patient only, without concomitant use of alcohol or other substances unless prescribed.     Patient has completed prescribing agreement detailing terms of continued prescribing of controlled substances, including monitoring INSPECT reports, urine drug screening, and pill counts if necessary. The patient is aware that inappropriate use will results in cessation of prescribing such medications.    INSPECT report has been reviewed and scanned into the patient's chart.

## 2025-06-18 ENCOUNTER — OFFICE VISIT (OUTPATIENT)
Dept: PAIN MEDICINE | Facility: CLINIC | Age: 85
End: 2025-06-18
Payer: MEDICARE

## 2025-06-18 VITALS
RESPIRATION RATE: 16 BRPM | OXYGEN SATURATION: 97 % | BODY MASS INDEX: 30.21 KG/M2 | WEIGHT: 176 LBS | SYSTOLIC BLOOD PRESSURE: 137 MMHG | DIASTOLIC BLOOD PRESSURE: 71 MMHG | HEART RATE: 99 BPM

## 2025-06-18 DIAGNOSIS — M54.9 MID BACK PAIN: ICD-10-CM

## 2025-06-18 DIAGNOSIS — G62.9 PERIPHERAL POLYNEUROPATHY: ICD-10-CM

## 2025-06-18 DIAGNOSIS — Z98.1 HISTORY OF LUMBAR FUSION: ICD-10-CM

## 2025-06-18 DIAGNOSIS — M53.3 SACROILIAC JOINT DYSFUNCTION: Primary | ICD-10-CM

## 2025-06-18 DIAGNOSIS — M96.1 POST LAMINECTOMY SYNDROME: ICD-10-CM

## 2025-06-18 DIAGNOSIS — M79.18 MYOFASCIAL PAIN: ICD-10-CM

## 2025-06-18 DIAGNOSIS — E13.42 DIABETIC POLYNEUROPATHY ASSOCIATED WITH OTHER SPECIFIED DIABETES MELLITUS: ICD-10-CM

## 2025-06-18 RX ORDER — PREGABALIN 25 MG/1
25 CAPSULE ORAL
Qty: 30 CAPSULE | Refills: 1 | Status: SHIPPED | OUTPATIENT
Start: 2025-06-18 | End: 2025-08-17

## 2025-07-03 ENCOUNTER — HOSPITAL ENCOUNTER (OUTPATIENT)
Dept: GENERAL RADIOLOGY | Facility: HOSPITAL | Age: 85
Discharge: HOME | End: 2025-07-03
Admitting: STUDENT IN AN ORGANIZED HEALTH CARE EDUCATION/TRAINING PROGRAM
Payer: MEDICARE

## 2025-07-03 DIAGNOSIS — M79.18 MYOFASCIAL PAIN: ICD-10-CM

## 2025-07-03 PROCEDURE — 72070 X-RAY EXAM THORAC SPINE 2VWS: CPT

## 2025-07-08 RX ORDER — LUBIPROSTONE 8 UG/1
CAPSULE ORAL
Qty: 60 CAPSULE | Refills: 0 | Status: SHIPPED | OUTPATIENT
Start: 2025-07-08

## 2025-07-08 NOTE — TELEPHONE ENCOUNTER
Rx Refill Note  Requested Prescriptions     Pending Prescriptions Disp Refills    lubiprostone (AMITIZA) 8 MCG capsule [Pharmacy Med Name: LUBIPROSTONE 8 MCG CAPSULE] 60 capsule 0     Sig: TAKE 1 CAPSULE BY MOUTH 2 TIMES A DAY WITH A MEAL      Last office visit with prescribing clinician: 5/15/2025   Last telemedicine visit with prescribing clinician: Visit date not found   Next office visit with prescribing clinician: 8/15/2025        Lipid Panel (05/14/2024 09:59)                  Would you like a call back once the refill request has been completed: [] Yes [] No    If the office needs to give you a call back, can they leave a voicemail: [] Yes [] No    Miriam Mello, RT  07/08/25, 12:08 EDT

## 2025-07-09 ENCOUNTER — HOSPITAL ENCOUNTER (OUTPATIENT)
Dept: PAIN MEDICINE | Facility: HOSPITAL | Age: 85
Discharge: HOME | End: 2025-07-09
Payer: MEDICARE

## 2025-07-09 VITALS
OXYGEN SATURATION: 93 % | SYSTOLIC BLOOD PRESSURE: 160 MMHG | RESPIRATION RATE: 16 BRPM | TEMPERATURE: 97.3 F | DIASTOLIC BLOOD PRESSURE: 75 MMHG | HEART RATE: 85 BPM

## 2025-07-09 DIAGNOSIS — M53.3 SACROILIAC JOINT DYSFUNCTION: Primary | ICD-10-CM

## 2025-07-09 DIAGNOSIS — R52 PAIN: ICD-10-CM

## 2025-07-09 PROCEDURE — 77003 FLUOROGUIDE FOR SPINE INJECT: CPT

## 2025-07-09 PROCEDURE — 25510000001 IOPAMIDOL 41 % SOLUTION: Performed by: STUDENT IN AN ORGANIZED HEALTH CARE EDUCATION/TRAINING PROGRAM

## 2025-07-09 PROCEDURE — 25010000002 METHYLPREDNISOLONE PER 40 MG: Performed by: STUDENT IN AN ORGANIZED HEALTH CARE EDUCATION/TRAINING PROGRAM

## 2025-07-09 RX ORDER — METHYLPREDNISOLONE ACETATE 40 MG/ML
40 INJECTION, SUSPENSION INTRA-ARTICULAR; INTRALESIONAL; INTRAMUSCULAR; SOFT TISSUE ONCE
Status: COMPLETED | OUTPATIENT
Start: 2025-07-09 | End: 2025-07-09

## 2025-07-09 RX ORDER — IOPAMIDOL 408 MG/ML
3 INJECTION, SOLUTION INTRATHECAL
Status: COMPLETED | OUTPATIENT
Start: 2025-07-09 | End: 2025-07-09

## 2025-07-09 RX ADMIN — METHYLPREDNISOLONE ACETATE 40 MG: 40 INJECTION, SUSPENSION INTRA-ARTICULAR; INTRALESIONAL; INTRAMUSCULAR; INTRASYNOVIAL; SOFT TISSUE at 11:36

## 2025-07-09 RX ADMIN — IOPAMIDOL 3 ML: 408 INJECTION, SOLUTION INTRATHECAL at 11:35

## 2025-07-09 NOTE — PROCEDURES
RIGHT SI Joint Arthropathy      POSTOPERATIVE DIAGNOSIS:  Same.     PROCEDURE: RIGHT sacroiliac joint steroid injection     PROCEDURE IN DETAIL:  The patient was placed in a prone position and the lower back was prepped with chloraprep and draped in the usual sterile fashion.  The skin overlaying the RIGHT SI joint was infiltrated with 1% lidocaine for local anesthesia.  A 22-gauge 3.5 inch spinal needle was inserted through the skin under fluoroscopic guidance until we got to the lower third of the SI joint. Another needle was placed in the upper third of the joint. 2 cc of 0.25% marcaine with depomedrol was injected at each level. A total of 4 cc of 0.25% marcaine with 40 mg of depo-medrol was injected.     After the procedure the needles were flushed with preservative free local anesthetic and removed. Skin was cleaned and a sterile dressing was applied.    Following the procedure the patient's vital signs were stable. The patient was discharged home in good condition after being given discharge instructions.    COMPLICATIONS: None    Lance Young DO  Pain Management   [facility]

## 2025-07-09 NOTE — H&P
H and P reviewed from previous visit and no changes to patient's clinical presentation. Will proceed with procedure as planned.     Lance Young DO  Pain Management   [facility]

## 2025-07-10 ENCOUNTER — HOSPITAL ENCOUNTER (OUTPATIENT)
Facility: HOSPITAL | Age: 85
Discharge: HOME | End: 2025-07-10
Attending: EMERGENCY MEDICINE | Admitting: EMERGENCY MEDICINE
Payer: MEDICARE

## 2025-07-10 ENCOUNTER — TELEPHONE (OUTPATIENT)
Dept: PAIN MEDICINE | Facility: HOSPITAL | Age: 85
End: 2025-07-10
Payer: MEDICARE

## 2025-07-10 VITALS
TEMPERATURE: 97.8 F | WEIGHT: 170 LBS | DIASTOLIC BLOOD PRESSURE: 92 MMHG | HEIGHT: 64 IN | HEART RATE: 98 BPM | SYSTOLIC BLOOD PRESSURE: 198 MMHG | BODY MASS INDEX: 29.02 KG/M2 | RESPIRATION RATE: 19 BRPM | OXYGEN SATURATION: 95 %

## 2025-07-10 DIAGNOSIS — N39.0 ACUTE UTI: Primary | ICD-10-CM

## 2025-07-10 LAB
BACTERIA UR QL AUTO: ABNORMAL /HPF
BILIRUB UR QL STRIP: NEGATIVE
CLARITY UR: CLEAR
COLOR UR: YELLOW
GLUCOSE UR STRIP-MCNC: NEGATIVE MG/DL
HGB UR QL STRIP.AUTO: ABNORMAL
HYALINE CASTS UR QL AUTO: ABNORMAL /LPF
KETONES UR QL STRIP: NEGATIVE
LEUKOCYTE ESTERASE UR QL STRIP.AUTO: ABNORMAL
NITRITE UR QL STRIP: NEGATIVE
PH UR STRIP.AUTO: 6.5 [PH] (ref 5–8)
PROT UR QL STRIP: NEGATIVE
RBC # UR STRIP: ABNORMAL /HPF
REF LAB TEST METHOD: ABNORMAL
SP GR UR STRIP: 1.01 (ref 1–1.03)
SQUAMOUS #/AREA URNS HPF: ABNORMAL /HPF
UROBILINOGEN UR QL STRIP: ABNORMAL
WBC # UR STRIP: ABNORMAL /HPF

## 2025-07-10 PROCEDURE — 99214 OFFICE O/P EST MOD 30 MIN: CPT | Performed by: NURSE PRACTITIONER

## 2025-07-10 PROCEDURE — 87086 URINE CULTURE/COLONY COUNT: CPT | Performed by: EMERGENCY MEDICINE

## 2025-07-10 PROCEDURE — G0463 HOSPITAL OUTPT CLINIC VISIT: HCPCS | Performed by: NURSE PRACTITIONER

## 2025-07-10 PROCEDURE — 81001 URINALYSIS AUTO W/SCOPE: CPT | Performed by: EMERGENCY MEDICINE

## 2025-07-10 RX ORDER — NITROFURANTOIN 25; 75 MG/1; MG/1
100 CAPSULE ORAL EVERY 12 HOURS SCHEDULED
Status: COMPLETED | OUTPATIENT
Start: 2025-07-10 | End: 2025-07-10

## 2025-07-10 RX ORDER — PHENAZOPYRIDINE HYDROCHLORIDE 200 MG/1
200 TABLET, FILM COATED ORAL 3 TIMES DAILY PRN
Qty: 6 TABLET | Refills: 0 | Status: SHIPPED | OUTPATIENT
Start: 2025-07-10 | End: 2025-07-12

## 2025-07-10 RX ORDER — NITROFURANTOIN 25; 75 MG/1; MG/1
100 CAPSULE ORAL 2 TIMES DAILY
Qty: 14 CAPSULE | Refills: 0 | Status: SHIPPED | OUTPATIENT
Start: 2025-07-10 | End: 2025-07-17

## 2025-07-10 RX ORDER — PHENAZOPYRIDINE HYDROCHLORIDE 200 MG/1
200 TABLET, FILM COATED ORAL ONCE
Status: COMPLETED | OUTPATIENT
Start: 2025-07-10 | End: 2025-07-10

## 2025-07-10 RX ADMIN — NITROFURANTOIN (MONOHYDRATE/MACROCRYSTALS) 100 MG: 75; 25 CAPSULE ORAL at 17:53

## 2025-07-10 RX ADMIN — PHENAZOPYRIDINE 200 MG: 200 TABLET ORAL at 17:53

## 2025-07-10 NOTE — DISCHARGE INSTRUCTIONS
Follow-up with your primary care doctor.  Also schedule an appointment for follow-up with your urologist.  Take the medication as directed and until gone.  Take food with the Pyridium to help with nausea.    Be sure to drink plenty of water and other fluids.  Popsicles and Jell-O count as fluids.    Return to the emergency department for worsening symptoms, development of fever, abdominal pain, diarrhea, chest pain or shortness of breath

## 2025-07-10 NOTE — FSED PROVIDER NOTE
"Subjective   History of Present Illness  84-year-old female presents with dysuria and increased frequency that started around 4 PM today.  She states that \"hit her all of a sudden\".  Her UA shows a small amount of blood and moderate leukocytes negative nitrates.  Patient reports she has not felt good for about the past 2 weeks she has been feeling \"draggy\".  Patient has a history of diabetes, hypertension and high cholesterol.  She has had a recent cortisone injection which she knows will elevate her glucose.  She also has a urinary stimulator and sees urology.    History provided by:  Patient   used: No        Review of Systems    Past Medical History:   Diagnosis Date    Anemia     Ankylosing spondylitis of site in spine     Anxiety     Back pain     Callus     Cervical disc disorder     Childhood Asthma     Chronic constipation     Chronic pain disorder     Colon polyp     TA    DDD  lumbar     Deep vein thrombosis in my 20s    Depression     DEXA     2022= (0.0/-0.3)    Diabetes     DMII     Fibromyalgia     Fractures     Heart murmur     HL (hearing loss)     Hyperlipidemia     Hypertension     IBS     Inflammatory bowel disease     Injury of back     Insomnia     Joint pain     Leg pain, right     Low back pain     Lumbar spinal stenosis     Lumbosacral disc disease     Macular degeneration, left eye     MAMMO     NEG= 2020/ 2022----------DECLINES    Meniere's disease     Neck pain     Neuropathy in diabetes     Nontoxic thyroid nodule     Ocular histoplasmosis     Osteoarthritis     Peripheral neuropathy     PVC     Renal insufficiency     Shingles     Spondylosis     Thoracic disc disorder     Tinnitus     Tremor     Trigeminal neuralgia     Visual impairment     Vitamin D deficiency        Allergies   Allergen Reactions    Compazine [Prochlorperazine Edisylate] Anxiety    Amoxicillin Diarrhea    Aspirin GI Intolerance and Unknown - High Severity     Aspirin    Cortisone Other (See " Comments)     Patient states elevated blood sugar    Erythromycin Diarrhea    Gabapentin     Levofloxacin Diarrhea    Lipitor [Atorvastatin] Myalgia    Nsaids GI Intolerance and Unknown - High Severity    Pravastatin Myalgia    Pregabalin Mental Status Change    Sitagliptin Nausea Only    Sulfa Antibiotics Nausea Only and Unknown - High Severity    Theophylline        Past Surgical History:   Procedure Laterality Date    AMPUTATION DIGIT Left     Digit #1    APPENDECTOMY  1962    BACK SURGERY  3 times    BUNIONECTOMY Bilateral     CATARACT EXTRACTION      left    CATARACT EXTRACTION      x2    COLONOSCOPY      NEG = 2012/ 2021= TA,  NO Recall    CYST REMOVAL Bilateral     WRIST    EPIDURAL BLOCK  many    FRACTURE SURGERY  nose    HYSTERECTOMY  1980    JOINT REPLACEMENT      Rt TKR x 2    JOINT REPLACEMENT      Left THR    LAMINECTOMY      LAPAROSCOPIC CHOLECYSTECTOMY      DR. LOBATO    ORTHOPEDIC SURGERY      SPINAL FUSION      SPINE SURGERY      Lumbar Fusion    SUBTOTAL HYSTERECTOMY      TRIGGER POINT INJECTION         Family History   Problem Relation Age of Onset    Asthma Mother     COPD Mother     Heart disease Father     Alcohol abuse Father     Other Father     Arthritis Sister     Cancer Sister     Leukemia Sister     Heart attack Brother     Cancer Brother     Alcohol abuse Brother     Kidney disease Brother     Alzheimer's disease Brother     Heart disease Brother     Diabetes Brother     Hyperlipidemia Brother     Diabetes Son         she is  my daughter    Diabetes Son        Social History     Socioeconomic History    Marital status:    Tobacco Use    Smoking status: Never     Passive exposure: Never    Smokeless tobacco: Never   Vaping Use    Vaping status: Never Used   Substance and Sexual Activity    Alcohol use: Yes     Alcohol/week: 1.0 standard drink of alcohol     Types: 1 Glasses of wine per week     Comment: Rare    Drug use: No    Sexual activity: Not Currently           Objective  "  Physical Exam  Vitals and nursing note reviewed.   Constitutional:       General: She is not in acute distress.     Appearance: Normal appearance. She is not ill-appearing, toxic-appearing or diaphoretic.   Cardiovascular:      Rate and Rhythm: Normal rate and regular rhythm.      Heart sounds: Normal heart sounds. No murmur heard.     No friction rub. No gallop.   Pulmonary:      Effort: Pulmonary effort is normal. No respiratory distress.      Breath sounds: Normal breath sounds. No stridor. No wheezing, rhonchi or rales.   Abdominal:      Tenderness: There is no right CVA tenderness or left CVA tenderness.   Skin:     General: Skin is warm and dry.   Neurological:      Mental Status: She is alert and oriented to person, place, and time.   Psychiatric:         Mood and Affect: Mood normal.         Behavior: Behavior normal.         Procedures           ED Course  ED Course as of 07/10/25 1750   Thu Jul 10, 2025   1738 Color, UA: Yellow [SJ]   1738 Appearance, UA: Clear [SJ]   1738 pH, UA: 6.5 [SJ]   1738 Specific Gravity, UA: 1.015 [SJ]   1738 Glucose: Negative [SJ]   1738 Ketones, UA: Negative [SJ]   1738 Bilirubin, UA: Negative [SJ]   1738 Blood, UA(!): Small (1+) [SJ]   1738 Protein, UA: Negative [SJ]   1738 Leukocytes, UA(!): Moderate (2+) [SJ]   1738 Nitrite, UA: Negative [SJ]   1738 Urobilinogen, UA: 0.2 E.U./dL [SJ]      ED Course User Index  [SJ] Apple Red APRN                                           Medical Decision Making  84-year-old female presents with dysuria and increased frequency that started around 4 PM today.  She states that \"hit her all of a sudden\".  Her UA shows a small amount of blood and moderate leukocytes negative nitrates.  Patient reports she has not felt good for about the past 2 weeks she has been feeling \"draggy\".  Patient has a history of diabetes, hypertension and high cholesterol.  She has had a recent cortisone injection which she knows will elevate her glucose.  She " also has a urinary stimulator and sees urology.  Differential diagnoses include acute UTI, cystitis.  This does not constitute all considered diagnoses.  Patient does see urology, I advised her to follow-up with them.  A culture has been ordered.  Patient was placed on Macrobid and Pyridium.  A dose was given here in the emergency department.  Reasons for return were discussed.  Patient verbalized understanding.    Amount and/or Complexity of Data Reviewed  Labs:  Decision-making details documented in ED Course.    Risk  Prescription drug management.        Final diagnoses:   Acute UTI       ED Disposition  ED Disposition       ED Disposition   Discharge    Condition   Stable    Comment   --               Henny Long DO  [address]  [address]  [place] [address]  [phone]    Call            Medication List        New Prescriptions      nitrofurantoin (macrocrystal-monohydrate) 100 MG capsule  Commonly known as: MACROBID  Take 1 capsule by mouth 2 (Two) Times a Day for 7 days.     phenazopyridine 200 MG tablet  Commonly known as: PYRIDIUM  Take 1 tablet by mouth 3 (Three) Times a Day As Needed for Bladder Spasms for up to 2 days.               Where to Get Your Medications        These medications were sent to [facility] [id number] - [place], [place] - [address] [place] [address] [address]  - [phone]  - [phone] [address] [place] [address]      Phone: [phone]   nitrofurantoin (macrocrystal-monohydrate) 100 MG capsule  phenazopyridine 200 MG tablet

## 2025-07-10 NOTE — TELEPHONE ENCOUNTER
Ok for HUB to read. Attempted post procedure phone call but no answer.  No message left on answering machine . Pt. did not identify self on answering machine so no message left. Patient does not need to call back unless they are have questions/concerns.

## 2025-07-12 LAB — BACTERIA SPEC AEROBE CULT: NO GROWTH

## 2025-07-18 RX ORDER — VERAPAMIL HYDROCHLORIDE 180 MG/1
180 CAPSULE, DELAYED RELEASE ORAL NIGHTLY
Qty: 90 CAPSULE | Refills: 0 | Status: SHIPPED | OUTPATIENT
Start: 2025-07-18

## 2025-07-29 ENCOUNTER — TELEPHONE (OUTPATIENT)
Dept: PAIN MEDICINE | Facility: CLINIC | Age: 85
End: 2025-07-29
Payer: MEDICARE

## 2025-07-29 NOTE — TELEPHONE ENCOUNTER
"Caller: Monica Pagan \"MONICA PAGAN\"    Relationship: Self    Best call back number:     What is the best time to reach you: ANYTIME    Who are you requesting to speak with (clinical staff, provider,  specific staff member): DR. WILSON    What was the call regarding: PATIENT HAS TRIGGER POINT INJECTIONS SCHEDULED FOR TOMORROW 7.30.25 AND STATES SHE ISN'T SURE IF SHE NEEDS A  OR NOT. PATIENT STATES IF SHE DOES NEED A  SHE MAY HAVE TO RESCHEDULE DO TO NOT HAVING SOMEONE ELSE TO DRIVE HER AS OF RIGHT NOW. PLEASE CONTACT THE PATIENT TO ADVICE IF SHE CAN DRIVE HERSELF.    Is it okay if the provider responds through [employer]t: CALL    "

## 2025-07-30 ENCOUNTER — PROCEDURE VISIT (OUTPATIENT)
Dept: PAIN MEDICINE | Facility: CLINIC | Age: 85
End: 2025-07-30
Payer: MEDICARE

## 2025-07-30 VITALS
RESPIRATION RATE: 16 BRPM | HEART RATE: 86 BPM | SYSTOLIC BLOOD PRESSURE: 145 MMHG | DIASTOLIC BLOOD PRESSURE: 75 MMHG | OXYGEN SATURATION: 98 % | BODY MASS INDEX: 29.18 KG/M2 | WEIGHT: 170 LBS

## 2025-07-30 DIAGNOSIS — M79.18 MYOFASCIAL PAIN: Primary | ICD-10-CM

## 2025-07-30 RX ORDER — BUPIVACAINE HYDROCHLORIDE 2.5 MG/ML
10 INJECTION, SOLUTION INFILTRATION; PERINEURAL ONCE
Status: COMPLETED | OUTPATIENT
Start: 2025-07-30 | End: 2025-07-30

## 2025-07-30 RX ORDER — METHYLPREDNISOLONE ACETATE 40 MG/ML
40 INJECTION, SUSPENSION INTRA-ARTICULAR; INTRALESIONAL; INTRAMUSCULAR; SOFT TISSUE ONCE
Status: COMPLETED | OUTPATIENT
Start: 2025-07-30 | End: 2025-07-30

## 2025-07-30 RX ADMIN — BUPIVACAINE HYDROCHLORIDE 10 ML: 2.5 INJECTION, SOLUTION INFILTRATION; PERINEURAL at 14:51

## 2025-07-30 RX ADMIN — METHYLPREDNISOLONE ACETATE 40 MG: 40 INJECTION, SUSPENSION INTRA-ARTICULAR; INTRALESIONAL; INTRAMUSCULAR; SOFT TISSUE at 14:52

## 2025-07-30 NOTE — PROGRESS NOTES
H and P reviewed from previous visit and no changes to patient's clinical presentation. Will proceed with procedure as planned.     Lance Young DO  Pain Management   [facility]     Procedure: Trigger point injection  Pre and Post op Dx: Myofascial pain syndrome    A trigger point injection was performed at the site of maximal tenderness using 10 ml of 0.25% Bupivicaine and 40 mg depo-medrol. This was well tolerated.      Muscles injected - b/l rhomboids     Lance Young DO  Pain Management   [facility]

## 2025-08-15 ENCOUNTER — OFFICE VISIT (OUTPATIENT)
Dept: FAMILY MEDICINE CLINIC | Facility: CLINIC | Age: 85
End: 2025-08-15
Payer: MEDICARE

## 2025-08-15 VITALS
BODY MASS INDEX: 30.22 KG/M2 | HEIGHT: 64 IN | SYSTOLIC BLOOD PRESSURE: 130 MMHG | RESPIRATION RATE: 16 BRPM | OXYGEN SATURATION: 97 % | DIASTOLIC BLOOD PRESSURE: 71 MMHG | TEMPERATURE: 97.7 F | WEIGHT: 177 LBS | HEART RATE: 91 BPM

## 2025-08-15 DIAGNOSIS — E11.42 TYPE 2 DIABETES MELLITUS WITH PERIPHERAL NEUROPATHY: Primary | ICD-10-CM

## 2025-08-15 DIAGNOSIS — D50.8 IRON DEFICIENCY ANEMIA SECONDARY TO INADEQUATE DIETARY IRON INTAKE: ICD-10-CM

## 2025-08-15 DIAGNOSIS — E13.42 DIABETIC POLYNEUROPATHY ASSOCIATED WITH OTHER SPECIFIED DIABETES MELLITUS: ICD-10-CM

## 2025-08-15 DIAGNOSIS — G62.9 PERIPHERAL POLYNEUROPATHY: ICD-10-CM

## 2025-08-15 LAB
EXPIRATION DATE: ABNORMAL
HBA1C MFR BLD: 6.7 % (ref 4.5–5.7)
Lab: ABNORMAL

## 2025-08-15 RX ORDER — PREGABALIN 25 MG/1
25 CAPSULE ORAL
Start: 2025-08-15 | End: 2025-10-14

## 2025-08-16 DIAGNOSIS — E13.42 DIABETIC POLYNEUROPATHY ASSOCIATED WITH OTHER SPECIFIED DIABETES MELLITUS: ICD-10-CM

## 2025-08-16 DIAGNOSIS — G62.9 PERIPHERAL POLYNEUROPATHY: ICD-10-CM

## 2025-08-18 RX ORDER — PREGABALIN 25 MG/1
25 CAPSULE ORAL
Qty: 30 CAPSULE | OUTPATIENT
Start: 2025-08-18

## 2025-08-18 RX ORDER — BUSPIRONE HYDROCHLORIDE 7.5 MG/1
7.5 TABLET ORAL 3 TIMES DAILY
Qty: 270 TABLET | Refills: 0 | Status: SHIPPED | OUTPATIENT
Start: 2025-08-18 | End: 2025-08-21 | Stop reason: SDUPTHER

## 2025-08-20 ENCOUNTER — OFFICE VISIT (OUTPATIENT)
Dept: PAIN MEDICINE | Facility: CLINIC | Age: 85
End: 2025-08-20
Payer: MEDICARE

## 2025-08-20 VITALS
DIASTOLIC BLOOD PRESSURE: 75 MMHG | BODY MASS INDEX: 30.38 KG/M2 | OXYGEN SATURATION: 95 % | HEART RATE: 82 BPM | WEIGHT: 177 LBS | SYSTOLIC BLOOD PRESSURE: 123 MMHG | RESPIRATION RATE: 16 BRPM

## 2025-08-20 DIAGNOSIS — E13.42 DIABETIC POLYNEUROPATHY ASSOCIATED WITH OTHER SPECIFIED DIABETES MELLITUS: Primary | ICD-10-CM

## 2025-08-20 DIAGNOSIS — M25.551 RIGHT HIP PAIN: ICD-10-CM

## 2025-08-20 DIAGNOSIS — Z98.1 HISTORY OF LUMBAR FUSION: ICD-10-CM

## 2025-08-20 DIAGNOSIS — M53.3 SACROILIAC JOINT DYSFUNCTION: ICD-10-CM

## 2025-08-20 DIAGNOSIS — M96.1 POST LAMINECTOMY SYNDROME: ICD-10-CM

## 2025-08-20 DIAGNOSIS — G62.9 PERIPHERAL POLYNEUROPATHY: ICD-10-CM

## 2025-08-20 RX ORDER — PREGABALIN 50 MG/1
50 CAPSULE ORAL
Qty: 30 CAPSULE | Refills: 1 | Status: SHIPPED | OUTPATIENT
Start: 2025-08-20 | End: 2025-10-19

## 2025-08-21 RX ORDER — VERAPAMIL HYDROCHLORIDE 180 MG/1
180 CAPSULE, DELAYED RELEASE ORAL NIGHTLY
Qty: 90 CAPSULE | Refills: 0 | Status: SHIPPED | OUTPATIENT
Start: 2025-08-21

## 2025-08-21 RX ORDER — BUSPIRONE HYDROCHLORIDE 7.5 MG/1
7.5 TABLET ORAL 3 TIMES DAILY
Qty: 270 TABLET | Refills: 0 | Status: SHIPPED | OUTPATIENT
Start: 2025-08-21

## 2025-08-21 RX ORDER — ROSUVASTATIN CALCIUM 5 MG/1
5 TABLET, COATED ORAL DAILY
Qty: 90 TABLET | Refills: 1 | Status: SHIPPED | OUTPATIENT
Start: 2025-08-21

## 2025-08-21 RX ORDER — DULOXETIN HYDROCHLORIDE 60 MG/1
60 CAPSULE, DELAYED RELEASE ORAL DAILY
Qty: 90 CAPSULE | Refills: 3 | Status: SHIPPED | OUTPATIENT
Start: 2025-08-21

## 2025-08-22 ENCOUNTER — CLINICAL SUPPORT (OUTPATIENT)
Dept: FAMILY MEDICINE CLINIC | Facility: CLINIC | Age: 85
End: 2025-08-22
Payer: MEDICARE

## 2025-08-22 DIAGNOSIS — E78.2 MIXED HYPERLIPIDEMIA: ICD-10-CM

## 2025-08-22 DIAGNOSIS — D50.8 IRON DEFICIENCY ANEMIA SECONDARY TO INADEQUATE DIETARY IRON INTAKE: ICD-10-CM

## 2025-08-22 DIAGNOSIS — E55.9 VITAMIN D DEFICIENCY: ICD-10-CM

## 2025-08-22 DIAGNOSIS — E11.42 TYPE 2 DIABETES MELLITUS WITH PERIPHERAL NEUROPATHY: ICD-10-CM

## 2025-08-22 LAB
BASOPHILS # BLD AUTO: 0.05 10*3/MM3 (ref 0–0.2)
BASOPHILS NFR BLD AUTO: 0.6 % (ref 0–1.5)
DEPRECATED RDW RBC AUTO: 44.1 FL (ref 37–54)
EOSINOPHIL # BLD AUTO: 0.19 10*3/MM3 (ref 0–0.4)
EOSINOPHIL NFR BLD AUTO: 2.3 % (ref 0.3–6.2)
ERYTHROCYTE [DISTWIDTH] IN BLOOD BY AUTOMATED COUNT: 13.1 % (ref 12.3–15.4)
HCT VFR BLD AUTO: 41.4 % (ref 34–46.6)
HGB BLD-MCNC: 13.8 G/DL (ref 12–15.9)
IMM GRANULOCYTES # BLD AUTO: 0.02 10*3/MM3 (ref 0–0.05)
IMM GRANULOCYTES NFR BLD AUTO: 0.2 % (ref 0–0.5)
LYMPHOCYTES # BLD AUTO: 2.12 10*3/MM3 (ref 0.7–3.1)
LYMPHOCYTES NFR BLD AUTO: 25.4 % (ref 19.6–45.3)
MCH RBC QN AUTO: 30.7 PG (ref 26.6–33)
MCHC RBC AUTO-ENTMCNC: 33.3 G/DL (ref 31.5–35.7)
MCV RBC AUTO: 92.2 FL (ref 79–97)
MONOCYTES # BLD AUTO: 0.46 10*3/MM3 (ref 0.1–0.9)
MONOCYTES NFR BLD AUTO: 5.5 % (ref 5–12)
NEUTROPHILS NFR BLD AUTO: 5.52 10*3/MM3 (ref 1.7–7)
NEUTROPHILS NFR BLD AUTO: 66 % (ref 42.7–76)
NRBC BLD AUTO-RTO: 0 /100 WBC (ref 0–0.2)
PLATELET # BLD AUTO: 220 10*3/MM3 (ref 140–450)
PMV BLD AUTO: 10.6 FL (ref 6–12)
RBC # BLD AUTO: 4.49 10*6/MM3 (ref 3.77–5.28)
WBC NRBC COR # BLD AUTO: 8.36 10*3/MM3 (ref 3.4–10.8)

## 2025-08-22 PROCEDURE — 36415 COLL VENOUS BLD VENIPUNCTURE: CPT | Performed by: FAMILY MEDICINE

## 2025-08-23 LAB
25(OH)D3 SERPL-MCNC: 34.9 NG/ML (ref 30–100)
ALBUMIN SERPL-MCNC: 4.2 G/DL (ref 3.5–5.2)
ALBUMIN UR-MCNC: <1.2 MG/DL
ALBUMIN/GLOB SERPL: 1.6 G/DL
ALP SERPL-CCNC: 81 U/L (ref 39–117)
ALT SERPL W P-5'-P-CCNC: 13 U/L (ref 1–33)
ANION GAP SERPL CALCULATED.3IONS-SCNC: 14 MMOL/L (ref 5–15)
AST SERPL-CCNC: 20 U/L (ref 1–32)
BILIRUB SERPL-MCNC: 0.5 MG/DL (ref 0–1.2)
BUN SERPL-MCNC: 16 MG/DL (ref 8–23)
BUN/CREAT SERPL: 12.7 (ref 7–25)
CALCIUM SPEC-SCNC: 10.3 MG/DL (ref 8.6–10.5)
CHLORIDE SERPL-SCNC: 103 MMOL/L (ref 98–107)
CHOLEST SERPL-MCNC: 139 MG/DL (ref 0–200)
CO2 SERPL-SCNC: 25 MMOL/L (ref 22–29)
CREAT SERPL-MCNC: 1.26 MG/DL (ref 0.57–1)
CREAT UR-MCNC: 57.8 MG/DL
EGFRCR SERPLBLD CKD-EPI 2021: 42.4 ML/MIN/1.73
FERRITIN SERPL-MCNC: 65.4 NG/ML (ref 13–150)
GLOBULIN UR ELPH-MCNC: 2.7 GM/DL
GLUCOSE SERPL-MCNC: 100 MG/DL (ref 65–99)
HBA1C MFR BLD: 6.9 % (ref 4.8–5.6)
HDLC SERPL-MCNC: 59 MG/DL (ref 40–60)
IRON 24H UR-MRATE: 100 MCG/DL (ref 37–145)
IRON SATN MFR SERPL: 24 % (ref 20–50)
LDLC SERPL CALC-MCNC: 64 MG/DL (ref 0–100)
LDLC/HDLC SERPL: 1.07 {RATIO}
MICROALBUMIN/CREAT UR: NORMAL MG/G{CREAT}
POTASSIUM SERPL-SCNC: 4.6 MMOL/L (ref 3.5–5.2)
PROT SERPL-MCNC: 6.9 G/DL (ref 6–8.5)
SODIUM SERPL-SCNC: 142 MMOL/L (ref 136–145)
TIBC SERPL-MCNC: 419 MCG/DL (ref 298–536)
TRANSFERRIN SERPL-MCNC: 281 MG/DL (ref 200–360)
TRIGL SERPL-MCNC: 83 MG/DL (ref 0–150)
VIT B12 BLD-MCNC: 1754 PG/ML (ref 211–946)
VLDLC SERPL-MCNC: 16 MG/DL (ref 5–40)